# Patient Record
Sex: MALE | Race: WHITE | NOT HISPANIC OR LATINO | Employment: UNEMPLOYED | URBAN - NONMETROPOLITAN AREA
[De-identification: names, ages, dates, MRNs, and addresses within clinical notes are randomized per-mention and may not be internally consistent; named-entity substitution may affect disease eponyms.]

---

## 2017-01-03 VITALS
BODY MASS INDEX: 39.34 KG/M2 | HEIGHT: 68 IN | DIASTOLIC BLOOD PRESSURE: 70 MMHG | OXYGEN SATURATION: 98 % | WEIGHT: 259.6 LBS | TEMPERATURE: 98.1 F | RESPIRATION RATE: 18 BRPM | SYSTOLIC BLOOD PRESSURE: 152 MMHG | HEART RATE: 59 BPM

## 2017-01-03 RX ORDER — POTASSIUM CHLORIDE 20 MEQ/1
20 TABLET, EXTENDED RELEASE ORAL DAILY
COMMUNITY
End: 2017-01-06 | Stop reason: SDUPTHER

## 2017-01-03 RX ORDER — LISINOPRIL AND HYDROCHLOROTHIAZIDE 12.5; 1 MG/1; MG/1
1 TABLET ORAL DAILY
COMMUNITY
End: 2017-01-06 | Stop reason: SDUPTHER

## 2017-01-03 RX ORDER — GABAPENTIN 100 MG/1
100 CAPSULE ORAL 2 TIMES DAILY
COMMUNITY
End: 2017-01-06 | Stop reason: SDUPTHER

## 2017-01-03 RX ORDER — FUROSEMIDE 40 MG/1
40 TABLET ORAL 2 TIMES DAILY
COMMUNITY
End: 2017-01-06 | Stop reason: SDUPTHER

## 2017-01-06 ENCOUNTER — OFFICE VISIT (OUTPATIENT)
Dept: FAMILY MEDICINE CLINIC | Facility: CLINIC | Age: 62
End: 2017-01-06

## 2017-01-06 VITALS
WEIGHT: 247.2 LBS | OXYGEN SATURATION: 96 % | TEMPERATURE: 98.2 F | HEART RATE: 59 BPM | RESPIRATION RATE: 16 BRPM | DIASTOLIC BLOOD PRESSURE: 76 MMHG | HEIGHT: 68 IN | SYSTOLIC BLOOD PRESSURE: 124 MMHG | BODY MASS INDEX: 37.47 KG/M2

## 2017-01-06 DIAGNOSIS — Z11.59 NEED FOR HEPATITIS C SCREENING TEST: ICD-10-CM

## 2017-01-06 DIAGNOSIS — E11.40 TYPE 2 DIABETES MELLITUS WITH DIABETIC NEUROPATHY, WITHOUT LONG-TERM CURRENT USE OF INSULIN (HCC): ICD-10-CM

## 2017-01-06 DIAGNOSIS — E11.9 ENCOUNTER FOR DIABETIC FOOT EXAM (HCC): ICD-10-CM

## 2017-01-06 DIAGNOSIS — Z01.00 DIABETIC EYE EXAM (HCC): ICD-10-CM

## 2017-01-06 DIAGNOSIS — R60.0 LOCALIZED EDEMA: ICD-10-CM

## 2017-01-06 DIAGNOSIS — E11.9 CONTROLLED TYPE 2 DIABETES MELLITUS WITHOUT COMPLICATION, WITHOUT LONG-TERM CURRENT USE OF INSULIN (HCC): ICD-10-CM

## 2017-01-06 DIAGNOSIS — E11.9 DIABETIC EYE EXAM (HCC): ICD-10-CM

## 2017-01-06 DIAGNOSIS — I10 ESSENTIAL HYPERTENSION: Primary | ICD-10-CM

## 2017-01-06 DIAGNOSIS — Z53.20 COLONOSCOPY REFUSED: ICD-10-CM

## 2017-01-06 DIAGNOSIS — E78.2 MIXED HYPERLIPIDEMIA: ICD-10-CM

## 2017-01-06 DIAGNOSIS — Z12.5 SCREENING PSA (PROSTATE SPECIFIC ANTIGEN): ICD-10-CM

## 2017-01-06 DIAGNOSIS — E87.6 HYPOKALEMIA: ICD-10-CM

## 2017-01-06 DIAGNOSIS — Z28.21 REFUSES TETANUS, DIPHTHERIA, AND ACELLULAR PERTUSSIS (TDAP) VACCINATION: ICD-10-CM

## 2017-01-06 DIAGNOSIS — Z23 INFLUENZA VACCINATION GIVEN: ICD-10-CM

## 2017-01-06 LAB — HBA1C MFR BLD: 5.7 %

## 2017-01-06 PROCEDURE — 99214 OFFICE O/P EST MOD 30 MIN: CPT | Performed by: NURSE PRACTITIONER

## 2017-01-06 PROCEDURE — 83036 HEMOGLOBIN GLYCOSYLATED A1C: CPT | Performed by: NURSE PRACTITIONER

## 2017-01-06 RX ORDER — FUROSEMIDE 40 MG/1
40 TABLET ORAL 2 TIMES DAILY
Qty: 180 TABLET | Refills: 1 | Status: SHIPPED | OUTPATIENT
Start: 2017-01-06 | End: 2017-07-21 | Stop reason: SDUPTHER

## 2017-01-06 RX ORDER — POTASSIUM CHLORIDE 20 MEQ/1
20 TABLET, EXTENDED RELEASE ORAL DAILY
Qty: 90 TABLET | Refills: 1 | Status: SHIPPED | OUTPATIENT
Start: 2017-01-06 | End: 2017-07-21 | Stop reason: SDUPTHER

## 2017-01-06 RX ORDER — GABAPENTIN 100 MG/1
100 CAPSULE ORAL 2 TIMES DAILY
Qty: 180 CAPSULE | Refills: 1 | Status: SHIPPED | OUTPATIENT
Start: 2017-01-06 | End: 2017-07-21 | Stop reason: SDUPTHER

## 2017-01-06 RX ORDER — CHLORAL HYDRATE 500 MG
CAPSULE ORAL
COMMUNITY
End: 2017-07-21

## 2017-01-06 RX ORDER — LISINOPRIL AND HYDROCHLOROTHIAZIDE 12.5; 1 MG/1; MG/1
1 TABLET ORAL DAILY
Qty: 90 TABLET | Refills: 1 | Status: SHIPPED | OUTPATIENT
Start: 2017-01-06 | End: 2017-07-21 | Stop reason: SDUPTHER

## 2017-01-06 RX ORDER — CHOLECALCIFEROL (VITAMIN D3) 50 MCG
1 CAPSULE ORAL DAILY
Qty: 90 CAPSULE | Refills: 1 | Status: SHIPPED | OUTPATIENT
Start: 2017-01-06 | End: 2017-01-13

## 2017-01-06 NOTE — PROGRESS NOTES
Sandeep Galvez is a 61 y.o. male presents who today with   Chief Complaint   Patient presents with   • Hypertension     refills and labs        No Known Allergies    HPI:  Sandeep galvez is a 61 yr old male who is here for follow up on chronic problems of HTN stable on lisinopril, diabetes  Type 2 stable with metformin, hyperlipidemia stable on omega 3 fatty acids, diabetic neuropathy stable with gabapentin, and hypokalemia stable on potassium chloride.  No problems today.  Health maintenance discussed and documented     Past Medical History   Diagnosis Date   • Hypertension    • Hypokalemia    • Peripheral edema      History reviewed. No pertinent past surgical history.  Social History     Social History   • Marital status:      Spouse name: N/A   • Number of children: N/A   • Years of education: N/A     Social History Main Topics   • Smoking status: Never Smoker   • Smokeless tobacco: Never Used   • Alcohol use No   • Drug use: No   • Sexual activity: Defer     Other Topics Concern   • None     Social History Narrative     Family History   Problem Relation Age of Onset   • Heart attack Mother    • No Known Problems Sister    • No Known Problems Brother    • No Known Problems Son    • No Known Problems Maternal Grandmother    • No Known Problems Maternal Grandfather    • No Known Problems Paternal Grandmother    • No Known Problems Paternal Grandfather    • No Known Problems Brother    • No Known Problems Brother    • No Known Problems Son    • No Known Problems Son    • No Known Problems Son        Current Outpatient Prescriptions on File Prior to Visit   Medication Sig Dispense Refill   • furosemide (LASIX) 40 MG tablet Take 40 mg by mouth 2 (Two) Times a Day.     • gabapentin (NEURONTIN) 100 MG capsule Take 100 mg by mouth 2 (Two) Times a Day.     • lisinopril-hydrochlorothiazide (ZESTORETIC) 10-12.5 MG per tablet Take 1 tablet by mouth Daily.     • metFORMIN (GLUCOPHAGE) 500 MG tablet Take 500 mg by  "mouth Daily. 1/2 tab daily     • potassium chloride (K-DUR,KLOR-CON) 20 MEQ CR tablet Take 20 mEq by mouth Daily.       No current facility-administered medications on file prior to visit.       REVIEW OF SYMPTOMS: (Positives bolded, all negative)  General:  weight loss, fever, chills, night sweats, fatigue, appetite loss  HEENT:  blurry vision, eye pain, eye discharge, dry eyes, decreased vision  Respiratory: shortness of breath, cough, hemoptysis, wheezing, pleurisy,   Cardiovascular:  chest pain, PND, palpitation, edema, orthopnea, syncope, swelling of extremities  Gastro: Nausea, vomiting, diarrhea, hematemesis, abdominal pain, constipation  Genito: hematuria, dysuria, glycosuria, hesitancy, frequency, incontinence  Musckelo: Arthralgia, myalgia, muscle weakness, joint swelling, NSAID use  Skin: rash, pruritis, sores, nail changes, skin thickening  Neuro:  Migraine, numbness, ataxia, tremor, vertigo, weakness, memory loss,  \"All other systems reviewed and negative, except as listed above.”    OBJECTIVE:  Constitutional:  Appearance-No acute distress, Consistent with stated age. Orientation- Oriented x 3, alert Posture-Not doubled over. Gait-Normal pace, normal arm movement. Posture- Normal Build and Nutrition-Well developed and well nourished.  General- Patient is pleasant and cooperative with the interview and exam.    Integumentary: General-No rashes, ulcers or lesions. No edema.  Palpation- Normal skin moisture/turgor. Skin is warm to touch, no increased warmth. Capillary refill is normal bilateral Upper and lower extremity.     Head/Neck: Head- normocephalic and atraumatic.  Neck- without visible/palpable lumps or pulsations.  Palpation- No bony tenderness about head/neck along frontal, occiptial, temporal, parietal, mastoid, jawline, zygoma, orbit or any other location.  NO temporal artery tenderness. No TMJ tenderness. Normal cervical ROM.   Neck Supple.  Thyroid-No thyromegaly, no nodules    Eye: " Bilaterally PERRLA, EOMI.  No discharge.  Upper and lower eyelids are normal. Sclera/conjunctiva normal without discharge. Cornea is normal and clear. Lens is normal.  Eyeball appears normal. No ciliary flushing, no conjunctival injection.    ENMT:  Pinna- normal without tenderness or erythema.  External auditory canal Left- normal without erythema or discharge, no excessive cerumen. External auditory canal Right-normal without erythema or discharge, no excessive cerumen. TM left- Grey/pearly, normal light reflex and anatomy TM Right- Grey/pearly, normal light reflex and anatomy Hearing Assessment-normal to conversational speech at 2-5 feet.  Nose and sinus-No sinus tenderness along frontal/maxillary region. External appearance normal and midline. Nares- bilateral quiet airflow, no discharge. Nasal mucosa- No bleeding noted and no ulcerations observed. Pink, moist. Turbinates non boggy. Lips- normal color, moist without cracks/lesions Oral Cavity/Palate- hard/soft palate intact without lesions, oral mucosa pink and moist. Dentition assessed and discussed appropriate oral care. Tongue normal midline.  Oropharynx- no pharyngeal erythema, Uvula midline. No post nasal drip. No exudate. Salivary glands- Non tender to palpation    CHEST/LUNG: Inspection- symmetric chest wall no pectus deformity. Normal effort, no distress, no use of accessory muscles. Palpation- nontender sternum, ribline.  No abnormal pulsations. Auscultation- Breath sounds normal throughout all lung fields.  Normal tracheal sounds, Normal bronchial sounds overlying sternum, Bronchovessicular sounds normal between scapulae posteriorly, Normal vessicular breath sounds heard throughout periphery. Lungs are clear today. Adventitious sounds- No wheezes, rales, rhonchi.     CARDIOVASCULAR:  Carotid artery- normal, no bruits or abnormal pulsations. Jugular vein- no pulsations. Palpation/Percussion- Normal PMI, no palpable thrill  Auscultation- Regular rate and  rhythm. No murmur noted in sitting, supine positions. Extremities- no digital clubbing, cyanosis, edema, increased warmth.    ABDOMEN: Inspection- normal and no visible pulsations. Normal contour. Auscultation- Bowel sounds normal, no abdominal bruits. Palpation/Percussion- soft, non-tender, no rebound tenderness, no rigidity (guarding), no jar tenderness, no masses.  Liver-no hepatomegaly, Spleen - no splenomegaly, Hernias- none. Rectal not examined.     Peripheral Vascular: Upper extremity Left- Normal temperature with pink nailbeds and no ulcerations.  Upper extremity Right- Normal temperature with pink nailbeds and no ulcerations.  Lower extremity- Normal temperature with pink nailbeds and no ulcerations. DP pulses 2+ bilaterally.  Pedal hair intact.  Normal capillary refill. Edema- No edema.    Neurological: General- Moves all 4 extremities symmetrically. Symmetrical face and body posture. Cranial nerves- individually evaluated II-XII and intact. PERRLA, Normal EOMI, visual/special senses appear intact, Face is symmetrical and normal sensation/movement, normal tongue, normal strength/posture of neck musculature. Balance- Romberg intact.  Reflexes- ntact with DTR 2+ patellar, Achilles, bicep, brachial,tricep. Ankle clonus normal with 2 beats.  Strength- 5/5 bilateral UE and LE. Soft touch- intact bilateral UE and LE.  Temperature sensation- intact bilateral UE and LE. Cerebellar testing-Rapid alternating movements intact.  Heel shin intact. Able to walk normal gait, normal heel toe walking. Neck- supple.      Diabetic foot exam:   RIGHT: No callus formation, no pre-ulcer areas, no hammer toe, no claw toe, no deformity. Normal sensation, normal pulses.  Toenails are yellow, thick and growing all directions.  No amputations, no cracks, no fissures, no bunions.  Normal monofilament testing.      LEFT: No callus formation, no pre-ulcer areas, no hammer toe, no claw toe, no deformity. Toe nails are thick, yellow and  extremely long and growing different directions.   normal pulses. No amputations, no cracks, no fissures, no bunions, normal monofilament testing       Neuropsych: Oriented- Person, place, time. (AAOx3), Mood/affect- normal and congruent. Able to articulate well. Speech-Normal speech, normal rate, normal tone, normal use of language, volume and coherence.  Thought content- normal with ability to perform basic computations and apply abstract thought/reason. Associations- intact, no SI/HI, no hallucinations, delusions, obsessions.  Judgment/insight- Appropriate. Memory-Recall intact, remote and recent memory intact. Knowledge- Age appropriate fund of knowledge, concentration and attention span normal.    Lymphatic: Head/Neck- normal size and non tender to palpation. Axillary- Head and neck LN are normal size and non tender to palpation. Femoral and Inguinal- normal size and non tender to palpation.      Assessment/Plan:  Sandeep was seen today for hypertension.    Diagnoses and all orders for this visit:    Essential hypertension  -     CBC & AUTO Differential  -     lisinopril-hydrochlorothiazide (ZESTORETIC) 10-12.5 MG per tablet; Take 1 tablet by mouth Daily.    Mixed hyperlipidemia  -     Lipid Panel  -     potassium chloride (K-DUR,KLOR-CON) 20 MEQ CR tablet; Take 1 tablet by mouth Daily.  -     HM OMEGA-3-6-9 FATTY ACIDS capsule; Take 1 capsule by mouth Daily.    Hypokalemia  -     Comprehensive Metabolic Panel    Screening PSA (prostate specific antigen)  -     PSA    Controlled type 2 diabetes mellitus without complication, without long-term current use of insulin  -     POC Glycosylated Hemoglobin (Hb A1C)    Refuses tetanus, diphtheria, and acellular pertussis (TDaP) vaccination  Comments:  1/7/17 refused      Influenza vaccination given  Comments:  At work November 2016    Colonoscopy refused  Comments:  1/7/17 Declines dispite the risks, benefits and alterative options    Diabetic eye exam  Comments:  Done  9/10/13 now due    Need for hepatitis C screening test  Comments:  1/7/17Declines not in high risk    Encounter for diabetic foot exam  Comments:  1/7/17    Localized edema  -     furosemide (LASIX) 40 MG tablet; Take 1 tablet by mouth 2 (Two) Times a Day.    Type 2 diabetes mellitus with diabetic neuropathy, without long-term current use of insulin  -     gabapentin (NEURONTIN) 100 MG capsule; Take 1 capsule by mouth 2 (Two) Times a Day.  -     Ambulatory Referral to Podiatry      1. Diabetes: Insulin (non) dependent. Nephropathy, Retinopathy, Neuropahty status discussed.  Discussed goals of Diabetes today.  Goal Hgb A1C <7.0 for most patient.  Good BP control is encouraged with Goal BP based on JNC 8 guidelines 2014 <140/90.  Discussed role of Ace-I and ARB with DM.  DM imparts risk equivalence for CAD based on ATP III.  Current guidelines support moderate intensity statin with goal of 30-50% reduction in LDL unless 10 yr risk ASCVD >7.5 then high intensity should be used. Close monitoring of Lipid levels encouraged. Recommend once yearly eye evaluation by optometry or ophthalmology.  Good foot health discussed and foot exam completed.  Recommended toe health, wear good shoes, cut nails straight across and tend calluses if present. Take medications as encouraged.  Monitor blood sugars as encouraged and bring log to future meetings. Weight needs to be monitored. Monitor portions and caloric intake.  Pneumovax frequency discussed.   a. Labs: CMP, Microalbumin, A1C  b. Encouraged pt to bring glucose logs to each appointment  c. Encouraged self foot exams  d. Encouraged to lose weight/please see below  e. Recommend regular exercise   f. Medications as listed below  g. Offered handout on DM educational topics.  Provided if patient requested these today.  h. Microalbumin yearly      2. HTN: Suspect Essential HTN.Good BP control is encouraged with Goal BP based on JNC 8 guidelines 2014 <140/90 for patients with known  cardiac disease and diabetes. (ALEXIS. 2014:322 (5):507-520. doi:10.1001/alexis.2013.73661): general population <60 yr old goal BP <140/90 and for those >60 <150/90.  For patients of all ages with Diabetes, CKD, Known CAD <140/90. Recommended to the patient to obtain electronic home BP machine with upper arm blood pressure cuff and to check regularly as instructed.  Keep BP log and bring to subsequent visits. Stable, at goal.  a. LABS: CBC, CMP  b. Recommend if you do not have a home BP machine to obtain an electronic machine with arm blood pressure cuff.      c. Monitor BP over the next week and keep log to bring back to office. Discussed medication therapy however pt wants to try to control with diet exercise. .  Your provider  has recommended self-monitoring of your blood pressure.  If you do not have a blood pressure cuff you may purchase one from the local pharmacy.  You may ask the pharmacist which brand and model they recommend.  Obtain your blood pressure measurement at least 2x per week.  You should also check your blood pressure if you experience any symptoms of blurred visit, dizziness or headache.  Please record all blood pressure measurements and bring them to next office visit.  If you have any questions about the accuracy of your blood pressure machine please bring it in to the office and our staff will be happy to check accuracy.   d. Encouraged to eat a low sodium heart healthy diet  e. Offered handout on HTN educational topics.  These were provided if patient requested these today.  f. MEDS: as listed below  g. Risks/benefits of current and new medications discussed with the patient and or family today.  The patient/family are aware and accept that if there any side effects they should call or return to clinic as soon as possible.  Appropriate F/U discussed for topics addressed today. All questions were answered to the  satisfactory state of patient/family.  Should symptoms fail to improve or worsen  they agree to call or return to clinic or to go to the ER. Education handouts were offered on any new Rx if requested.  Discussed the importance of following up with any needed screening tests/labs/specialist appointments and any requested follow-up recommended by me today.  Importance of maintaining follow-up discussed and patient accepts that missed appointments can delay diagnosis and potentially lead to worsening of conditions.  h. ADA diet encouraged    3. Hyperlipidemia: Current guidelines support moderate intensity statin with goal of 30-50% reduction in LDL unless 10 yr risk ASCVD >7.5 then high intensity should be used.  a. Labs: Lipid panel  b. Offered handout on Elevated cholesterol topics.  Provided if patient requested these today.  c. Moderate potency statins include atorvastatin 20mg, pravastatin 40 mg or Rosuvastatin 10 mg.  d. Laboratory Monitoring:    i. If starting statins can consider repeat lipid panel and CMP in 8 weeks.   ii. Current literature supports limited need to monitor CMP due to low risks of liver enzyme changes.  Consider repeat in 3 months. Risks include abdominal pain, dark urine, change in stools.  Monitor for DM (especially in women) during treatment.  iii. Consider to monitor CPK if family history, severe myalgia or dark urine.  iv. Lipid panel should be checked at baseline and then 8-12 weeks after medication change.  If 2 consecutive LDL <40 consider changing dose.  Minimum monitoring of yearly.   e. Major side effects reported include muscle cramps and pain, kidney and liver changes and diabetes.  We will monitor blood work for kidney/liver. Take Rx at night. If any muscle cramps call clinic.      4. Obesity: Federal guidelines recommend that people under the age of 65 should have a BMI of 18.5-24.9 and people age 65 and older should have a BMI of 23-30. The patient BMI 37.6  is outside this range and we recommended/discussed today to utilize a diet/exercise program to get  back into the appropriate range.  Today we encouraged roughly a 1 lb per week weight loss with initial goal of 5% weight loss. The initial step is to document everything that is consumed into a food diary.  Studies have shown that patients can lose up to 2x the weight by keeping track of foods.     a. Discussed if eating out for a meal, consider cutting food in half and placing into to-go container.  Individually portion any foods coming into the home based on package.    b. Consider using smaller plates.    c. Consider drinking 12 oz of water 30 minutes before meal as way to suppress appetite.   d. Cut back on soft drinks/juices.  Discussed 1 can of regular soft drink has roughly 150-170 Calories per day.    e. Increase exercise as able. It is recommended to try to exercise minimum 10 minutes 5 days per week.  The goal over the next 2-4 weeks is to walk 30 minutes per day 5 days per week at pace difficult to hold conversation.     Schedule a diabetic eye exam    Return in about 6 months (around 7/6/2017).      Lisbet Velez, DNP, APRN-BC  01/06/2017

## 2017-01-06 NOTE — MR AVS SNAPSHOT
Sandeep Alonso   1/6/2017 9:00 AM   Office Visit    Dept Phone:  [phone]   Encounter #:  [id number]    Provider:  Lisbet Velez DNP, VENKAT   Department:  [facility]                Your Full Care Plan              Today's Medication Changes          These changes are accurate as of: 1/6/17 10:11 AM.  If you have any questions, ask your nurse or doctor.               New Medication(s)Ordered:     HM OMEGA-3-6-9 FATTY ACIDS capsule   Take 1 capsule by mouth Daily.   Started by:  Lisbet Velez DNP, VENKAT            Where to Get Your Medications      You can get these medications from any pharmacy     Bring a paper prescription for each of these medications     furosemide 40 MG tablet    gabapentin 100 MG capsule    HM OMEGA-3-6-9 FATTY ACIDS capsule    lisinopril-hydrochlorothiazide 10-12.5 MG per tablet    potassium chloride 20 MEQ CR tablet                  Your Updated Medication List          This list is accurate as of: 1/6/17 10:11 AM.  Always use your most recent med list.                fish oil 1000 MG capsule capsule       furosemide 40 MG tablet   Commonly known as:  LASIX   Take 1 tablet by mouth 2 (Two) Times a Day.       gabapentin 100 MG capsule   Commonly known as:  NEURONTIN   Take 1 capsule by mouth 2 (Two) Times a Day.       HM OMEGA-3-6-9 FATTY ACIDS capsule   Take 1 capsule by mouth Daily.       lisinopril-hydrochlorothiazide 10-12.5 MG per tablet   Commonly known as:  ZESTORETIC   Take 1 tablet by mouth Daily.       metFORMIN 500 MG tablet   Commonly known as:  GLUCOPHAGE       potassium chloride 20 MEQ CR tablet   Commonly known as:  K-DUR,KLOR-CON   Take 1 tablet by mouth Daily.               We Performed the Following     Ambulatory Referral to Podiatry     CBC & AUTO Differential     Comprehensive Metabolic Panel     Lipid Panel     POC Glycosylated Hemoglobin (Hb A1C)     PSA       You Were Diagnosed With        Codes  Comments    Essential hypertension    -  Primary ICD-10-CM: I10  ICD-9-CM: 401.9     Mixed hyperlipidemia     ICD-10-CM: E78.2  ICD-9-CM: 272.2     Hypokalemia     ICD-10-CM: E87.6  ICD-9-CM: 276.8     Screening PSA (prostate specific antigen)     ICD-10-CM: Z12.5  ICD-9-CM: V76.44     Controlled type 2 diabetes mellitus without complication, without long-term current use of insulin     ICD-10-CM: E11.9  ICD-9-CM: 250.00     Refuses tetanus, diphtheria, and acellular pertussis (TDaP) vaccination     ICD-10-CM: Z28.21  ICD-9-CM: V64.06 17 refused      Influenza vaccination given     ICD-10-CM: Z23  ICD-9-CM: V04.81 At work 2016    Colonoscopy refused     ICD-10-CM: Z53.20  ICD-9-CM: V64.2 17 Declines dispite the risks, benefits and alterative options    Diabetic eye exam     ICD-10-CM: E11.9, Z01.00  ICD-9-CM: V72.0, 250.00 Done 9/10/13 now due    Need for hepatitis C screening test     ICD-10-CM: Z11.59  ICD-9-CM: V73.89 17Declines not in high risk    Encounter for diabetic foot exam     ICD-10-CM: E11.9  ICD-9-CM: 250.00 17    Localized edema     ICD-10-CM: R60.0  ICD-9-CM: 782.3     Type 2 diabetes mellitus with diabetic neuropathy, without long-term current use of insulin     ICD-10-CM: E11.40  ICD-9-CM: 250.60, 357.2       Instructions     None    Patient Instructions History      Upcoming Appointments     Visit Type Date Time Department    OFFICE VISIT 2017  9:00 AM SHAMA WELCH [place]      [employer] Signup     [employer] allows you to send messages to your doctor, view your test results, renew your prescriptions, schedule appointments, and more. To sign up, go to [employer] and click on the Sign Up Now link in the New User? box. Enter your [employer] Activation Code exactly as it appears below along with the last four digits of your Social Security Number and your Date of Birth () to complete the sign-up process. If you do not sign up before the expiration  "date, you must request a new code.    [employer] Activation Code: [email or web address]  Expires: 1/20/2017 10:11 AM    If you have questions, you can email [email or web address] or call [phone] to talk to our [employer] staff. Remember, [employer] is NOT to be used for urgent needs. For medical emergencies, dial 911.               Other Info from Your Visit           Allergies     No Known Allergies      Reason for Visit     Hypertension refills and labs      Vital Signs     Blood Pressure Pulse Temperature Respirations Height Weight    124/76 59 98.2 °F (36.8 °C) 16 68\" (172.7 cm) 247 lb 3.2 oz (112 kg)    Oxygen Saturation Body Mass Index Smoking Status             96% 37.59 kg/m2 Never Smoker         Problems and Diagnoses Noted     Colonoscopy refused    Controlled type 2 diabetes mellitus without complication, without long-term current use of insulin    Diabetic eye exam    Encounter for diabetic foot exam    High blood pressure    Hypokalemia    Influenza vaccination given    Mixed hyperlipidemia    Need for hepatitis C screening test    Refuses tetanus, diphtheria, and acellular pertussis (TDaP) vaccination    Screening for prostate cancer    Accumulation of fluid in tissues        Type 2 diabetes mellitus with diabetic neuropathy, without long-term current use of insulin            "

## 2017-01-07 LAB
ALBUMIN SERPL-MCNC: 4.7 G/DL (ref 3.5–5)
ALBUMIN/GLOB SERPL: 1.2 G/DL (ref 1.1–2.5)
ALP SERPL-CCNC: 91 U/L (ref 24–120)
ALT SERPL-CCNC: 47 U/L (ref 0–54)
AST SERPL-CCNC: 26 U/L (ref 7–45)
BASOPHILS # BLD AUTO: 0.02 10*3/MM3 (ref 0–0.2)
BASOPHILS NFR BLD AUTO: 0.2 % (ref 0–2)
BILIRUB SERPL-MCNC: 0.7 MG/DL (ref 0.1–1)
BUN SERPL-MCNC: 17 MG/DL (ref 5–21)
BUN/CREAT SERPL: 18.5 (ref 7–25)
CALCIUM SERPL-MCNC: 9.7 MG/DL (ref 8.4–10.4)
CHLORIDE SERPL-SCNC: 98 MMOL/L (ref 98–110)
CHOLEST SERPL-MCNC: 180 MG/DL (ref 130–200)
CO2 SERPL-SCNC: 33 MMOL/L (ref 24–31)
CREAT SERPL-MCNC: 0.92 MG/DL (ref 0.5–1.4)
EOSINOPHIL # BLD AUTO: 0.15 10*3/MM3 (ref 0–0.7)
EOSINOPHIL NFR BLD AUTO: 1.7 % (ref 0–4)
ERYTHROCYTE [DISTWIDTH] IN BLOOD BY AUTOMATED COUNT: 13.9 % (ref 12–15)
GLOBULIN SER CALC-MCNC: 3.8 GM/DL
GLUCOSE SERPL-MCNC: 89 MG/DL (ref 70–100)
HCT VFR BLD AUTO: 49.2 % (ref 40–52)
HDLC SERPL-MCNC: 32 MG/DL
HGB BLD-MCNC: 15.8 G/DL (ref 14–18)
IMM GRANULOCYTES # BLD: 0.03 10*3/MM3 (ref 0–0.03)
IMM GRANULOCYTES NFR BLD: 0.3 % (ref 0–5)
LDLC SERPL CALC-MCNC: 120 MG/DL (ref 0–99)
LYMPHOCYTES # BLD AUTO: 2.99 10*3/MM3 (ref 0.72–4.86)
LYMPHOCYTES NFR BLD AUTO: 34.6 % (ref 15–45)
MCH RBC QN AUTO: 29.4 PG (ref 28–32)
MCHC RBC AUTO-ENTMCNC: 32.1 G/DL (ref 33–36)
MCV RBC AUTO: 91.6 FL (ref 82–95)
MONOCYTES # BLD AUTO: 0.55 10*3/MM3 (ref 0.19–1.3)
MONOCYTES NFR BLD AUTO: 6.4 % (ref 4–12)
NEUTROPHILS # BLD AUTO: 4.9 10*3/MM3 (ref 1.87–8.4)
NEUTROPHILS NFR BLD AUTO: 56.8 % (ref 39–78)
PLATELET # BLD AUTO: 297 10*3/MM3 (ref 130–400)
POTASSIUM SERPL-SCNC: 4 MMOL/L (ref 3.5–5.3)
PROT SERPL-MCNC: 8.5 G/DL (ref 6.3–8.7)
PSA SERPL-MCNC: 0.98 NG/ML (ref 0–4)
RBC # BLD AUTO: 5.37 10*6/MM3 (ref 4.8–5.9)
SODIUM SERPL-SCNC: 145 MMOL/L (ref 135–145)
TRIGL SERPL-MCNC: 142 MG/DL (ref 0–149)
VLDLC SERPL CALC-MCNC: 28.4 MG/DL
WBC # BLD AUTO: 8.64 10*3/MM3 (ref 4.8–10.8)

## 2017-01-13 ENCOUNTER — OFFICE VISIT (OUTPATIENT)
Dept: PODIATRY | Facility: CLINIC | Age: 62
End: 2017-01-13

## 2017-01-13 VITALS
HEIGHT: 68 IN | SYSTOLIC BLOOD PRESSURE: 152 MMHG | HEART RATE: 82 BPM | BODY MASS INDEX: 36.83 KG/M2 | DIASTOLIC BLOOD PRESSURE: 88 MMHG | WEIGHT: 243 LBS

## 2017-01-13 DIAGNOSIS — B35.1 ONYCHOMYCOSIS: Primary | ICD-10-CM

## 2017-01-13 DIAGNOSIS — E11.49 DIABETES MELLITUS TYPE 2 WITH NEUROLOGICAL MANIFESTATIONS (HCC): ICD-10-CM

## 2017-01-13 PROCEDURE — 99203 OFFICE O/P NEW LOW 30 MIN: CPT | Performed by: PODIATRIST

## 2017-01-13 PROCEDURE — 11721 DEBRIDE NAIL 6 OR MORE: CPT | Performed by: PODIATRIST

## 2017-01-13 NOTE — PROGRESS NOTES
Today's Date: 01/13/2017    Sandeep Alonso  MRN: [id number]  CSN: [id number]  PCP: Lisbet Velez, DNP, APRN      SUBJECTIVE     Chief Complaint   Patient presents with   • Lower Extremity Issue     Diabetic toenail care     HPI: Sandeep Alonso, a 61 y.o.male, presents to clinic as a new patient complaining of painful elongated toenails. Pt is diabetic, controlled with Metformin. Relates his toenails become painful when they are elongated and was told he shouldn't trim them himself due to diabetes. Admits occasional numbness, and tingling in his feet. Denies any constitutional symptoms. No other pedal complaints at this time.    Past Medical History   Diagnosis Date   • Diabetes mellitus    • Hypertension    • Hypokalemia    • Peripheral edema        History reviewed. No pertinent past surgical history.    Family History   Problem Relation Age of Onset   • Heart attack Mother    • Emphysema Father    • No Known Problems Sister    • No Known Problems Brother    • No Known Problems Son    • No Known Problems Maternal Grandmother    • No Known Problems Maternal Grandfather    • No Known Problems Paternal Grandmother    • No Known Problems Paternal Grandfather    • No Known Problems Brother    • No Known Problems Brother    • No Known Problems Son    • No Known Problems Son    • No Known Problems Son        Social History     Social History   • Marital status:      Spouse name: N/A   • Number of children: N/A   • Years of education: N/A     Occupational History   • Not on file.     Social History Main Topics   • Smoking status: Never Smoker   • Smokeless tobacco: Never Used   • Alcohol use Yes      Comment: occasional beer   • Drug use: No   • Sexual activity: Defer     Other Topics Concern   • Not on file     Social History Narrative       No Known Allergies    Prior to Admission medications    Medication Sig Start Date End Date Taking? Authorizing Provider   furosemide (LASIX) 40 MG tablet Take 1 tablet by  mouth 2 (Two) Times a Day. 1/6/17   Lisbet Velez DNP, APRN   gabapentin (NEURONTIN) 100 MG capsule Take 1 capsule by mouth 2 (Two) Times a Day. 1/6/17   Lisbet Velez DNP, APRN   HM OMEGA-3-6-9 FATTY ACIDS capsule Take 1 capsule by mouth Daily. 1/6/17   Lisbet Velez DNP, APRN   lisinopril-hydrochlorothiazide (ZESTORETIC) 10-12.5 MG per tablet Take 1 tablet by mouth Daily. 1/6/17   Lisbet Velez DNP, APRN   metFORMIN (GLUCOPHAGE) 500 MG tablet 1/2 tab daily (actual dose is 250) 1/6/17   Lisbet Velez DNP, APRN   Omega-3 Fatty Acids (FISH OIL) 1000 MG capsule capsule Take  by mouth Daily With Breakfast.    Historical Provider, MD   potassium chloride (K-DUR,KLOR-CON) 20 MEQ CR tablet Take 1 tablet by mouth Daily. 1/6/17   Lisbet Velez DNP, APRN       Review of Systems   Constitutional: Negative for chills and fever.   HENT: Negative for congestion.    Respiratory: Negative for shortness of breath.    Cardiovascular: Negative for chest pain and leg swelling.   Gastrointestinal: Negative for constipation, diarrhea, nausea and vomiting.   Skin:        Elongated toenails   Neurological: Positive for numbness.       OBJECTIVE     Vitals:    01/13/17 1303   BP: 152/88   Pulse: 82       PHYSICAL EXAM  GEN:   A&Ox3, NAD, Pt ambulates to clinic without assistance and wearing casual shoes.    NEURO:   Protective sensation intact to 10/10 sites Right foot, 10/10 site Left foot using Hawley-Lucita monofilament  Light touch sensation diminished.  No Tinel's or Villeux sign.    VASC:  Skin temperature warm to warm proximal to distal alina  DP pulses 2/4 Right, 2/4 Left  PT pulses 1/4 Right, 1/4 Left  CFT <3sec alina  No edema noted alina  No varicosities noted alina    MUSC/SKEL:  Muscle Strength Right foot Dorsiflexors 5/5, Plantarflexors 5/5, Evertors 5/5, Invertors 5/5  Muscle Strength Left foot Dorsiflexors 5/5, Plantarflexors 5/5, Evertors 5/5, Invertors 5/5  POP of Nail plates  No gross pedal  musculoskeletal deformities noted.     DERM:  Pedal nails 1-5 alina are thickened, elongated, discolored, and crumbly with presence of subungual debris.  Webspaces are c/d/i  Skin is mildly atrophic and shiny with no open wounds or sores appreciated.      PATHOLOGY RESULTS:      RADIOLOGY/NUCLEAR:      LABORATORY/CULTURE RESULTS:     Lab Results (last 24 hours)     ** No results found for the last 24 hours. **          ASSESSMENT/PLAN     Patient Active Problem List   Diagnosis   • Essential hypertension   • Mixed hyperlipidemia   • Hypokalemia   • Screening PSA (prostate specific antigen)   • Controlled type 2 diabetes mellitus without complication, without long-term current use of insulin   • Need for hepatitis C screening test   • Diabetic eye exam   • Colonoscopy refused   • Influenza vaccination given   • Refuses tetanus, diphtheria, and acellular pertussis (TDaP) vaccination   • Encounter for diabetic foot exam         ICD-10-CM ICD-9-CM   1. Onychomycosis B35.1 110.1   2. Diabetes mellitus type 2 with neurological manifestations E11.49 250.60     -Pt was examined and evaluated  -Discussed findings and treatment options with patient in detail  -Reviewed any pertinent xrays, labs and studies from pt chart  -After verbal consent obtained, debridement of nails x10 of length and thickness without incidence  -Pt may use emery board at home to maintain nails between visits  -RTC 3 month(s), or sooner if acute issues arise.              Please note that portions of this note may have been completed with a voice recognition program. Efforts were made to edit the dictations, but occasionally words are mistranscribed.

## 2017-01-13 NOTE — PATIENT INSTRUCTIONS
Diabetes and Foot Care  Diabetes may cause you to have problems because of poor blood supply (circulation) to your feet and legs. This may cause the skin on your feet to become thinner, break easier, and heal more slowly. Your skin may become dry, and the skin may peel and crack. You may also have nerve damage in your legs and feet causing decreased feeling in them. You may not notice minor injuries to your feet that could lead to infections or more serious problems. Taking care of your feet is one of the most important things you can do for yourself.   HOME CARE INSTRUCTIONS  · Wear shoes at all times, even in the house. Do not go barefoot. Bare feet are easily injured.  · Check your feet daily for blisters, cuts, and redness. If you cannot see the bottom of your feet, use a mirror or ask someone for help.  · Wash your feet with warm water (do not use hot water) and mild soap. Then pat your feet and the areas between your toes until they are completely dry. Do not soak your feet as this can dry your skin.  · Apply a moisturizing lotion or petroleum jelly (that does not contain alcohol and is unscented) to the skin on your feet and to dry, brittle toenails. Do not apply lotion between your toes.  · Trim your toenails straight across. Do not dig under them or around the cuticle. File the edges of your nails with an emery board or nail file.  · Do not cut corns or calluses or try to remove them with medicine.  · Wear clean socks or stockings every day. Make sure they are not too tight. Do not wear knee-high stockings since they may decrease blood flow to your legs.  · Wear shoes that fit properly and have enough cushioning. To break in new shoes, wear them for just a few hours a day. This prevents you from injuring your feet. Always look in your shoes before you put them on to be sure there are no objects inside.  · Do not cross your legs. This may decrease the blood flow to your feet.  · If you find a minor scrape,  cut, or break in the skin on your feet, keep it and the skin around it clean and dry. These areas may be cleansed with mild soap and water. Do not cleanse the area with peroxide, alcohol, or iodine.  · When you remove an adhesive bandage, be sure not to damage the skin around it.  · If you have a wound, look at it several times a day to make sure it is healing.  · Do not use heating pads or hot water bottles. They may burn your skin. If you have lost feeling in your feet or legs, you may not know it is happening until it is too late.  · Make sure your health care provider performs a complete foot exam at least annually or more often if you have foot problems. Report any cuts, sores, or bruises to your health care provider immediately.  SEEK MEDICAL CARE IF:   · You have an injury that is not healing.  · You have cuts or breaks in the skin.  · You have an ingrown nail.  · You notice redness on your legs or feet.  · You feel burning or tingling in your legs or feet.  · You have pain or cramps in your legs and feet.  · Your legs or feet are numb.  · Your feet always feel cold.  SEEK IMMEDIATE MEDICAL CARE IF:   · There is increasing redness, swelling, or pain in or around a wound.  · There is a red line that goes up your leg.  · Pus is coming from a wound.  · You develop a fever or as directed by your health care provider.  · You notice a bad smell coming from an ulcer or wound.     This information is not intended to replace advice given to you by your health care provider. Make sure you discuss any questions you have with your health care provider.     Document Released: 12/15/2001 Document Revised: 08/20/2014 Document Reviewed: 05/27/2014  [employer] Interactive Patient Education ©2016 [employer]

## 2017-01-13 NOTE — MR AVS SNAPSHOT
Sandeep Alonso   1/13/2017 1:45 PM   Office Visit    Dept Phone:  [phone]   Encounter #:  [id number]    Provider:  Ghassan Sheets DPM   Department:  [facility]                Your Full Care Plan              Your Updated Medication List          This list is accurate as of: 1/13/17  1:28 PM.  Always use your most recent med list.                fish oil 1000 MG capsule capsule       furosemide 40 MG tablet   Commonly known as:  LASIX   Take 1 tablet by mouth 2 (Two) Times a Day.       gabapentin 100 MG capsule   Commonly known as:  NEURONTIN   Take 1 capsule by mouth 2 (Two) Times a Day.       lisinopril-hydrochlorothiazide 10-12.5 MG per tablet   Commonly known as:  ZESTORETIC   Take 1 tablet by mouth Daily.       metFORMIN 500 MG tablet   Commonly known as:  GLUCOPHAGE   1/2 tab daily (actual dose is 250)       potassium chloride 20 MEQ CR tablet   Commonly known as:  K-DUR,KLOR-CON   Take 1 tablet by mouth Daily.               You Were Diagnosed With        Codes Comments    Onychomycosis    -  Primary ICD-10-CM: B35.1  ICD-9-CM: 110.1     Diabetes mellitus type 2 with neurological manifestations     ICD-10-CM: E11.49  ICD-9-CM: 250.60       Instructions    Diabetes and Foot Care  Diabetes may cause you to have problems because of poor blood supply (circulation) to your feet and legs. This may cause the skin on your feet to become thinner, break easier, and heal more slowly. Your skin may become dry, and the skin may peel and crack. You may also have nerve damage in your legs and feet causing decreased feeling in them. You may not notice minor injuries to your feet that could lead to infections or more serious problems. Taking care of your feet is one of the most important things you can do for yourself.   HOME CARE INSTRUCTIONS  · Wear shoes at all times, even in the house. Do not go barefoot. Bare feet are easily injured.  · Check your feet daily for  blisters, cuts, and redness. If you cannot see the bottom of your feet, use a mirror or ask someone for help.  · Wash your feet with warm water (do not use hot water) and mild soap. Then pat your feet and the areas between your toes until they are completely dry. Do not soak your feet as this can dry your skin.  · Apply a moisturizing lotion or petroleum jelly (that does not contain alcohol and is unscented) to the skin on your feet and to dry, brittle toenails. Do not apply lotion between your toes.  · Trim your toenails straight across. Do not dig under them or around the cuticle. File the edges of your nails with an emery board or nail file.  · Do not cut corns or calluses or try to remove them with medicine.  · Wear clean socks or stockings every day. Make sure they are not too tight. Do not wear knee-high stockings since they may decrease blood flow to your legs.  · Wear shoes that fit properly and have enough cushioning. To break in new shoes, wear them for just a few hours a day. This prevents you from injuring your feet. Always look in your shoes before you put them on to be sure there are no objects inside.  · Do not cross your legs. This may decrease the blood flow to your feet.  · If you find a minor scrape, cut, or break in the skin on your feet, keep it and the skin around it clean and dry. These areas may be cleansed with mild soap and water. Do not cleanse the area with peroxide, alcohol, or iodine.  · When you remove an adhesive bandage, be sure not to damage the skin around it.  · If you have a wound, look at it several times a day to make sure it is healing.  · Do not use heating pads or hot water bottles. They may burn your skin. If you have lost feeling in your feet or legs, you may not know it is happening until it is too late.  · Make sure your health care provider performs a complete foot exam at least annually or more often if you have foot problems. Report any cuts, sores, or bruises to your  health care provider immediately.  SEEK MEDICAL CARE IF:   · You have an injury that is not healing.  · You have cuts or breaks in the skin.  · You have an ingrown nail.  · You notice redness on your legs or feet.  · You feel burning or tingling in your legs or feet.  · You have pain or cramps in your legs and feet.  · Your legs or feet are numb.  · Your feet always feel cold.  SEEK IMMEDIATE MEDICAL CARE IF:   · There is increasing redness, swelling, or pain in or around a wound.  · There is a red line that goes up your leg.  · Pus is coming from a wound.  · You develop a fever or as directed by your health care provider.  · You notice a bad smell coming from an ulcer or wound.     This information is not intended to replace advice given to you by your health care provider. Make sure you discuss any questions you have with your health care provider.     Document Released: 12/15/2001 Document Revised: 2014 Document Reviewed: 2014  [employer] - is now [employer] Interactive Patient Education © [employer]       Patient Instructions History      Upcoming Appointments     Visit Type Date Time Department    NEW PATIENT 2017  1:45 PM [facility]    FOLLOW UP 2017  1:00 PM [facility]      MyChart Signup     [facility] [email or web address] allows you to send messages to your doctor, view your test results, renew your prescriptions, schedule appointments, and more. To sign up, go to [employer] and click on the Sign Up Now link in the New User? box. Enter your [email or web address] Activation Code exactly as it appears below along with the last four digits of your Social Security Number and your Date of Birth () to complete the sign-up process. If you do not sign up before the expiration date, you must request a new code.    [email or web address] Activation Code: [email or web address]  Expires: 2017 10:11 AM    If you have questions, you can email [email or web address] [email or web address] or call [phone] to talk to our [email or web address] staff.  "Remember, MyChart is NOT to be used for urgent needs. For medical emergencies, dial 911.               Other Info from Your Visit           Your Appointments     Jan 13, 2017  1:45 PM CST   New Patient with Ghassan Sheets DPM   [facility] PODIAT[facility] (--)    [address] [place] [address] [address]  [place] [id number] [phone]           Bring all previous medical records and films, along with current medications and insurance information.            Apr 14, 2017  1:00 PM CDT   Follow Up with Ghassan Sheets DPM   [facility] PODIAT[facility] (--)    [address] [place] [address] [address]  [place] [address] [phone]           Arrive 15 minutes prior to appointment.              Allergies     No Known Allergies      Reason for Visit     Lower Extremity Issue Diabetic toenail care      Vital Signs     Blood Pressure Pulse Height Weight Body Mass Index Smoking Status    152/88 82 67.5\" (171.5 cm) 243 lb (110 kg) 37.5 kg/m2 Never Smoker      Problems and Diagnoses Noted     Onychomycosis    -  Primary    Diabetes mellitus type 2 with neurological manifestations            "

## 2017-04-14 ENCOUNTER — OFFICE VISIT (OUTPATIENT)
Dept: PODIATRY | Facility: CLINIC | Age: 62
End: 2017-04-14

## 2017-04-14 VITALS
BODY MASS INDEX: 37.2 KG/M2 | HEART RATE: 58 BPM | WEIGHT: 237 LBS | SYSTOLIC BLOOD PRESSURE: 132 MMHG | HEIGHT: 67 IN | DIASTOLIC BLOOD PRESSURE: 74 MMHG

## 2017-04-14 DIAGNOSIS — B35.1 ONYCHOMYCOSIS: Primary | ICD-10-CM

## 2017-04-14 DIAGNOSIS — E11.49 DIABETES MELLITUS TYPE 2 WITH NEUROLOGICAL MANIFESTATIONS (HCC): ICD-10-CM

## 2017-04-14 PROCEDURE — 99213 OFFICE O/P EST LOW 20 MIN: CPT | Performed by: PODIATRIST

## 2017-04-14 PROCEDURE — 11721 DEBRIDE NAIL 6 OR MORE: CPT | Performed by: PODIATRIST

## 2017-04-14 NOTE — PATIENT INSTRUCTIONS
Diabetes and Foot Care  Diabetes may cause you to have problems because of poor blood supply (circulation) to your feet and legs. This may cause the skin on your feet to become thinner, break easier, and heal more slowly. Your skin may become dry, and the skin may peel and crack. You may also have nerve damage in your legs and feet causing decreased feeling in them. You may not notice minor injuries to your feet that could lead to infections or more serious problems. Taking care of your feet is one of the most important things you can do for yourself.  HOME CARE INSTRUCTIONS  · Wear shoes at all times, even in the house. Do not go barefoot. Bare feet are easily injured.  · Check your feet daily for blisters, cuts, and redness. If you cannot see the bottom of your feet, use a mirror or ask someone for help.  · Wash your feet with warm water (do not use hot water) and mild soap. Then pat your feet and the areas between your toes until they are completely dry. Do not soak your feet as this can dry your skin.  · Apply a moisturizing lotion or petroleum jelly (that does not contain alcohol and is unscented) to the skin on your feet and to dry, brittle toenails. Do not apply lotion between your toes.  · Trim your toenails straight across. Do not dig under them or around the cuticle. File the edges of your nails with an emery board or nail file.  · Do not cut corns or calluses or try to remove them with medicine.  · Wear clean socks or stockings every day. Make sure they are not too tight. Do not wear knee-high stockings since they may decrease blood flow to your legs.  · Wear shoes that fit properly and have enough cushioning. To break in new shoes, wear them for just a few hours a day. This prevents you from injuring your feet. Always look in your shoes before you put them on to be sure there are no objects inside.  · Do not cross your legs. This may decrease the blood flow to your feet.  · If you find a minor scrape,  cut, or break in the skin on your feet, keep it and the skin around it clean and dry. These areas may be cleansed with mild soap and water. Do not cleanse the area with peroxide, alcohol, or iodine.  · When you remove an adhesive bandage, be sure not to damage the skin around it.  · If you have a wound, look at it several times a day to make sure it is healing.  · Do not use heating pads or hot water bottles. They may burn your skin. If you have lost feeling in your feet or legs, you may not know it is happening until it is too late.  · Make sure your health care provider performs a complete foot exam at least annually or more often if you have foot problems. Report any cuts, sores, or bruises to your health care provider immediately.  SEEK MEDICAL CARE IF:   · You have an injury that is not healing.  · You have cuts or breaks in the skin.  · You have an ingrown nail.  · You notice redness on your legs or feet.  · You feel burning or tingling in your legs or feet.  · You have pain or cramps in your legs and feet.  · Your legs or feet are numb.  · Your feet always feel cold.  SEEK IMMEDIATE MEDICAL CARE IF:   · There is increasing redness, swelling, or pain in or around a wound.  · There is a red line that goes up your leg.  · Pus is coming from a wound.  · You develop a fever or as directed by your health care provider.  · You notice a bad smell coming from an ulcer or wound.     This information is not intended to replace advice given to you by your health care provider. Make sure you discuss any questions you have with your health care provider.     Document Released: 12/15/2001 Document Revised: 01/13/2017 Document Reviewed: 05/27/2014  [employer] Interactive Patient Education ©2016 [employer]

## 2017-04-14 NOTE — PROGRESS NOTES
[facility] - PODIATRY    Today's Date: 04/14/2017    Patient Name: Sandeep Alonso  MRN: [id number]  CSN: [id number]  PCP: Lisbet Velez, DNP, APRN  Referring Provider: No ref. provider found    SUBJECTIVE     Chief Complaint   Patient presents with   • Follow-up     3 month f/u of diabetic foot care and onychomycosis/Pt states that his feet seem to be doing well     HPI: Sandeep Alonso, a 61 y.o.male, comes to clinic as a(n) established patient presenting for diabetic foot exam and complaining of painful toenails. Patient is NIDDM and unsure of last BG level. Admits pain at 2/10 level and described as aching and tingling. Relates previous treatment(s) including nail trimming by podiatrist. Denies any constitutional symptoms. No other pedal complaints at this time.    Past Medical History:   Diagnosis Date   • Diabetes mellitus    • Hypertension    • Hypokalemia    • Peripheral edema      History reviewed. No pertinent surgical history.  Family History   Problem Relation Age of Onset   • Heart attack Mother    • Emphysema Father    • No Known Problems Sister    • No Known Problems Brother    • No Known Problems Son    • No Known Problems Maternal Grandmother    • No Known Problems Maternal Grandfather    • No Known Problems Paternal Grandmother    • No Known Problems Paternal Grandfather    • No Known Problems Brother    • No Known Problems Brother    • No Known Problems Son    • No Known Problems Son    • No Known Problems Son      Social History     Social History   • Marital status:      Spouse name: N/A   • Number of children: N/A   • Years of education: N/A     Occupational History   • Not on file.     Social History Main Topics   • Smoking status: Never Smoker   • Smokeless tobacco: Never Used   • Alcohol use Yes      Comment: occasional beer   • Drug use: No   • Sexual activity: Defer     Other Topics Concern   • Not on file     Social History Narrative     No Known Allergies  Current  Outpatient Prescriptions   Medication Sig Dispense Refill   • furosemide (LASIX) 40 MG tablet Take 1 tablet by mouth 2 (Two) Times a Day. (Patient taking differently: Take 40 mg by mouth Daily.) 180 tablet 1   • gabapentin (NEURONTIN) 100 MG capsule Take 1 capsule by mouth 2 (Two) Times a Day. 180 capsule 1   • lisinopril-hydrochlorothiazide (ZESTORETIC) 10-12.5 MG per tablet Take 1 tablet by mouth Daily. 90 tablet 1   • metFORMIN (GLUCOPHAGE) 500 MG tablet 1/2 tab daily (actual dose is 250) 90 tablet 0   • Omega-3 Fatty Acids (FISH OIL) 1000 MG capsule capsule Take  by mouth Daily With Breakfast.     • potassium chloride (K-DUR,KLOR-CON) 20 MEQ CR tablet Take 1 tablet by mouth Daily. 90 tablet 1     No current facility-administered medications for this visit.      Review of Systems   Constitutional: Negative for chills and fever.   HENT: Negative for congestion.    Respiratory: Negative for shortness of breath.    Cardiovascular: Negative for chest pain and leg swelling.   Gastrointestinal: Negative for constipation, diarrhea, nausea and vomiting.   Skin:        Elongated toenails   Neurological: Positive for numbness.       OBJECTIVE     Vitals:    04/14/17 1308   BP: 132/74   Pulse: 58       PHYSICAL EXAM  GEN:   A&Ox3, NAD. Pt presents to clinic ambulating without assistance and wearing Casual Shoes.      NEURO:   Protective sensation intact to 10/10 sites Right foot, 10/10 site Left foot using [place] monofilament  Light touch sensation diminished  No Tinel's or [place] sign.    VASC:  Skin temperature Warm to Warm proximal to distal alina  DP pulses 2/4 Right, 2/4 Left  PT pulses 1/4 Right, 1/4 Left  CFT <3 sec alina   no edema noted alina  Varicosities absent alina    MUSC/SKEL:  Muscle Strength Right foot Dorsiflexors 5/5, Plantarflexors 5/5, Evertors 5/5, Invertors 5/5  Muscle Strength Left foot Dorsiflexors 5/5, Plantarflexors 5/5, Evertors 5/5, Invertors 5/5  ROM of the 1st MTP is full without pain or  crepitus  ROM of the MTJ is full without pain or crepitus    ROM of the STJ is full without pain or crepitus    ROM of the ankle joint is full without pain or crepitus    POP of nail plates  Rectus foot type   Gait pattern: Normal  No gross pedal musculoskeletal deformities noted.     DERM:  Pedal nails x10 are thickened, elongated and discolored with presence of subunugual debris  Webspaces are Clean, Dry, and Intact  Skin is mildly atrophic and shiny with no open wounds or sores appreciated.      RADIOLOGY/NUCLEAR:  No results found.    LABORATORY/CULTURE RESULTS:      PATHOLOGY RESULTS:       ASSESSMENT/PLAN     Sandeep was seen today for follow-up.    Diagnoses and all orders for this visit:    Onychomycosis    Diabetes mellitus type 2 with neurological manifestations    Comprehensive lower extremity examination and evaluation was performed.  Discussed findings and treatment plan including risks, benefits, and treatment options with patient in detail. Patient agreed with treatment plan.  After verbal consent obtained, nail(s) x10 debrided of length and thickness with nail nipper without incidence, Patient may maintain nails and calluses at home utilizing [place] board of pumice stone between visits as needed, Reviewed at home diabetic foot care   An After Visit Summary was printed and given to the patient at discharge, including (if requested) any available informative/educational handouts regarding diagnosis, treatment, or medications. All questions were answered to patient/family satisfaction. Should symptoms fail to improve or worsen they agree to call or return to clinic or to go to the Emergency Department. Discussed the importance of following up with any needed screening tests/labs/specialist appointments and any requested follow-up recommended by me today. Importance of maintaining follow-up discussed and patient accepts that missed appointments can delay diagnosis and potentially lead to worsening of  conditions.  No Follow-up on file., or sooner if acute issues arise.        This document has been electronically signed by Ghassan Sheets DPM on April 14, 2017 1:26 PM     Please note that portions of this note may have been completed with a voice recognition program. Efforts were made to edit the dictations, but occasionally words are mistranscribed.

## 2017-07-21 ENCOUNTER — HOSPITAL ENCOUNTER (OUTPATIENT)
Dept: GENERAL RADIOLOGY | Facility: HOSPITAL | Age: 62
Discharge: HOME | End: 2017-07-21
Admitting: NURSE PRACTITIONER

## 2017-07-21 ENCOUNTER — OFFICE VISIT (OUTPATIENT)
Dept: FAMILY MEDICINE CLINIC | Facility: CLINIC | Age: 62
End: 2017-07-21

## 2017-07-21 VITALS
WEIGHT: 228.2 LBS | SYSTOLIC BLOOD PRESSURE: 168 MMHG | HEART RATE: 50 BPM | BODY MASS INDEX: 35.82 KG/M2 | RESPIRATION RATE: 20 BRPM | HEIGHT: 67 IN | DIASTOLIC BLOOD PRESSURE: 82 MMHG | OXYGEN SATURATION: 96 %

## 2017-07-21 DIAGNOSIS — E11.40 TYPE 2 DIABETES MELLITUS WITH DIABETIC NEUROPATHY, WITHOUT LONG-TERM CURRENT USE OF INSULIN (HCC): ICD-10-CM

## 2017-07-21 DIAGNOSIS — I10 ESSENTIAL HYPERTENSION: ICD-10-CM

## 2017-07-21 DIAGNOSIS — M25.472 SWELLING OF ANKLE JOINT, LEFT: ICD-10-CM

## 2017-07-21 DIAGNOSIS — E87.6 HYPOKALEMIA: ICD-10-CM

## 2017-07-21 DIAGNOSIS — Z02.83 ENCOUNTER FOR DRUG SCREENING: ICD-10-CM

## 2017-07-21 DIAGNOSIS — L03.116 CELLULITIS OF LEFT LOWER EXTREMITY: ICD-10-CM

## 2017-07-21 DIAGNOSIS — E11.9 CONTROLLED TYPE 2 DIABETES MELLITUS WITHOUT COMPLICATION, WITHOUT LONG-TERM CURRENT USE OF INSULIN (HCC): Primary | ICD-10-CM

## 2017-07-21 DIAGNOSIS — R60.0 LOCALIZED EDEMA: ICD-10-CM

## 2017-07-21 DIAGNOSIS — Z51.81 ENCOUNTER FOR THERAPEUTIC DRUG MONITORING: ICD-10-CM

## 2017-07-21 DIAGNOSIS — E78.2 MIXED HYPERLIPIDEMIA: ICD-10-CM

## 2017-07-21 PROBLEM — M25.473 SWELLING OF ANKLE JOINT: Status: ACTIVE | Noted: 2017-07-21

## 2017-07-21 LAB
HBA1C MFR BLD: 5.4 %
POC AMPHETAMINES: NEGATIVE
POC BARBITURATES: NEGATIVE
POC BENZODIAZEPHINES: NEGATIVE
POC COCAINE: NEGATIVE
POC METHADONE: NEGATIVE
POC METHAMPHETAMINE SCREEN URINE: NEGATIVE
POC OPIATES: NEGATIVE
POC OXYCODONE: NEGATIVE
POC PHENCYCLIDINE: NEGATIVE
POC PROPOXYPHENE: NEGATIVE
POC THC: NEGATIVE
POC TRICYCLIC ANTIDEPRESSANTS: NEGATIVE

## 2017-07-21 PROCEDURE — 80307 DRUG TEST PRSMV CHEM ANLYZR: CPT | Performed by: NURSE PRACTITIONER

## 2017-07-21 PROCEDURE — 73610 X-RAY EXAM OF ANKLE: CPT

## 2017-07-21 PROCEDURE — 99214 OFFICE O/P EST MOD 30 MIN: CPT | Performed by: NURSE PRACTITIONER

## 2017-07-21 PROCEDURE — 83036 HEMOGLOBIN GLYCOSYLATED A1C: CPT | Performed by: NURSE PRACTITIONER

## 2017-07-21 RX ORDER — CLINDAMYCIN HYDROCHLORIDE 300 MG/1
300 CAPSULE ORAL 3 TIMES DAILY
Qty: 21 CAPSULE | Refills: 0 | Status: SHIPPED | OUTPATIENT
Start: 2017-07-21 | End: 2017-07-28

## 2017-07-21 RX ORDER — FUROSEMIDE 40 MG/1
40 TABLET ORAL 2 TIMES DAILY
Qty: 180 TABLET | Refills: 1 | Status: SHIPPED | OUTPATIENT
Start: 2017-07-21 | End: 2018-02-02 | Stop reason: SDUPTHER

## 2017-07-21 RX ORDER — OMEGA-3 FATTY ACIDS/FISH OIL 300-1000MG
CAPSULE ORAL
COMMUNITY
End: 2018-07-27 | Stop reason: SDUPTHER

## 2017-07-21 RX ORDER — LISINOPRIL AND HYDROCHLOROTHIAZIDE 12.5; 1 MG/1; MG/1
1 TABLET ORAL DAILY
Qty: 90 TABLET | Refills: 1 | Status: SHIPPED | OUTPATIENT
Start: 2017-07-21 | End: 2018-02-02 | Stop reason: SDUPTHER

## 2017-07-21 RX ORDER — GABAPENTIN 100 MG/1
100 CAPSULE ORAL 2 TIMES DAILY
Qty: 180 CAPSULE | Refills: 1 | Status: SHIPPED | OUTPATIENT
Start: 2017-07-21 | End: 2017-12-08 | Stop reason: SDUPTHER

## 2017-07-21 RX ORDER — POTASSIUM CHLORIDE 20 MEQ/1
20 TABLET, EXTENDED RELEASE ORAL DAILY
Qty: 90 TABLET | Refills: 1 | Status: SHIPPED | OUTPATIENT
Start: 2017-07-21 | End: 2018-02-02 | Stop reason: SDUPTHER

## 2017-07-21 NOTE — PROGRESS NOTES
"  Chief Complaint   Patient presents with   • Follow-up     6 month/ patient states he is having swelling and slight pain on left ankle when walking      No Known Allergies    HPI: Sandeep Alonso is a 62 y.o. male presents today for a chronic visit and have his leg looked at because it has been swelling and having pain, rating pain 7/10. .  Denies any injury, says that it just started swelling about 5 days ago and he has been taking ibuprofen for the pain with some relief.  Has multiple chronic problems of hypokalemia stable with potassium chloride, diabetes type 2 which has been controlled in the past with metformin.  Pt states, \"I do not check my sugars).  HTN stable with lisinopril/HCTZ, hyperlipidemia stable with omega 3 fatty acids, lower extremity edema not stable with furosemide with last does being this am.  Diabetic neuropathy stable with gabapentin.   He has not been in since gabapentin went scheduled and I explained to him that he will have to do a drug test, clement and control contract and he is ok with that. R    Past Medical History:   Diagnosis Date   • Diabetes mellitus    • Hypertension    • Hypokalemia    • Peripheral edema      No past surgical history on file.  Social History     Social History   • Marital status:      Spouse name: N/A   • Number of children: N/A   • Years of education: N/A     Social History Main Topics   • Smoking status: Never Smoker   • Smokeless tobacco: Never Used   • Alcohol use Yes      Comment: occasional beer   • Drug use: No   • Sexual activity: Defer     Other Topics Concern   • None     Social History Narrative     Family History   Problem Relation Age of Onset   • Heart attack Mother    • Emphysema Father    • No Known Problems Sister    • No Known Problems Brother    • No Known Problems Son    • No Known Problems Maternal Grandmother    • No Known Problems Maternal Grandfather    • No Known Problems Paternal Grandmother    • No Known Problems Paternal " "Grandfather    • No Known Problems Brother    • No Known Problems Brother    • No Known Problems Son    • No Known Problems Son    • No Known Problems Son        Current Outpatient Prescriptions on File Prior to Visit   Medication Sig Dispense Refill   • furosemide (LASIX) 40 MG tablet Take 1 tablet by mouth 2 (Two) Times a Day. (Patient taking differently: Take 40 mg by mouth Daily.) 180 tablet 1   • gabapentin (NEURONTIN) 100 MG capsule Take 1 capsule by mouth 2 (Two) Times a Day. 180 capsule 1   • lisinopril-hydrochlorothiazide (ZESTORETIC) 10-12.5 MG per tablet Take 1 tablet by mouth Daily. 90 tablet 1   • metFORMIN (GLUCOPHAGE) 500 MG tablet 1/2 tab daily (actual dose is 250) 90 tablet 0   • potassium chloride (K-DUR,KLOR-CON) 20 MEQ CR tablet Take 1 tablet by mouth Daily. 90 tablet 1   • [DISCONTINUED] Omega-3 Fatty Acids (FISH OIL) 1000 MG capsule capsule Take  by mouth Daily With Breakfast.       No current facility-administered medications on file prior to visit.         REVIEW OF SYMPTOMS: (Positives bolded)  General:  weight loss, fever, chills, night sweats, fatigue, appetite loss  HEENT:  blurry vision, eye pain, eye discharge, dry eyes, decreased vision   Respiratory: shortness of breath, cough, hemoptysis, wheezing, pleurisy,   Cardiovascular:  chest pain, PND, palpitation, edema, orthopnea, syncope, swelling of extremities  Gastro: Nausea, vomiting, diarrhea, hematemesis, abdominal pain, constipation  Genito: hematuria, dysuria, glycosuria, hesitancy, frequency, incontinence  Musckelo: Arthralgia, myalgia, muscle weakness, joint swelling, NSAID use  Skin: rash, pruritis, sores, nail changes, skin thickening, change in wart/mole, itching, rash, new lesions, pruritus, nail changes  Neuro:  Migraine, numbness, ataxia, tremor, vertigo, weakness, memory loss,  \"All other systems reviewed and negative, except as listed above.”      OBJECTIVE:  Constitutional:  Appearance-No acute distress, Consistent with " stated age. Orientation- Oriented x 3, alert Posture-Not doubled over. Gait-Normal pace, normal arm movement. Posture- Normal Build and Nutrition-Well developed and well nourished.  General- Patient is pleasant and cooperative with the interview and exam.    Integumentary: General-No rashes, ulcers or lesions. No edema.  Palpation- Normal skin moisture/turgor. Skin is warm to touch, no increased warmth. Capillary refill is normal bilateral Upper and lower extremity.     Head/Neck: Head- normocephalic and atraumatic.  Neck- without visible/palpable lumps or pulsations.  Palpation- No bony tenderness about head/neck along frontal, occiptial, temporal, parietal, mastoid, jawline, zygoma, orbit or any other location.  NO temporal artery tenderness. No TMJ tenderness. Normal cervical ROM.   Neck Supple.  Thyroid-No thyromegaly, no nodules    Eye: Bilaterally PERRLA, EOMI.  No discharge.  Upper and lower eyelids are normal. Sclera/conjunctiva normal without discharge. Cornea is normal and clear. Lens is normal.  Eyeball appears normal. No ciliary flushing, no conjunctival injection.    ENMT:  Pinna- normal without tenderness or erythema.  External auditory canal Left- normal without erythema or discharge, no excessive cerumen. External auditory canal Right-normal without erythema or discharge, no excessive cerumen. TM left- Grey/pearly, normal light reflex and anatomy TM Right- Grey/pearly, normal light reflex and anatomy Hearing Assessment-normal to conversational speech at 2-5 feet.  Nose and sinus-No sinus tenderness along frontal/maxillary region. External appearance normal and midline. Nares- bilateral quiet airflow, no discharge. Nasal mucosa- No bleeding noted and no ulcerations observed. Pink, moist. Turbinates non boggy. Lips- normal color, moist without cracks/lesions Oral Cavity/Palate- hard/soft palate intact without lesions, oral mucosa pink and moist. Dentition assessed and discussed appropriate oral care.  Tongue normal midline.  Oropharynx- no pharyngeal erythema, Uvula midline. No post nasal drip. No exudate. Salivary glands- Non tender to palpation    CHEST/LUNG: Inspection- symmetric chest wall no pectus deformity. Normal effort, no distress, no use of accessory muscles. Palpation- nontender sternum, ribline.  No abnormal pulsations. Auscultation- Breath sounds normal throughout all lung fields.  Normal tracheal sounds, Normal bronchial sounds overlying sternum, Bronchovessicular sounds normal between scapulae posteriorly, Normal vessicular breath sounds heard throughout periphery. Lungs are clear today. Adventitious sounds- No wheezes, rales, rhonchi.     CARDIOVASCULAR:  Carotid artery- normal, no bruits or abnormal pulsations. Jugular vein- no pulsations. Palpation/Percussion- Normal PMI, no palpable thrill  Auscultation- Regular rate and rhythm. No murmur noted in sitting, supine positions. Extremities- no digital clubbing, cyanosis, edema, increased warmth.    ABDOMEN: Inspection- normal and no visible pulsations. Normal contour. Auscultation- Bowel sounds normal, no abdominal bruits. Palpation/Percussion- soft, non-tender, no rebound tenderness, no rigidity (guarding), no jar tenderness, no masses.  Liver-no hepatomegaly, Spleen - no splenomegaly, Hernias- none. Rectal not examined.     Peripheral Vascular: Upper extremity Left- Normal temperature with pink nailbeds and no ulcerations.  Upper extremity Right- Normal temperature with pink nailbeds and no ulcerations.  Lower extremity- Normal temperature with pink nailbeds and no ulcerations. DP pulses 2+ bilaterally.  Pedal hair intact.  Normal capillary refill. Edema- Has bilateral ankle edema with edema worse in the left leg/ankle    Musculoskeletal: Generalized-No generalized swelling or edema of extremities, no digital clubbing or cyanosis, neurovascularly intact all four extremities.  Left lower extremity:  Non tender to touch on leg and calf, has  tenderness on palpation of the the whole ankle worse medially.  Has decreased rom of the ankle, has 1+ swelling of ankle, has good pedal pulses and dorsal pedis pulses, can  all toes without difficulty.  Has erythema in the medial side of the ankle.  Pt has had hx of cellulitis in the past and this resembles it.   Knee ROM normal at 0-120 degrees. No tenderness overlying trochanters, no tenderness about patella, quad tendon, patellar tendon.  No tenderness at tibial tuberosity. Ankle normal ROM not tender to palpation along medial/lateral malleolus. Normal movement of toes, no tenderness bilateral feet/toes.  Normal foot type. Calves symmetrical.  Stretching demonstrated today.  Spine/Ribs- No deformities, masses or tenderness, no known fractures, normal strength, Normal ROM. Normal stability No tenderness along C/T/L spine.  Normal appearing ROM about spine.      Neurological: General- Moves all 4 extremities symmetrically. Symmetrical face and body posture. Cranial nerves- individually evaluated II-XII and intact. PERRLA, Normal EOMI, visual/special senses appear intact, Face is symmetrical and normal sensation/movement, normal tongue, normal strength/posture of neck musculature. Balance- Romberg intact.  Reflexes- ntact with DTR 2+ patellar, Achilles, bicep, brachial,tricep. Ankle clonus normal with 2 beats.  Strength- 5/5 bilateral UE and LE. Soft touch- intact bilateral UE and LE.  Temperature sensation- intact bilateral UE and LE. Cerebellar testing-Rapid alternating movements intact.  Heel shin intact. Able to walk normal gait, normal heel toe walking. Neck- supple.      Diabetic foot exam:   RIGHT: No callus formation, no pre-ulcer areas, no hammer toe, no claw toe, no deformity. Toe nails normal, toe nails cut rounded, very thick, brittle and yellow, across, normal sensation, normal pulses. No amputations, no cracks, no fissures, no bunions.  Normal monofilament testing.   Has absence of hair on toes.        LEFT: No callus formation, no pre-ulcer areas, no hammer toe, no claw toe, no deformity. Toe nails normal, toe nails cut rounded, very thick, brittle and yellow, across, normal sensation, normal pulses. No amputations, no cracks, no fissures, no bunions.  Normal monofilament testing.   Has absence of hair on toes.     Neuropsych: Oriented- Person, place, time. (AAOx3), Mood/affect- normal and congruent. Able to articulate well. Speech-Normal speech, normal rate, normal tone, normal use of language, volume and coherence.  Thought content- normal with ability to perform basic computations and apply abstract thought/reason. Associations- intact, no SI/HI, no hallucinations, delusions, obsessions.  Judgment/insight- Appropriate. Memory-Recall intact, remote and recent memory intact. Knowledge- Age appropriate fund of knowledge, concentration and attention span normal.    Lymphatic: Head/Neck- normal size and non tender to palpation. Axillary- Head and neck LN are normal size and non tender to palpation. Femoral and Inguinal- normal size and non tender to palpation.      Assessment/Plan:  Sandeep was seen today for follow-up.    Diagnoses and all orders for this visit:    Controlled type 2 diabetes mellitus without complication, without long-term current use of insulin  -     POC Glycosylated Hemoglobin (Hb A1C)     POC Glycosylated Hemoglobin (Hb A1C)   Order: [id number]   Status:  Final result   Visible to patient:  No (Not Released) Dx:  Controlled type 2 diabetes mellitus w...         Ref Range & Units 9:05 AM   6mo ago   2yr ago      Hemoglobin A1C % 5.4 5.7 5.7R, CM   Resulting Agency  [facility]      Specimen Collected: 07/21/17  9:05 AM Last Resulted: 07/21/17  9:05 AM              -     Microalbumin / Creatinine Urine Ratio    Essential hypertension  -     CBC & Differential  -     Comprehensive Metabolic Panel  -      lisinopril-hydrochlorothiazide (ZESTORETIC) 10-12.5 MG per tablet; Take 1 tablet by mouth Daily.    Encounter for therapeutic drug monitoring  -     POC Urine Drug Screen, Triage    Hypokalemia    Swelling of ankle joint, left  -     XR Ankle 3+ View Right    Encounter for drug screening  -     POC Urine Drug Screen, Triage     POC Urine Drug Screen, Triage   Order: [id number]   Status:  Final result   Visible to patient:  No (Not Released) Dx:  Encounter for therapeutic drug monito...      Ref Range & Units 9:41 AM     Methamphetaine Screen, Urine Negative Negative   POC Amphetamines Negative Negative   Barbiturates Screen Negative Negative   Benzodiazepine Screen Negative Negative   Cocaine Screen Negative Negative   Methadone Screen Negative Negative   Opiate Screen Negative Negative   Oxycodone, Screen Negative Negative   Phencyclidine (PCP) Screen Negative Negative   Propoxyphene Screen Negative Negative   THC, Screen Negative Negative   Tricyclic Antidepressants Screen Negative Negative   Resulting Agency  [facility]      Specimen Collected: 07/21/17  9:41 AM Last Resulted: 07/21/17  9:41 AM                      Mixed hyperlipidemia  -     Lipid panel  -     potassium chloride (K-DUR,KLOR-CON) 20 MEQ CR tablet; Take 1 tablet by mouth Daily.    Localized edema  -     furosemide (LASIX) 40 MG tablet; Take 1 tablet by mouth 2 (Two) Times a Day.    Type 2 diabetes mellitus with diabetic neuropathy, without long-term current use of insulin  -     gabapentin (NEURONTIN) 100 MG capsule; Take 1 capsule by mouth 2 (Two) Times a Day.  -     metFORMIN (GLUCOPHAGE) 500 MG tablet; 1/2 tab daily (actual dose is 250)    Cellulitis of left lower extremity  -     clindamycin (CLEOCIN) 300 MG capsule; Take 1 capsule by mouth 3 (Three) Times a Day for 7 days.      Hypolkalemia stable with potassium in diet.    HEATHER query complete. Treatment plan to include limited course of prescribed controlled  substance. Risks including addiction, benefits, and alternatives presented to patient.     The patient has read and signed the [facility] Controlled Substance Contract.  I will continue to see patient for regular follow up appointments.  They are well controlled on their medication.  HEATHER is updated every 3 months. The patient is aware of the potential for addiction and dependence.    PSA was done in January 2017    Encouraged to have colonoscopy, Tdap, diabetic eye exam, hepatitis screening.  Pt declines. R/B/A of medications/treatment options d/w  the pt/family today. The patient/family are aware and accept that medications can have side effects.  If there any obvious side effects they should call or return to clinic as soon as possible.  Appropriate f/u discussed for topics addressed today. All questions were answered to the satisfactory state of patient/family.  Should symptoms fail to improve or worsen they agree to call or return to clinic or to go to the ER. Education handouts were offered on any new Rx if requested.  Discussed the importance of f/u with any needed screening tests/labs/specialist appointments and any requested follow-up recommended by me today.  Importance of maintaining f/u discussed and patient accepts that missed appointments can delay diagnosis and potentially lead to worsening of conditions.    1. Diabetes: Insulin (non) dependent. Nephropathy, Retinopathy, Neuropahty status discussed.  Discussed goals of Diabetes today.  Goal Hgb A1C <7.0 for most patient.  Good BP control is encouraged with Goal BP based on JNC 8 guidelines 2014 <140/90.  Discussed role of Ace-I and ARB with DM.  DM imparts risk equivalence for CAD based on ATP III.  Current guidelines support moderate intensity statin with goal of 30-50% reduction in LDL unless 10 yr risk ASCVD >7.5 then high intensity should be used. Close monitoring of Lipid levels encouraged. Recommend once yearly eye evaluation by optometry  or ophthalmology.  Good foot health discussed and foot exam completed.  Recommended toe health, wear good shoes, cut nails straight across and tend calluses if present. Take medications as encouraged.  Monitor blood sugars as encouraged and bring log to future meetings. Weight needs to be monitored. Monitor portions and caloric intake.  Pneumovax frequency discussed.   a. Labs: CMP, Microalbumin, A1C  b. Encouraged pt to bring glucose logs to each appointment  c. Encouraged self foot exams  d. Encouraged to lose weight/please see below  e. Recommend regular exercise   f. Medications as listed below  g. Offered handout on DM educational topics.  Provided if patient requested these today.  h. Microalbumin yearly      2. HTN: Suspect Essential HTN.Good BP control is encouraged with Goal BP based on JNC 8 guidelines 2014 <140/90 for patients with known cardiac disease and diabetes. (ALEXIS. 2014:322 (5):507-520. doi:10.1001/alexis.2013.34261): general population <60 yr old goal BP <140/90 and for those >60 <150/90.  For patients of all ages with Diabetes, CKD, Known CAD <140/90. Recommended to the patient to obtain electronic home BP machine with upper arm blood pressure cuff and to check regularly as instructed.  Keep BP log and bring to subsequent visits. Stable, at goal.  a. LABS: CBC, CMP  b. Recommend if you do not have a home BP machine to obtain an electronic machine with arm blood pressure cuff.      c. Monitor BP over the next week and keep log to bring back to office. Discussed medication therapy however pt wants to try to control with diet exercise. .  Your provider  has recommended self-monitoring of your blood pressure.  If you do not have a blood pressure cuff you may purchase one from the local pharmacy.  You may ask the pharmacist which brand and model they recommend.  Obtain your blood pressure measurement at least 2x per week.  You should also check your blood pressure if you experience any symptoms of  blurred visit, dizziness or headache.  Please record all blood pressure measurements and bring them to next office visit.  If you have any questions about the accuracy of your blood pressure machine please bring it in to the office and our staff will be happy to check accuracy.   d. Encouraged to eat a low sodium heart healthy diet  e. Offered handout on HTN educational topics.  These were provided if patient requested these today.  f. MEDS: as listed below  g. Risks/benefits of current and new medications discussed with the patient and or family today.  The patient/family are aware and accept that if there any side effects they should call or return to clinic as soon as possible.  Appropriate F/U discussed for topics addressed today. All questions were answered to the  satisfactory state of patient/family.  Should symptoms fail to improve or worsen they agree to call or return to clinic or to go to the ER. Education handouts were offered on any new Rx if requested.  Discussed the importance of following up with any needed screening tests/labs/specialist appointments and any requested follow-up recommended by me today.  Importance of maintaining follow-up discussed and patient accepts that missed appointments can delay diagnosis and potentially lead to worsening of conditions.  h. ADA diet encouraged    3. Hyperlipidemia: Current guidelines support moderate intensity statin with goal of 30-50% reduction in LDL unless 10 yr risk ASCVD >7.5 then high intensity should be used.  a. Labs: Lipid panel  b. Offered handout on Elevated cholesterol topics.  Provided if patient requested these today.  c. Moderate potency statins include atorvastatin 20mg, pravastatin 40 mg or Rosuvastatin 10 mg.  d. Laboratory Monitoring:    i. If starting statins can consider repeat lipid panel and CMP in 8 weeks.   ii. Current literature supports limited need to monitor CMP due to low risks of liver enzyme changes.  Consider repeat in 3  months. Risks include abdominal pain, dark urine, change in stools.  Monitor for DM (especially in women) during treatment.  iii. Consider to monitor CPK if family history, severe myalgia or dark urine.  iv. Lipid panel should be checked at baseline and then 8-12 weeks after medication change.  If 2 consecutive LDL <40 consider changing dose.  Minimum monitoring of yearly.   e. Major side effects reported include muscle cramps and pain, kidney and liver changes and diabetes.  We will monitor blood work for kidney/liver. Take Rx at night. If any muscle cramps call clinic.      4. Obesity: Federal guidelines recommend that people under the age of 65 should have a BMI of 18.5-24.9 and people age 65 and older should have a BMI of 23-30. The patient BMI 35.7 is outside this range and we recommended/discussed today to utilize a diet/exercise program to get back into the appropriate range.  Today we encouraged roughly a 1 lb per week weight loss with initial goal of 5% weight loss. The initial step is to document everything that is consumed into a food diary.  Studies have shown that patients can lose up to 2x the weight by keeping track of foods.     a. Discussed if eating out for a meal, consider cutting food in half and placing into to-go container.  Individually portion any foods coming into the home based on package.    b. Consider using smaller plates.    c. Consider drinking 12 oz of water 30 minutes before meal as way to suppress appetite.   d. Cut back on soft drinks/juices.  Discussed 1 can of regular soft drink has roughly 150-170 Calories per day.    e. Increase exercise as able. It is recommended to try to exercise minimum 10 minutes 5 days per week.  The goal over the next 2-4 weeks is to walk 30 minutes per day 5 days per week at pace difficult to hold conversation.   f. Would like to see back in roughly 3 months.      Return in about 6 months (around 1/21/2018), or if symptoms worsen or fail to improve in  ankle.    Lisbet Velez, BECKA, APRN-BC  07/21/2017

## 2017-07-22 LAB
ALBUMIN SERPL-MCNC: 4.3 G/DL (ref 3.5–5)
ALBUMIN/CREAT UR: <13.5 MG/G CREAT (ref 0–30)
ALBUMIN/GLOB SERPL: 1.3 G/DL (ref 1.1–2.5)
ALP SERPL-CCNC: 76 U/L (ref 24–120)
ALT SERPL-CCNC: 48 U/L (ref 0–54)
AST SERPL-CCNC: 27 U/L (ref 7–45)
BASOPHILS # BLD AUTO: 0.02 10*3/MM3 (ref 0–0.2)
BASOPHILS NFR BLD AUTO: 0.3 % (ref 0–2)
BILIRUB SERPL-MCNC: 0.9 MG/DL (ref 0.1–1)
BUN SERPL-MCNC: 16 MG/DL (ref 5–21)
BUN/CREAT SERPL: 17.2 (ref 7–25)
CALCIUM SERPL-MCNC: 9.1 MG/DL (ref 8.4–10.4)
CHLORIDE SERPL-SCNC: 102 MMOL/L (ref 98–110)
CHOLEST SERPL-MCNC: 173 MG/DL (ref 130–200)
CO2 SERPL-SCNC: 31 MMOL/L (ref 24–31)
CREAT SERPL-MCNC: 0.93 MG/DL (ref 0.5–1.4)
CREAT UR-MCNC: 22.3 MG/DL
EOSINOPHIL # BLD AUTO: 0.16 10*3/MM3 (ref 0–0.7)
EOSINOPHIL NFR BLD AUTO: 2.3 % (ref 0–4)
ERYTHROCYTE [DISTWIDTH] IN BLOOD BY AUTOMATED COUNT: 14.3 % (ref 12–15)
GLOBULIN SER CALC-MCNC: 3.2 GM/DL
GLUCOSE SERPL-MCNC: 85 MG/DL (ref 70–100)
HCT VFR BLD AUTO: 47.2 % (ref 40–52)
HDLC SERPL-MCNC: 31 MG/DL
HGB BLD-MCNC: 14.6 G/DL (ref 14–18)
IMM GRANULOCYTES # BLD: 0.02 10*3/MM3 (ref 0–0.03)
IMM GRANULOCYTES NFR BLD: 0.3 % (ref 0–5)
LDLC SERPL CALC-MCNC: 119 MG/DL (ref 0–99)
LYMPHOCYTES # BLD AUTO: 2.63 10*3/MM3 (ref 0.72–4.86)
LYMPHOCYTES NFR BLD AUTO: 37 % (ref 15–45)
MCH RBC QN AUTO: 29.1 PG (ref 28–32)
MCHC RBC AUTO-ENTMCNC: 30.9 G/DL (ref 33–36)
MCV RBC AUTO: 94.2 FL (ref 82–95)
MICROALBUMIN UR-MCNC: <3 UG/ML
MONOCYTES # BLD AUTO: 0.51 10*3/MM3 (ref 0.19–1.3)
MONOCYTES NFR BLD AUTO: 7.2 % (ref 4–12)
NEUTROPHILS # BLD AUTO: 3.77 10*3/MM3 (ref 1.87–8.4)
NEUTROPHILS NFR BLD AUTO: 52.9 % (ref 39–78)
PLATELET # BLD AUTO: 261 10*3/MM3 (ref 130–400)
POTASSIUM SERPL-SCNC: 3.8 MMOL/L (ref 3.5–5.3)
PROT SERPL-MCNC: 7.5 G/DL (ref 6.3–8.7)
RBC # BLD AUTO: 5.01 10*6/MM3 (ref 4.8–5.9)
SODIUM SERPL-SCNC: 144 MMOL/L (ref 135–145)
TRIGL SERPL-MCNC: 113 MG/DL (ref 0–149)
VLDLC SERPL CALC-MCNC: 22.6 MG/DL
WBC # BLD AUTO: 7.11 10*3/MM3 (ref 4.8–10.8)

## 2017-08-11 ENCOUNTER — OFFICE VISIT (OUTPATIENT)
Dept: PODIATRY | Facility: CLINIC | Age: 62
End: 2017-08-11

## 2017-08-11 VITALS
HEIGHT: 68 IN | DIASTOLIC BLOOD PRESSURE: 84 MMHG | OXYGEN SATURATION: 99 % | WEIGHT: 225 LBS | BODY MASS INDEX: 34.1 KG/M2 | SYSTOLIC BLOOD PRESSURE: 132 MMHG | HEART RATE: 68 BPM

## 2017-08-11 DIAGNOSIS — M76.822 POSTERIOR TIBIAL TENDINITIS, LEFT: ICD-10-CM

## 2017-08-11 DIAGNOSIS — E11.49 DIABETES MELLITUS TYPE 2 WITH NEUROLOGICAL MANIFESTATIONS (HCC): ICD-10-CM

## 2017-08-11 DIAGNOSIS — B35.1 ONYCHOMYCOSIS: Primary | ICD-10-CM

## 2017-08-11 PROCEDURE — 99213 OFFICE O/P EST LOW 20 MIN: CPT | Performed by: PODIATRIST

## 2017-08-11 PROCEDURE — 11721 DEBRIDE NAIL 6 OR MORE: CPT | Performed by: PODIATRIST

## 2017-08-11 NOTE — PROGRESS NOTES
"    [facility] - PODIATRY    Today's Date: 08/11/17    Patient Name: Sandeep Alonso  MRN: [id number]  CSN: [id number]  PCP: Lisbet Velez, DNP, APRN  Referring Provider: No ref. provider found    SUBJECTIVE     Chief Complaint   Patient presents with   • Left Ankle - Pain     Patient is complaining of left ankle pain x 2 weeks. 3/10 on pain scale. He describes the pain as throbbing.    • Diabetes     Diabetic foot exam and nail care     HPI: Sandeep Alonso, a 62 y.o.male, comes to clinic as a(n) established patient presenting for diabetic foot exam and complaining of painful toenails. Pt has h/o DM2, HTN, Hypokalemia, Edema. Relates that his nails are thick and long, need trimming. Also relates new pain to his left ankle for 2 weeks. Denies trauma but notes walking on hard floors often. Was given \"capsules\" for a few days which improved the pain some. Patient is NIDDM and unsure of last BG level. Admits pain at 3/10 level and described as aching, throbbing and tingling. Relates previous treatment(s) including nail trimming by podiatrist. Denies any constitutional symptoms. No other pedal complaints at this time.    Past Medical History:   Diagnosis Date   • Diabetes mellitus    • Hypertension    • Hypokalemia    • Peripheral edema      No past surgical history on file.  Family History   Problem Relation Age of Onset   • Heart attack Mother    • Emphysema Father    • No Known Problems Sister    • No Known Problems Brother    • No Known Problems Son    • No Known Problems Maternal Grandmother    • No Known Problems Maternal Grandfather    • No Known Problems Paternal Grandmother    • No Known Problems Paternal Grandfather    • No Known Problems Brother    • No Known Problems Brother    • No Known Problems Son    • No Known Problems Son    • No Known Problems Son      Social History     Social History   • Marital status:      Spouse name: N/A   • Number of children: N/A   • Years of education: N/A "     Occupational History   • Not on file.     Social History Main Topics   • Smoking status: Never Smoker   • Smokeless tobacco: Never Used   • Alcohol use Yes      Comment: occasional beer   • Drug use: No   • Sexual activity: Defer     Other Topics Concern   • Not on file     Social History Narrative     No Known Allergies  Current Outpatient Prescriptions   Medication Sig Dispense Refill   • furosemide (LASIX) 40 MG tablet Take 1 tablet by mouth 2 (Two) Times a Day. 180 tablet 1   • gabapentin (NEURONTIN) 100 MG capsule Take 1 capsule by mouth 2 (Two) Times a Day. 180 capsule 1   • lisinopril-hydrochlorothiazide (ZESTORETIC) 10-12.5 MG per tablet Take 1 tablet by mouth Daily. 90 tablet 1   • metFORMIN (GLUCOPHAGE) 500 MG tablet 1/2 tab daily (actual dose is 250) 90 tablet 0   • Omega 3 1000 MG capsule Take  by mouth.     • potassium chloride (K-DUR,KLOR-CON) 20 MEQ CR tablet Take 1 tablet by mouth Daily. 90 tablet 1     No current facility-administered medications for this visit.      Review of Systems   Constitutional: Negative for chills and fever.   HENT: Negative for congestion.    Respiratory: Negative for shortness of breath.    Cardiovascular: Negative for chest pain and leg swelling.   Gastrointestinal: Negative for constipation, diarrhea, nausea and vomiting.   Skin:        Elongated toenails   Neurological: Positive for numbness.       OBJECTIVE     Vitals:    08/11/17 0916   BP: 132/84   Pulse: 68   SpO2: 99%       PHYSICAL EXAM  GEN:   A&Ox3, NAD. Pt presents to clinic ambulating without assistance and wearing Casual Shoes.      NEURO:   Protective sensation intact to 10/10 sites Right foot, 10/10 site Left foot using [place]-Lucita monofilament  Light touch sensation diminished  No Tinel's or Villeux sign.    VASC:  Skin temperature Warm to Warm proximal to distal alina  DP pulses 2/4 Right, 2/4 Left  PT pulses 1/4 Right, 1/4 Left  CFT <3 sec alina   Light focal edema to left medial ankle  Varicosities  absent alina    MUSC/SKEL:  Muscle Strength Right foot Dorsiflexors 5/5, Plantarflexors 5/5, Evertors 5/5, Invertors 5/5  Muscle Strength Left foot Dorsiflexors 5/5, Plantarflexors 5/5, Evertors 5/5, Invertors 5/5  ROM of the 1st MTP is full without pain or crepitus  ROM of the MTJ is full without pain or crepitus    ROM of the STJ is full without pain or crepitus    ROM of the ankle joint is full without pain or crepitus    POP of nail plates  Mild POP of left medial ankle along course of PT tendon  Rectus foot type   Gait pattern: Normal  No gross pedal musculoskeletal deformities noted.     DERM:  Pedal nails x10 are thickened, elongated and discolored with presence of subunugual debris  Webspaces are Clean, Dry, and Intact  Skin is mildly atrophic and shiny with no open wounds or sores appreciated.      RADIOLOGY/NUCLEAR:  Xr Ankle 3+ View Left    Result Date: 7/21/2017  Narrative: HISTORY: Left ankle pain and swelling  Left ankle: 3 views of left ankle are obtained.  There is soft tissue edema. There is no fracture or dislocation. There is prominent calcaneal spurring.      Impression: 1. No acute bony pathology left ankle. Soft tissue edema. 2. Moderate calcaneal spurring. This report was finalized on 07/21/2017 09:27 by Dr. Zully Mace MD.      LABORATORY/CULTURE RESULTS:      PATHOLOGY RESULTS:       ASSESSMENT/PLAN     Sandeep was seen today for diabetes and pain.    Diagnoses and all orders for this visit:    Onychomycosis    Diabetes mellitus type 2 with neurological manifestations    Posterior tibial tendinitis, left    Comprehensive lower extremity examination and evaluation was performed.  Discussed findings and treatment plan including risks, benefits, and treatment options with patient in detail. Patient agreed with treatment plan.  After verbal consent obtained, nail(s) x10 debrided of length and thickness with nail nipper without incidence, Patient may maintain nails and calluses at home utilizing  emery board of pumice stone between visits as needed, Reviewed at home diabetic foot care   Advised to wear compression stocking and ice left inner ankle 3x daily until pain resolves. If pain gets worse, may require more aggressive therapy.  An After Visit Summary was printed and given to the patient at discharge, including (if requested) any available informative/educational handouts regarding diagnosis, treatment, or medications. All questions were answered to patient/family satisfaction. Should symptoms fail to improve or worsen they agree to call or return to clinic or to go to the Emergency Department. Discussed the importance of following up with any needed screening tests/labs/specialist appointments and any requested follow-up recommended by me today. Importance of maintaining follow-up discussed and patient accepts that missed appointments can delay diagnosis and potentially lead to worsening of conditions.  Return in about 3 months (around 11/11/2017)., or sooner if acute issues arise.        This document has been electronically signed by Ghassan Sheets DPM on August 11, 2017 9:30 AM     Please note that portions of this note may have been completed with a voice recognition program. Efforts were made to edit the dictations, but occasionally words are mistranscribed.

## 2017-08-11 NOTE — PATIENT INSTRUCTIONS
Diabetes and Foot Care  Diabetes may cause you to have problems because of poor blood supply (circulation) to your feet and legs. This may cause the skin on your feet to become thinner, break easier, and heal more slowly. Your skin may become dry, and the skin may peel and crack. You may also have nerve damage in your legs and feet causing decreased feeling in them. You may not notice minor injuries to your feet that could lead to infections or more serious problems. Taking care of your feet is one of the most important things you can do for yourself.  HOME CARE INSTRUCTIONS  · Wear shoes at all times, even in the house. Do not go barefoot. Bare feet are easily injured.  · Check your feet daily for blisters, cuts, and redness. If you cannot see the bottom of your feet, use a mirror or ask someone for help.  · Wash your feet with warm water (do not use hot water) and mild soap. Then pat your feet and the areas between your toes until they are completely dry. Do not soak your feet as this can dry your skin.  · Apply a moisturizing lotion or petroleum jelly (that does not contain alcohol and is unscented) to the skin on your feet and to dry, brittle toenails. Do not apply lotion between your toes.  · Trim your toenails straight across. Do not dig under them or around the cuticle. File the edges of your nails with an emery board or nail file.  · Do not cut corns or calluses or try to remove them with medicine.  · Wear clean socks or stockings every day. Make sure they are not too tight. Do not wear knee-high stockings since they may decrease blood flow to your legs.  · Wear shoes that fit properly and have enough cushioning. To break in new shoes, wear them for just a few hours a day. This prevents you from injuring your feet. Always look in your shoes before you put them on to be sure there are no objects inside.  · Do not cross your legs. This may decrease the blood flow to your feet.  · If you find a minor scrape,  cut, or break in the skin on your feet, keep it and the skin around it clean and dry. These areas may be cleansed with mild soap and water. Do not cleanse the area with peroxide, alcohol, or iodine.  · When you remove an adhesive bandage, be sure not to damage the skin around it.  · If you have a wound, look at it several times a day to make sure it is healing.  · Do not use heating pads or hot water bottles. They may burn your skin. If you have lost feeling in your feet or legs, you may not know it is happening until it is too late.  · Make sure your health care provider performs a complete foot exam at least annually or more often if you have foot problems. Report any cuts, sores, or bruises to your health care provider immediately.  SEEK MEDICAL CARE IF:   · You have an injury that is not healing.  · You have cuts or breaks in the skin.  · You have an ingrown nail.  · You notice redness on your legs or feet.  · You feel burning or tingling in your legs or feet.  · You have pain or cramps in your legs and feet.  · Your legs or feet are numb.  · Your feet always feel cold.  SEEK IMMEDIATE MEDICAL CARE IF:   · There is increasing redness, swelling, or pain in or around a wound.  · There is a red line that goes up your leg.  · Pus is coming from a wound.  · You develop a fever or as directed by your health care provider.  · You notice a bad smell coming from an ulcer or wound.     This information is not intended to replace advice given to you by your health care provider. Make sure you discuss any questions you have with your health care provider.     Document Released: 12/15/2001 Document Revised: 04/10/2017 Document Reviewed: 05/27/2014  [employer] Interactive Patient Education ©2017 [employer]    Diabetes and Foot Care  Diabetes may cause you to have problems because of poor blood supply (circulation) to your feet and legs. This may cause the skin on your feet to become thinner, break easier, and heal more  slowly. Your skin may become dry, and the skin may peel and crack. You may also have nerve damage in your legs and feet causing decreased feeling in them. You may not notice minor injuries to your feet that could lead to infections or more serious problems. Taking care of your feet is one of the most important things you can do for yourself.  HOME CARE INSTRUCTIONS  · Wear shoes at all times, even in the house. Do not go barefoot. Bare feet are easily injured.  · Check your feet daily for blisters, cuts, and redness. If you cannot see the bottom of your feet, use a mirror or ask someone for help.  · Wash your feet with warm water (do not use hot water) and mild soap. Then pat your feet and the areas between your toes until they are completely dry. Do not soak your feet as this can dry your skin.  · Apply a moisturizing lotion or petroleum jelly (that does not contain alcohol and is unscented) to the skin on your feet and to dry, brittle toenails. Do not apply lotion between your toes.  · Trim your toenails straight across. Do not dig under them or around the cuticle. File the edges of your nails with an emery board or nail file.  · Do not cut corns or calluses or try to remove them with medicine.  · Wear clean socks or stockings every day. Make sure they are not too tight. Do not wear knee-high stockings since they may decrease blood flow to your legs.  · Wear shoes that fit properly and have enough cushioning. To break in new shoes, wear them for just a few hours a day. This prevents you from injuring your feet. Always look in your shoes before you put them on to be sure there are no objects inside.  · Do not cross your legs. This may decrease the blood flow to your feet.  · If you find a minor scrape, cut, or break in the skin on your feet, keep it and the skin around it clean and dry. These areas may be cleansed with mild soap and water. Do not cleanse the area with peroxide, alcohol, or iodine.  · When you  remove an adhesive bandage, be sure not to damage the skin around it.  · If you have a wound, look at it several times a day to make sure it is healing.  · Do not use heating pads or hot water bottles. They may burn your skin. If you have lost feeling in your feet or legs, you may not know it is happening until it is too late.  · Make sure your health care provider performs a complete foot exam at least annually or more often if you have foot problems. Report any cuts, sores, or bruises to your health care provider immediately.  SEEK MEDICAL CARE IF:   · You have an injury that is not healing.  · You have cuts or breaks in the skin.  · You have an ingrown nail.  · You notice redness on your legs or feet.  · You feel burning or tingling in your legs or feet.  · You have pain or cramps in your legs and feet.  · Your legs or feet are numb.  · Your feet always feel cold.  SEEK IMMEDIATE MEDICAL CARE IF:   · There is increasing redness, swelling, or pain in or around a wound.  · There is a red line that goes up your leg.  · Pus is coming from a wound.  · You develop a fever or as directed by your health care provider.  · You notice a bad smell coming from an ulcer or wound.     This information is not intended to replace advice given to you by your health care provider. Make sure you discuss any questions you have with your health care provider.     Document Released: 12/15/2001 Document Revised: 04/10/2017 Document Reviewed: 05/27/2014  [employer] Interactive Patient Education ©2017 [employer]

## 2017-09-29 DIAGNOSIS — E11.40 TYPE 2 DIABETES MELLITUS WITH DIABETIC NEUROPATHY, WITHOUT LONG-TERM CURRENT USE OF INSULIN (HCC): ICD-10-CM

## 2017-09-29 NOTE — TELEPHONE ENCOUNTER
Patient called requesting refill for Metformin. He was seen 7/21/17 for chronic visit to return 1/21/18.

## 2017-10-20 ENCOUNTER — LAB (OUTPATIENT)
Dept: FAMILY MEDICINE CLINIC | Facility: CLINIC | Age: 62
End: 2017-10-20

## 2017-10-20 DIAGNOSIS — E78.2 MIXED HYPERLIPIDEMIA: Primary | ICD-10-CM

## 2017-10-21 LAB
CHOLEST SERPL-MCNC: 162 MG/DL (ref 130–200)
HDLC SERPL-MCNC: 32 MG/DL
LDLC SERPL CALC-MCNC: 107 MG/DL (ref 0–99)
TRIGL SERPL-MCNC: 116 MG/DL (ref 0–149)
VLDLC SERPL CALC-MCNC: 23.2 MG/DL

## 2017-11-17 ENCOUNTER — OFFICE VISIT (OUTPATIENT)
Dept: PODIATRY | Facility: CLINIC | Age: 62
End: 2017-11-17

## 2017-11-17 VITALS
HEIGHT: 67 IN | BODY MASS INDEX: 35 KG/M2 | SYSTOLIC BLOOD PRESSURE: 128 MMHG | WEIGHT: 223 LBS | HEART RATE: 64 BPM | DIASTOLIC BLOOD PRESSURE: 80 MMHG | OXYGEN SATURATION: 99 %

## 2017-11-17 DIAGNOSIS — B35.1 ONYCHOMYCOSIS: Primary | ICD-10-CM

## 2017-11-17 DIAGNOSIS — E11.49 DIABETES MELLITUS TYPE 2 WITH NEUROLOGICAL MANIFESTATIONS (HCC): ICD-10-CM

## 2017-11-17 PROCEDURE — 11721 DEBRIDE NAIL 6 OR MORE: CPT | Performed by: PODIATRIST

## 2017-11-17 PROCEDURE — 99213 OFFICE O/P EST LOW 20 MIN: CPT | Performed by: PODIATRIST

## 2017-11-17 NOTE — PROGRESS NOTES
[facility] - PODIATRY    Today's Date: 11/17/17    Patient Name: Sandeep Alonso  MRN: [id number]  CSN: [id number]  PCP: Lisbet Velez, DNP, APRN  Referring Provider: No ref. provider found    SUBJECTIVE     Chief Complaint   Patient presents with   • Follow-up     He is here for a follow up on nail care and diabetic foot exam.    • Diabetes     Patient states he does not check BG.      HPI: Sandeep Alonso, a 62 y.o.male, comes to clinic as a(n) established patient presenting for diabetic foot exam and complaining of painful toenails. Pt has h/o DM2, HTN, Hypokalemia, Edema. Relates that his nails are thick and long, need trimming. Admits occasional numbness and tingling in feet. Relates the pain in his left ankle has resolved with conservative treatment.  Patient is NIDDM and unsure of last BG level. Admits pain at 2/10 level and described as aching, throbbing and tingling. Relates previous treatment(s) including nail trimming by podiatrist. Denies any constitutional symptoms. No other pedal complaints at this time.    Past Medical History:   Diagnosis Date   • Diabetes mellitus    • Hypertension    • Hypokalemia    • Peripheral edema      Past Surgical History:   Procedure Laterality Date   • NO PAST SURGERIES       Family History   Problem Relation Age of Onset   • Heart attack Mother    • Emphysema Father    • No Known Problems Sister    • No Known Problems Brother    • No Known Problems Son    • No Known Problems Maternal Grandmother    • No Known Problems Maternal Grandfather    • No Known Problems Paternal Grandmother    • No Known Problems Paternal Grandfather    • No Known Problems Brother    • No Known Problems Brother    • No Known Problems Son    • No Known Problems Son    • No Known Problems Son      Social History     Social History   • Marital status:      Spouse name: N/A   • Number of children: N/A   • Years of education: N/A     Occupational History   • Not on file.      Social History Main Topics   • Smoking status: Never Smoker   • Smokeless tobacco: Never Used   • Alcohol use Yes      Comment: occasional beer   • Drug use: No   • Sexual activity: Defer     Other Topics Concern   • Not on file     Social History Narrative     No Known Allergies  Current Outpatient Prescriptions   Medication Sig Dispense Refill   • furosemide (LASIX) 40 MG tablet Take 1 tablet by mouth 2 (Two) Times a Day. 180 tablet 1   • gabapentin (NEURONTIN) 100 MG capsule Take 1 capsule by mouth 2 (Two) Times a Day. 180 capsule 1   • lisinopril-hydrochlorothiazide (ZESTORETIC) 10-12.5 MG per tablet Take 1 tablet by mouth Daily. 90 tablet 1   • metFORMIN (GLUCOPHAGE) 500 MG tablet 1/2 tab daily (actual dose is 250) 90 tablet 1   • Omega 3 1000 MG capsule Take  by mouth.     • potassium chloride (K-DUR,KLOR-CON) 20 MEQ CR tablet Take 1 tablet by mouth Daily. 90 tablet 1     No current facility-administered medications for this visit.      Review of Systems   Constitutional: Negative for chills and fever.   HENT: Negative for congestion.    Respiratory: Negative for shortness of breath.    Cardiovascular: Negative for chest pain and leg swelling.   Gastrointestinal: Negative for constipation, diarrhea, nausea and vomiting.   Skin:        Elongated toenails   Neurological: Positive for numbness.       OBJECTIVE     Vitals:    11/17/17 0843   BP: 128/80   Pulse: 64   SpO2: 99%       PHYSICAL EXAM  GEN:   A&Ox3, NAD. Pt presents to clinic ambulating without assistance and wearing Casual Shoes.      NEURO:   Protective sensation intact to 10/10 sites Right foot, 10/10 site Left foot using [place]-Lucita monofilament  Light touch sensation diminished  No Tinel's or Villeux sign.    VASC:  Skin temperature Warm to Warm proximal to distal alina  DP pulses 2/4 Right, 2/4 Left  PT pulses 1/4 Right, 1/4 Left  CFT <3 sec alina   Light focal edema to left medial ankle  Varicosities absent alina    MUSC/SKEL:  Muscle Strength  Right foot Dorsiflexors 5/5, Plantarflexors 5/5, Evertors 5/5, Invertors 5/5  Muscle Strength Left foot Dorsiflexors 5/5, Plantarflexors 5/5, Evertors 5/5, Invertors 5/5  ROM of the 1st MTP is full without pain or crepitus  ROM of the MTJ is full without pain or crepitus    ROM of the STJ is full without pain or crepitus    ROM of the ankle joint is full without pain or crepitus    POP of nail plates  Previous pain to left PT tendon resolved  Rectus foot type   Gait pattern: Normal  No gross pedal musculoskeletal deformities noted.     DERM:  Pedal nails x10 are thickened, elongated and discolored with presence of subunugual debris  Webspaces are Clean, Dry, and Intact  Skin is mildly atrophic and shiny with no open wounds or sores appreciated.      RADIOLOGY/NUCLEAR:  No results found.    LABORATORY/CULTURE RESULTS:      PATHOLOGY RESULTS:       ASSESSMENT/PLAN     Sandeep was seen today for follow-up and diabetes.    Diagnoses and all orders for this visit:    Onychomycosis    Diabetes mellitus type 2 with neurological manifestations    Comprehensive lower extremity examination and evaluation was performed.  Discussed findings and treatment plan including risks, benefits, and treatment options with patient in detail. Patient agreed with treatment plan.  After verbal consent obtained, nail(s) x10 debrided of length and thickness with nail nipper without incidence  Patient may maintain nails and calluses at home utilizing emery board or pumice stone between visits as needed  Reviewed at home diabetic foot care including daily foot checks   An After Visit Summary was printed and given to the patient at discharge, including (if requested) any available informative/educational handouts regarding diagnosis, treatment, or medications. All questions were answered to patient/family satisfaction. Should symptoms fail to improve or worsen they agree to call or return to clinic or to go to the Emergency Department. Discussed  the importance of following up with any needed screening tests/labs/specialist appointments and any requested follow-up recommended by me today. Importance of maintaining follow-up discussed and patient accepts that missed appointments can delay diagnosis and potentially lead to worsening of conditions.  Return in about 3 months (around 2/17/2018)., or sooner if acute issues arise.        This document has been electronically signed by Ghassan Sheets DPM on November 17, 2017 9:01 AM     Please note that portions of this note may have been completed with a voice recognition program. Efforts were made to edit the dictations, but occasionally words are mistranscribed.

## 2017-12-08 DIAGNOSIS — E11.40 TYPE 2 DIABETES MELLITUS WITH DIABETIC NEUROPATHY, WITHOUT LONG-TERM CURRENT USE OF INSULIN (HCC): ICD-10-CM

## 2017-12-08 RX ORDER — GABAPENTIN 100 MG/1
100 CAPSULE ORAL 2 TIMES DAILY
Qty: 180 CAPSULE | Refills: 0 | OUTPATIENT
Start: 2017-12-08 | End: 2018-02-02 | Stop reason: SDUPTHER

## 2017-12-08 NOTE — TELEPHONE ENCOUNTER
Patient called requesting refill for Gabapentin. Rx printed for 180 with 1 refill, but refill was not circled. Pharmacy contacted, verified. #[phone] was called to [facility] [place].

## 2018-02-02 ENCOUNTER — OFFICE VISIT (OUTPATIENT)
Dept: FAMILY MEDICINE CLINIC | Facility: CLINIC | Age: 63
End: 2018-02-02

## 2018-02-02 VITALS
SYSTOLIC BLOOD PRESSURE: 134 MMHG | BODY MASS INDEX: 36.38 KG/M2 | TEMPERATURE: 97.7 F | RESPIRATION RATE: 18 BRPM | HEIGHT: 67 IN | HEART RATE: 53 BPM | DIASTOLIC BLOOD PRESSURE: 79 MMHG | WEIGHT: 231.8 LBS | OXYGEN SATURATION: 98 %

## 2018-02-02 DIAGNOSIS — E78.2 MIXED HYPERLIPIDEMIA: ICD-10-CM

## 2018-02-02 DIAGNOSIS — I10 ESSENTIAL HYPERTENSION: ICD-10-CM

## 2018-02-02 DIAGNOSIS — E11.40 TYPE 2 DIABETES MELLITUS WITH DIABETIC NEUROPATHY, WITHOUT LONG-TERM CURRENT USE OF INSULIN (HCC): ICD-10-CM

## 2018-02-02 DIAGNOSIS — E66.01 OBESITY, MORBID (HCC): Primary | ICD-10-CM

## 2018-02-02 DIAGNOSIS — E87.6 HYPOKALEMIA: ICD-10-CM

## 2018-02-02 DIAGNOSIS — R60.0 LOCALIZED EDEMA: ICD-10-CM

## 2018-02-02 PROCEDURE — 99214 OFFICE O/P EST MOD 30 MIN: CPT | Performed by: NURSE PRACTITIONER

## 2018-02-02 RX ORDER — GABAPENTIN 100 MG/1
100 CAPSULE ORAL 2 TIMES DAILY
Qty: 180 CAPSULE | Refills: 1 | Status: SHIPPED | OUTPATIENT
Start: 2018-02-02 | End: 2018-07-27 | Stop reason: SDUPTHER

## 2018-02-02 RX ORDER — POTASSIUM CHLORIDE 20 MEQ/1
20 TABLET, EXTENDED RELEASE ORAL DAILY
Qty: 90 TABLET | Refills: 1 | Status: SHIPPED | OUTPATIENT
Start: 2018-02-02 | End: 2018-07-27 | Stop reason: SDUPTHER

## 2018-02-02 RX ORDER — FUROSEMIDE 40 MG/1
40 TABLET ORAL 2 TIMES DAILY
Qty: 180 TABLET | Refills: 1 | Status: SHIPPED | OUTPATIENT
Start: 2018-02-02 | End: 2018-07-27 | Stop reason: SDUPTHER

## 2018-02-02 RX ORDER — LISINOPRIL AND HYDROCHLOROTHIAZIDE 12.5; 1 MG/1; MG/1
1 TABLET ORAL DAILY
Qty: 90 TABLET | Refills: 1 | Status: SHIPPED | OUTPATIENT
Start: 2018-02-02 | End: 2018-07-27 | Stop reason: SDUPTHER

## 2018-02-02 NOTE — PATIENT INSTRUCTIONS
Obesity, Adult  Obesity is the condition of having too much total body fat. Being overweight or obese means that your weight is greater than what is considered healthy for your body size. Obesity is determined by a measurement called BMI. BMI is an estimate of body fat and is calculated from height and weight. For adults, a BMI of 30 or higher is considered obese.  Obesity can eventually lead to other health concerns and major illnesses, including:  · Stroke.  · Coronary artery disease (CAD).  · Type 2 diabetes.  · Some types of cancer, including cancers of the colon, breast, uterus, and gallbladder.  · Osteoarthritis.  · High blood pressure (hypertension).  · High cholesterol.  · Sleep apnea.  · Gallbladder stones.  · Infertility problems.  What are the causes?  The main cause of obesity is taking in (consuming) more calories than your body uses for energy. Other factors that contribute to this condition may include:  · Being born with genes that make you more likely to become obese.  · Having a medical condition that causes obesity. These conditions include:  ¨ Hypothyroidism.  ¨ Polycystic ovarian syndrome (PCOS).  ¨ Binge-eating disorder.  ¨ Cushing syndrome.  · Taking certain medicines, such as steroids, antidepressants, and seizure medicines.  · Not being physically active (sedentary lifestyle).  · Living where there are limited places to exercise safely or buy healthy foods.  · Not getting enough sleep.  What increases the risk?  The following factors may increase your risk of this condition:  · Having a family history of obesity.  · Being a woman of -American descent.  · Being a man of  descent.  What are the signs or symptoms?  Having excessive body fat is the main symptom of this condition.  How is this diagnosed?  This condition may be diagnosed based on:  · Your symptoms.  · Your medical history.  · A physical exam. Your health care provider may measure:  ¨ Your BMI. If you are an  adult with a BMI between 25 and less than 30, you are considered overweight. If you are an adult with a BMI of 30 or higher, you are considered obese.  ¨ The distances around your hips and your waist (circumferences). These may be compared to each other to help diagnose your condition.  ¨ Your skinfold thickness. Your health care provider may gently pinch a fold of your skin and measure it.  How is this treated?  Treatment for this condition often includes changing your lifestyle. Treatment may include some or all of the following:  · Dietary changes. Work with your health care provider and a dietitian to set a weight-loss goal that is healthy and reasonable for you. Dietary changes may include eating:  ¨ Smaller portions. A portion size is the amount of a particular food that is healthy for you to eat at one time. This varies from person to person.  ¨ Low-calorie or low-fat options.  ¨ More whole grains, fruits, and vegetables.  · Regular physical activity. This may include aerobic activity (cardio) and strength training.  · Medicine to help you lose weight. Your health care provider may prescribe medicine if you are unable to lose 1 pound a week after 6 weeks of eating more healthily and doing more physical activity.  · Surgery. Surgical options may include gastric banding and gastric bypass. Surgery may be done if:  ¨ Other treatments have not helped to improve your condition.  ¨ You have a BMI of 40 or higher.  ¨ You have life-threatening health problems related to obesity.  Follow these instructions at home:     Eating and drinking  · Follow recommendations from your health care provider about what you eat and drink. Your health care provider may advise you to:  ¨ Limit fast foods, sweets, and processed snack foods.  ¨ Choose low-fat options, such as low-fat milk instead of whole milk.  ¨ Eat 5 or more servings of fruits or vegetables every day.  ¨ Eat at home more often. This gives you more control over what  you eat.  ¨ Choose healthy foods when you eat out.  ¨ Learn what a healthy portion size is.  ¨ Keep low-fat snacks on hand.  ¨ Avoid sugary drinks, such as soda, fruit juice, iced tea sweetened with sugar, and flavored milk.  ¨ Eat a healthy breakfast.  · Drink enough water to keep your urine clear or pale yellow.  · Do not go without eating for long periods of time (do not fast) or follow a fad diet. Fasting and fad diets can be unhealthy and even dangerous.  Physical Activity  · Exercise regularly, as told by your health care provider. Ask your health care provider what types of exercise are safe for you and how often you should exercise.  · Warm up and stretch before being active.  · Cool down and stretch after being active.  · Rest between periods of activity.  Lifestyle  · Limit the time that you spend in front of your TV, computer, or video game system.  · Find ways to reward yourself that do not involve food.  · Limit alcohol intake to no more than 1 drink a day for nonpregnant women and 2 drinks a day for men. One drink equals 12 oz of beer, 5 oz of wine, or 1½ oz of hard liquor.  General instructions  · Keep a weight loss journal to keep track of the food you eat and how much you exercise you get.  · Take over-the-counter and prescription medicines only as told by your health care provider.  · Take vitamins and supplements only as told by your health care provider.  · Consider joining a support group. Your health care provider may be able to recommend a support group.  · Keep all follow-up visits as told by your health care provider. This is important.  Contact a health care provider if:  · You are unable to meet your weight loss goal after 6 weeks of dietary and lifestyle changes.  This information is not intended to replace advice given to you by your health care provider. Make sure you discuss any questions you have with your health care provider.  Document Released: 01/25/2006 Document Revised:  05/22/2017 Document Reviewed: 10/05/2016  [employer] Interactive Patient Education © 2017 [employer]      MyPlate from [employer]  The general, healthful diet is based on the 2010 Dietary Guidelines for Americans. The amount of food you need to eat from each food group depends on your age, sex, and level of physical activity and can be individualized by a dietitian. Go to ChooseMyPlate.gov for more information.  What do I need to know about the MyPlate plan?  · Enjoy your food, but eat less.  · Avoid oversized portions.  ¨ ½ of your plate should include fruits and vegetables.  ¨ ¼ of your plate should be grains.  ¨ ¼ of your plate should be protein.  Grains  · Make at least half of your grains whole grains.  · For a 2,000 calorie daily food plan, eat 6 oz every day.  · 1 oz is about 1 slice bread, 1 cup cereal, or ½ cup cooked rice, cereal, or pasta.  Vegetables  · Make half your plate fruits and vegetables.  · For a 2,000 calorie daily food plan, eat 2½ cups every day.  · 1 cup is about 1 cup raw or cooked vegetables or vegetable juice or 2 cups raw leafy greens.  Fruits  · Make half your plate fruits and vegetables.  · For a 2,000 calorie daily food plan, eat 2 cups every day.  · 1 cup is about 1 cup fruit or 100% fruit juice or ½ cup dried fruit.  Protein  · For a 2,000 calorie daily food plan, eat 5½ oz every day.  · 1 oz is about 1 oz meat, poultry, or fish, ¼ cup cooked beans, 1 egg, 1 Tbsp peanut butter, or ½ oz nuts or seeds.  Dairy  · Switch to fat-free or low-fat (1%) milk.  · For a 2,000 calorie daily food plan, eat 3 cups every day.  · 1 cup is about 1 cup milk or yogurt or soy milk (soy beverage), 1½ oz natural cheese, or 2 oz processed cheese.  Fats, Oils, and Empty Calories  · Only small amounts of oils are recommended.  · Empty calories are calories from solid fats or added sugars.  · Compare sodium in foods like soup, bread, and frozen meals. Choose the foods with lower numbers.  · Drink water instead of  sugary drinks.  What foods can I eat?  Grains   Whole grains such as whole wheat, quinoa, millet, and bulgur. Bread, rolls, and pasta made from whole grains. Brown or wild rice. Hot or cold cereals made from whole grains and without added sugar.  Vegetables   All fresh vegetables, especially fresh red, dark green, or orange vegetables. Peas and beans. Low-sodium frozen or canned vegetables prepared without added salt. Low-sodium vegetable juices.  Fruits   All fresh, frozen, and dried fruits. Canned fruit packed in water or fruit juice without added sugar. Fruit juices without added sugar.  Meats and Other Protein Sources   Boiled, baked, or grilled lean meat trimmed of fat. Skinless poultry. Fresh seafood and shellfish. Canned seafood packed in water. Unsalted nuts and unsalted nut butters. Tofu. Dried beans and pea. Eggs.  Dairy   Low-fat or fat-free milk, yogurt, and cheeses.  Sweets and Desserts   Frozen desserts made from low-fat milk.  Fats and Oils   Olive, peanut, and canola oils and margarine. Salad dressing and mayonnaise made from these oils.  Other   Soups and casseroles made from allowed ingredients and without added fat or salt.  The items listed above may not be a complete list of recommended foods or beverages. Contact your dietitian for more options.   What foods are not recommended?  Grains   Sweetened, low-fiber cereals. Packaged baked goods. Snack crackers and chips. Cheese crackers, butter crackers, and biscuits. Frozen waffles, sweet breads, doughnuts, pastries, packaged baking mixes, pancakes, cakes, and cookies.  Vegetables   Regular canned or frozen vegetables or vegetables prepared with salt. Canned tomatoes. Canned tomato sauce. Fried vegetables. Vegetables in cream sauce or cheese sauce.  Fruits   Fruits packed in syrup or made with added sugar.  Meats and Other Protein Sources   Marbled or fatty meats such as ribs. Poultry with skin. Fried meats, poultry, eggs, or fish. Sausages, hot  dogs, and deli meats such as pastrami, bologna, or salami.  Dairy   Whole milk, cream, cheeses made from whole milk, sour cream. Ice cream or yogurt made from whole milk or with added sugar.  Beverages   For adults, no more than one alcoholic drink per day. Regular soft drinks or other sugary beverages. Juice drinks.  Sweets and Desserts   Sugary or fatty desserts, candy, and other sweets.  Fats and Oils   Solid shortening or partially hydrogenated oils. Solid margarine. Margarine that contains trans fats. Butter.  The items listed above may not be a complete list of foods and beverages to avoid. Contact your dietitian for more information.   This information is not intended to replace advice given to you by your health care provider. Make sure you discuss any questions you have with your health care provider.  Document Released: 01/06/2009 Document Revised: 05/25/2017 Document Reviewed: 11/26/2014  [employer] Interactive Patient Education © 2017 [employer]    Calorie Counting for Weight Loss  Calories are energy you get from the things you eat and drink. Your body uses this energy to keep you going throughout the day. The number of calories you eat affects your weight. When you eat more calories than your body needs, your body stores the extra calories as fat. When you eat fewer calories than your body needs, your body burns fat to get the energy it needs.  Calorie counting means keeping track of how many calories you eat and drink each day. If you make sure to eat fewer calories than your body needs, you should lose weight. In order for calorie counting to work, you will need to eat the number of calories that are right for you in a day to lose a healthy amount of weight per week. A healthy amount of weight to lose per week is usually 1-2 lb (0.5-0.9 kg). A dietitian can determine how many calories you need in a day and give you suggestions on how to reach your calorie goal.   WHAT IS MY MY PLAN?  My goal is to  have __________ calories per day.   If I have this many calories per day, I should lose around __________ pounds per week.  WHAT DO I NEED TO KNOW ABOUT CALORIE COUNTING?  In order to meet your daily calorie goal, you will need to:  · Find out how many calories are in each food you would like to eat. Try to do this before you eat.  · Decide how much of the food you can eat.  · Write down what you ate and how many calories it had. Doing this is called keeping a food log.  WHERE DO I FIND CALORIE INFORMATION?  The number of calories in a food can be found on a Nutrition Facts label. Note that all the information on a label is based on a specific serving of the food. If a food does not have a Nutrition Facts label, try to look up the calories online or ask your dietitian for help.  HOW DO I DECIDE HOW MUCH TO EAT?  To decide how much of the food you can eat, you will need to consider both the number of calories in one serving and the size of one serving. This information can be found on the Nutrition Facts label. If a food does not have a Nutrition Facts label, look up the information online or ask your dietitian for help.  Remember that calories are listed per serving. If you choose to have more than one serving of a food, you will have to multiply the calories per serving by the amount of servings you plan to eat. For example, the label on a package of bread might say that a serving size is 1 slice and that there are 90 calories in a serving. If you eat 1 slice, you will have eaten 90 calories. If you eat 2 slices, you will have eaten 180 calories.  HOW DO I KEEP A FOOD LOG?  After each meal, record the following information in your food log:  · What you ate.  · How much of it you ate.  · How many calories it had.  · Then, add up your calories.  Keep your food log near you, such as in a small notebook in your pocket. Another option is to use a mobile silvio or website. Some programs will calculate calories for you and  show you how many calories you have left each time you add an item to the log.  WHAT ARE SOME CALORIE COUNTING TIPS?  · Use your calories on foods and drinks that will fill you up and not leave you hungry. Some examples of this include foods like nuts and nut butters, vegetables, lean proteins, and high-fiber foods (more than 5 g fiber per serving).  · Eat nutritious foods and avoid empty calories. Empty calories are calories you get from foods or beverages that do not have many nutrients, such as candy and soda. It is better to have a nutritious high-calorie food (such as an avocado) than a food with few nutrients (such as a bag of chips).  · Know how many calories are in the foods you eat most often. This way, you do not have to look up how many calories they have each time you eat them.  · Look out for foods that may seem like low-calorie foods but are really high-calorie foods, such as baked goods, soda, and fat-free candy.  · Pay attention to calories in drinks. Drinks such as sodas, specialty coffee drinks, alcohol, and juices have a lot of calories yet do not fill you up. Choose low-calorie drinks like water and diet drinks.  · Focus your calorie counting efforts on higher calorie items. Logging the calories in a garden salad that contains only vegetables is less important than calculating the calories in a milk shake.  · Find a way of tracking calories that works for you. Get creative. Most people who are successful find ways to keep track of how much they eat in a day, even if they do not count every calorie.  WHAT ARE SOME PORTION CONTROL TIPS?  · Know how many calories are in a serving. This will help you know how many servings of a certain food you can have.  · Use a measuring cup to measure serving sizes. This is helpful when you start out. With time, you will be able to estimate serving sizes for some foods.  · Take some time to put servings of different foods on your favorite plates, bowls, and cups  "so you know what a serving looks like.  · Try not to eat straight from a bag or box. Doing this can lead to overeating. Put the amount you would like to eat in a cup or on a plate to make sure you are eating the right portion.  · Use smaller plates, glasses, and bowls to prevent overeating. This is a quick and easy way to practice portion control. If your plate is smaller, less food can fit on it.  · Try not to multitask while eating, such as watching TV or using your computer. If it is time to eat, sit down at a table and enjoy your food. Doing this will help you to start recognizing when you are full. It will also make you more aware of what and how much you are eating.  HOW CAN I CALORIE COUNT WHEN EATING OUT?  · Ask for smaller portion sizes or child-sized portions.  · Consider sharing an entree and sides instead of getting your own entree.  · If you get your own entree, eat only half. Ask for a box at the beginning of your meal and put the rest of your entree in it so you are not tempted to eat it.  · Look for the calories on the menu. If calories are listed, choose the lower calorie options.  · Choose dishes that include vegetables, fruits, whole grains, low-fat dairy products, and lean protein. Focusing on smart food choices from each of the 5 food groups can help you stay on track at restaurants.  · Choose items that are boiled, broiled, grilled, or steamed.  · Choose water, milk, unsweetened iced tea, or other drinks without added sugars. If you want an alcoholic beverage, choose a lower calorie option. For example, a regular herb can have up to 700 calories and a glass of wine has around 150.  · Stay away from items that are buttered, battered, fried, or served with cream sauce. Items labeled \"crispy\" are usually fried, unless stated otherwise.  · Ask for dressings, sauces, and syrups on the side. These are usually very high in calories, so do not eat much of them.  · Watch out for salads. Many people " think salads are a healthy option, but this is often not the case. Many salads come with babb, fried chicken, lots of cheese, fried chips, and dressing. All of these items have a lot of calories. If you want a salad, choose a garden salad and ask for grilled meats or steak. Ask for the dressing on the side, or ask for olive oil and vinegar or lemon to use as dressing.  · Estimate how many servings of a food you are given. For example, a serving of cooked rice is ½ cup or about the size of half a tennis ball or one cupcake wrapper. Knowing serving sizes will help you be aware of how much food you are eating at restaurants. The list below tells you how big or small some common portion sizes are based on everyday objects.  ¨ 1 oz--4 stacked dice.  ¨ 3 oz--1 deck of cards.  ¨ 1 tsp--1 dice.  ¨ 1 Tbsp--½ a Ping-Pong ball.  ¨ 2 Tbsp--1 Ping-Pong ball.  ¨ ½ cup--1 tennis ball or 1 cupcake wrapper.  ¨ 1 cup--1 baseball.  This information is not intended to replace advice given to you by your health care provider. Make sure you discuss any questions you have with your health care provider.  Document Released: 12/18/2006 Document Revised: 01/08/2016 Document Reviewed: 10/23/2014  [employer] Interactive Patient Education © 2017 [employer]

## 2018-02-02 NOTE — PROGRESS NOTES
Chief Complaint   Patient presents with   • Follow-up     Patient is here today for a follow up.   • Med Refill     Patient is here today for a med refill.         No Known Allergies    HPI: Sandeep Alonso is a 62 y.o. male presents today for a chronic visit, refills and labs.  Has HTN stable as listed below, has diabetes stable with metformin, has hyperlipidemia stable with omega 3 fatty acids, and hypokalemia stable with potassium.  Denies any fever, chills, nausea, vomiting or diarrhea.  No complaints today. Says the gabapentin works wonders.     HTN  Currently takes lisinopril.  Takes medications as prescribed without missed doses, reports no episodes of hypotension or any additional adverse effects from medications(s).  Patient reports BP's at home normal. Denies chest pain/chest pressure or discomfort, shortness of breath with exertion, headaches, palpitations, irregular heart beat, tachycardia, lower extremity edema, orthopnea, near-syncope.     Past Medical History:   Diagnosis Date   • Diabetes mellitus    • Hypertension    • Hypokalemia    • Peripheral edema      Past Surgical History:   Procedure Laterality Date   • NO PAST SURGERIES       Social History     Social History   • Marital status:      Spouse name: N/A   • Number of children: N/A   • Years of education: N/A     Social History Main Topics   • Smoking status: Never Smoker   • Smokeless tobacco: Never Used   • Alcohol use Yes      Comment: occasional beer   • Drug use: No   • Sexual activity: Defer     Other Topics Concern   • None     Social History Narrative     Family History   Problem Relation Age of Onset   • Heart attack Mother    • Emphysema Father    • No Known Problems Sister    • No Known Problems Brother    • No Known Problems Son    • No Known Problems Maternal Grandmother    • No Known Problems Maternal Grandfather    • No Known Problems Paternal Grandmother    • No Known Problems Paternal Grandfather    • No Known Problems  "Brother    • No Known Problems Brother    • No Known Problems Son    • No Known Problems Son    • No Known Problems Son        Current Outpatient Prescriptions on File Prior to Visit   Medication Sig Dispense Refill   • furosemide (LASIX) 40 MG tablet Take 1 tablet by mouth 2 (Two) Times a Day. 180 tablet 1   • gabapentin (NEURONTIN) 100 MG capsule Take 1 capsule by mouth 2 (Two) Times a Day. 180 capsule 0   • lisinopril-hydrochlorothiazide (ZESTORETIC) 10-12.5 MG per tablet Take 1 tablet by mouth Daily. 90 tablet 1   • metFORMIN (GLUCOPHAGE) 500 MG tablet 1/2 tab daily (actual dose is 250) 90 tablet 1   • Omega 3 1000 MG capsule Take  by mouth.     • potassium chloride (K-DUR,KLOR-CON) 20 MEQ CR tablet Take 1 tablet by mouth Daily. 90 tablet 1     No current facility-administered medications on file prior to visit.         REVIEW OF SYMPTOMS: (Positives bolded)  General:  weight loss, fever, chills, night sweats, fatigue, appetite loss  HEENT:  blurry vision, eye pain, eye discharge, dry eyes  Respiratory: shortness of breath, cough, hemoptysis, wheezing, pleurisy,   Cardiovascular:  chest pain, PND, palpitation, edema, orthopnea, syncope, swelling of extremities  Gastro: Nausea, vomiting, diarrhea, hematemesis, abdominal pain, constipation  Genito: hematuria, dysuria, glycosuria, hesitancy, frequency, incontinence  Musckelo: Arthralgia, myalgia, muscle weakness, joint swelling, NSAID use  Skin: rash, pruritis, sores, nail changes, skin thickening, change in wart/mole, itching, rash, new lesions, pruritus, nail changes  Neuro:  Migraine, numbness, ataxia, tremor, vertigo, weakness, memory loss  \"All other systems reviewed and negative, except as listed above.”      OBJECTIVE:  Constitutional:  Appearance-No acute distress, Consistent with stated age. Orientation- Oriented x 3, alert Posture-Not doubled over. Gait-Normal pace, normal arm movement. Posture- Normal Build and Nutrition-Well developed and well " nourished.  General- Patient is pleasant and cooperative with the interview and exam.    Integumentary: General-No rashes, ulcers or lesions. No edema.  Palpation- Normal skin moisture/turgor. Skin is warm to touch, no increased warmth. Capillary refill is normal bilateral Upper and lower extremity.     Head/Neck: Head- normocephalic and atraumatic.  Neck- without visible/palpable lumps or pulsations.  Palpation- No bony tenderness about head/neck along frontal, occiptial, temporal, parietal, mastoid, jawline, zygoma, orbit or any other location.  NO temporal artery tenderness. No TMJ tenderness. Normal cervical ROM.   Neck Supple.  Thyroid-No thyromegaly, no nodules    Eye: Bilaterally PERRLA, EOMI.  No discharge.  Upper and lower eyelids are normal. Sclera/conjunctiva normal without discharge. Cornea is normal and clear. Lens is normal.  Eyeball appears normal. No ciliary flushing, no conjunctival injection.    ENMT:  Pinna- normal without tenderness or erythema.  External auditory canal Left- normal without erythema or discharge, no excessive cerumen. External auditory canal Right-normal without erythema or discharge, no excessive cerumen. TM left- Grey/pearly, normal light reflex and anatomy TM Right- Grey/pearly, normal light reflex and anatomy Hearing Assessment-normal to conversational speech at 2-5 feet.  Nose and sinus-No sinus tenderness along frontal/maxillary region. External appearance normal and midline. Nares- bilateral quiet airflow, no discharge. Nasal mucosa- No bleeding noted and no ulcerations observed. Pink, moist. Turbinates non boggy. Lips- normal color, moist without cracks/lesions Oral Cavity/Palate- hard/soft palate intact without lesions, oral mucosa pink and moist. Dentition assessed and discussed appropriate oral care. Tongue normal midline.  Oropharynx- no pharyngeal erythema, Uvula midline. No post nasal drip. No exudate. Salivary glands- Non tender to palpation    CHEST/LUNG:  Inspection- symmetric chest wall no pectus deformity. Normal effort, no distress, no use of accessory muscles. Palpation- nontender sternum, ribline.  No abnormal pulsations. Auscultation- Breath sounds normal throughout all lung fields.  Normal tracheal sounds, Normal bronchial sounds overlying sternum, Bronchovessicular sounds normal between scapulae posteriorly, Normal vessicular breath sounds heard throughout periphery. Lungs are clear today. Adventitious sounds- No wheezes, rales, rhonchi.     CARDIOVASCULAR:  Carotid artery- normal, no bruits or abnormal pulsations. Jugular vein- no pulsations. Palpation/Percussion- Normal PMI, no palpable thrill  Auscultation- Regular rate and rhythm. No murmur noted in sitting, supine positions. Extremities- no digital clubbing, cyanosis, edema, increased warmth.    ABDOMEN: Inspection- normal and no visible pulsations. Normal contour. Auscultation- Bowel sounds normal, no abdominal bruits. Palpation/Percussion- soft, non-tender, no rebound tenderness, no rigidity (guarding), no jar tenderness, no masses.  Liver-no hepatomegaly, Spleen - no splenomegaly, Hernias- none. Rectal not examined.     Peripheral Vascular: Upper extremity Left- Normal temperature with pink nailbeds and no ulcerations.  Upper extremity Right- Normal temperature with pink nailbeds and no ulcerations.  Lower extremity- Normal temperature with pink nailbeds and no ulcerations. DP pulses 2+ bilaterally.  Pedal hair intact.  Normal capillary refill. Edema- No edema.    Musculoskeletal: Generalized-No generalized swelling or edema of extremities, no digital clubbing or cyanosis, neurovascularly intact all four extremities.  Upper extremity- Symmetrical posture.  No visible deformity.  Normal sensation along medial and lateral upper extremity proximally and distally.  NO tenderness overlying shoulder, lateral/medial epicondyle.  5/5 and strength 5/5 bilateral UE.  Elbow palpated, no tenderness overlying  olecranon.  Normal supination, pronation to active/passive ROM and to resisted rotation. Bicep insertion/tricep insertion appear normal without obvious pathology. Rotator cuff evaluated and intact.  Normal wrist ROM bilaterally. Normal hand movement, intrinsic muscles of hands normal. No tenderness to palpation of hands/wrists/elbows.  Lower extremity- Not tender to palpation, no pain, no swelling, edema or erythema of surrounding tissue, normal strength and tone.  Normal appearing hip ROM bilaterally without pain.  Knee ROM normal at 0-120 degrees. No tenderness overlying trochanters, no tenderness about patella, quad tendon, patellar tendon.  No tenderness at tibial tuberosity. Ankle normal ROM not tender to palpation along medial/lateral malleolus. Normal movement of toes, no tenderness bilateral feet/toes.  Normal foot type. Calves symmetrical.  Stretching demonstrated today.  Spine/Ribs- No deformities, masses or tenderness, no known fractures, normal strength, Normal ROM. Normal stability No tenderness along C/T/L spine.  Normal appearing ROM about spine.      Neurological: General- Moves all 4 extremities symmetrically. Symmetrical face and body posture. Cranial nerves- individually evaluated II-XII and intact. PERRLA, Normal EOMI, visual/special senses appear intact, Face is symmetrical and normal sensation/movement, normal tongue, normal strength/posture of neck musculature. Balance- Romberg intact.  Reflexes- ntact with DTR 2+ patellar, Achilles, bicep, brachial,tricep. Ankle clonus normal with 2 beats.  Strength- 5/5 bilateral UE and LE. Soft touch- intact bilateral UE and LE.  Temperature sensation- intact bilateral UE and LE. Cerebellar testing-Rapid alternating movements intact.  Heel shin intact. Able to walk normal gait, normal heel toe walking. Neck- supple.      Diabetic foot exam:   Sees podiatry on Feb 23, 2018  RIGHT: No callus formation, no pre-ulcer areas, no hammer toe, no claw toe, no  deformity. Toe nails normal, toe nails cut  straight across, normal sensation, normal pulses. No amputations, no cracks, no fissures, no bunions.  Normal monofilament testing.        LEFT: No callus formation, no pre-ulcer areas, no hammer toe, no claw toe, no deformity. Toe nails normal, toe nails cut straight across, normal sensation, normal pulses. No amputations, no cracks, no fissures, no bunions, normal monofilament testing       Neuropsych: Oriented- Person, place, time. (AAOx3), Mood/affect- normal and congruent. Able to articulate well. Speech-Normal speech, normal rate, normal tone, normal use of language, volume and coherence.  Thought content- normal with ability to perform basic computations and apply abstract thought/reason. Associations- intact, no SI/HI, no hallucinations, delusions, obsessions.  Judgment/insight- Appropriate. Memory-Recall intact, remote and recent memory intact. Knowledge- Age appropriate fund of knowledge, concentration and attention span normal.    Lymphatic: Head/Neck- normal size and non tender to palpation. Axillary- Head and neck LN are normal size and non tender to palpation. Femoral and Inguinal- normal size and non tender to palpation.    Preventive Health  Tdap:  Declines  Hepatitis C screen:  Declines  Shingles vaccine:  Declines  [place] vaccine:  Declines  Flu Vaccine got at work in October  Colonoscopy:  Declines  HA1C:  Today  Depression screen done:  2  Fall Risk done:  6.  Plan:  Clear house of clutter and obstacles.  Install grab bars in bathrooms and showers.     Assessment/Plan:  Sandeep was seen today for follow-up and med refill.    Diagnoses and all orders for this visit:    Type 2 diabetes mellitus with diabetic neuropathy, without long-term current use of insulin  -     gabapentin (NEURONTIN) 100 MG capsule; Take 1 capsule by mouth 2 (Two) Times a Day.  -     metFORMIN (GLUCOPHAGE) 500 MG tablet; 1/2 tab daily (actual dose is 250)  -     Hemoglobin A1c         -      Insulin (non) dependent. Nephropathy, Retinopathy, Neuropahty status discussed.  Discussed goals of Diabetes today.  Goal Hgb A1C <7.0 for most patient.  Good BP control is encouraged with Goal BP based on JNC 8 guidelines 2014 <140/90.  Discussed role of Ace-I and ARB with DM.  DM imparts risk equivalence for CAD based on ATP III.  Current guidelines support moderate intensity statin with goal of 30-50% reduction in LDL unless 10 yr risk ASCVD >7.5 then high intensity should be used. Close monitoring of Lipid levels encouraged. Recommend once yearly eye evaluation by optometry or ophthalmology.  Good foot health discussed and foot exam completed.  Recommended toe health, wear good shoes, cut nails straight across and tend calluses if present. Take medications as encouraged.  Monitor blood sugars as encouraged and bring log to future meetings. Weight needs to be monitored. Monitor portions and caloric intake.  Pneumovax frequency discussed.   a. Labs: CMP,  A1C  b. Encouraged pt to bring glucose logs to each appointment  c. Encouraged self foot exams  d. Encouraged to lose weight/please see below  e. Recommend regular exercise   f. Medications as listed below  g. Offered handout on DM educational topics.  Provided if patient requested these today.  h. Microalbumin yearly: not due till 7/21/18  I.    Foot exam:  Done today              J.   Diabetic eye exam done [facility] 10/24/174    Localized edema  -     furosemide (LASIX) 40 MG tablet; Take 1 tablet by mouth 2 (Two) Times a Day.    Essential hypertension  -     lisinopril-hydrochlorothiazide (ZESTORETIC) 10-12.5 MG per tablet; Take 1 tablet by mouth Daily.  -     CBC & Differential  -     Comprehensive metabolic panel        -     BP is at goal,  Good BP control is encouraged with Goal BP based on JNC 8 guidelines.  For the general population <60 yr ol goal <140/90 for those  >60 <150/90.  For those of all ages with Diabetes, CKD, known CAD the  goal is <140/90. Reviewed typical first line agents to include thiazide diuretic or ace-1 or ARB or CCB alone or in combo. Recommended to the patient to obtain electronic home BP machine with upper arm blood pressure cuff and to check regularly as instructed.  Keep BP log and bring to subsequent visits.  · Offered handout on HTN educational topics:  These were provided if pt requested these  · Lab work as needed: CBC, CMP, Lipid  · Dash diet encouraged  · Monitor cholesterol regularly  · Monitor weight with goal of BMI < 30  · I encouraged pt to bring electronic home BP machine to offer to be checked for accuracy  · We discussed blood pressure medications in depth as well as concerning associated signs and symptoms taht might need immediate attention; based on blood pressures obtained in the clinic today.    · I have advised the patient to discontinue use of medications that cause any side effects  · Report to the clinic or ER for abnormal pressures > 160/100, chest pain with or without exertion, worsening unresolved dyspnea, blurred vision, unresolved headaches, or any additional concerning symptoms to seek IMMEDIATE CARE at local ER of choice.  · I have advised the patient to avoid medications and substances that can increase BP: NSAIDS, caffeine, decongestants, nicotine.     Hypokalemia  -     potassium chloride (K-DUR,KLOR-CON) 20 MEQ CR tablet; Take 1 tablet by mouth Daily.      Hyperlipidemia: Current guidelines support moderate intensity statin with goal of 30-50% reduction in LDL unless 10 yr risk ASCVD >7.5 then high intensity should be used.  i. Labs: Lipid panel  j. Offered handout on Elevated cholesterol topics.  Provided if patient requested these today.  k. Moderate potency statins include atorvastatin 20mg, pravastatin 40 mg or Rosuvastatin 10 mg.  l. Laboratory Monitoring:    i. If starting statins can consider repeat lipid panel and CMP in 8 weeks.   ii. Current literature supports limited need to  monitor CMP due to low risks of liver enzyme changes.  Consider repeat in 3 months. Risks include abdominal pain, dark urine, change in stools.  Monitor for DM (especially in women) during treatment.  iii. Consider to monitor CPK if family history, severe myalgia or dark urine.  iv. Lipid panel should be checked at baseline and then 8-12 weeks after medication change.  If 2 consecutive LDL <40 consider changing dose.  Minimum monitoring of yearly.   m. Major side effects reported include muscle cramps and pain, kidney and liver changes and diabetes.  We will monitor blood work for kidney/liver. Take Rx at night. If any muscle cramps call clinic.    Obesity: Federal guidelines recommend that people under the age of 65 should have a BMI of 18.5-24.9 and people age 65 and older should have a BMI of 23-30. The patient BMI 36.3 is outside this range and we recommended/discussed today to utilize a diet/exercise program to get back into the appropriate range.  Today we encouraged roughly a 1 lb per week weight loss with initial goal of 5% weight loss. The initial step is to document everything that is consumed into a food diary.  Studies have shown that patients can lose up to 2x the weight by keeping track of foods.     n. Discussed if eating out for a meal, consider cutting food in half and placing into to-go container.  Individually portion any foods coming into the home based on package.    o. Consider using smaller plates.    p. Consider drinking 12 oz of water 30 minutes before meal as way to suppress appetite.   q. Cut back on soft drinks/juices.  Discussed 1 can of regular soft drink has roughly 150-170 Calories per day.    r. Increase exercise as able. It is recommended to try to exercise minimum 10 minutes 5 days per week.  The goal over the next 2-4 weeks is to walk 30 minutes per day 5 days per week at pace difficult to hold conversation.   s. Would like to see back in roughly 3 months.      Management  plan:  Take medications as prescribed; return to the clinic of new or worsening concerns.      Weight management.  BMI is elevated .  Plan is to lose at least 5 pounds by next visit, exercise daily, cut calories, avoid eating out and eat off smaller plate.   See recommendations in education    Risks/benefits of current and new medications discussed with the patient and or family today.  The patient/family are aware and accept that if there any side effects they should call or return to clinic as soon as possible.  Appropriate F/U discussed for topics addressed today. All questions were answered to the satisfactory state of patient/family.  Should symptoms fail to improve or worsen they agree to call or return to clinic or to go to the ER. Education handouts were offered on any new Rx if requested.  Discussed the importance of following up with any needed screening tests/labs/specialist appointments and any requested follow-up recommended by me today.  Importance of maintaining follow-up discussed and patient accepts that missed appointments can delay diagnosis and potentially lead to worsening of conditions.    Return in about 6 months (around 8/2/2018) for HTN, Diabetes, neuropathy and edema.  PRN if symptoms worsen or fail to improve.    Lisbet Velez, DNP, APRN-BC

## 2018-02-03 LAB
ALBUMIN SERPL-MCNC: 4.2 G/DL (ref 3.5–5)
ALBUMIN/GLOB SERPL: 1.2 G/DL (ref 1.1–2.5)
ALP SERPL-CCNC: 72 U/L (ref 24–120)
ALT SERPL-CCNC: 41 U/L (ref 0–54)
AST SERPL-CCNC: 26 U/L (ref 7–45)
BASOPHILS # BLD AUTO: 0.03 10*3/MM3 (ref 0–0.2)
BASOPHILS NFR BLD AUTO: 0.4 % (ref 0–2)
BILIRUB SERPL-MCNC: 0.6 MG/DL (ref 0.1–1)
BUN SERPL-MCNC: 14 MG/DL (ref 5–21)
BUN/CREAT SERPL: 14.6 (ref 7–25)
CALCIUM SERPL-MCNC: 9.2 MG/DL (ref 8.4–10.4)
CHLORIDE SERPL-SCNC: 98 MMOL/L (ref 98–110)
CHOLEST SERPL-MCNC: 158 MG/DL (ref 130–200)
CO2 SERPL-SCNC: 34 MMOL/L (ref 24–31)
CREAT SERPL-MCNC: 0.96 MG/DL (ref 0.5–1.4)
EOSINOPHIL # BLD AUTO: 0.13 10*3/MM3 (ref 0–0.7)
EOSINOPHIL NFR BLD AUTO: 1.6 % (ref 0–4)
ERYTHROCYTE [DISTWIDTH] IN BLOOD BY AUTOMATED COUNT: 12.8 % (ref 12–15)
GFR SERPLBLD CREATININE-BSD FMLA CKD-EPI: 80 ML/MIN/1.73
GFR SERPLBLD CREATININE-BSD FMLA CKD-EPI: 97 ML/MIN/1.73
GLOBULIN SER CALC-MCNC: 3.5 GM/DL
GLUCOSE SERPL-MCNC: 82 MG/DL (ref 70–100)
HBA1C MFR BLD: 5.4 %
HCT VFR BLD AUTO: 48 % (ref 40–52)
HDLC SERPL-MCNC: 32 MG/DL
HGB BLD-MCNC: 14.9 G/DL (ref 14–18)
IMM GRANULOCYTES # BLD: 0.02 10*3/MM3 (ref 0–0.03)
IMM GRANULOCYTES NFR BLD: 0.3 % (ref 0–5)
LDLC SERPL CALC-MCNC: 101 MG/DL (ref 0–99)
LYMPHOCYTES # BLD AUTO: 2.51 10*3/MM3 (ref 0.72–4.86)
LYMPHOCYTES NFR BLD AUTO: 31.5 % (ref 15–45)
MCH RBC QN AUTO: 28.4 PG (ref 28–32)
MCHC RBC AUTO-ENTMCNC: 31 G/DL (ref 33–36)
MCV RBC AUTO: 91.4 FL (ref 82–95)
MONOCYTES # BLD AUTO: 0.53 10*3/MM3 (ref 0.19–1.3)
MONOCYTES NFR BLD AUTO: 6.7 % (ref 4–12)
NEUTROPHILS # BLD AUTO: 4.74 10*3/MM3 (ref 1.87–8.4)
NEUTROPHILS NFR BLD AUTO: 59.5 % (ref 39–78)
NRBC BLD AUTO-RTO: 0 /100 WBC (ref 0–0)
PLATELET # BLD AUTO: 271 10*3/MM3 (ref 130–400)
POTASSIUM SERPL-SCNC: 3.8 MMOL/L (ref 3.5–5.3)
PROT SERPL-MCNC: 7.7 G/DL (ref 6.3–8.7)
RBC # BLD AUTO: 5.25 10*6/MM3 (ref 4.8–5.9)
SODIUM SERPL-SCNC: 143 MMOL/L (ref 135–145)
TRIGL SERPL-MCNC: 127 MG/DL (ref 0–149)
VLDLC SERPL CALC-MCNC: 25.4 MG/DL
WBC # BLD AUTO: 7.96 10*3/MM3 (ref 4.8–10.8)

## 2018-03-02 ENCOUNTER — OFFICE VISIT (OUTPATIENT)
Dept: PODIATRY | Facility: CLINIC | Age: 63
End: 2018-03-02

## 2018-03-02 VITALS
HEART RATE: 74 BPM | OXYGEN SATURATION: 97 % | DIASTOLIC BLOOD PRESSURE: 78 MMHG | HEIGHT: 67 IN | WEIGHT: 226 LBS | SYSTOLIC BLOOD PRESSURE: 130 MMHG | BODY MASS INDEX: 35.47 KG/M2

## 2018-03-02 DIAGNOSIS — B35.1 ONYCHOMYCOSIS: Primary | ICD-10-CM

## 2018-03-02 DIAGNOSIS — E11.49 DIABETES MELLITUS TYPE 2 WITH NEUROLOGICAL MANIFESTATIONS (HCC): ICD-10-CM

## 2018-03-02 PROCEDURE — 99213 OFFICE O/P EST LOW 20 MIN: CPT | Performed by: PODIATRIST

## 2018-03-02 PROCEDURE — 11721 DEBRIDE NAIL 6 OR MORE: CPT | Performed by: PODIATRIST

## 2018-03-02 NOTE — PROGRESS NOTES
[facility] - PODIATRY    Today's Date: 03/02/18    Patient Name: Sandeep Alonso  MRN: [id number]  CSN: [id number]  PCP: Lisbet Velez, DNP, APRN  Referring Provider: No ref. provider found    SUBJECTIVE     Chief Complaint   Patient presents with   • Left Foot - Follow-up     Patient states here for diabetic foot care on bilateral feet. 0/10 on pain scale. Didn't check blood sugar this morning.    • Right Foot - Follow-up     HPI: Sandeep Alonso, a 62 y.o.male, comes to clinic as a(n) established patient presenting for diabetic foot exam and complaining of painful toenails. Pt has h/o DM2, HTN, Hypokalemia, Edema. Relates that his nails are thick and long, need trimming. Admits occasional numbness and tingling in feet. Relates the pain in his left ankle has not returned.  Patient is NIDDM and unsure of last BG level. Does not check BG at home. Denies pain today but admits aching in toes when toenails are thick and long. Relates previous treatment(s) including nail trimming by podiatrist. Denies any constitutional symptoms. No other pedal complaints at this time.    Past Medical History:   Diagnosis Date   • Diabetes mellitus    • Hypertension    • Hypokalemia    • Peripheral edema      Past Surgical History:   Procedure Laterality Date   • NO PAST SURGERIES       Family History   Problem Relation Age of Onset   • Heart attack Mother    • Emphysema Father    • No Known Problems Sister    • No Known Problems Brother    • No Known Problems Son    • No Known Problems Maternal Grandmother    • No Known Problems Maternal Grandfather    • No Known Problems Paternal Grandmother    • No Known Problems Paternal Grandfather    • No Known Problems Brother    • No Known Problems Brother    • No Known Problems Son    • No Known Problems Son    • No Known Problems Son      Social History     Social History   • Marital status:      Spouse name: N/A   • Number of children: N/A   • Years of education: N/A      Occupational History   • Not on file.     Social History Main Topics   • Smoking status: Never Smoker   • Smokeless tobacco: Never Used   • Alcohol use Yes      Comment: occasional beer   • Drug use: No   • Sexual activity: Defer     Other Topics Concern   • Not on file     Social History Narrative     No Known Allergies  Current Outpatient Prescriptions   Medication Sig Dispense Refill   • furosemide (LASIX) 40 MG tablet Take 1 tablet by mouth 2 (Two) Times a Day. 180 tablet 1   • gabapentin (NEURONTIN) 100 MG capsule Take 1 capsule by mouth 2 (Two) Times a Day. 180 capsule 1   • lisinopril-hydrochlorothiazide (ZESTORETIC) 10-12.5 MG per tablet Take 1 tablet by mouth Daily. 90 tablet 1   • metFORMIN (GLUCOPHAGE) 500 MG tablet 1/2 tab daily (actual dose is 250) 90 tablet 1   • Omega 3 1000 MG capsule Take  by mouth.     • potassium chloride (K-DUR,KLOR-CON) 20 MEQ CR tablet Take 1 tablet by mouth Daily. 90 tablet 1     No current facility-administered medications for this visit.      Review of Systems   Constitutional: Negative for chills and fever.   HENT: Negative for congestion.    Respiratory: Negative for shortness of breath.    Cardiovascular: Negative for chest pain and leg swelling.   Gastrointestinal: Negative for constipation, diarrhea, nausea and vomiting.   Skin:        Elongated toenails   Neurological: Positive for numbness.       OBJECTIVE     Vitals:    03/02/18 1008   BP: 130/78   Pulse: 74   SpO2: 97%       PHYSICAL EXAM  GEN:   A&Ox3, NAD. Pt presents to clinic ambulating without assistance and wearing Casual Shoes.      NEURO:   Protective sensation intact to 10/10 sites Right foot, 10/10 site Left foot using [place]-Lucita monofilament  Light touch sensation diminished  No Tinel's or Villeux sign.    VASC:  Skin temperature Warm to Warm proximal to distal alina  DP pulses 2/4 Right, 2/4 Left  PT pulses 1/4 Right, 1/4 Left  CFT <3 sec alina   Light focal edema to left medial ankle  Varicosities  absent alina    MUSC/SKEL:  Muscle Strength Right foot Dorsiflexors 5/5, Plantarflexors 5/5, Evertors 5/5, Invertors 5/5  Muscle Strength Left foot Dorsiflexors 5/5, Plantarflexors 5/5, Evertors 5/5, Invertors 5/5  ROM of the 1st MTP is full without pain or crepitus  ROM of the MTJ is full without pain or crepitus    ROM of the STJ is full without pain or crepitus    ROM of the ankle joint is full without pain or crepitus    POP of nail plates  Previous pain to left PT tendon resolved  Rectus foot type   Gait pattern: Normal  No gross pedal musculoskeletal deformities noted.     DERM:  Pedal nails x10 are thickened, elongated and discolored with presence of subungual debris  Webspaces are Clean, Dry, and Intact  Skin is mildly atrophic and shiny with no open wounds or sores appreciated.      RADIOLOGY/NUCLEAR:  No results found.    LABORATORY/CULTURE RESULTS:      PATHOLOGY RESULTS:       ASSESSMENT/PLAN     Sandeep was seen today for follow-up and follow-up.    Diagnoses and all orders for this visit:    Onychomycosis    Diabetes mellitus type 2 with neurological manifestations      Comprehensive lower extremity examination and evaluation was performed.  Discussed findings and treatment plan including risks, benefits, and treatment options with patient in detail. Patient agreed with treatment plan.  After verbal consent obtained, nail(s) x10 debrided of length and thickness with nail nipper without incidence  Patient may maintain nails and calluses at home utilizing emery board or pumice stone between visits as needed  Reviewed at home diabetic foot care including daily foot checks   An After Visit Summary was printed and given to the patient at discharge, including (if requested) any available informative/educational handouts regarding diagnosis, treatment, or medications. All questions were answered to patient/family satisfaction. Should symptoms fail to improve or worsen they agree to call or return to clinic or to  go to the Emergency Department. Discussed the importance of following up with any needed screening tests/labs/specialist appointments and any requested follow-up recommended by me today. Importance of maintaining follow-up discussed and patient accepts that missed appointments can delay diagnosis and potentially lead to worsening of conditions.  Return in about 3 months (around 6/2/2018)., or sooner if acute issues arise.        This document has been electronically signed by Ghassan Sheets DPM on March 2, 2018 10:36 AM     Please note that portions of this note may have been completed with a voice recognition program. Efforts were made to edit the dictations, but occasionally words are mistranscribed.

## 2018-03-02 NOTE — PATIENT INSTRUCTIONS
Diabetes and Foot Care  Diabetes may cause you to have problems because of poor blood supply (circulation) to your feet and legs. This may cause the skin on your feet to become thinner, break easier, and heal more slowly. Your skin may become dry, and the skin may peel and crack. You may also have nerve damage in your legs and feet causing decreased feeling in them. You may not notice minor injuries to your feet that could lead to infections or more serious problems. Taking care of your feet is one of the most important things you can do for yourself.  Follow these instructions at home:  · Wear shoes at all times, even in the house. Do not go barefoot. Bare feet are easily injured.  · Check your feet daily for blisters, cuts, and redness. If you cannot see the bottom of your feet, use a mirror or ask someone for help.  · Wash your feet with warm water (do not use hot water) and mild soap. Then pat your feet and the areas between your toes until they are completely dry. Do not soak your feet as this can dry your skin.  · Apply a moisturizing lotion or petroleum jelly (that does not contain alcohol and is unscented) to the skin on your feet and to dry, brittle toenails. Do not apply lotion between your toes.  · Trim your toenails straight across. Do not dig under them or around the cuticle. File the edges of your nails with an emery board or nail file.  · Do not cut corns or calluses or try to remove them with medicine.  · Wear clean socks or stockings every day. Make sure they are not too tight. Do not wear knee-high stockings since they may decrease blood flow to your legs.  · Wear shoes that fit properly and have enough cushioning. To break in new shoes, wear them for just a few hours a day. This prevents you from injuring your feet. Always look in your shoes before you put them on to be sure there are no objects inside.  · Do not cross your legs. This may decrease the blood flow to your feet.  · If you find a  minor scrape, cut, or break in the skin on your feet, keep it and the skin around it clean and dry. These areas may be cleansed with mild soap and water. Do not cleanse the area with peroxide, alcohol, or iodine.  · When you remove an adhesive bandage, be sure not to damage the skin around it.  · If you have a wound, look at it several times a day to make sure it is healing.  · Do not use heating pads or hot water bottles. They may burn your skin. If you have lost feeling in your feet or legs, you may not know it is happening until it is too late.  · Make sure your health care provider performs a complete foot exam at least annually or more often if you have foot problems. Report any cuts, sores, or bruises to your health care provider immediately.  Contact a health care provider if:  · You have an injury that is not healing.  · You have cuts or breaks in the skin.  · You have an ingrown nail.  · You notice redness on your legs or feet.  · You feel burning or tingling in your legs or feet.  · You have pain or cramps in your legs and feet.  · Your legs or feet are numb.  · Your feet always feel cold.  Get help right away if:  · There is increasing redness, swelling, or pain in or around a wound.  · There is a red line that goes up your leg.  · Pus is coming from a wound.  · You develop a fever or as directed by your health care provider.  · You notice a bad smell coming from an ulcer or wound.  This information is not intended to replace advice given to you by your health care provider. Make sure you discuss any questions you have with your health care provider.  Document Released: 12/15/2001 Document Revised: 05/25/2017 Document Reviewed: 05/27/2014  [employer] Interactive Patient Education © 2017 [employer]

## 2018-06-08 ENCOUNTER — OFFICE VISIT (OUTPATIENT)
Dept: PODIATRY | Facility: CLINIC | Age: 63
End: 2018-06-08

## 2018-06-08 VITALS
HEIGHT: 67 IN | DIASTOLIC BLOOD PRESSURE: 76 MMHG | SYSTOLIC BLOOD PRESSURE: 130 MMHG | BODY MASS INDEX: 37.67 KG/M2 | OXYGEN SATURATION: 94 % | HEART RATE: 78 BPM | WEIGHT: 240 LBS

## 2018-06-08 DIAGNOSIS — E11.49 DIABETES MELLITUS TYPE 2 WITH NEUROLOGICAL MANIFESTATIONS (HCC): ICD-10-CM

## 2018-06-08 DIAGNOSIS — B35.1 ONYCHOMYCOSIS: Primary | ICD-10-CM

## 2018-06-08 PROCEDURE — 99212 OFFICE O/P EST SF 10 MIN: CPT | Performed by: PODIATRIST

## 2018-06-08 PROCEDURE — 11721 DEBRIDE NAIL 6 OR MORE: CPT | Performed by: PODIATRIST

## 2018-06-08 NOTE — PROGRESS NOTES
[facility] - P[facility]    Today's Date: 06/08/18    Patient Name: Sandeep Alonso  MRN: [id number]  CSN: [id number]  PCP: Lisbet Velez, BECKA, APRN  Referring Provider: No ref. provider found    SUBJECTIVE     Chief Complaint   Patient presents with   • Left Foot - Follow-up, Numbness     PT is here for diabetic foot/nail care. PT c/o occasional bilateral numbness, swelling, and occasional trouble with walking. Pain scale: 1/10.   • Right Foot - Follow-up, Numbness   • Diabetes     Blood sugar has went unchecked per patient. PCP: Lisbet Velez APRN last visit 02/02/2018     HPI: Sandeep Alonso, a 63 y.o.male, comes to clinic as a(n) established patient presenting for diabetic foot exam and complaining of painful toenails. Pt has h/o DM2, HTN, Hypokalemia, Edema. Relates that his nails are thick and long, need cutting. Admits occasional numbness and tingling in feet. Patient is NIDDM and unsure of last BG level. Does not check BG at home. Admits pain at 1/10 level and described as aching and tingling and admits aching in toes when toenails are thick and long. Relates previous treatment(s) including nail debridement by podiatrist. Denies any constitutional symptoms. No other pedal complaints at this time.    Past Medical History:   Diagnosis Date   • Diabetes mellitus    • Hypertension    • Hypokalemia    • Peripheral edema      Past Surgical History:   Procedure Laterality Date   • NO PAST SURGERIES       Family History   Problem Relation Age of Onset   • Heart attack Mother    • Emphysema Father    • No Known Problems Sister    • No Known Problems Brother    • No Known Problems Son    • No Known Problems Maternal Grandmother    • No Known Problems Maternal Grandfather    • No Known Problems Paternal Grandmother    • No Known Problems Paternal Grandfather    • No Known Problems Brother    • No Known Problems Brother    • No Known Problems Son    • No Known Problems Son    • No Known Problems Son       Social History     Social History   • Marital status:      Spouse name: N/A   • Number of children: N/A   • Years of education: N/A     Occupational History   • Not on file.     Social History Main Topics   • Smoking status: Never Smoker   • Smokeless tobacco: Never Used   • Alcohol use Yes      Comment: occasional beer   • Drug use: No   • Sexual activity: Defer     Other Topics Concern   • Not on file     Social History Narrative   • No narrative on file     No Known Allergies  Current Outpatient Prescriptions   Medication Sig Dispense Refill   • furosemide (LASIX) 40 MG tablet Take 1 tablet by mouth 2 (Two) Times a Day. 180 tablet 1   • gabapentin (NEURONTIN) 100 MG capsule Take 1 capsule by mouth 2 (Two) Times a Day. 180 capsule 1   • lisinopril-hydrochlorothiazide (ZESTORETIC) 10-12.5 MG per tablet Take 1 tablet by mouth Daily. 90 tablet 1   • metFORMIN (GLUCOPHAGE) 500 MG tablet 1/2 tab daily (actual dose is 250) 90 tablet 1   • Omega 3 1000 MG capsule Take  by mouth.     • potassium chloride (K-DUR,KLOR-CON) 20 MEQ CR tablet Take 1 tablet by mouth Daily. 90 tablet 1     No current facility-administered medications for this visit.      Review of Systems   Constitutional: Negative for chills and fever.   HENT: Negative for congestion.    Respiratory: Negative for shortness of breath.    Cardiovascular: Negative for chest pain and leg swelling.   Gastrointestinal: Negative for constipation, diarrhea, nausea and vomiting.   Skin:        Elongated toenails   Neurological: Positive for numbness.       OBJECTIVE     Vitals:    06/08/18 1423   BP: 130/76   Pulse: 78   SpO2: 94%       PHYSICAL EXAM  GEN:   A&Ox3, NAD. Pt presents to clinic ambulating without assistance and wearing Casual Shoes.      NEURO:   Protective sensation intact to 10/10 sites Right foot, 10/10 site Left foot using [place]-Lucita monofilament  Light touch sensation diminished  No Tinel's or Villeux sign.    VASC:  Skin temperature  Warm to Warm proximal to distal alina  DP pulses 2/4 Right, 2/4 Left  PT pulses 1/4 Right, 1/4 Left  CFT <3 sec alina   Light focal edema to left medial ankle  Varicosities absent alina    MUSC/SKEL:  Muscle Strength Right foot Dorsiflexors 5/5, Plantarflexors 5/5, Evertors 5/5, Invertors 5/5  Muscle Strength Left foot Dorsiflexors 5/5, Plantarflexors 5/5, Evertors 5/5, Invertors 5/5  ROM of the 1st MTP is full without pain or crepitus  ROM of the MTJ is full without pain or crepitus    ROM of the STJ is full without pain or crepitus    ROM of the ankle joint is full without pain or crepitus    POP of nail plates  Rectus foot type   Gait pattern: Normal  No gross pedal musculoskeletal deformities noted.     DERM:  Pedal nails x10 are thickened, elongated and discolored with presence of subungual debris  Webspaces are Clean, Dry, and Intact  Skin is mildly atrophic and shiny with no open wounds or sores appreciated.      RADIOLOGY/NUCLEAR:  No results found.    LABORATORY/CULTURE RESULTS:      PATHOLOGY RESULTS:       ASSESSMENT/PLAN     Sandeep was seen today for diabetes, follow-up, numbness, follow-up and numbness.    Diagnoses and all orders for this visit:    Onychomycosis    Diabetes mellitus type 2 with neurological manifestations      Comprehensive lower extremity examination and evaluation was performed.  Discussed findings and treatment plan including risks, benefits, and treatment options with patient in detail. Patient agreed with treatment plan.  After verbal consent obtained, nail(s) x10 debrided of length and thickness with nail nipper without incidence  Patient may maintain nails and calluses at home utilizing emery board or pumice stone between visits as needed  Reviewed at home diabetic foot care including daily foot checks   An After Visit Summary was printed and given to the patient at discharge, including (if requested) any available informative/educational handouts regarding diagnosis, treatment, or  medications. All questions were answered to patient/family satisfaction. Should symptoms fail to improve or worsen they agree to call or return to clinic or to go to the Emergency Department. Discussed the importance of following up with any needed screening tests/labs/specialist appointments and any requested follow-up recommended by me today. Importance of maintaining follow-up discussed and patient accepts that missed appointments can delay diagnosis and potentially lead to worsening of conditions.  Return in about 3 months (around 9/8/2018)., or sooner if acute issues arise.        This document has been electronically signed by Ghassan Sheets DPM on June 8, 2018 2:55 PM     Please note that portions of this note may have been completed with a voice recognition program. Efforts were made to edit the dictations, but occasionally words are mistranscribed.

## 2018-07-27 ENCOUNTER — OFFICE VISIT (OUTPATIENT)
Dept: FAMILY MEDICINE CLINIC | Facility: CLINIC | Age: 63
End: 2018-07-27

## 2018-07-27 VITALS
WEIGHT: 238 LBS | HEIGHT: 67 IN | BODY MASS INDEX: 37.35 KG/M2 | TEMPERATURE: 98.4 F | RESPIRATION RATE: 18 BRPM | HEART RATE: 91 BPM | SYSTOLIC BLOOD PRESSURE: 140 MMHG | OXYGEN SATURATION: 98 % | DIASTOLIC BLOOD PRESSURE: 80 MMHG

## 2018-07-27 DIAGNOSIS — R60.0 LOCALIZED EDEMA: ICD-10-CM

## 2018-07-27 DIAGNOSIS — E78.2 MIXED HYPERLIPIDEMIA: ICD-10-CM

## 2018-07-27 DIAGNOSIS — I10 ESSENTIAL HYPERTENSION: ICD-10-CM

## 2018-07-27 DIAGNOSIS — Z51.81 THERAPEUTIC DRUG MONITORING: ICD-10-CM

## 2018-07-27 DIAGNOSIS — E11.40 TYPE 2 DIABETES MELLITUS WITH DIABETIC NEUROPATHY, WITHOUT LONG-TERM CURRENT USE OF INSULIN (HCC): Primary | ICD-10-CM

## 2018-07-27 PROBLEM — B35.1 ONYCHOMYCOSIS: Status: RESOLVED | Noted: 2017-08-11 | Resolved: 2018-07-27

## 2018-07-27 PROCEDURE — 99214 OFFICE O/P EST MOD 30 MIN: CPT | Performed by: NURSE PRACTITIONER

## 2018-07-27 RX ORDER — LISINOPRIL AND HYDROCHLOROTHIAZIDE 12.5; 1 MG/1; MG/1
1 TABLET ORAL DAILY
Qty: 90 TABLET | Refills: 1 | Status: SHIPPED | OUTPATIENT
Start: 2018-07-27 | End: 2019-02-08 | Stop reason: SDUPTHER

## 2018-07-27 RX ORDER — GABAPENTIN 100 MG/1
100 CAPSULE ORAL 2 TIMES DAILY
Qty: 180 CAPSULE | Refills: 1 | Status: SHIPPED | OUTPATIENT
Start: 2018-07-27 | End: 2019-02-08 | Stop reason: SDUPTHER

## 2018-07-27 RX ORDER — OMEGA-3 FATTY ACIDS/FISH OIL 300-1000MG
1 CAPSULE ORAL NIGHTLY
Qty: 90 EACH | Refills: 1 | Status: SHIPPED | OUTPATIENT
Start: 2018-07-27 | End: 2018-07-27 | Stop reason: SDUPTHER

## 2018-07-27 RX ORDER — POTASSIUM CHLORIDE 20 MEQ/1
20 TABLET, EXTENDED RELEASE ORAL DAILY
Qty: 90 TABLET | Refills: 1 | Status: SHIPPED | OUTPATIENT
Start: 2018-07-27 | End: 2019-02-08 | Stop reason: SDUPTHER

## 2018-07-27 RX ORDER — FUROSEMIDE 40 MG/1
40 TABLET ORAL 2 TIMES DAILY
Qty: 180 TABLET | Refills: 1 | Status: SHIPPED | OUTPATIENT
Start: 2018-07-27 | End: 2019-02-08 | Stop reason: SDUPTHER

## 2018-07-27 RX ORDER — OMEGA-3 FATTY ACIDS/FISH OIL 300-1000MG
1 CAPSULE ORAL NIGHTLY
Qty: 90 EACH | Refills: 1 | Status: SHIPPED | OUTPATIENT
Start: 2018-07-27 | End: 2020-02-21 | Stop reason: SDUPTHER

## 2018-07-27 NOTE — PROGRESS NOTES
Chief Complaint   Patient presents with   • Hyperlipidemia     Medication Refill   • Hypertension     Medication Refill           HPI  Sandeep Alonso is a 63 y.o. male presents today with    HTN  Currently takes lisinopril-hctz.  Takes medications as prescribed without missed doses, reports no episodes of hypotension or any additional adverse effects from medications(s);    Patient reports BP's at home normal at home. Denies chest pain/chest pressure or discomfort, shortness of breath with exertion, headaches, palpitations, irregular heart beat, tachycardia, lower extremity edema, orthopnea, near-syncope.     Chronic problems: HTN  Currently takes lisinopril.  Takes medications as prescribed without missed doses, reports no episodes of hypotension or any additional adverse effects from medications(s).  Patient reports BP's at home normal. Denies chest pain/chest pressure or discomfort, shortness of breath with exertion, headaches, palpitations, irregular heart beat, tachycardia, lower extremity edema, orthopnea, near-syncope.     PCP:  Lisbet Velez, DNP, APRN    No Known Allergies    Past Medical History:   Diagnosis Date   • Diabetes mellitus (CMS/[facility])    • Hypertension    • Hypokalemia    • Peripheral edema        Past Surgical History:   Procedure Laterality Date   • NO PAST SURGERIES         Social History     Social History   • Marital status:      Social History Main Topics   • Smoking status: Never Smoker   • Smokeless tobacco: Never Used   • Alcohol use Yes      Comment: occasional beer   • Drug use: No   • Sexual activity: Defer     Other Topics Concern   • Not on file       Family History   Problem Relation Age of Onset   • Heart attack Mother    • Emphysema Father    • No Known Problems Sister    • No Known Problems Brother    • No Known Problems Son    • No Known Problems Maternal Grandmother    • No Known Problems Maternal Grandfather    • No Known Problems Paternal Grandmother    • No  "Known Problems Paternal Grandfather    • No Known Problems Brother    • No Known Problems Brother    • No Known Problems Son    • No Known Problems Son    • No Known Problems Son        Current Outpatient Prescriptions on File Prior to Visit   Medication Sig Dispense Refill   • [DISCONTINUED] furosemide (LASIX) 40 MG tablet Take 1 tablet by mouth 2 (Two) Times a Day. 180 tablet 1   • [DISCONTINUED] gabapentin (NEURONTIN) 100 MG capsule Take 1 capsule by mouth 2 (Two) Times a Day. 180 capsule 1   • [DISCONTINUED] lisinopril-hydrochlorothiazide (ZESTORETIC) 10-12.5 MG per tablet Take 1 tablet by mouth Daily. 90 tablet 1   • [DISCONTINUED] metFORMIN (GLUCOPHAGE) 500 MG tablet 1/2 tab daily (actual dose is 250) 90 tablet 1   • [DISCONTINUED] Omega 3 1000 MG capsule Take  by mouth.     • [DISCONTINUED] potassium chloride (K-DUR,KLOR-CON) 20 MEQ CR tablet Take 1 tablet by mouth Daily. 90 tablet 1     No current facility-administered medications on file prior to visit.         REVIEW OF SYMPTOMS: (Positives bolded)  General:  weight loss, fever, chills, night sweats, fatigue, appetite loss  HEENT:  blurry vision, eye pain, eye discharge, dry eyes, decreased vision, sore throat tinnitus, bloody nose, hearingloss, sinus pain/pressure, ear pain/pressure.   Respiratory: shortness of breath, cough, hemoptysis, wheezing, pleurisy,   Cardiovascular:  chest pain, PND, palpitation, edema, orthopnea, syncope, swelling of extremities  Gastro: Nausea, vomiting, diarrhea, hematemesis, abdominal pain, constipation  Genito: hematuria, dysuria, glycosuria, hesitancy, frequency, incontinence  Musckelo: Arthralgia, myalgia, muscle weakness, joint swelling, NSAID use  Skin: rash, pruritis, sores, nail changes, skin thickening, change in wart/mole, itching, rash, new lesions, pruritus, nail changes  Neuro:  Migraine, numbness, ataxia, tremor, vertigo, weakness, memory loss  \"All other systems reviewed and negative, except as listed " above.”      OBJECTIVE:  Constitutional:  Appearance-No acute distress, Consistent with stated age. Orientation- Oriented x 3, alert Posture-Not doubled over. Gait-Normal pace, normal arm movement. Posture- Normal Build and Nutrition-Well developed and well nourished.  General- Patient is pleasant and cooperative with the interview and exam.    Integumentary: General-No rashes, ulcers or lesions. No edema.  Palpation- Normal skin moisture/turgor. Skin is warm to touch, no increased warmth. Capillary refill is normal bilateral Upper and lower extremity.     Head/Neck: Head- normocephalic and atraumatic.  Neck- without visible/palpable lumps or pulsations.  Palpation- No bony tenderness about head/neck along frontal, occiptial, temporal, parietal, mastoid, jawline, zygoma, orbit or any other location.  NO temporal artery tenderness. No TMJ tenderness. Normal cervical ROM.   Neck Supple.  Thyroid-No thyromegaly, no nodules    Eye: Bilaterally PERRLA, EOMI.  No discharge.  Upper and lower eyelids are normal. Sclera/conjunctiva normal without discharge. Cornea is normal and clear. Lens is normal.  Eyeball appears normal. No ciliary flushing, no conjunctival injection.    ENMT:  Pinna- normal without tenderness or erythema.  External auditory canal Left- normal without erythema or discharge, no excessive cerumen. External auditory canal Right-normal without erythema or discharge, no excessive cerumen. TM left- Grey/pearly, normal light reflex and anatomy TM Right- Grey/pearly, normal light reflex and anatomy Hearing Assessment-normal to conversational speech at 2-5 feet.  Nose and sinus-No sinus tenderness along frontal/maxillary region. External appearance normal and midline. Nares- bilateral quiet airflow, no discharge. Nasal mucosa- No bleeding noted and no ulcerations observed. Pink, moist. Turbinates non boggy. Lips- normal color, moist without cracks/lesions Oral Cavity/Palate- hard/soft palate intact without  lesions, oral mucosa pink and moist. Dentition assessed and discussed appropriate oral care. Tongue normal midline.  Oropharynx- no pharyngeal erythema, Uvula midline. No post nasal drip. No exudate. Salivary glands- Non tender to palpation    CHEST/LUNG: Inspection- symmetric chest wall no pectus deformity. Normal effort, no distress, no use of accessory muscles. Palpation- nontender sternum, ribline.  No abnormal pulsations. Auscultation- Breath sounds normal throughout all lung fields.  Normal tracheal sounds, Normal bronchial sounds overlying sternum, Bronchovessicular sounds normal between scapulae posteriorly, Normal vessicular breath sounds heard throughout periphery. Lungs are clear today. Adventitious sounds- No wheezes, rales, rhonchi.     CARDIOVASCULAR:  Carotid artery- normal, no bruits or abnormal pulsations. Jugular vein- no pulsations. Palpation/Percussion- Normal PMI, no palpable thrill  Auscultation- Regular rate and rhythm. No murmur noted in sitting, supine positions. Extremities- no digital clubbing, cyanosis, edema, increased warmth.    ABDOMEN: Inspection- normal and no visible pulsations. Normal contour. Auscultation- Bowel sounds normal, no abdominal bruits. Palpation/Percussion- soft, non-tender, no rebound tenderness, no rigidity (guarding), no jar tenderness, no masses.  Liver-no hepatomegaly, Spleen - no splenomegaly, Hernias- none. Rectal not examined.     Peripheral Vascular: Upper extremity Left- Normal temperature with pink nailbeds and no ulcerations.  Upper extremity Right- Normal temperature with pink nailbeds and no ulcerations.  Lower extremity- Normal temperature with pink nailbeds and no ulcerations. DP pulses 2+ bilaterally.  Pedal hair intact.  Normal capillary refill. Edema- No edema.    Musculoskeletal: Generalized-No generalized swelling or edema of extremities, no digital clubbing or cyanosis, neurovascularly intact all four extremities.  Upper extremity- Symmetrical  posture.  No visible deformity.  Normal sensation along medial and lateral upper extremity proximally and distally.  NO tenderness overlying shoulder, lateral/medial epicondyle.  5/5 and strength 5/5 bilateral UE.  Elbow palpated, no tenderness overlying olecranon.  Normal supination, pronation to active/passive ROM and to resisted rotation. Bicep insertion/tricep insertion appear normal without obvious pathology. Rotator cuff evaluated and intact.  Normal wrist ROM bilaterally. Normal hand movement, intrinsic muscles of hands normal. No tenderness to palpation of hands/wrists/elbows.  Lower extremity- Not tender to palpation, no pain, no swelling, edema or erythema of surrounding tissue, normal strength and tone.  Normal appearing hip ROM bilaterally without pain.  Knee ROM normal at 0-120 degrees. No tenderness overlying trochanters, no tenderness about patella, quad tendon, patellar tendon.  No tenderness at tibial tuberosity. Ankle normal ROM not tender to palpation along medial/lateral malleolus. Normal movement of toes, no tenderness bilateral feet/toes.  Normal foot type. Calves symmetrical.  Stretching demonstrated today.  Spine/Ribs- No deformities, masses or tenderness, no known fractures, normal strength, Normal ROM. Normal stability No tenderness along C/T/L spine.  Normal appearing ROM about spine.      Neurological: General- Moves all 4 extremities symmetrically. Symmetrical face and body posture. Cranial nerves- individually evaluated II-XII and intact. PERRLA, Normal EOMI, visual/special senses appear intact, Face is symmetrical and normal sensation/movement, normal tongue, normal strength/posture of neck musculature. Balance- Romberg intact.  Reflexes- ntact with DTR 2+ patellar, Achilles, bicep, brachial,tricep. Ankle clonus normal with 2 beats.  Strength- 5/5 bilateral UE and LE. Soft touch- intact bilateral UE and LE.  Temperature sensation- intact bilateral UE and LE. Cerebellar  testing-Rapid alternating movements intact.  Heel shin intact. Able to walk normal gait, normal heel toe walking. Neck- supple.        Neuropsych: Oriented- Person, place, time. (AAOx3), Mood/affect- normal and congruent. Able to articulate well. Speech-Normal speech, normal rate, normal tone, normal use of language, volume and coherence.  Thought content- normal with ability to perform basic computations and apply abstract thought/reason. Associations- intact, no SI/HI, no hallucinations, delusions, obsessions.  Judgment/insight- Appropriate. Memory-Recall intact, remote and recent memory intact. Knowledge- Age appropriate fund of knowledge, concentration and attention span normal.    Lymphatic: Head/Neck- normal size and non tender to palpation. Axillary- Head and neck LN are normal size and non tender to palpation. Femoral and Inguinal- normal size and non tender to palpation.      Assessment/Plan:  Sandeep was seen today for hyperlipidemia and hypertension.    Diagnoses and all orders for this visit:    Type 2 diabetes mellitus with diabetic neuropathy, without long-term current use of insulin (CMS/[facility])  -     gabapentin (NEURONTIN) 100 MG capsule; Take 1 capsule by mouth 2 (Two) Times a Day.  -     metFORMIN (GLUCOPHAGE) 500 MG tablet; 1/2 tab daily (actual dose is 250)  -     Hemoglobin A1c  -       HEATHER Report:     As part of this patient's treatment plan, I am prescribing controlled substances. The patient has been made aware of appropriate use of such medications, including potential risk of opiod use, somnolence, limited ability to drive and /or work safely, and potential for dependence or overdose. It has also been made clear that these medications are for use by this patient only, without concomitant use of alcohol or other substances unless prescribed.     Patient has completed prescribing agreement detailing terms of continued prescribing of controlled substances, including monitoring HEATHER reports,  urine drug screening, and pill counts if necessary. The patient is aware that inappropriate use will result in cessation of prescribing such medications.     HEATHER report has been reviewed by: Lisbet Velez, BECKA, APRN on 7/27/18, #[id number]  The report was scanned into the patient's chart.      -     Pt is aware of the potential for addiction and dependence.    Addiction was discussed.  The  Risks, benefits and alternative options of drug therapy discussed.  It was reinforced about the risks and benefits of opioids.  It was reinforced that patient may not call early for medication, may not ask for increase in the number of pills given monthly or increase in dosage of medication, may not jump around to different pharmacies or providers and may not receive any narcotics from other providers including ER, or the contract will be broken and narcotics will no longer be prescribed from this facility if any of these criteria has been broken.         Essential hypertension  -     lisinopril-hydrochlorothiazide (ZESTORETIC) 10-12.5 MG per tablet; Take 1 tablet by mouth Daily.  -     CBC & Differential  -     Comprehensive metabolic panel    Localized edema  -     furosemide (LASIX) 40 MG tablet; Take 1 tablet by mouth 2 (Two) Times a Day.    Mixed hyperlipidemia  -     potassium chloride (K-DUR,KLOR-CON) 20 MEQ CR tablet; Take 1 tablet by mouth Daily.  -     Omega 3 1000 MG capsule; Take 1 capsule by mouth Every Night.  -     Lipid panel    Therapeutic drug monitoring  -     ToxASSURE Select 13 Discrete -  -     Gabapentin, Urine -    Other orders  -     Discontinue: Omega 3 1000 MG capsule; Take 1 capsule by mouth Every Night.      Morbid obesity:  BMI:    37.3   Weight:    238    Follow up plan:  Lose at least 3 pounds before next chronic visit, exercise at least 3 times a week for 30 minute increments, eat baked instead of fried foods, and eat healthier     -  Documentation of education   The patient BMI is outside  this range and we recommended/discussed today to utilize a diet/exercise program to get back into the appropriate range.  Federal guidelines recommend that people under the age of 65 should have a BMI of 18.5-24.9  Food diary:  Bring to next visit  tial step is to document everything that is consumed. Pt's that have a food diary  Lose 2x the weight  by keeping track of foods.   Choose one bad food weekly and eliminate it from your diet.  Replace with one healthy food  Exercise diary: Goal over next 2-4 weeks is walk 30 minutes per day 5 days per week at pace difficult to hold conversation.   Drink more water, less soda.   Cut back on portion sizes.   Today we encouraged roughly a 1 lb per week weight loss with initial goal of 5% weight loss.  Avoid fast foods, eat more salads  If eating out for a meal, consider cutting food in half and placing into a to go container.  Individually portion any foods coming into the home   Use smaller plates  Drink 12 ozof water 30 minutes before meal as way to suppress appetite.  Discussed Contrave, metformin, topamax, Belviq and the cost of medications  Encouraged internet programs or self help books  Set  Goals that are realistic   Educated on ways to measure food  Baseball: 1 cup good for green salad, frozen yogurt, medium piece of fruit  Handful:  ½ cup good cut fruit, cooked vegetables, pasta, rice  Egg:  ¼ cup good for dried fruit  Deck of cards: 3 ounces good for meat and poultry  Check book:  3 ounces grilled fish  Plate Method:  reduce plate size to 9 inch dinner plate.  Half of plate should be filled with non-starchy vegetables (broccoli, lettuce, cauliflower, tomatoes), ¼ plate with lean source of protein (lean chicken, turkey, fish), remaining ½ with whole grains (brown rice, potato, whole grain breads  Avoid liquid calories (regular soda, juice, coffee with cream)  Focus  on water, seltzer water and other non-calorie drinks  Replace regular sugar with non-caloric  sweeteners  Avoid skipping meals: plan small regular meals throughout the day in order to keep your hunger controlled  Consider using meal replacements if unable to plan a healthy meal (protein shake, high protein bar)  Replace all white bread with whole wheat/whole grain alternative  Swap regular salad dressings (mayonnaise, butter, or low fat or fat free alternative)  Avoid high fat, high calorie, high carbohydrate snakes (cookies, pastries, cakes)  Snack on fruits, low fat dairy (yogurt, cottage cheese)            Management plan:  Take medications as prescribed; return to the clinic of new or worsening concerns.       Risks/benefits of current and new medications discussed with the patient and or family today.  The patient/family are aware and accept that if there any side effects they should call or return to clinic as soon as possible.  Appropriate F/U discussed for topics addressed today. All questions were answered to the satisfactory state of patient/family.  Should symptoms fail to improve or worsen they agree to call or return to clinic or to go to the ER. Education handouts were offered on any new Rx if requested.  Discussed the importance of following up with any needed screening tests/labs/specialist appointments and any requested follow-up recommended by me today.  Importance of maintaining follow-up discussed and patient accepts that missed appointments can delay diagnosis and potentially lead to worsening of conditions.    Return for Recheck 6 months for chronic problems and  schedule well visit.    Lisbet Velez, DNP, APRN  7/27/2018

## 2018-08-03 DIAGNOSIS — R74.8 ABNORMAL LIVER ENZYMES: Primary | ICD-10-CM

## 2018-08-03 DIAGNOSIS — E78.49 OTHER HYPERLIPIDEMIA: Primary | ICD-10-CM

## 2018-08-03 LAB
7AMINOCLONAZEPAM/CREAT UR: NOT DETECTED NG/MG CREAT
A-OH ALPRAZ/CREAT UR: NOT DETECTED NG/MG CREAT
ALBUMIN SERPL-MCNC: 4.5 G/DL (ref 3.5–5)
ALBUMIN/GLOB SERPL: 1.4 G/DL (ref 1.1–2.5)
ALFENTANIL UR QL: NOT DETECTED NG/MG CREAT
ALP SERPL-CCNC: 74 U/L (ref 24–120)
ALPHA-HYDROXYTRIAZOLAM, URINE: NOT DETECTED NG/MG CREAT
ALT SERPL-CCNC: 47 U/L (ref 0–54)
AMOBARBITAL UR QL CFM: NOT DETECTED
AMPHET/CREAT UR: NOT DETECTED NG/MG CREAT
AMPHETAMINES UR QL CFM: NEGATIVE
AST SERPL-CCNC: 57 U/L (ref 7–45)
BARBITAL UR QL CFM: NOT DETECTED
BARBITURATES UR QL CFM: NEGATIVE
BASOPHILS # BLD AUTO: 0.03 10*3/MM3 (ref 0–0.2)
BASOPHILS NFR BLD AUTO: 0.4 % (ref 0–2)
BENZODIAZ UR QL CFM: NEGATIVE
BILIRUB SERPL-MCNC: 0.6 MG/DL (ref 0.1–1)
BUN SERPL-MCNC: 17 MG/DL (ref 5–21)
BUN/CREAT SERPL: 19.1 (ref 7–25)
BUPRENORPHINE UR QL CFM: NEGATIVE
BUPRENORPHINE/CREAT UR: NOT DETECTED NG/MG CREAT
BUTABARBITAL UR QL CFM: NOT DETECTED
BUTALBITAL UR QL CFM: NOT DETECTED
BZE/CREAT UR: NOT DETECTED NG/MG CREAT
CALCIUM SERPL-MCNC: 9.1 MG/DL (ref 8.4–10.4)
CANNABINOIDS UR QL CFM: NEGATIVE
CARBOXYTHC/CREAT UR: NOT DETECTED NG/MG CREAT
CHLORIDE SERPL-SCNC: 100 MMOL/L (ref 98–110)
CHOLEST SERPL-MCNC: 179 MG/DL (ref 130–200)
CLONAZEPAM UR QL: NOT DETECTED NG/MG CREAT
CO2 SERPL-SCNC: 31 MMOL/L (ref 24–31)
COCAETHYLENE UR QL CFM: NOT DETECTED NG/MG CREAT
COCAINE UR QL CFM: NEGATIVE
CODEINE/CREAT UR: NOT DETECTED NG/MG CREAT
CONV COCAINE, UR: NOT DETECTED NG/MG CREAT
CREAT SERPL-MCNC: 0.89 MG/DL (ref 0.5–1.4)
CREAT UR-MCNC: 20 MG/DL
DESALKYLFLURAZ/CREAT UR: NOT DETECTED NG/MG CREAT
DHC/CREAT UR: NOT DETECTED NG/MG CREAT
DIAZEPAM/CREAT UR: NOT DETECTED NG/MG CREAT
DRUGS UR: NORMAL
EDDP/CREAT UR: NOT DETECTED NG/MG CREAT
EOSINOPHIL # BLD AUTO: 0.11 10*3/MM3 (ref 0–0.7)
EOSINOPHIL NFR BLD AUTO: 1.4 % (ref 0–4)
ERYTHROCYTE [DISTWIDTH] IN BLOOD BY AUTOMATED COUNT: 14.1 % (ref 12–15)
ETHANOL UR CFM-MCNC: NOT DETECTED G/DL
ETHANOL UR QL CFM: NEGATIVE
FENTANYL UR QL CFM: NEGATIVE
FENTANYL/CREAT UR: NOT DETECTED NG/MG CREAT
GABAPENTIN UR-MCNC: 50.2 UG/ML
GLOBULIN SER CALC-MCNC: 3.3 GM/DL
GLUCOSE SERPL-MCNC: 103 MG/DL (ref 70–100)
HBA1C MFR BLD: 5.5 %
HCT VFR BLD AUTO: 48.6 % (ref 40–52)
HDLC SERPL-MCNC: 37 MG/DL
HGB BLD-MCNC: 15 G/DL (ref 14–18)
HX OF MEDICATION USE: NORMAL
HYDROCODONE/CREAT UR: NOT DETECTED NG/MG CREAT
HYDROMORPHONE/CREAT UR: NOT DETECTED NG/MG CREAT
IMM GRANULOCYTES # BLD: 0.03 10*3/MM3 (ref 0–0.03)
IMM GRANULOCYTES NFR BLD: 0.4 % (ref 0–5)
LDLC SERPL CALC-MCNC: 117 MG/DL (ref 0–99)
LEVEL OF DETECTION:: NORMAL
LORAZEPAM/CREAT UR: NOT DETECTED NG/MG CREAT
LORAZEPAM/CREAT UR: NOT DETECTED NG/MG CREAT
LYMPHOCYTES # BLD AUTO: 2.72 10*3/MM3 (ref 0.72–4.86)
LYMPHOCYTES NFR BLD AUTO: 34.5 % (ref 15–45)
MCH RBC QN AUTO: 28.4 PG (ref 28–32)
MCHC RBC AUTO-ENTMCNC: 30.9 G/DL (ref 33–36)
MCV RBC AUTO: 91.9 FL (ref 82–95)
MDA/CREAT UR: NOT DETECTED NG/MG CREAT
MDMA/CREAT UR: NOT DETECTED NG/MG CREAT
MEPHOBARBITAL UR QL CFM: NOT DETECTED
METHADONE UR QL CFM: NEGATIVE
METHADONE/CREAT UR: NOT DETECTED NG/MG CREAT
METHAMPHET/CREAT UR: NOT DETECTED NG/MG CREAT
MIDAZOLAM UR-MCNC: NOT DETECTED NG/MG CREAT
MONOCYTES # BLD AUTO: 0.46 10*3/MM3 (ref 0.19–1.3)
MONOCYTES NFR BLD AUTO: 5.8 % (ref 4–12)
MORPHINE/CREAT UR: NOT DETECTED NG/MG CREAT
N-DESMETHYLTRAMADOL, U: NOT DETECTED
NARCOTICS UR: NEGATIVE
NEUTROPHILS # BLD AUTO: 4.54 10*3/MM3 (ref 1.87–8.4)
NEUTROPHILS NFR BLD AUTO: 57.5 % (ref 39–78)
NORBUPRENORPHINE/CREAT UR: NOT DETECTED NG/MG CREAT
NORCODEINE UR-MCNC: NOT DETECTED NG/MG CREAT
NORDIAZEPAM/CREAT UR: NOT DETECTED NG/MG CREAT
NORFENTANYL/CREAT UR: NOT DETECTED NG/MG CREAT
NORHYDROCODONE/CREAT UR: NOT DETECTED NG/MG CREAT
NOROXYCODONE/CREAT UR: NOT DETECTED NG/MG CREAT
NOROXYMORPHONE: NOT DETECTED NG/MG CREAT
NRBC BLD AUTO-RTO: 0 /100 WBC (ref 0–0)
O-DESMETHYLTRAMADOL, UR: NOT DETECTED
OPIATES UR QL CFM: NEGATIVE
OXAZEPAM/CREAT UR: NOT DETECTED NG/MG CREAT
OXYCODONE UR QL CFM: NEGATIVE
OXYCODONE/CREAT UR: NOT DETECTED NG/MG CREAT
OXYMORPHONE/CREAT UR: NOT DETECTED NG/MG CREAT
PENTOBARB UR QL CFM: NOT DETECTED
PHENOBARB UR QL CFM: NOT DETECTED
PLATELET # BLD AUTO: 257 10*3/MM3 (ref 130–400)
POTASSIUM SERPL-SCNC: 4.1 MMOL/L (ref 3.5–5.3)
PROT SERPL-MCNC: 7.8 G/DL (ref 6.3–8.7)
RBC # BLD AUTO: 5.29 10*6/MM3 (ref 4.8–5.9)
SECOBARBITAL UR QL CFM: NOT DETECTED
SODIUM SERPL-SCNC: 143 MMOL/L (ref 135–145)
SUFENTANIL UR QL: NOT DETECTED NG/MG CREAT
TAPENTADOL UR QL CFM: NEGATIVE
TAPENTADOL/CREAT UR: NOT DETECTED NG/MG CREAT
TEMAZEPAM/CREAT UR: NOT DETECTED NG/MG CREAT
THIOPENTAL UR QL CFM: NOT DETECTED
TRAMADOL UR QL CFM: NOT DETECTED
TRIGL SERPL-MCNC: 125 MG/DL (ref 0–149)
VLDLC SERPL CALC-MCNC: 25 MG/DL
WBC # BLD AUTO: 7.89 10*3/MM3 (ref 4.8–10.8)

## 2018-08-03 RX ORDER — PRAVASTATIN SODIUM 10 MG
10 TABLET ORAL NIGHTLY
Qty: 90 TABLET | Refills: 0 | Status: SHIPPED | OUTPATIENT
Start: 2018-08-03 | End: 2019-02-08 | Stop reason: SDUPTHER

## 2018-08-13 ENCOUNTER — TELEPHONE (OUTPATIENT)
Dept: FAMILY MEDICINE CLINIC | Facility: CLINIC | Age: 63
End: 2018-08-13

## 2018-08-13 NOTE — TELEPHONE ENCOUNTER
Patient called and was given his recent lab results he has been taking his omega 3 fatty acids he is aware that pravastatin has been added.

## 2018-09-07 DIAGNOSIS — R74.8 ABNORMAL LIVER ENZYMES: ICD-10-CM

## 2018-09-08 LAB — AST SERPL-CCNC: 31 U/L (ref 7–45)

## 2018-09-10 ENCOUNTER — TELEPHONE (OUTPATIENT)
Dept: FAMILY MEDICINE CLINIC | Facility: CLINIC | Age: 63
End: 2018-09-10

## 2018-09-10 ENCOUNTER — TELEPHONE (OUTPATIENT)
Dept: PODIATRY | Facility: CLINIC | Age: 63
End: 2018-09-10

## 2018-10-02 ENCOUNTER — TELEPHONE (OUTPATIENT)
Dept: PODIATRY | Facility: CLINIC | Age: 63
End: 2018-10-02

## 2018-10-02 NOTE — TELEPHONE ENCOUNTER
Left message and callback number reminding patient of appointment with Dr. Balwinder Sheets on October 3, 2018 at 2:30 pm.

## 2018-10-02 NOTE — PROGRESS NOTES
[facility] - PODIATRY    Today's Date: 10/03/18    Patient Name: Sandeep Alonso  MRN: [id number]  CSN: [id number]  PCP: Lisbet Velez, DNP, APRN  Referring Provider: No ref. provider found    SUBJECTIVE     Chief Complaint   Patient presents with   • Left Foot - Follow-up     PT c/o slight in right foot, swelling, and numbness. Pain scale: 5/10.   • Right Foot - Follow-up   • Diabetes     PT does not check blood sugar regularly and is currently not taking insulin. PCP: Lisbet Velez APRN last visit 07/27/2018     HPI: Sandeep Alonso, a 63 y.o.male, comes to clinic as a(n) established patient presenting for diabetic foot exam and complaining of painful toenails. Pt has h/o DM2, HTN, Hypokalemia, Edema. Relates that his nails are thick and long, need cutting. Admits occasional numbness and tingling in feet. Patient is NIDDM and unsure of last BG level. Does not check BG at home. Admits pain at 5/10 level and described as aching, numbness and tingling and admits aching in toes when toenails are thick and long. Relates mild itching to feet. Denies open wounds or sores. Relates previous treatment(s) including nail debridement by podiatrist. Denies any constitutional symptoms. No other pedal complaints at this time.    Past Medical History:   Diagnosis Date   • Hypertension    • Hypokalemia    • Peripheral edema      Past Surgical History:   Procedure Laterality Date   • NO PAST SURGERIES       Family History   Problem Relation Age of Onset   • Heart attack Mother    • Emphysema Father    • No Known Problems Sister    • No Known Problems Brother    • No Known Problems Son    • No Known Problems Maternal Grandmother    • No Known Problems Maternal Grandfather    • No Known Problems Paternal Grandmother    • No Known Problems Paternal Grandfather    • No Known Problems Brother    • No Known Problems Brother    • No Known Problems Son    • No Known Problems Son    • No Known Problems Son      Social  History     Social History   • Marital status:      Spouse name: N/A   • Number of children: N/A   • Years of education: N/A     Occupational History   • Not on file.     Social History Main Topics   • Smoking status: Never Smoker   • Smokeless tobacco: Never Used   • Alcohol use Yes      Comment: occasional beer   • Drug use: No   • Sexual activity: Defer     Other Topics Concern   • Not on file     Social History Narrative   • No narrative on file     No Known Allergies  Current Outpatient Prescriptions   Medication Sig Dispense Refill   • furosemide (LASIX) 40 MG tablet Take 1 tablet by mouth 2 (Two) Times a Day. 180 tablet 1   • gabapentin (NEURONTIN) 100 MG capsule Take 1 capsule by mouth 2 (Two) Times a Day. 180 capsule 1   • lisinopril-hydrochlorothiazide (ZESTORETIC) 10-12.5 MG per tablet Take 1 tablet by mouth Daily. 90 tablet 1   • metFORMIN (GLUCOPHAGE) 500 MG tablet 1/2 tab daily (actual dose is 250) 90 tablet 1   • Omega 3 1000 MG capsule Take 1 capsule by mouth Every Night. 90 each 1   • potassium chloride (K-DUR,KLOR-CON) 20 MEQ CR tablet Take 1 tablet by mouth Daily. 90 tablet 1   • pravastatin (PRAVACHOL) 10 MG tablet Take 1 tablet by mouth Every Night. 90 tablet 0     No current facility-administered medications for this visit.      Review of Systems   Constitutional: Negative for chills and fever.   HENT: Negative for congestion.    Respiratory: Negative for shortness of breath.    Cardiovascular: Negative for chest pain and leg swelling.   Gastrointestinal: Negative for constipation, diarrhea, nausea and vomiting.   Skin:        Elongated toenails   Neurological: Positive for numbness.       OBJECTIVE     Vitals:    10/03/18 1359   BP: 138/80   Pulse: 64   SpO2: 94%       PHYSICAL EXAM  GEN:   A&Ox3, NAD. Pt presents to clinic ambulating without assistance and wearing Casual Shoes.      NEURO:   Protective sensation intact to 9/10 sites Right foot, 9/10 site Left foot using  [place]-Lucita monofilament  Light touch sensation diminished  No Tinel's or Villeux sign.    VASC:  Skin temperature Warm to Warm proximal to distal alina  DP pulses 2/4 Right, 2/4 Left  PT pulses 1/4 Right, 1/4 Left  CFT <3 sec alina   Light focal edema to left medial ankle  Varicosities absent alina    MUSC/SKEL:  Muscle Strength Right foot Dorsiflexors 5/5, Plantarflexors 5/5, Evertors 5/5, Invertors 5/5  Muscle Strength Left foot Dorsiflexors 5/5, Plantarflexors 5/5, Evertors 5/5, Invertors 5/5  ROM of the 1st MTP is full without pain or crepitus  ROM of the MTJ is full without pain or crepitus    ROM of the STJ is full without pain or crepitus    ROM of the ankle joint is full without pain or crepitus    POP of nail plates  Rectus foot type   Gait pattern: Normal  No gross pedal musculoskeletal deformities noted.     DERM:  Pedal nails x10 are thickened, elongated and discolored with presence of subungual debris  Webspaces are Clean, Dry, and Intact; mild flakiness to skin.   Skin is mildly atrophic and shiny with no open wounds or sores appreciated.      RADIOLOGY/NUCLEAR:  No results found.    LABORATORY/CULTURE RESULTS:      PATHOLOGY RESULTS:       ASSESSMENT/PLAN     Sandeep was seen today for diabetes, follow-up and follow-up.    Diagnoses and all orders for this visit:    Onychomycosis    Diabetes mellitus type 2 with neurological manifestations (CMS/HCC)    Foot pain, bilateral    Tinea pedis of both feet      Comprehensive lower extremity examination and evaluation was performed.  Discussed findings and treatment plan including risks, benefits, and treatment options with patient in detail. Patient agreed with treatment plan.  After verbal consent obtained, nail(s) x10 debrided of length and thickness with nail nipper without incidence  Patient may maintain nails and calluses at home utilizing emery board or pumice stone between visits as needed  Reviewed at home diabetic foot care including daily foot  checks   Patient to use OTC antifungal cream or spray PRN  An After Visit Summary was printed and given to the patient at discharge, including (if requested) any available informative/educational handouts regarding diagnosis, treatment, or medications. All questions were answered to patient/family satisfaction. Should symptoms fail to improve or worsen they agree to call or return to clinic or to go to the Emergency Department. Discussed the importance of following up with any needed screening tests/labs/specialist appointments and any requested follow-up recommended by me today. Importance of maintaining follow-up discussed and patient accepts that missed appointments can delay diagnosis and potentially lead to worsening of conditions.  Return in about 3 months (around 1/3/2019)., or sooner if acute issues arise.        This document has been electronically signed by Ghassan Sheets DPM on October 3, 2018 2:18 PM     Please note that portions of this note may have been completed with a voice recognition program. Efforts were made to edit the dictations, but occasionally words are mistranscribed.

## 2018-10-03 ENCOUNTER — OFFICE VISIT (OUTPATIENT)
Dept: PODIATRY | Facility: CLINIC | Age: 63
End: 2018-10-03

## 2018-10-03 VITALS
SYSTOLIC BLOOD PRESSURE: 138 MMHG | HEIGHT: 67 IN | HEART RATE: 64 BPM | WEIGHT: 238 LBS | BODY MASS INDEX: 37.35 KG/M2 | OXYGEN SATURATION: 94 % | DIASTOLIC BLOOD PRESSURE: 80 MMHG

## 2018-10-03 DIAGNOSIS — B35.1 ONYCHOMYCOSIS: Primary | ICD-10-CM

## 2018-10-03 DIAGNOSIS — B35.3 TINEA PEDIS OF BOTH FEET: ICD-10-CM

## 2018-10-03 DIAGNOSIS — M79.671 FOOT PAIN, BILATERAL: ICD-10-CM

## 2018-10-03 DIAGNOSIS — M79.672 FOOT PAIN, BILATERAL: ICD-10-CM

## 2018-10-03 DIAGNOSIS — E11.49 DIABETES MELLITUS TYPE 2 WITH NEUROLOGICAL MANIFESTATIONS (HCC): ICD-10-CM

## 2018-10-03 PROCEDURE — 11721 DEBRIDE NAIL 6 OR MORE: CPT | Performed by: PODIATRIST

## 2018-10-03 PROCEDURE — 99213 OFFICE O/P EST LOW 20 MIN: CPT | Performed by: PODIATRIST

## 2018-10-03 NOTE — PATIENT INSTRUCTIONS
Athlete's Foot  Athlete's foot (tinea pedis) is a fungal infection of the skin on the feet. It often occurs on the skin that is between or underneath the toes. It can also occur on the soles of the feet. The infection can spread from person to person (is contagious).  Follow these instructions at home:  · Apply or take over-the-counter and prescription medicines only as told by your doctor.  · Keep all follow-up visits as told by your doctor. This is important.  · Do not scratch your feet.  · Keep your feet dry:  ? Wear cotton or wool socks. Change your socks every day or if they become wet.  ? Wear shoes that allow air to move around, such as sandals or canvas tennis shoes.  · Wash and dry your feet:  ? Every day or as told by your doctor.  ? After exercising.  ? Including the area between your toes.  · Wear sandals in wet areas, such as locker rooms and shared showers.  · Do not share any of these items:  ? Towels.  ? Nail clippers.  ? Other personal items that touch your feet.  · If you have diabetes, keep your blood sugar under control.  Contact a doctor if:  · You have a fever.  · You have swelling, soreness, warmth, or redness in your foot.  · You are not getting better with treatment.  · Your symptoms get worse.  · You have new symptoms.  This information is not intended to replace advice given to you by your health care provider. Make sure you discuss any questions you have with your health care provider.  Document Released: 06/05/2009 Document Revised: 05/25/2017 Document Reviewed: 06/20/2016  [employer] Interactive Patient Education © 2018 [employer]    Diabetes and Foot Care  Diabetes may cause you to have problems because of poor blood supply (circulation) to your feet and legs. This may cause the skin on your feet to become thinner, break easier, and heal more slowly. Your skin may become dry, and the skin may peel and crack. You may also have nerve damage in your legs and feet causing decreased  feeling in them. You may not notice minor injuries to your feet that could lead to infections or more serious problems. Taking care of your feet is one of the most important things you can do for yourself.  Follow these instructions at home:  · Wear shoes at all times, even in the house. Do not go barefoot. Bare feet are easily injured.  · Check your feet daily for blisters, cuts, and redness. If you cannot see the bottom of your feet, use a mirror or ask someone for help.  · Wash your feet with warm water (do not use hot water) and mild soap. Then pat your feet and the areas between your toes until they are completely dry. Do not soak your feet as this can dry your skin.  · Apply a moisturizing lotion or petroleum jelly (that does not contain alcohol and is unscented) to the skin on your feet and to dry, brittle toenails. Do not apply lotion between your toes.  · Trim your toenails straight across. Do not dig under them or around the cuticle. File the edges of your nails with an emery board or nail file.  · Do not cut corns or calluses or try to remove them with medicine.  · Wear clean socks or stockings every day. Make sure they are not too tight. Do not wear knee-high stockings since they may decrease blood flow to your legs.  · Wear shoes that fit properly and have enough cushioning. To break in new shoes, wear them for just a few hours a day. This prevents you from injuring your feet. Always look in your shoes before you put them on to be sure there are no objects inside.  · Do not cross your legs. This may decrease the blood flow to your feet.  · If you find a minor scrape, cut, or break in the skin on your feet, keep it and the skin around it clean and dry. These areas may be cleansed with mild soap and water. Do not cleanse the area with peroxide, alcohol, or iodine.  · When you remove an adhesive bandage, be sure not to damage the skin around it.  · If you have a wound, look at it several times a day to  make sure it is healing.  · Do not use heating pads or hot water bottles. They may burn your skin. If you have lost feeling in your feet or legs, you may not know it is happening until it is too late.  · Make sure your health care provider performs a complete foot exam at least annually or more often if you have foot problems. Report any cuts, sores, or bruises to your health care provider immediately.  Contact a health care provider if:  · You have an injury that is not healing.  · You have cuts or breaks in the skin.  · You have an ingrown nail.  · You notice redness on your legs or feet.  · You feel burning or tingling in your legs or feet.  · You have pain or cramps in your legs and feet.  · Your legs or feet are numb.  · Your feet always feel cold.  Get help right away if:  · There is increasing redness, swelling, or pain in or around a wound.  · There is a red line that goes up your leg.  · Pus is coming from a wound.  · You develop a fever or as directed by your health care provider.  · You notice a bad smell coming from an ulcer or wound.  This information is not intended to replace advice given to you by your health care provider. Make sure you discuss any questions you have with your health care provider.  Document Released: 12/15/2001 Document Revised: 05/25/2017 Document Reviewed: 05/27/2014  [employer] Interactive Patient Education © 2017 [employer]

## 2019-01-24 ENCOUNTER — TELEPHONE (OUTPATIENT)
Dept: PODIATRY | Facility: CLINIC | Age: 64
End: 2019-01-24

## 2019-01-24 NOTE — TELEPHONE ENCOUNTER
Left message and callback number reminding patient of appointment with Dr. Balwinder Sheets on January 25, 2019 at 3:00 pm.

## 2019-01-25 ENCOUNTER — OFFICE VISIT (OUTPATIENT)
Dept: PODIATRY | Facility: CLINIC | Age: 64
End: 2019-01-25

## 2019-01-25 VITALS
HEIGHT: 67 IN | OXYGEN SATURATION: 93 % | SYSTOLIC BLOOD PRESSURE: 140 MMHG | BODY MASS INDEX: 37.35 KG/M2 | HEART RATE: 67 BPM | DIASTOLIC BLOOD PRESSURE: 72 MMHG | WEIGHT: 238 LBS

## 2019-01-25 DIAGNOSIS — M79.671 FOOT PAIN, BILATERAL: ICD-10-CM

## 2019-01-25 DIAGNOSIS — B35.1 ONYCHOMYCOSIS: Primary | ICD-10-CM

## 2019-01-25 DIAGNOSIS — M79.672 FOOT PAIN, BILATERAL: ICD-10-CM

## 2019-01-25 DIAGNOSIS — E11.49 DIABETES MELLITUS TYPE 2 WITH NEUROLOGICAL MANIFESTATIONS (HCC): ICD-10-CM

## 2019-01-25 PROCEDURE — 11721 DEBRIDE NAIL 6 OR MORE: CPT | Performed by: PODIATRIST

## 2019-01-25 PROCEDURE — 99213 OFFICE O/P EST LOW 20 MIN: CPT | Performed by: PODIATRIST

## 2019-01-25 NOTE — PROGRESS NOTES
[facility] - PODIATRY    Today's Date: 01/25/19    Patient Name: Sandeep Alonso  MRN: [id number]  CSN: [id number]  PCP: Lisbet Velez, DNP, APRN  Referring Provider: No ref. provider found    SUBJECTIVE     Chief Complaint   Patient presents with   • Diabetes     Patient is here for diabetic foot and nail care. Patient c/o painful toenails. Pain scale: 5/10. PCP: Lisbet Velez APRN last visit 07/27/2018. Patient is unsure of last blood sugar level.     HPI: Sandeep Alonso, a 63 y.o.male, comes to clinic as a(n) established patient presenting for diabetic foot exam and complaining of painful toenails. Pt has h/o DM2, HTN, Hypokalemia, Edema. Relates that his nails are thick and long, need cutting. Admits occasional numbness and tingling in feet. Patient is NIDDM and unsure of last BG level. Does not check BG at home. Admits pain at 5/10 level and described as aching, numbness and tingling and admits aching in toes when toenails are thick and long. Relates mild itching to feet. Denies open wounds or sores. Relates previous treatment(s) including nail debridement by podiatrist. Denies any constitutional symptoms. No other pedal complaints at this time.    Past Medical History:   Diagnosis Date   • Hypertension    • Hypokalemia    • Peripheral edema      Past Surgical History:   Procedure Laterality Date   • NO PAST SURGERIES       Family History   Problem Relation Age of Onset   • Heart attack Mother    • Emphysema Father    • No Known Problems Sister    • No Known Problems Brother    • No Known Problems Son    • No Known Problems Maternal Grandmother    • No Known Problems Maternal Grandfather    • No Known Problems Paternal Grandmother    • No Known Problems Paternal Grandfather    • No Known Problems Brother    • No Known Problems Brother    • No Known Problems Son    • No Known Problems Son    • No Known Problems Son      Social History     Socioeconomic History   • Marital status:       Spouse name: Not on file   • Number of children: Not on file   • Years of education: Not on file   • Highest education level: Not on file   Social Needs   • Financial resource strain: Not on file   • Food insecurity - worry: Not on file   • Food insecurity - inability: Not on file   • Transportation needs - medical: Not on file   • Transportation needs - non-medical: Not on file   Occupational History   • Not on file   Tobacco Use   • Smoking status: Never Smoker   • Smokeless tobacco: Never Used   Substance and Sexual Activity   • Alcohol use: Yes     Comment: occasional beer   • Drug use: No   • Sexual activity: Defer   Other Topics Concern   • Not on file   Social History Narrative   • Not on file     No Known Allergies  Current Outpatient Medications   Medication Sig Dispense Refill   • furosemide (LASIX) 40 MG tablet Take 1 tablet by mouth 2 (Two) Times a Day. 180 tablet 1   • gabapentin (NEURONTIN) 100 MG capsule Take 1 capsule by mouth 2 (Two) Times a Day. 180 capsule 1   • lisinopril-hydrochlorothiazide (ZESTORETIC) 10-12.5 MG per tablet Take 1 tablet by mouth Daily. 90 tablet 1   • metFORMIN (GLUCOPHAGE) 500 MG tablet 1/2 tab daily (actual dose is 250) 90 tablet 1   • Omega 3 1000 MG capsule Take 1 capsule by mouth Every Night. 90 each 1   • potassium chloride (K-DUR,KLOR-CON) 20 MEQ CR tablet Take 1 tablet by mouth Daily. 90 tablet 1   • pravastatin (PRAVACHOL) 10 MG tablet Take 1 tablet by mouth Every Night. 90 tablet 0     No current facility-administered medications for this visit.      Review of Systems   Constitutional: Negative for chills and fever.   HENT: Negative for congestion.    Respiratory: Negative for shortness of breath.    Cardiovascular: Negative for chest pain and leg swelling.   Gastrointestinal: Negative for constipation, diarrhea, nausea and vomiting.   Skin:        Elongated toenails   Neurological: Positive for numbness.       OBJECTIVE     Vitals:    01/25/19 1524   BP: 140/72    Pulse: 67   SpO2: 93%       PHYSICAL EXAM  GEN:   A&Ox3, NAD. Pt presents to clinic ambulating without assistance and wearing Casual Shoes.      NEURO:   Protective sensation intact to 9/10 sites Right foot, 9/10 site Left foot using [place]-Lucita monofilament  Light touch sensation diminished  No Tinel's or Villeux sign.    VASC:  Skin temperature Warm to Warm proximal to distal alina  DP pulses 2/4 Right, 2/4 Left  PT pulses 1/4 Right, 1/4 Left  CFT <3 sec alina   No edema  Varicosities absent alina    MUSC/SKEL:  Muscle Strength Right foot Dorsiflexors 5/5, Plantarflexors 5/5, Evertors 5/5, Invertors 5/5  Muscle Strength Left foot Dorsiflexors 5/5, Plantarflexors 5/5, Evertors 5/5, Invertors 5/5  ROM of the 1st MTP is full without pain or crepitus  ROM of the MTJ is full without pain or crepitus    ROM of the STJ is full without pain or crepitus    ROM of the ankle joint is full without pain or crepitus    POP of nail plates  Rectus foot type   Gait pattern: Normal  No gross pedal musculoskeletal deformities noted.     DERM:  Pedal nails x10 are thickened, elongated and discolored with presence of subungual debris  Webspaces are Clean, Dry, and Intact; mild flakiness to skin.   Skin is mildly atrophic and shiny with no open wounds or sores appreciated.      RADIOLOGY/NUCLEAR:  No results found.    LABORATORY/CULTURE RESULTS:      PATHOLOGY RESULTS:       ASSESSMENT/PLAN     Sandeep was seen today for diabetes.    Diagnoses and all orders for this visit:    Onychomycosis    Diabetes mellitus type 2 with neurological manifestations (CMS/HCC)    Foot pain, bilateral      Comprehensive lower extremity examination and evaluation was performed.  Discussed findings and treatment plan including risks, benefits, and treatment options with patient in detail. Patient agreed with treatment plan.  After verbal consent obtained, nail(s) x10 debrided of length and thickness with nail nipper without incidence  Patient may maintain  nails and calluses at home utilizing emery board or pumice stone between visits as needed  Reviewed at home diabetic foot care including daily foot checks   An After Visit Summary was printed and given to the patient at discharge, including (if requested) any available informative/educational handouts regarding diagnosis, treatment, or medications. All questions were answered to patient/family satisfaction. Should symptoms fail to improve or worsen they agree to call or return to clinic or to go to the Emergency Department. Discussed the importance of following up with any needed screening tests/labs/specialist appointments and any requested follow-up recommended by me today. Importance of maintaining follow-up discussed and patient accepts that missed appointments can delay diagnosis and potentially lead to worsening of conditions.  Return in about 3 months (around 4/25/2019)., or sooner if acute issues arise.        This document has been electronically signed by Ghassan Sheets DPM on January 25, 2019 3:44 PM     Please note that portions of this note may have been completed with a voice recognition program. Efforts were made to edit the dictations, but occasionally words are mistranscribed.

## 2019-02-08 ENCOUNTER — OFFICE VISIT (OUTPATIENT)
Dept: FAMILY MEDICINE CLINIC | Facility: CLINIC | Age: 64
End: 2019-02-08

## 2019-02-08 VITALS
OXYGEN SATURATION: 93 % | RESPIRATION RATE: 18 BRPM | HEART RATE: 61 BPM | WEIGHT: 247.4 LBS | BODY MASS INDEX: 38.83 KG/M2 | TEMPERATURE: 98.1 F | DIASTOLIC BLOOD PRESSURE: 77 MMHG | SYSTOLIC BLOOD PRESSURE: 163 MMHG | HEIGHT: 67 IN

## 2019-02-08 VITALS
SYSTOLIC BLOOD PRESSURE: 163 MMHG | DIASTOLIC BLOOD PRESSURE: 77 MMHG | BODY MASS INDEX: 38.83 KG/M2 | HEART RATE: 61 BPM | OXYGEN SATURATION: 93 % | RESPIRATION RATE: 18 BRPM | HEIGHT: 67 IN | TEMPERATURE: 98.1 F | WEIGHT: 247.4 LBS

## 2019-02-08 DIAGNOSIS — I10 ESSENTIAL HYPERTENSION: Primary | ICD-10-CM

## 2019-02-08 DIAGNOSIS — E11.40 TYPE 2 DIABETES MELLITUS WITH DIABETIC NEUROPATHY, WITHOUT LONG-TERM CURRENT USE OF INSULIN (HCC): ICD-10-CM

## 2019-02-08 DIAGNOSIS — Z12.5 SCREENING FOR MALIGNANT NEOPLASM OF PROSTATE: ICD-10-CM

## 2019-02-08 DIAGNOSIS — E11.8 TYPE 2 DIABETES MELLITUS WITH COMPLICATION, UNSPECIFIED WHETHER LONG TERM INSULIN USE: Primary | ICD-10-CM

## 2019-02-08 DIAGNOSIS — Z12.11 ENCOUNTER FOR SCREENING FOR MALIGNANT NEOPLASM OF COLON: ICD-10-CM

## 2019-02-08 DIAGNOSIS — E78.2 MIXED HYPERLIPIDEMIA: ICD-10-CM

## 2019-02-08 DIAGNOSIS — R60.0 LOCALIZED EDEMA: ICD-10-CM

## 2019-02-08 DIAGNOSIS — E11.9 ENCOUNTER FOR COMPREHENSIVE DIABETIC FOOT EXAMINATION, TYPE 2 DIABETES MELLITUS (HCC): ICD-10-CM

## 2019-02-08 DIAGNOSIS — E78.49 OTHER HYPERLIPIDEMIA: ICD-10-CM

## 2019-02-08 PROBLEM — E66.01 CLASS 2 SEVERE OBESITY DUE TO EXCESS CALORIES WITH SERIOUS COMORBIDITY AND BODY MASS INDEX (BMI) OF 38.0 TO 38.9 IN ADULT: Status: ACTIVE | Noted: 2019-02-08

## 2019-02-08 PROBLEM — E66.812 CLASS 2 SEVERE OBESITY DUE TO EXCESS CALORIES WITH SERIOUS COMORBIDITY AND BODY MASS INDEX (BMI) OF 38.0 TO 38.9 IN ADULT: Status: ACTIVE | Noted: 2019-02-08

## 2019-02-08 LAB — HBA1C MFR BLD: 5.9 %

## 2019-02-08 PROCEDURE — 83036 HEMOGLOBIN GLYCOSYLATED A1C: CPT | Performed by: NURSE PRACTITIONER

## 2019-02-08 PROCEDURE — 99396 PREV VISIT EST AGE 40-64: CPT | Performed by: NURSE PRACTITIONER

## 2019-02-08 PROCEDURE — 99214 OFFICE O/P EST MOD 30 MIN: CPT | Performed by: NURSE PRACTITIONER

## 2019-02-08 RX ORDER — PRAVASTATIN SODIUM 10 MG
10 TABLET ORAL NIGHTLY
Qty: 90 TABLET | Refills: 1 | Status: SHIPPED | OUTPATIENT
Start: 2019-02-08 | End: 2019-08-16 | Stop reason: SDUPTHER

## 2019-02-08 RX ORDER — GABAPENTIN 100 MG/1
100 CAPSULE ORAL 2 TIMES DAILY
Qty: 180 CAPSULE | Refills: 1 | Status: SHIPPED | OUTPATIENT
Start: 2019-02-08 | End: 2019-08-16 | Stop reason: SDUPTHER

## 2019-02-08 RX ORDER — POTASSIUM CHLORIDE 20 MEQ/1
20 TABLET, EXTENDED RELEASE ORAL DAILY
Qty: 90 TABLET | Refills: 1 | Status: SHIPPED | OUTPATIENT
Start: 2019-02-08 | End: 2019-08-16 | Stop reason: SDUPTHER

## 2019-02-08 RX ORDER — LISINOPRIL AND HYDROCHLOROTHIAZIDE 12.5; 1 MG/1; MG/1
1 TABLET ORAL DAILY
Qty: 90 TABLET | Refills: 1 | Status: SHIPPED | OUTPATIENT
Start: 2019-02-08 | End: 2019-08-16 | Stop reason: SDUPTHER

## 2019-02-08 RX ORDER — FUROSEMIDE 40 MG/1
40 TABLET ORAL 2 TIMES DAILY
Qty: 180 TABLET | Refills: 1 | Status: SHIPPED | OUTPATIENT
Start: 2019-02-08 | End: 2019-08-16 | Stop reason: SDUPTHER

## 2019-02-08 NOTE — PATIENT INSTRUCTIONS
Diabetes Mellitus and Nutrition  When you have diabetes (diabetes mellitus), it is very important to have healthy eating habits because your blood sugar (glucose) levels are greatly affected by what you eat and drink. Eating healthy foods in the appropriate amounts, at about the same times every day, can help you:  · Control your blood glucose.  · Lower your risk of heart disease.  · Improve your blood pressure.  · Reach or maintain a healthy weight.    Every person with diabetes is different, and each person has different needs for a meal plan. Your health care provider may recommend that you work with a diet and nutrition specialist (dietitian) to make a meal plan that is best for you. Your meal plan may vary depending on factors such as:  · The calories you need.  · The medicines you take.  · Your weight.  · Your blood glucose, blood pressure, and cholesterol levels.  · Your activity level.  · Other health conditions you have, such as heart or kidney disease.    How do carbohydrates affect me?  Carbohydrates affect your blood glucose level more than any other type of food. Eating carbohydrates naturally increases the amount of glucose in your blood. Carbohydrate counting is a method for keeping track of how many carbohydrates you eat. Counting carbohydrates is important to keep your blood glucose at a healthy level, especially if you use insulin or take certain oral diabetes medicines.  It is important to know how many carbohydrates you can safely have in each meal. This is different for every person. Your dietitian can help you calculate how many carbohydrates you should have at each meal and for snack.  Foods that contain carbohydrates include:  · Bread, cereal, rice, pasta, and crackers.  · Potatoes and corn.  · Peas, beans, and lentils.  · Milk and yogurt.  · Fruit and juice.  · Desserts, such as cakes, cookies, ice cream, and candy.    How does alcohol affect me?  Alcohol can cause a sudden decrease in blood  "glucose (hypoglycemia), especially if you use insulin or take certain oral diabetes medicines. Hypoglycemia can be a life-threatening condition. Symptoms of hypoglycemia (sleepiness, dizziness, and confusion) are similar to symptoms of having too much alcohol.  If your health care provider says that alcohol is safe for you, follow these guidelines:  · Limit alcohol intake to no more than 1 drink per day for nonpregnant women and 2 drinks per day for men. One drink equals 12 oz of beer, 5 oz of wine, or 1½ oz of hard liquor.  · Do not drink on an empty stomach.  · Keep yourself hydrated with water, diet soda, or unsweetened iced tea.  · Keep in mind that regular soda, juice, and other mixers may contain a lot of sugar and must be counted as carbohydrates.    What are tips for following this plan?  Reading food labels  · Start by checking the serving size on the label. The amount of calories, carbohydrates, fats, and other nutrients listed on the label are based on one serving of the food. Many foods contain more than one serving per package.  · Check the total grams (g) of carbohydrates in one serving. You can calculate the number of servings of carbohydrates in one serving by dividing the total carbohydrates by 15. For example, if a food has 30 g of total carbohydrates, it would be equal to 2 servings of carbohydrates.  · Check the number of grams (g) of saturated and trans fats in one serving. Choose foods that have low or no amount of these fats.  · Check the number of milligrams (mg) of sodium in one serving. Most people should limit total sodium intake to less than 2,300 mg per day.  · Always check the nutrition information of foods labeled as \"low-fat\" or \"nonfat\". These foods may be higher in added sugar or refined carbohydrates and should be avoided.  · Talk to your dietitian to identify your daily goals for nutrients listed on the label.  Shopping  · Avoid buying canned, premade, or processed foods. These " foods tend to be high in fat, sodium, and added sugar.  · Shop around the outside edge of the grocery store. This includes fresh fruits and vegetables, bulk grains, fresh meats, and fresh dairy.  Cooking  · Use low-heat cooking methods, such as baking, instead of high-heat cooking methods like deep frying.  · Cook using healthy oils, such as olive, canola, or sunflower oil.  · Avoid cooking with butter, cream, or high-fat meats.  Meal planning  · Eat meals and snacks regularly, preferably at the same times every day. Avoid going long periods of time without eating.  · Eat foods high in fiber, such as fresh fruits, vegetables, beans, and whole grains. Talk to your dietitian about how many servings of carbohydrates you can eat at each meal.  · Eat 4-6 ounces of lean protein each day, such as lean meat, chicken, fish, eggs, or tofu. 1 ounce is equal to 1 ounce of meat, chicken, or fish, 1 egg, or 1/4 cup of tofu.  · Eat some foods each day that contain healthy fats, such as avocado, nuts, seeds, and fish.  Lifestyle    · Check your blood glucose regularly.  · Exercise at least 30 minutes 5 or more days each week, or as told by your health care provider.  · Take medicines as told by your health care provider.  · Do not use any products that contain nicotine or tobacco, such as cigarettes and e-cigarettes. If you need help quitting, ask your health care provider.  · Work with a counselor or diabetes educator to identify strategies to manage stress and any emotional and social challenges.  What are some questions to ask my health care provider?  · Do I need to meet with a diabetes educator?  · Do I need to meet with a dietitian?  · What number can I call if I have questions?  · When are the best times to check my blood glucose?  Where to find more information:  · American Diabetes Association: diabetes.org/food-and-fitness/food  · Academy of Nutrition and Dietetics:  www.eatright.org/resources/health/diseases-and-conditions/diabetes  · National [place] of Diabetes and Digestive and Kidney Diseases (NIH): www.niddk.nih.gov/health-information/diabetes/overview/diet-eating-physical-activity  Summary  · A healthy meal plan will help you control your blood glucose and maintain a healthy lifestyle.  · Working with a diet and nutrition specialist (dietitian) can help you make a meal plan that is best for you.  · Keep in mind that carbohydrates and alcohol have immediate effects on your blood glucose levels. It is important to count carbohydrates and to use alcohol carefully.  This information is not intended to replace advice given to you by your health care provider. Make sure you discuss any questions you have with your health care provider.  Document Released: 09/14/2006 Document Revised: 01/22/2018 Document Reviewed: 01/22/2018  [email or web address] Interactive Patient Education © 2018 [employer]

## 2019-02-08 NOTE — PROGRESS NOTES
Chief Complaint   Patient presents with   • Annual Exam     Pt is here for annual physical.          No Known Allergies    HPI: Sandeep Alonso is a 63 y.o. male presents today for well exam.  Has a mole on the left forehead that is changing in color but not size.     Chronic problems: HTN stable with lisinopril/HCTZ, hyperlipidemia stable with omega 3 fatty acids and pravastatin, lower extremity edema  stable with furosemide with last does being this am.  Diabetic neuropathy stable with gabapentin.       Past Medical History:   Diagnosis Date   • Hypertension    • Hypokalemia    • Peripheral edema      Past Surgical History:   Procedure Laterality Date   • NO PAST SURGERIES       Social History     Socioeconomic History   • Marital status:      Spouse name: Not on file   • Number of children: Not on file   • Years of education: Not on file   • Highest education level: Not on file   Tobacco Use   • Smoking status: Never Smoker   • Smokeless tobacco: Never Used   Substance and Sexual Activity   • Alcohol use: Yes     Comment: occasional beer   • Drug use: No   • Sexual activity: Defer     Family History   Problem Relation Age of Onset   • Heart attack Mother    • Emphysema Father    • No Known Problems Sister    • No Known Problems Brother    • No Known Problems Son    • No Known Problems Maternal Grandmother    • No Known Problems Maternal Grandfather    • No Known Problems Paternal Grandmother    • No Known Problems Paternal Grandfather    • No Known Problems Brother    • No Known Problems Brother    • No Known Problems Son    • No Known Problems Son    • No Known Problems Son        Current Outpatient Medications on File Prior to Visit   Medication Sig Dispense Refill   • furosemide (LASIX) 40 MG tablet Take 1 tablet by mouth 2 (Two) Times a Day. 180 tablet 1   • gabapentin (NEURONTIN) 100 MG capsule Take 1 capsule by mouth 2 (Two) Times a Day. 180 capsule 1   • lisinopril-hydrochlorothiazide (ZESTORETIC)  10-12.5 MG per tablet Take 1 tablet by mouth Daily. 90 tablet 1   • metFORMIN (GLUCOPHAGE) 500 MG tablet 1/2 tab daily (actual dose is 250) 90 tablet 1   • Omega 3 1000 MG capsule Take 1 capsule by mouth Every Night. 90 each 1   • potassium chloride (K-DUR,KLOR-CON) 20 MEQ CR tablet Take 1 tablet by mouth Daily. 90 tablet 1   • pravastatin (PRAVACHOL) 10 MG tablet Take 1 tablet by mouth Every Night. 90 tablet 1   • [DISCONTINUED] furosemide (LASIX) 40 MG tablet Take 1 tablet by mouth 2 (Two) Times a Day. 180 tablet 1   • [DISCONTINUED] gabapentin (NEURONTIN) 100 MG capsule Take 1 capsule by mouth 2 (Two) Times a Day. 180 capsule 1   • [DISCONTINUED] lisinopril-hydrochlorothiazide (ZESTORETIC) 10-12.5 MG per tablet Take 1 tablet by mouth Daily. 90 tablet 1   • [DISCONTINUED] metFORMIN (GLUCOPHAGE) 500 MG tablet 1/2 tab daily (actual dose is 250) 90 tablet 1   • [DISCONTINUED] potassium chloride (K-DUR,KLOR-CON) 20 MEQ CR tablet Take 1 tablet by mouth Daily. 90 tablet 1   • [DISCONTINUED] pravastatin (PRAVACHOL) 10 MG tablet Take 1 tablet by mouth Every Night. 90 tablet 0     No current facility-administered medications on file prior to visit.         REVIEW OF SYMPTOMS: (Positives bolded)  General:  weight loss, fever, chills, night sweats, fatigue, appetite loss, sneezing  HEENT:  blurry vision, eye pain, eye discharge, dry eyes, decreased vision, sore throat tinnitus, bloody nose, hearin gloss, sinus pain/pressure, ear pain/pressure.   Respiratory: shortness of breath, cough, hemoptysis, wheezing, pleurisy,   Cardiovascular:  chest pain, PND, palpitation, edema, orthopnea, syncope, swelling of extremities  Gastro: Nausea, vomiting, diarrhea, hematemesis, abdominal pain, constipation  Genito: hematuria, dysuria, glycosuria, hesitancy, frequency, incontinence  Skin: rash, pruritis, sores, nail changes, skin thickening, change in wart/mole, itching, rash, new lesions, pruritus, nail changes  Musco: myalgia,  "arthralgia  \"All other systems reviewed and negative, except as listed above.”    The following portions of the patient's history were reviewed and updated as appropriate: allergies, current medications, past family history, past medical history, past social history, past surgical history and problem list.    OBJECTIVE:  Constitutional:  Appearance-No acute distress, Consistent with stated age. Orientation- Oriented x 3, alert Posture-Not doubled over. Gait-Normal pace, normal arm movement. Posture- Normal Build and Nutrition-Well developed and well nourished.  General- Patient is pleasant and cooperative with the interview and exam.    Integumentary: General-No rashes, ulcers or  edema.  Palpation- Normal skin moisture/turgor. Skin is warm to touch, no increased warmth. Capillary refill is normal bilateral Upper and lower extremity. Has small pinpoint black mole over the left brow that is getting darker in color, no change in size    Head/Neck: Head- normocephalic and atraumatic.  Neck- without visible/palpable lumps or pulsations.  Palpation- No bony tenderness about head/neck along frontal, occiptial, temporal, parietal, mastoid, jawline, zygoma, orbit or any other location.  NO temporal artery tenderness. No TMJ tenderness. Normal cervical ROM.   Neck Supple.  Thyroid-No thyromegaly, no nodules    Eye: Bilaterally PERRLA, EOMI.  No discharge.  Upper and lower eyelids are normal. Sclera/conjunctiva normal without discharge. Cornea is normal and clear. Lens is normal.  Eyeball appears normal. No ciliary flushing, no conjunctival injection.    ENMT:  Pinna- normal without tenderness or erythema.  External auditory canal Left- normal without erythema or discharge, no excessive cerumen. External auditory canal Right-normal without erythema or discharge, no excessive cerumen. TM left- Grey/pearly, normal light reflex and anatomy TM Right- Grey/pearly, normal light reflex and anatomy Hearing Assessment-normal to " conversational speech at 2-5 feet.  Nose and sinus-No sinus tenderness along frontal/maxillary region. External appearance normal and midline. Nares- bilateral quiet airflow, no discharge. Nasal mucosa- No bleeding noted and no ulcerations observed. Pink, moist. Turbinates non boggy. Lips- normal color, moist without cracks/lesions Oral Cavity/Palate- hard/soft palate intact without lesions, oral mucosa pink and moist. Dentition assessed and discussed appropriate oral care. Tongue normal midline.  Oropharynx- no pharyngeal erythema, Uvula midline. No post nasal drip. No exudate. Salivary glands- Non tender to palpation    CHEST/LUNG: Inspection- symmetric chest wall no pectus deformity. Normal effort, no distress, no use of accessory muscles. Palpation- nontender sternum, ribline.  No abnormal pulsations. Auscultation- Breath sounds normal throughout all lung fields.  Normal tracheal sounds, Normal bronchial sounds overlying sternum, Bronchovessicular sounds normal between scapulae posteriorly, Normal vessicular breath sounds heard throughout periphery. Lungs are clear today. Adventitious sounds- No wheezes, rales, rhonchi.     CARDIOVASCULAR:  Carotid artery- normal, no bruits or abnormal pulsations. Jugular vein- no pulsations. Palpation/Percussion- Normal PMI, no palpable thrill  Auscultation- Regular rate and rhythm. No murmur noted in sitting, supine positions. Extremities- no digital clubbing, cyanosis, edema, increased warmth.    ABDOMEN: Inspection- normal and no visible pulsations. Normal contour. Auscultation- Bowel sounds normal, no abdominal bruits. Palpation/Percussion- soft, non-tender, no rebound tenderness, no rigidity (guarding), no jar tenderness, no masses.  Liver-no hepatomegaly, Spleen - no splenomegaly, Hernias- none. Rectal not examined.     Peripheral Vascular: Upper extremity Left- Normal temperature with pink nailbeds and no ulcerations.  Upper extremity Right- Normal temperature with pink  "nailbeds and no ulcerations.  Lower extremity- Normal temperature with pink nailbeds and no ulcerations. DP pulses 2+ bilaterally.  Pedal hair intact.  Normal capillary refill. Edema- No edema.    Neurological: General- Moves all 4 extremities symmetrically. Symmetrical face and body posture. Cranial nerves- individually evaluated II-XII and intact. PERRLA, Normal EOMI, visual/special senses appear intact, Face is symmetrical and normal sensation/movement, normal tongue, normal strength/posture of neck musculature. Balance- Romberg intact.  Reflexes- ntact with DTR 2+ patellar, Achilles, bicep, brachial,tricep. Ankle clonus normal with 2 beats.  Strength- 5/5 bilateral UE and LE. Soft touch- intact bilateral UE and LE.  Temperature sensation- intact bilateral UE and LE. Cerebellar testing-Rapid alternating movements intact.  Heel shin intact. Able to walk normal gait, normal heel toe walking. Neck- supple.      Neuropsych: Oriented- Person, place, time. (AAOx3), Mood/affect- normal and congruent. Able to articulate well. Speech-Normal speech, normal rate, normal tone, normal use of language, volume and coherence.  Thought content- normal with ability to perform basic computations and apply abstract thought/reason. Associations- intact, no SI/HI, no hallucinations, delusions, obsessions.  Judgment/insight- Appropriate. Memory-Recall intact, remote and recent memory intact. Knowledge- Age appropriate fund of knowledge, concentration and attention span normal.    Lymphatic: Head/Neck- normal size and non tender to palpation. Axillary- Head and neck LN are normal size and non tender to palpation. Femoral and Inguinal- normal size and non tender to palpation.    /77 (BP Location: Left arm, Patient Position: Sitting, Cuff Size: Adult)   Pulse 61   Temp 98.1 °F (36.7 °C) (Oral)   Resp 18   Ht 170.2 cm (67.01\")   Wt 112 kg (247 lb 6.4 oz)   SpO2 93%   BMI 38.74 kg/m²          ASSESSMENT/PLAN  Sandeep was seen " today for annual exam.    Diagnoses and all orders for this visit:    Type 2 diabetes mellitus with complication, unspecified whether long term insulin use (CMS/[facility])  -     POC Glycosylated Hemoglobin (Hb A1C); 5.9  -     Continue metformen as prescribed  -         Screening for malignant neoplasm of prostate  -     PSA SCREENING    Encounter for screening for malignant neoplasm of colon  -     Cologuard - Stool, Per Rectum; Future      Health:  Exercise daily at least 30 minutes 3x week  Lose weight- decrease calories in diet, eat healthier      Preventive:  Flu vaccine: received at work  Pneumonia: ?   Zostavax: had in Aug 2018    Urine micro: done 7/2018  Dental check: years ago  Diabetic eye exam: 10/19/18  Diabetic foot exam: today  Colonoscopy:  Does not want one.  Will do cologuard      The patient does not have a history of falls. I did complete a risk assessment for falls. A plan of care for falls was not documented.       Return in about 1 year (around 10/12/2017).    Lisbet Velez, BECKA, APRN-BC  02/08/2019

## 2019-02-08 NOTE — PROGRESS NOTES
Chief Complaint   Patient presents with   • Hypertension     Pt is here for followup and medication refills.             HPI  Sandeep Alonso is a 63 y.o. male presents today with follow up for chronic conditions: diabetes, htn, hyperlipidemia.      HTN  Currently takes lisinopril-HCTZ, furosemide .  Takes medications as prescribed without missed doses, reports no episodes of hypotension or any additional adverse effects from medications..  Patient reports BP's at home are okay but he does not check it often. Complains or occasional headaches.  Denies chest pain/chest pressure or discomfort, shortness of breath with exertion, palpitations, irregular heart beat, tachycardia, lower extremity edema, orthopnea, near-syncope.     Chronic problems: HTN stable with lisinopril/HCTZ, hyperlipidemia stable with omega 3 fatty acids and pravastatin, lower extremity edema not stable with furosemide with last does being this am.  Diabetic neuropathy stable with gabapentin.    PCP:  Lisbet Velez, DNP, APRN    No Known Allergies    Past Medical History:   Diagnosis Date   • Hypertension    • Hypokalemia    • Peripheral edema        Past Surgical History:   Procedure Laterality Date   • NO PAST SURGERIES         Social History     Socioeconomic History   • Marital status:      Spouse name: Not on file   • Number of children: Not on file   • Years of education: Not on file   • Highest education level: Not on file   Tobacco Use   • Smoking status: Never Smoker   • Smokeless tobacco: Never Used   Substance and Sexual Activity   • Alcohol use: Yes     Comment: occasional beer   • Drug use: No   • Sexual activity: Defer       Family History   Problem Relation Age of Onset   • Heart attack Mother    • Emphysema Father    • No Known Problems Sister    • No Known Problems Brother    • No Known Problems Son    • No Known Problems Maternal Grandmother    • No Known Problems Maternal Grandfather    • No Known Problems Paternal  "Grandmother    • No Known Problems Paternal Grandfather    • No Known Problems Brother    • No Known Problems Brother    • No Known Problems Son    • No Known Problems Son    • No Known Problems Son        Current Outpatient Medications on File Prior to Visit   Medication Sig Dispense Refill   • furosemide (LASIX) 40 MG tablet Take 1 tablet by mouth 2 (Two) Times a Day. 180 tablet 1   • gabapentin (NEURONTIN) 100 MG capsule Take 1 capsule by mouth 2 (Two) Times a Day. 180 capsule 1   • lisinopril-hydrochlorothiazide (ZESTORETIC) 10-12.5 MG per tablet Take 1 tablet by mouth Daily. 90 tablet 1   • metFORMIN (GLUCOPHAGE) 500 MG tablet 1/2 tab daily (actual dose is 250) 90 tablet 1   • Omega 3 1000 MG capsule Take 1 capsule by mouth Every Night. 90 each 1   • potassium chloride (K-DUR,KLOR-CON) 20 MEQ CR tablet Take 1 tablet by mouth Daily. 90 tablet 1   • pravastatin (PRAVACHOL) 10 MG tablet Take 1 tablet by mouth Every Night. 90 tablet 0     No current facility-administered medications on file prior to visit.         REVIEW OF SYMPTOMS: (Positives bolded)  General:  weight loss, fever, chills, night sweats, fatigue, appetite loss  HEENT:  blurry vision, eye pain, eye discharge, dry eyes, decreased vision, sore throat tinnitus, bloody nose  Respiratory: shortness of breath, cough, hemoptysis, wheezing, pleurisy,   Cardiovascular:  chest pain, PND, palpitation, edema, orthopnea, syncope, swelling of lower extremities  Gastro: Nausea, vomiting, diarrhea, hematemesis, abdominal pain, constipation  Genito: hematuria, dysuria, glycosuria, hesitancy, frequency, incontinence  Musckelo: Arthralgia, myalgia, muscle weakness, joint swelling, NSAID use  Skin: rash, pruritis, sores, nail changes, skin thickening, change in wart/mole, itching, rash, new lesions, pruritus, nail changes  Neuro:  Migraine, numbness, ataxia, tremor, vertigo, weakness, memory loss  \"All other systems reviewed and negative, except as listed " above.”    OBJECTIVE:  Constitutional:  Appearance-No acute distress, Consistent with stated age. Orientation- Oriented x 3, alert Posture-Not doubled over. Gait-Normal pace, normal arm movement. Posture- Normal Build and Nutrition-Well developed and well nourished.  General- Patient is pleasant and cooperative with the interview and exam.    Integumentary: General-No rashes, ulcers or lesions. No edema.  Palpation- Normal skin moisture/turgor. Skin is warm to touch, no increased warmth. Capillary refill is normal bilateral Upper and lower extremity.     Head/Neck: Head- normocephalic and atraumatic.  Neck- without visible/palpable lumps or pulsations.  Palpation- No bony tenderness about head/neck along frontal, occiptial, temporal, parietal, mastoid, jawline, zygoma, orbit or any other location.  NO temporal artery tenderness. No TMJ tenderness. Normal cervical ROM.   Neck Supple.  Thyroid-No thyromegaly, no nodules    Eye: Bilaterally PERRLA, EOMI.  No discharge.  Upper and lower eyelids are normal. Sclera/conjunctiva normal without discharge. Cornea is normal and clear. Lens is normal.  Eyeball appears normal. No ciliary flushing, no conjunctival injection.    ENMT:  Pinna- normal without tenderness or erythema.  External auditory canal Left- normal without erythema or discharge, no excessive cerumen. External auditory canal Right-normal without erythema or discharge, no excessive cerumen. TM left- Grey/pearly, normal light reflex and anatomy TM Right- Grey/pearly, normal light reflex and anatomy Hearing Assessment-normal to conversational speech at 2-5 feet.  Nose and sinus-No sinus tenderness along frontal/maxillary region. External appearance normal and midline. Nares- bilateral quiet airflow, no discharge. Nasal mucosa- No bleeding noted and no ulcerations observed. Pink, moist. Turbinates non boggy. Lips- normal color, moist without cracks/lesions Oral Cavity/Palate- hard/soft palate intact without lesions,  oral mucosa pink and moist. Dentition assessed and discussed appropriate oral care. Tongue normal midline.  Oropharynx- no pharyngeal erythema, Uvula midline. No post nasal drip. No exudate. Salivary glands- Non tender to palpation    CHEST/LUNG: Inspection- symmetric chest wall no pectus deformity. Normal effort, no distress, no use of accessory muscles. Palpation- nontender sternum, ribline.  No abnormal pulsations. Auscultation- Breath sounds normal throughout all lung fields.  Normal tracheal sounds, Normal bronchial sounds overlying sternum, Bronchovessicular sounds normal between scapulae posteriorly, Normal vessicular breath sounds heard throughout periphery. Lungs are clear today. Adventitious sounds- No wheezes, rales, rhonchi.     CARDIOVASCULAR:  Carotid artery- normal, no bruits or abnormal pulsations. Jugular vein- no pulsations. Palpation/Percussion- Normal PMI, no palpable thrill  Auscultation- Regular rate and rhythm. No murmur noted in sitting, supine positions. Extremities- no digital clubbing, cyanosis, edema, increased warmth.    ABDOMEN: Inspection- normal and no visible pulsations. Normal contour. Auscultation- Bowel sounds normal, no abdominal bruits. Palpation/Percussion- soft, non-tender, no rebound tenderness, no rigidity (guarding), no jar tenderness, no masses.  Liver-no hepatomegaly, Spleen - no splenomegaly, Hernias- none. Rectal not examined.     Peripheral Vascular: Upper extremity Left- Normal temperature with pink nailbeds and no ulcerations.  Upper extremity Right- Normal temperature with pink nailbeds and no ulcerations.  Lower extremity- Normal temperature with pink nailbeds and no ulcerations. DP pulses 2+ bilaterally.  Pedal hair intact.  Normal capillary refill. Edema- 1+ edema bilateral lower extremities.     Musculoskeletal:  digital clubbing or cyanosis, neurovascularly intact all four extremities.  Upper extremity- Symmetrical posture.  No visible deformity.  Normal  sensation along medial and lateral upper extremity proximally and distally.  NO tenderness overlying shoulder, lateral/medial epicondyle.  5/5 and strength 5/5 bilateral UE.  Elbow palpated, no tenderness overlying olecranon.  Normal supination, pronation to active/passive ROM and to resisted rotation. Bicep insertion/tricep insertion appear normal without obvious pathology. Rotator cuff evaluated and intact.  Normal wrist ROM bilaterally. Normal hand movement, intrinsic muscles of hands normal. No tenderness to palpation of hands/wrists/elbows.  Lower extremity- Not tender to palpation, no pain, no swelling, edema or erythema of surrounding tissue, normal strength and tone.  Normal appearing hip ROM bilaterally without pain.  Knee ROM normal at 0-120 degrees. No tenderness overlying trochanters, no tenderness about patella, quad tendon, patellar tendon.  No tenderness at tibial tuberosity. Ankle normal ROM not tender to palpation along medial/lateral malleolus. Normal movement of toes, no tenderness bilateral feet/toes.  Normal foot type. Calves symmetrical.  Stretching demonstrated today.  Spine/Ribs- No deformities, masses or tenderness, no known fractures, normal strength, Normal ROM. Normal stability No tenderness along C/T/L spine.  Normal appearing ROM about spine.      Neurological: General- Moves all 4 extremities symmetrically. Symmetrical face and body posture. Cranial nerves- individually evaluated II-XII and intact. PERRLA, Normal EOMI, visual/special senses appear intact, Face is symmetrical and normal sensation/movement, normal tongue, normal strength/posture of neck musculature. Balance- Romberg intact.  Reflexes- ntact with DTR 2+ patellar, Achilles, bicep, brachial,tricep. Ankle clonus normal with 2 beats.  Strength- 5/5 bilateral UE and LE. Soft touch- intact bilateral UE and LE.  Temperature sensation- intact bilateral UE and LE. Cerebellar testing-Rapid alternating movements intact.  Heel  shin intact. Able to walk normal gait, normal heel toe walking. Neck- supple.      Diabetic foot exam:   RIGHT: No callus formation, no pre-ulcer areas, no hammer toe, no claw toe, no deformity. Toe nails thick, toe nails cut rounded per Dr. Sheets; normal sensation, normal pulses. No amputations, no cracks, no fissures, no bunions.  Normal monofilament testing.  Does have peripheral neuoropathy    LEFT: No callus formation, no pre-ulcer areas, no hammer toe, no claw toe, no deformity. Toe nails thick, toe nails cut rounded per Dr. Sheets, normal sensation, normal pulses. No amputations, no cracks, no fissures, no bunions, normal monofilament testing.  Does have peripheral neuoropathy    Neuropsych: Oriented- Person, place, time. (AAOx3), Mood/affect- normal and congruent. Able to articulate well. Speech-Normal speech, normal rate, normal tone, normal use of language, volume and coherence.  Thought content- normal with ability to perform basic computations and apply abstract thought/reason. Associations- intact, no SI/HI, no hallucinations, delusions, obsessions.  Judgment/insight- Appropriate. Memory-Recall intact, remote and recent memory intact. Knowledge- Age appropriate fund of knowledge, concentration and attention span normal.    Lymphatic: Head/Neck- normal size and non tender to palpation. Axillary- Head and neck LN are normal size and non tender to palpation. Femoral and Inguinal- normal size and non tender to palpation.      Assessment/Plan:  Sandeep was seen today for hypertension.    Diagnoses and all orders for this visit:    Essential hypertension  -     lisinopril-hydrochlorothiazide (ZESTORETIC) 10-12.5 MG per tablet; Take 1 tablet by mouth Daily.  -     CBC Auto Differential  -     Comprehensive Metabolic Panel    Type 2 diabetes mellitus with diabetic neuropathy, without long-term current use of insulin (CMS/[facility])  -     gabapentin (NEURONTIN) 100 MG capsule; Take 1 capsule by mouth 2 (Two) Times a  Day.  -     metFORMIN (GLUCOPHAGE) 500 MG tablet; 1/2 tab daily (actual dose is 250)    POC Glycosylated Hemoglobin (Hb A1C)   Order: [id number]   Status:  Final result   Visible to patient:  No (Not Released)   Dx:  Type 2 diabetes mellitus with complic...    Ref Range & Units 09:01   Hemoglobin A1C % 5.9    Resulting Agency  [facility]         Specimen Collected: 02/08/19 09:01 Last Resulted: 02/08/19 09:01                Mixed hyperlipidemia  -     potassium chloride (K-DUR,KLOR-CON) 20 MEQ CR tablet; Take 1 tablet by mouth Daily.  -     Lipid Panel    Other hyperlipidemia  -     pravastatin (PRAVACHOL) 10 MG tablet; Take 1 tablet by mouth Every Night.    Localized edema  -     furosemide (LASIX) 40 MG tablet; Take 1 tablet by mouth 2 (Two) Times a Day.    Morbid obesity:  BMI:   38.7    Weight:    247    Follow up plan:  Lose at least 3 pounds before next chronic visit, exercise at least 3 times a week for 30 minute increments, eat baked instead of fried foods, and eat healthier     -  Documentation of education   The patient BMI is outside this range and we recommended/discussed today to utilize a diet/exercise program to get back into the appropriate range.  Federal guidelines recommend that people under the age of 65 should have a BMI of 18.5-24.9  Food diary:  Bring to next visit  tial step is to document everything that is consumed. Pt's that have a food diary  Lose 2x the weight  by keeping track of foods.   Choose one bad food weekly and eliminate it from your diet.  Replace with one healthy food  Exercise diary: Goal over next 2-4 weeks is walk 30 minutes per day 5 days per week at pace difficult to hold conversation.   Drink more water, less soda.   Cut back on portion sizes.   Today we encouraged roughly a 1 lb per week weight loss with initial goal of 5% weight loss.  Avoid fast foods, eat more salads  If eating out for a meal, consider cutting food in half and placing into a to  go container.  Individually portion any foods coming into the home   Use smaller plates  Drink 12 ozof water 30 minutes before meal as way to suppress appetite.  Discussed Contrave, metformin, topamax, Belviq and the cost of medications  Encouraged internet programs or self help books  Set  Goals that are realistic   Educated on ways to measure food  Baseball: 1 cup good for green salad, frozen yogurt, medium piece of fruit  Handful:  ½ cup good cut fruit, cooked vegetables, pasta, rice  Egg:  ¼ cup good for dried fruit  Deck of cards: 3 ounces good for meat and poultry  Check book:  3 ounces grilled fish  Plate Method:  reduce plate size to 9 inch dinner plate.  Half of plate should be filled with non-starchy vegetables (broccoli, lettuce, cauliflower, tomatoes), ¼ plate with lean source of protein (lean chicken, turkey, fish), remaining ½ with whole grains (brown rice, potato, whole grain breads  Avoid liquid calories (regular soda, juice, coffee with cream)  Focus  on water, seltzer water and other non-calorie drinks  Replace regular sugar with non-caloric sweeteners  Avoid skipping meals: plan small regular meals throughout the day in order to keep your hunger controlled  Consider using meal replacements if unable to plan a healthy meal (protein shake, high protein bar)  Replace all white bread with whole wheat/whole grain alternative  Swap regular salad dressings (mayonnaise, butter, or low fat or fat free alternative)  Avoid high fat, high calorie, high carbohydrate snakes (cookies, pastries, cakes)  Snack on fruits, low fat dairy (yogurt, cottage cheese)            Management plan:  Take medications as prescribed; return to the clinic of new or worsening concerns.       Risks/benefits of current and new medications discussed with the patient and or family today.  The patient/family are aware and accept that if there any side effects they should call or return to clinic as soon as possible.  Appropriate F/U  discussed for topics addressed today. All questions were answered to the satisfactory state of patient/family.  Should symptoms fail to improve or worsen they agree to call or return to clinic or to go to the ER. Education handouts were offered on any new Rx if requested.  Discussed the importance of following up with any needed screening tests/labs/specialist appointments and any requested follow-up recommended by me today.  Importance of maintaining follow-up discussed and patient accepts that missed appointments can delay diagnosis and potentially lead to worsening of conditions.    Return in about 6 months (around 8/8/2019) for Recheck htn, diabetes, hyperlipidemia.  Lisbet Velez, DNP, APRN-BC    2/8/2019

## 2019-02-09 LAB
ALBUMIN SERPL-MCNC: 4.6 G/DL (ref 3.5–5)
ALBUMIN/GLOB SERPL: 1.2 G/DL (ref 1.1–2.5)
ALP SERPL-CCNC: 80 U/L (ref 24–120)
ALT SERPL-CCNC: 37 U/L (ref 0–54)
AST SERPL-CCNC: 31 U/L (ref 7–45)
BASOPHILS # BLD AUTO: 0.03 10*3/MM3 (ref 0–0.2)
BASOPHILS NFR BLD AUTO: 0.3 % (ref 0–2)
BILIRUB SERPL-MCNC: 0.6 MG/DL (ref 0.1–1)
BUN SERPL-MCNC: 18 MG/DL (ref 5–21)
BUN/CREAT SERPL: 19.8 (ref 7–25)
CALCIUM SERPL-MCNC: 9.4 MG/DL (ref 8.4–10.4)
CHLORIDE SERPL-SCNC: 98 MMOL/L (ref 98–110)
CHOLEST SERPL-MCNC: 180 MG/DL (ref 130–200)
CO2 SERPL-SCNC: 35 MMOL/L (ref 24–31)
CREAT SERPL-MCNC: 0.91 MG/DL (ref 0.5–1.4)
EOSINOPHIL # BLD AUTO: 0.13 10*3/MM3 (ref 0–0.7)
EOSINOPHIL NFR BLD AUTO: 1.3 % (ref 0–4)
ERYTHROCYTE [DISTWIDTH] IN BLOOD BY AUTOMATED COUNT: 14 % (ref 12–15)
GLOBULIN SER CALC-MCNC: 3.8 GM/DL
GLUCOSE SERPL-MCNC: 102 MG/DL (ref 70–100)
HCT VFR BLD AUTO: 49.3 % (ref 40–52)
HDLC SERPL-MCNC: 35 MG/DL
HGB BLD-MCNC: 15.4 G/DL (ref 14–18)
IMM GRANULOCYTES # BLD AUTO: 0.04 10*3/MM3 (ref 0–0.03)
IMM GRANULOCYTES NFR BLD AUTO: 0.4 % (ref 0–5)
LDLC SERPL CALC-MCNC: 121 MG/DL (ref 0–99)
LYMPHOCYTES # BLD AUTO: 2.8 10*3/MM3 (ref 0.72–4.86)
LYMPHOCYTES NFR BLD AUTO: 27.1 % (ref 15–45)
MCH RBC QN AUTO: 28.4 PG (ref 28–32)
MCHC RBC AUTO-ENTMCNC: 31.2 G/DL (ref 33–36)
MCV RBC AUTO: 90.8 FL (ref 82–95)
MONOCYTES # BLD AUTO: 0.6 10*3/MM3 (ref 0.19–1.3)
MONOCYTES NFR BLD AUTO: 5.8 % (ref 4–12)
NEUTROPHILS # BLD AUTO: 6.72 10*3/MM3 (ref 1.87–8.4)
NEUTROPHILS NFR BLD AUTO: 65.1 % (ref 39–78)
NRBC BLD AUTO-RTO: 0 /100 WBC (ref 0–0)
PLATELET # BLD AUTO: 294 10*3/MM3 (ref 130–400)
POTASSIUM SERPL-SCNC: 3.8 MMOL/L (ref 3.5–5.3)
PROT SERPL-MCNC: 8.4 G/DL (ref 6.3–8.7)
PSA SERPL-MCNC: 0.9 NG/ML (ref 0–4)
RBC # BLD AUTO: 5.43 10*6/MM3 (ref 4.8–5.9)
SODIUM SERPL-SCNC: 142 MMOL/L (ref 135–145)
TRIGL SERPL-MCNC: 119 MG/DL (ref 0–149)
VLDLC SERPL CALC-MCNC: 23.8 MG/DL
WBC # BLD AUTO: 10.32 10*3/MM3 (ref 4.8–10.8)

## 2019-02-11 DIAGNOSIS — E78.2 MIXED HYPERLIPIDEMIA: Primary | ICD-10-CM

## 2019-02-13 ENCOUNTER — RESULTS ENCOUNTER (OUTPATIENT)
Dept: FAMILY MEDICINE CLINIC | Facility: CLINIC | Age: 64
End: 2019-02-13

## 2019-02-13 DIAGNOSIS — Z12.11 ENCOUNTER FOR SCREENING FOR MALIGNANT NEOPLASM OF COLON: ICD-10-CM

## 2019-02-14 NOTE — PROGRESS NOTES
PATIENT CALLED MESSAGE LEFT FOR PATIENT TO CALL BACK FOR RESULTS. Patient will be mailed a letter to contact the office for results

## 2019-02-22 ENCOUNTER — TELEPHONE (OUTPATIENT)
Dept: FAMILY MEDICINE CLINIC | Facility: CLINIC | Age: 64
End: 2019-02-22

## 2019-05-02 ENCOUNTER — TELEPHONE (OUTPATIENT)
Dept: PODIATRY | Facility: CLINIC | Age: 64
End: 2019-05-02

## 2019-05-10 ENCOUNTER — RESULTS ENCOUNTER (OUTPATIENT)
Dept: FAMILY MEDICINE CLINIC | Facility: CLINIC | Age: 64
End: 2019-05-10

## 2019-05-10 DIAGNOSIS — E78.2 MIXED HYPERLIPIDEMIA: ICD-10-CM

## 2019-05-25 LAB
CHOLEST SERPL-MCNC: 154 MG/DL (ref 0–200)
HDLC SERPL-MCNC: 38 MG/DL (ref 40–60)
LDLC SERPL CALC-MCNC: 98 MG/DL (ref 0–100)
TRIGL SERPL-MCNC: 92 MG/DL (ref 0–150)
VLDLC SERPL CALC-MCNC: 18.4 MG/DL

## 2019-05-30 NOTE — PROGRESS NOTES
PATIENT CALLED MESSAGE LEFT FOR PATIENT TO CALL BACK FOR RESULTS.    Patient will be mailed a letter

## 2019-08-16 ENCOUNTER — OFFICE VISIT (OUTPATIENT)
Dept: FAMILY MEDICINE CLINIC | Facility: CLINIC | Age: 64
End: 2019-08-16

## 2019-08-16 VITALS
DIASTOLIC BLOOD PRESSURE: 89 MMHG | OXYGEN SATURATION: 97 % | TEMPERATURE: 98.2 F | RESPIRATION RATE: 18 BRPM | HEART RATE: 52 BPM | SYSTOLIC BLOOD PRESSURE: 155 MMHG | HEIGHT: 67 IN | BODY MASS INDEX: 39.08 KG/M2 | WEIGHT: 249 LBS

## 2019-08-16 DIAGNOSIS — E78.49 OTHER HYPERLIPIDEMIA: ICD-10-CM

## 2019-08-16 DIAGNOSIS — E66.01 CLASS 2 SEVERE OBESITY DUE TO EXCESS CALORIES WITH SERIOUS COMORBIDITY AND BODY MASS INDEX (BMI) OF 38.0 TO 38.9 IN ADULT (HCC): ICD-10-CM

## 2019-08-16 DIAGNOSIS — E11.40 TYPE 2 DIABETES MELLITUS WITH DIABETIC NEUROPATHY, WITHOUT LONG-TERM CURRENT USE OF INSULIN (HCC): ICD-10-CM

## 2019-08-16 DIAGNOSIS — E78.2 MIXED HYPERLIPIDEMIA: ICD-10-CM

## 2019-08-16 DIAGNOSIS — E11.49 DIABETES MELLITUS TYPE 2 WITH NEUROLOGICAL MANIFESTATIONS (HCC): Primary | ICD-10-CM

## 2019-08-16 DIAGNOSIS — Z51.81 THERAPEUTIC DRUG MONITORING: ICD-10-CM

## 2019-08-16 DIAGNOSIS — I10 ESSENTIAL HYPERTENSION: ICD-10-CM

## 2019-08-16 DIAGNOSIS — Z12.5 SCREENING FOR MALIGNANT NEOPLASM OF PROSTATE: ICD-10-CM

## 2019-08-16 DIAGNOSIS — R60.0 LOCALIZED EDEMA: ICD-10-CM

## 2019-08-16 LAB — HBA1C MFR BLD: 5.6 %

## 2019-08-16 PROCEDURE — 83036 HEMOGLOBIN GLYCOSYLATED A1C: CPT | Performed by: NURSE PRACTITIONER

## 2019-08-16 PROCEDURE — 99214 OFFICE O/P EST MOD 30 MIN: CPT | Performed by: NURSE PRACTITIONER

## 2019-08-16 RX ORDER — LISINOPRIL AND HYDROCHLOROTHIAZIDE 12.5; 1 MG/1; MG/1
1 TABLET ORAL DAILY
Qty: 90 TABLET | Refills: 1 | Status: SHIPPED | OUTPATIENT
Start: 2019-08-16 | End: 2019-09-20 | Stop reason: DRUGHIGH

## 2019-08-16 RX ORDER — FUROSEMIDE 40 MG/1
40 TABLET ORAL 2 TIMES DAILY
Qty: 180 TABLET | Refills: 1 | Status: SHIPPED | OUTPATIENT
Start: 2019-08-16 | End: 2020-02-21 | Stop reason: SDUPTHER

## 2019-08-16 RX ORDER — POTASSIUM CHLORIDE 20 MEQ/1
20 TABLET, EXTENDED RELEASE ORAL DAILY
Qty: 90 TABLET | Refills: 1 | Status: SHIPPED | OUTPATIENT
Start: 2019-08-16 | End: 2020-02-21 | Stop reason: SDUPTHER

## 2019-08-16 RX ORDER — PRAVASTATIN SODIUM 10 MG
10 TABLET ORAL NIGHTLY
Qty: 90 TABLET | Refills: 1 | Status: SHIPPED | OUTPATIENT
Start: 2019-08-16 | End: 2020-02-21 | Stop reason: SDUPTHER

## 2019-08-16 RX ORDER — GABAPENTIN 100 MG/1
100 CAPSULE ORAL 2 TIMES DAILY
Qty: 180 CAPSULE | Refills: 1 | Status: SHIPPED | OUTPATIENT
Start: 2019-08-16 | End: 2020-02-21 | Stop reason: SDUPTHER

## 2019-08-16 RX ORDER — AMLODIPINE BESYLATE 5 MG/1
5 TABLET ORAL DAILY
Qty: 90 TABLET | Refills: 1 | Status: SHIPPED | OUTPATIENT
Start: 2019-08-16 | End: 2020-02-21 | Stop reason: SDUPTHER

## 2019-08-16 NOTE — PATIENT INSTRUCTIONS

## 2019-08-16 NOTE — PROGRESS NOTES
CC:  Med refills, labs, and discuss HTN       Sandeep Alonso is a 64 yr old male here for follow up for chronic medical problems, labs, and refills.  Denies any chest pain, shortness of breath or palpitations.  His blood pressure has been staying elevated with reading today of 155/89.  It has been elevated at the last several visits however, he has not wanted to increase the medication or add a medication.  I discussed in length about the kidneys and how HTN and diabetes kills the kidneys.  I want to change is HTN medication regimen.         Hypertension   The problem has been gradually worsening since onset. Pertinent negatives include no blurred vision, chest pain, headaches, neck pain, palpitations or shortness of breath. There are no associated agents to hypertension. Risk factors for coronary artery disease include dyslipidemia, diabetes mellitus, family history, male gender, obesity and sedentary lifestyle. Past treatments include ACE inhibitors and diuretics. Current antihypertension treatment includes ACE inhibitors, diuretics and lifestyle changes. There are no compliance problems.    Hyperlipidemia   Pertinent negatives include no chest pain or shortness of breath.   Diabetes   Pertinent negatives for hypoglycemia include no headaches. Pertinent negatives for diabetes include no blurred vision, no chest pain and no fatigue.        Chronic problems:  Hyperlipidemia stable with pravastatin, hypokalemia stable with potassium chloride, HTN uncontrolled with lisinopril-hctz, type 2 diabetes controlled with metformin, edema stable with furosemide, neuropathy stable with gabpentin    Continues to work fulltime for [employer]    Denies alcohol, drug or tobacco use    Does not have an advance directive, not interested in getting one.      The following portions of the patient's history were reviewed and updated as appropriate: allergies, current medications, past family history, past medical history, past  social history, past surgical history and problem list.    Review of Systems   Constitutional: Negative for activity change, chills, fatigue and fever.   Eyes: Negative for blurred vision.   Respiratory: Negative for cough, chest tightness, shortness of breath and wheezing.    Cardiovascular: Negative for chest pain and palpitations.   Genitourinary: Negative.    Musculoskeletal: Negative for neck pain.   Psychiatric/Behavioral: Negative.    All other systems reviewed and are negative.      Objective   Physical Exam   Constitutional: He is oriented to person, place, and time. He appears well-developed and well-nourished. He is cooperative.   HENT:   Head: Normocephalic and atraumatic.   Right Ear: Hearing normal.   Left Ear: Hearing normal.   Nose: Nose normal.   Mouth/Throat: Uvula is midline and oropharynx is clear and moist.   Eyes: Lids are normal.   Fundoscopic exam:       The right eye shows no AV nicking, no exudate, no hemorrhage and no papilledema.        The left eye shows no AV nicking, no exudate, no hemorrhage and no papilledema.   Neck: Normal range of motion. Neck supple.   Cardiovascular: Normal rate, regular rhythm and normal heart sounds.   Pulmonary/Chest: Effort normal and breath sounds normal.   Abdominal: Soft. Normal appearance and bowel sounds are normal.   Musculoskeletal: Normal range of motion.   Lymphadenopathy:        Head (right side): No submental, no submandibular and no tonsillar adenopathy present.        Head (left side): No submental, no submandibular and no tonsillar adenopathy present.   Neurological: He is alert and oriented to person, place, and time. He has normal strength. No cranial nerve deficit or sensory deficit.   Skin: Skin is warm, dry and intact.   Psychiatric: He has a normal mood and affect. His behavior is normal. Judgment and thought content normal. Cognition and memory are normal.   Nursing note and vitals reviewed.        Assessment/Plan   Sandeep was seen today  for diabetes and hypertension.    Diagnoses and all orders for this visit:    Diabetes mellitus type 2 with neurological manifestations (CMS/HCC)  -     POC Glycosylated Hemoglobin (Hb A1C)  -     gabapentin (NEURONTIN) 100 MG capsule; Take 1 capsule by mouth 2 (Two) Times a Day.  -     Gabapentin, Urine -    Type 2 diabetes mellitus with diabetic neuropathy, without long-term current use of insulin (CMS/HCC)  -     gabapentin (NEURONTIN) 100 MG capsule; Take 1 capsule by mouth 2 (Two) Times a Day.  -     metFORMIN (GLUCOPHAGE) 500 MG tablet; 1/2 tab daily (actual dose is 250)  -     Microalbumin / Creatinine Urine Ratio - Urine, Clean Catch    Essential hypertension  -     lisinopril-hydrochlorothiazide (ZESTORETIC) 10-12.5 MG per tablet; Take 1 tablet by mouth Daily.  -     Amlodipine 5 mg tab added today, take daily  This is a new bp med to take with other prescribed meds  -     CBC & Differential  -     Comprehensive metabolic panel  -     Monitor bp and if elevated follow up     Localized edema  -     furosemide (LASIX) 40 MG tablet; Take 1 tablet by mouth 2 (Two) Times a Day.    Mixed hyperlipidemia  -     potassium chloride (K-DUR,KLOR-CON) 20 MEQ CR tablet; Take 1 tablet by mouth Daily.  -     Lipid panel    Other hyperlipidemia  -     pravastatin (PRAVACHOL) 10 MG tablet; Take 1 tablet by mouth Every Night.    Therapeutic drug monitoring  -     ToxASSURE Select 13 Discrete -  -     Gabapentin, Urine -      HEATHER Report:     As part of this patient's treatment plan, I am prescribing controlled substances. The patient has been made aware of appropriate use of such medications, including potential risk of opiod use, somnolence, limited ability to drive and /or work safely, and potential for dependence or overdose, and opiods should be used sparingly and are not recommended for long term use.  It has also been made clear that these medications are for use by this patient only, without concomitant use of  alcohol or other substances unless prescribed.     Patient has completed prescribing agreement detailing terms of continued prescribing of controlled substances, including monitoring HEATHER reports, urine drug screening yearly an random drug screens to monitor patients compliance to treatment plan, and pill counts if necessary. The patient is aware that inappropriate use will result in cessation of prescribing such medications.    Toxassure:  I have ordered a urine drug screen to monitor patients predisposition to and patterns of drug use/misuse in order to establish and maintain the safe and effective use of analgesics in the treatment of chronic pain      HEATHER report has been reviewed by: Lisbet Velez, BECKA, APRN on today, #[id number]  The report was scanned into the patient's chart.      -     Pt is aware of the potential for addiction and dependence.    Addiction was discussed.  The  Risks, benefits and alternative options of drug therapy discussed.  It was reinforced about the risks and benefits of opioids.  It was reinforced that patient may not call early for medication, may not ask for increase in the number of pills given monthly or increase in dosage of medication, may not jump around to different pharmacies or providers and may not receive any narcotics from other providers including ER, or the contract will be broken and narcotics will no longer be prescribed from this facility if any of these criteria has been broken.      Last Dose was: last night    Last Fill was:  july  Date of last HEATHER:  today    Date of last UDS: today        Screening for malignant neoplasm of prostate  -     PSA Screen         POC Glycosylated Hemoglobin (Hb A1C)   Order: [id number]   Status:  Final result   Visible to patient:  No (Not Released) Dx:  Diabetes mellitus type 2 with neurolo...   Component  Ref Range & Units 08:11  (8/16/19) 6mo ago  (2/8/19) 1yr ago  (7/27/18) 1yr ago  (2/2/18)   Hemoglobin A1C  % 5.6  5.9   5.50 CM 5.40 CM   Resulting Agency [facility]         Specimen Collected: 08/16/19 08:11 Last Resulted: 08/16/19 08:11           Health Maintenance Summary    Expand All  Collapse All   Overdue - HEMOGLOBIN A1C   Overdue since 8/8/2019   (Every 6 Months)   02/08/2019  Completion Done - 5.9   02/08/2019  Completion POCT GLYCOSYLATED HEMOGLOBIN (HGB A1C)   07/27/2018  Completion HEMOGLOBIN A1C   02/02/2018  Completion Done   02/02/2018  Completion HEMOGLOBIN A1C   View More History   INFLUENZA VACCINE   Due since 8/1/2019   (Yearly - August to March)   12/01/2018  Completion Done   02/02/2018  Completion Patient-Reported (Performed Externally)   11/16/2016  Completion Done   DIABETIC FOOT EXAM   Next due on 2/8/2020   (Yearly)   02/08/2019  Completion Done   02/08/2019  Completion Visit Dx: Encounter for comprehensive diabetic foot examination, type 2 diabetes mellitus (CMS/[facility])   01/25/2019  Completion Done   02/02/2018  Completion Done   07/21/2017  Completion Done   View More History   DIABETIC EYE EXAM   Next due on 4/17/2020   (Yearly)   04/17/2019  Completion Patient-Reported (Performed Externally)   10/19/2018  Completion SCANNED - EYE EXAM   10/24/2017  Completion SCANNED - EYE EXAM   09/10/2013  Completion Done   LIPID PANEL   Next due on 8/16/2020   (Yearly)   08/16/2019  Completion Done   05/24/2019  Completion LIPID PANEL   02/08/2019  Completion Done   02/08/2019  Completion LIPID PANEL   07/27/2018  Completion LIPID PANEL   View More History   URINE MICROALBUMIN   Next due on 8/16/2020   (Yearly)   08/16/2019  Completion Done   07/27/2018  Completion Done   07/21/2017  Completion MICROALBUMIN / CREATININE URINE RATIO   07/07/2015  Completion MICROALBUMIN / CREATININE URINE RATIO   ANNUAL PHYSICAL   Next due on 8/17/2020   (Yearly)   08/16/2019  Completion Done   02/08/2019  Completion Done   COLOGUARD   Next due on 2/17/2022    (Every 3 Years)   02/17/2019  Completion SCANNED - COLOGUARD   02/17/2019  Completion Registry Metric: Has had FOBT   02/13/2019  Completion SCANNED - COLOGUARD   02/08/2019  Completion SCANNED - COLOGUARD   TDAP/TD VACCINES   Next due on 7/21/2027   (3 - Td)   07/21/2017  Completion Declined   01/06/2017  Completion Declined   PNEUMOCOCCAL VACCINE (19-64 MEDIUM RISK)   Completed   (Series Information)   05/08/2013  Completion Patient-Reported (Performed Externally) - Thinks he had it at Dr. Leon office but we do not have records from there   HEPATITIS C SCREENING   Addressed   07/21/2017  Completion Declined   01/06/2017  Completion Declined   ZOSTER VACCINE   Addressed   (Series Information)   07/21/2017  Completion Declined   01/06/2017  Completion Declined     Class 2 severe obesity due to excess calories with serious comorbidity and body mass index (BMI) of 38.0 to 38.9 in adult (CMS/[facility])    Obesity Plan:  BMI: 39.30   Weight 249  The patient BMI is outside this range and we recommended/discussed today to utilize a diet/exercise program to get back into the appropriate range.  Federal guidelines recommend that people under the age of 65 should have a BMI of 18.5-24.9  The initial step is to document everything that is consumed into a food diary. Studies have shown that patients can lose up to 2x the weight by keeping track of foods.   Choose one bad food weekly and eliminate it from your diet.  Replace with one healthy food  Goal over next 2-4 weeks is walk 30 minutes per day 5 days per week at pace difficult to hold conversation.   Drink more water, less soda.   Cut back on portion sizes.   Today we encouraged roughly a 1 lb per week weight loss with initial goal of 5% weight loss.  Discussed if eating out for a meal, consider cutting food in half and placing into a to go container.  Individually portion any foods coming into the home based on package.  Use smaller plates  Drinking 12 oz of water 30  minutes before meal as way to suppress appetite.  Lifestyle therapy   Simple advice to lose weight   Internet programs or self help books.    Advice from dietitian  Set Goals that are realistic   Encouraged to use visual aides to help in measuring foods  Baseball-1 cup good for green salad, frozen yogurt, medium piece of fruit, baked potato  Handful - ½ cup good cut fruit, cooked vegetables, pasta, rice  Egg- ¼ cup good for dried fruit  Deck of cards-3 ounces good for meat and poultry  Check book-3 ounces good for grilled fish  6 dice side by side- 1 ½ ounces good for natural cheese  Simple tips   Plate method-reduce plate size to 9 inch dinner plate.  Half of plate should be filled with non-starchy vegetables (broccoli, lettuce, cauliflower, tomatoes), ¼ plate with lean source of protein (lean chicken, turkey, fish), remaining ½ with whole grains (brown rice, potato, whole grain breads  Avoid liquid calories (regular soda, juice, coffee with cream)  Focus  on water, seltzer water and other non-calorie drinks  Replace regular sugar with non-caloric sweeteners  Avoid skipping meals: plan small regular meals throughout the day in order to keep your hunger controlled  Consider using meal replacements if unable to plan a healthy meal (protein shake, high protein bar)  Replace all white bread with whole wheat/whole grain alternative  Swap regular salad dressings (mayonnaise, butter, or low fat or fat free alternative)  Avoid high fat, high calorie, high carbohydrate snakes (cookies, pastries, cakes)  Snack on fruits, low fat dairy (yogurt, cottage cheese)    Behavioral Modification  Self-Monitoring of dietary Intake-keep a dietary intake log. Obese individuals have been shown to underestimate their food intake  Behavioral treatment -gives exacts about amount and total calories  Read food labels      Return in about 6 months (around 2/16/2020) for Recheck chronic problems, 1 month recheck bp.    Electronically signed by  Lisbet Velez, BECKA, APRN, 08/16/19, 8:57 AM.

## 2019-08-17 LAB
ALBUMIN/CREAT UR: <8.5 MG/G CREAT (ref 0–30)
CREAT UR-MCNC: 35.1 MG/DL
MICROALBUMIN UR-MCNC: <3 UG/ML

## 2019-08-22 LAB
6MAM UR QL CFM: NEGATIVE
6MAM/CREAT UR: NOT DETECTED NG/MG CREAT
7AMINOCLONAZEPAM/CREAT UR: NOT DETECTED NG/MG CREAT
A-OH ALPRAZ/CREAT UR: NOT DETECTED NG/MG CREAT
ALBUMIN SERPL-MCNC: 4.6 G/DL (ref 3.5–5.2)
ALBUMIN/GLOB SERPL: 1.4 G/DL
ALFENTANIL UR QL: NOT DETECTED NG/MG CREAT
ALP SERPL-CCNC: 82 U/L (ref 39–117)
ALPHA-HYDROXYMIDAZOLAM, URINE: NOT DETECTED NG/MG CREAT
ALPHA-HYDROXYTRIAZOLAM, URINE: NOT DETECTED NG/MG CREAT
ALT SERPL-CCNC: 21 U/L (ref 1–41)
AMOBARBITAL UR QL CFM: NOT DETECTED
AMPHET/CREAT UR: NOT DETECTED NG/MG CREAT
AMPHETAMINES UR QL CFM: NEGATIVE
AST SERPL-CCNC: 20 U/L (ref 1–40)
BARBITAL UR QL CFM: NOT DETECTED
BARBITURATES UR QL CFM: NEGATIVE
BASOPHILS # BLD AUTO: 0.04 10*3/MM3 (ref 0–0.2)
BASOPHILS NFR BLD AUTO: 0.5 % (ref 0–1.5)
BENZODIAZ UR QL CFM: NEGATIVE
BILIRUB SERPL-MCNC: 0.5 MG/DL (ref 0.2–1.2)
BUN SERPL-MCNC: 15 MG/DL (ref 8–23)
BUN/CREAT SERPL: 13.9 (ref 7–25)
BUPRENORPHINE UR QL CFM: NEGATIVE
BUPRENORPHINE/CREAT UR: NOT DETECTED NG/MG CREAT
BUTABARBITAL UR QL CFM: NOT DETECTED
BUTALBITAL UR QL CFM: NOT DETECTED
BZE/CREAT UR: NOT DETECTED NG/MG CREAT
CALCIUM SERPL-MCNC: 9.2 MG/DL (ref 8.6–10.5)
CANNABINOIDS UR QL CFM: NEGATIVE
CARBOXYTHC/CREAT UR: NOT DETECTED NG/MG CREAT
CHLORIDE SERPL-SCNC: 99 MMOL/L (ref 98–107)
CHOLEST SERPL-MCNC: 168 MG/DL (ref 0–200)
CLONAZEPAM UR QL: NOT DETECTED NG/MG CREAT
CO2 SERPL-SCNC: 33.2 MMOL/L (ref 22–29)
COCAETHYLENE UR QL CFM: NOT DETECTED NG/MG CREAT
COCAINE UR QL CFM: NEGATIVE
CODEINE/CREAT UR: NOT DETECTED NG/MG CREAT
CONV COCAINE, UR: NOT DETECTED NG/MG CREAT
CREAT SERPL-MCNC: 1.08 MG/DL (ref 0.76–1.27)
CREAT UR-MCNC: 38 MG/DL
DESALKYLFLURAZ/CREAT UR: NOT DETECTED NG/MG CREAT
DESMETHYLFLUNITRAZEPAM: NOT DETECTED NG/MG CREAT
DHC/CREAT UR: NOT DETECTED NG/MG CREAT
DIAZEPAM/CREAT UR: NOT DETECTED NG/MG CREAT
DRUGS UR: NORMAL
EDDP/CREAT UR: NOT DETECTED NG/MG CREAT
EOSINOPHIL # BLD AUTO: 0.12 10*3/MM3 (ref 0–0.4)
EOSINOPHIL NFR BLD AUTO: 1.4 % (ref 0.3–6.2)
ERYTHROCYTE [DISTWIDTH] IN BLOOD BY AUTOMATED COUNT: 14.4 % (ref 12.3–15.4)
ETHANOL UR CFM-MCNC: NOT DETECTED G/DL
ETHANOL UR QL CFM: NEGATIVE
FENTANYL UR QL CFM: NEGATIVE
FENTANYL/CREAT UR: NOT DETECTED NG/MG CREAT
FLUNITRAZEPAM UR QL CFM: NOT DETECTED NG/MG CREAT
GABAPENTIN UR-MCNC: 49.2 UG/ML
GLOBULIN SER CALC-MCNC: 3.4 GM/DL
GLUCOSE SERPL-MCNC: 92 MG/DL (ref 65–99)
HCT VFR BLD AUTO: 49.5 % (ref 37.5–51)
HDLC SERPL-MCNC: 36 MG/DL (ref 40–60)
HGB BLD-MCNC: 15 G/DL (ref 13–17.7)
HYDROCODONE/CREAT UR: NOT DETECTED NG/MG CREAT
HYDROMORPHONE/CREAT UR: NOT DETECTED NG/MG CREAT
IMM GRANULOCYTES # BLD AUTO: 0.02 10*3/MM3 (ref 0–0.05)
IMM GRANULOCYTES NFR BLD AUTO: 0.2 % (ref 0–0.5)
LDLC SERPL CALC-MCNC: 106 MG/DL (ref 0–100)
LEVEL OF DETECTION:: NORMAL
LORAZEPAM/CREAT UR: NOT DETECTED NG/MG CREAT
LORAZEPAM/CREAT UR: NOT DETECTED NG/MG CREAT
LYMPHOCYTES # BLD AUTO: 2.68 10*3/MM3 (ref 0.7–3.1)
LYMPHOCYTES NFR BLD AUTO: 30.6 % (ref 19.6–45.3)
MCH RBC QN AUTO: 29 PG (ref 26.6–33)
MCHC RBC AUTO-ENTMCNC: 30.3 G/DL (ref 31.5–35.7)
MCV RBC AUTO: 95.7 FL (ref 79–97)
MDA/CREAT UR: NOT DETECTED NG/MG CREAT
MDMA/CREAT UR: NOT DETECTED NG/MG CREAT
MEPHOBARBITAL UR QL CFM: NOT DETECTED
METHADONE UR QL CFM: NEGATIVE
METHADONE/CREAT UR: NOT DETECTED NG/MG CREAT
METHAMPHET/CREAT UR: NOT DETECTED NG/MG CREAT
MIDAZOLAM UR-MCNC: NOT DETECTED NG/MG CREAT
MONOCYTES # BLD AUTO: 0.58 10*3/MM3 (ref 0.1–0.9)
MONOCYTES NFR BLD AUTO: 6.6 % (ref 5–12)
MORPHINE/CREAT UR: NOT DETECTED NG/MG CREAT
N-DESMETHYLTRAMADOL, U: NOT DETECTED NG/MG CREAT
NARCOTICS UR: NEGATIVE
NEUTROPHILS # BLD AUTO: 5.33 10*3/MM3 (ref 1.7–7)
NEUTROPHILS NFR BLD AUTO: 60.7 % (ref 42.7–76)
NORBUPRENORPHINE/CREAT UR: NOT DETECTED NG/MG CREAT
NORCODEINE UR-MCNC: NOT DETECTED NG/MG CREAT
NORDIAZEPAM/CREAT UR: NOT DETECTED NG/MG CREAT
NORFENTANYL/CREAT UR: NOT DETECTED NG/MG CREAT
NORHYDROCODONE/CREAT UR: NOT DETECTED NG/MG CREAT
NORMORPHINE: NOT DETECTED NG/MG CREAT
NOROXYCODONE/CREAT UR: NOT DETECTED NG/MG CREAT
NOROXYMORPHONE: NOT DETECTED NG/MG CREAT
NRBC BLD AUTO-RTO: 0 /100 WBC (ref 0–0.2)
O-DESMETHYLTRAMADOL, UR: NOT DETECTED NG/MG CREAT
OPIATES UR QL CFM: NEGATIVE
OXAZEPAM/CREAT UR: NOT DETECTED NG/MG CREAT
OXYCODONE UR QL CFM: NEGATIVE
OXYCODONE/CREAT UR: NOT DETECTED NG/MG CREAT
OXYMORPHONE/CREAT UR: NOT DETECTED NG/MG CREAT
PENTOBARB UR QL CFM: NOT DETECTED
PHENOBARB UR QL CFM: NOT DETECTED
PLATELET # BLD AUTO: 257 10*3/MM3 (ref 140–450)
POTASSIUM SERPL-SCNC: 4.2 MMOL/L (ref 3.5–5.2)
PROT SERPL-MCNC: 8 G/DL (ref 6–8.5)
PSA SERPL-MCNC: 0.86 NG/ML (ref 0–4)
RBC # BLD AUTO: 5.17 10*6/MM3 (ref 4.14–5.8)
SECOBARBITAL UR QL CFM: NOT DETECTED
SODIUM SERPL-SCNC: 143 MMOL/L (ref 136–145)
SUFENTANIL UR QL: NOT DETECTED NG/MG CREAT
TAPENTADOL UR QL CFM: NEGATIVE
TAPENTADOL/CREAT UR: NOT DETECTED NG/MG CREAT
TEMAZEPAM/CREAT UR: NOT DETECTED NG/MG CREAT
THIOPENTAL UR QL CFM: NOT DETECTED
TRAMADOL UR QL CFM: NOT DETECTED NG/MG CREAT
TRIGL SERPL-MCNC: 129 MG/DL (ref 0–150)
VLDLC SERPL CALC-MCNC: 25.8 MG/DL
WBC # BLD AUTO: 8.77 10*3/MM3 (ref 3.4–10.8)

## 2019-08-29 ENCOUNTER — TELEPHONE (OUTPATIENT)
Dept: PODIATRY | Facility: CLINIC | Age: 64
End: 2019-08-29

## 2019-08-29 NOTE — TELEPHONE ENCOUNTER
Left message and callback number reminding patient of appointment with Dr. Balwinder Sheets on August 30, 2019.

## 2019-08-30 ENCOUNTER — OFFICE VISIT (OUTPATIENT)
Dept: PODIATRY | Facility: CLINIC | Age: 64
End: 2019-08-30

## 2019-08-30 VITALS
BODY MASS INDEX: 39.08 KG/M2 | HEART RATE: 62 BPM | OXYGEN SATURATION: 97 % | SYSTOLIC BLOOD PRESSURE: 142 MMHG | WEIGHT: 249 LBS | DIASTOLIC BLOOD PRESSURE: 78 MMHG | HEIGHT: 67 IN

## 2019-08-30 DIAGNOSIS — E11.49 DIABETES MELLITUS TYPE 2 WITH NEUROLOGICAL MANIFESTATIONS (HCC): ICD-10-CM

## 2019-08-30 DIAGNOSIS — M79.671 FOOT PAIN, BILATERAL: ICD-10-CM

## 2019-08-30 DIAGNOSIS — B35.1 ONYCHOMYCOSIS: Primary | ICD-10-CM

## 2019-08-30 DIAGNOSIS — M79.672 FOOT PAIN, BILATERAL: ICD-10-CM

## 2019-08-30 PROCEDURE — 99212 OFFICE O/P EST SF 10 MIN: CPT | Performed by: PODIATRIST

## 2019-08-30 PROCEDURE — 11721 DEBRIDE NAIL 6 OR MORE: CPT | Performed by: PODIATRIST

## 2019-08-30 NOTE — PROGRESS NOTES
[facility] - PODIATRY    Today's Date: 08/30/19    Patient Name: Sandeep Alonso  MRN: [id number]  CSN: [id number]  PCP: Lisbet Velez, DNP, APRN  Referring Provider: No ref. provider found    SUBJECTIVE     Chief Complaint   Patient presents with   • Diabetes     Patient is here for diabetic foot and nail care. Patient complains of painful toenails. Last A1C result of 5.6%. Denies pain. PCP: Lisbet Velez APRN last visit 08/16/2019     HPI: Sandeep Alonso, a 64 y.o.male, comes to clinic as a(n) established patient presenting for diabetic foot exam and complaining of painful toenails. Pt has h/o DM2, HTN, Hypokalemia, Edema. Relates that his nails are discolored, thick and long. He is unable to cut them himself. Admits occasional numbness and tingling in feet. Patient is NIDDM and unsure of last BG level. Does not check BG at home. Last A1C of 5.6%. Denies pain today but admits aching in toes when toenails are thick and long. Improved itching to feet. Denies open wounds or sores. Relates previous treatment(s) including nail debridement by podiatrist. Denies any constitutional symptoms. No other pedal complaints at this time.    Past Medical History:   Diagnosis Date   • Hypokalemia    • Peripheral edema      Past Surgical History:   Procedure Laterality Date   • NO PAST SURGERIES       Family History   Problem Relation Age of Onset   • Heart attack Mother    • Emphysema Father    • No Known Problems Sister    • No Known Problems Brother    • No Known Problems Son    • No Known Problems Maternal Grandmother    • No Known Problems Maternal Grandfather    • No Known Problems Paternal Grandmother    • No Known Problems Paternal Grandfather    • No Known Problems Brother    • No Known Problems Brother    • No Known Problems Son    • No Known Problems Son    • No Known Problems Son      Social History     Socioeconomic History   • Marital status:      Spouse name: Not on file   • Number of  children: Not on file   • Years of education: Not on file   • Highest education level: Not on file   Tobacco Use   • Smoking status: Never Smoker   • Smokeless tobacco: Never Used   Substance and Sexual Activity   • Alcohol use: Yes     Comment: occasional beer   • Drug use: No   • Sexual activity: Defer     No Known Allergies  Current Outpatient Medications   Medication Sig Dispense Refill   • amLODIPine (NORVASC) 5 MG tablet Take 1 tablet by mouth Daily. 90 tablet 1   • furosemide (LASIX) 40 MG tablet Take 1 tablet by mouth 2 (Two) Times a Day. 180 tablet 1   • gabapentin (NEURONTIN) 100 MG capsule Take 1 capsule by mouth 2 (Two) Times a Day. 180 capsule 1   • lisinopril-hydrochlorothiazide (ZESTORETIC) 10-12.5 MG per tablet Take 1 tablet by mouth Daily. 90 tablet 1   • metFORMIN (GLUCOPHAGE) 500 MG tablet 1/2 tab daily (actual dose is 250) 90 tablet 1   • Omega 3 1000 MG capsule Take 1 capsule by mouth Every Night. 90 each 1   • potassium chloride (K-DUR,KLOR-CON) 20 MEQ CR tablet Take 1 tablet by mouth Daily. 90 tablet 1   • pravastatin (PRAVACHOL) 10 MG tablet Take 1 tablet by mouth Every Night. 90 tablet 1     No current facility-administered medications for this visit.      Review of Systems   Constitutional: Negative for chills and fever.   HENT: Negative for congestion.    Respiratory: Negative for shortness of breath.    Cardiovascular: Negative for chest pain and leg swelling.   Gastrointestinal: Negative for constipation, diarrhea, nausea and vomiting.   Skin:        Elongated toenails   Neurological: Positive for numbness.       OBJECTIVE     Vitals:    08/30/19 1032   BP: 142/78   Pulse: 62   SpO2: 97%       PHYSICAL EXAM  GEN:   A&Ox3, NAD. Pt presents to clinic ambulating without assistance and wearing Casual Shoes.      NEURO:   Protective sensation intact to 9/10 sites Right foot, 9/10 site Left foot using [place] monofilament  Light touch sensation diminished  No Tinel's or [place]  sign.    VASC:  Skin temperature Warm to Warm proximal to distal alina  DP pulses 2/4 Right, 2/4 Left  PT pulses 1/4 Right, 1/4 Left  CFT 3 sec alina   Trace edema to BLE  Varicosities absent alina    MUSC/SKEL:  Muscle Strength Right foot Dorsiflexors 5/5, Plantarflexors 5/5, Evertors 5/5, Invertors 5/5  Muscle Strength Left foot Dorsiflexors 5/5, Plantarflexors 5/5, Evertors 5/5, Invertors 5/5  ROM of the 1st MTP is full without pain or crepitus  ROM of the MTJ is full without pain or crepitus    ROM of the STJ is full without pain or crepitus    ROM of the ankle joint is full without pain or crepitus    POP of nail plates  Rectus foot type   Gait pattern: Normal  No gross pedal musculoskeletal deformities noted.     DERM:  Pedal nails x10 are thickened, elongated and discolored with presence of subungual debris  Webspaces are Clean, Dry, and Intact; mild flakiness to skin.   Skin is mildly atrophic and shiny with no open wounds or sores appreciated.      RADIOLOGY/NUCLEAR:  No results found.    LABORATORY/CULTURE RESULTS:      PATHOLOGY RESULTS:       ASSESSMENT/PLAN     Sandeep was seen today for diabetes.    Diagnoses and all orders for this visit:    Onychomycosis    Diabetes mellitus type 2 with neurological manifestations (CMS/HCC)    Foot pain, bilateral      Comprehensive lower extremity examination and evaluation was performed.  Discussed findings and treatment plan including risks, benefits, and treatment options with patient in detail. Patient agreed with treatment plan.  After verbal consent obtained, nail(s) x10 debrided of length and thickness with nail nipper without incidence  Patient may maintain nails and calluses at home utilizing emery board or pumice stone between visits as needed  Reviewed at home diabetic foot care including daily foot checks   An After Visit Summary was printed and given to the patient at discharge, including (if requested) any available informative/educational handouts regarding  diagnosis, treatment, or medications. All questions were answered to patient/family satisfaction. Should symptoms fail to improve or worsen they agree to call or return to clinic or to go to the Emergency Department. Discussed the importance of following up with any needed screening tests/labs/specialist appointments and any requested follow-up recommended by me today. Importance of maintaining follow-up discussed and patient accepts that missed appointments can delay diagnosis and potentially lead to worsening of conditions.  Return in about 3 months (around 11/30/2019)., or sooner if acute issues arise.        This document has been electronically signed by Ghassan Sheets DPM on August 30, 2019 10:58 AM     Please note that portions of this note may have been completed with a voice recognition program. Efforts were made to edit the dictations, but occasionally words are mistranscribed.

## 2019-09-20 ENCOUNTER — OFFICE VISIT (OUTPATIENT)
Dept: FAMILY MEDICINE CLINIC | Facility: CLINIC | Age: 64
End: 2019-09-20

## 2019-09-20 VITALS
WEIGHT: 252.2 LBS | BODY MASS INDEX: 39.58 KG/M2 | RESPIRATION RATE: 18 BRPM | TEMPERATURE: 97.7 F | HEART RATE: 57 BPM | SYSTOLIC BLOOD PRESSURE: 160 MMHG | DIASTOLIC BLOOD PRESSURE: 84 MMHG | OXYGEN SATURATION: 93 % | HEIGHT: 67 IN

## 2019-09-20 DIAGNOSIS — R05.8 ACE-INHIBITOR COUGH: ICD-10-CM

## 2019-09-20 DIAGNOSIS — T46.4X5A ACE-INHIBITOR COUGH: ICD-10-CM

## 2019-09-20 DIAGNOSIS — E66.01 OBESITY, MORBID (HCC): ICD-10-CM

## 2019-09-20 DIAGNOSIS — I10 ESSENTIAL HYPERTENSION: Primary | ICD-10-CM

## 2019-09-20 PROCEDURE — 99214 OFFICE O/P EST MOD 30 MIN: CPT | Performed by: NURSE PRACTITIONER

## 2019-09-20 RX ORDER — LOSARTAN POTASSIUM AND HYDROCHLOROTHIAZIDE 12.5; 5 MG/1; MG/1
1 TABLET ORAL DAILY
Qty: 90 TABLET | Refills: 0 | Status: SHIPPED | OUTPATIENT
Start: 2019-09-20 | End: 2019-11-14 | Stop reason: SDUPTHER

## 2019-09-20 NOTE — PATIENT INSTRUCTIONS
Obesity, Adult  Obesity is the condition of having too much total body fat. Being overweight or obese means that your weight is greater than what is considered healthy for your body size. Obesity is determined by a measurement called BMI. BMI is an estimate of body fat and is calculated from height and weight. For adults, a BMI of 30 or higher is considered obese.  Obesity can eventually lead to other health concerns and major illnesses, including:  · Stroke.  · Coronary artery disease (CAD).  · Type 2 diabetes.  · Some types of cancer, including cancers of the colon, breast, uterus, and gallbladder.  · Osteoarthritis.  · High blood pressure (hypertension).  · High cholesterol.  · Sleep apnea.  · Gallbladder stones.  · Infertility problems.  What are the causes?  The main cause of obesity is taking in (consuming) more calories than your body uses for energy. Other factors that contribute to this condition may include:  · Being born with genes that make you more likely to become obese.  · Having a medical condition that causes obesity. These conditions include:  ? Hypothyroidism.  ? Polycystic ovarian syndrome (PCOS).  ? Binge-eating disorder.  ? Cushing syndrome.  · Taking certain medicines, such as steroids, antidepressants, and seizure medicines.  · Not being physically active (sedentary lifestyle).  · Living where there are limited places to exercise safely or buy healthy foods.  · Not getting enough sleep.  What increases the risk?  The following factors may increase your risk of this condition:  · Having a family history of obesity.  · Being a woman of -American descent.  · Being a man of  descent.  What are the signs or symptoms?  Having excessive body fat is the main symptom of this condition.  How is this diagnosed?  This condition may be diagnosed based on:  · Your symptoms.  · Your medical history.  · A physical exam. Your health care provider may measure:  ? Your BMI. If you are an  adult with a BMI between 25 and less than 30, you are considered overweight. If you are an adult with a BMI of 30 or higher, you are considered obese.  ? The distances around your hips and your waist (circumferences). These may be compared to each other to help diagnose your condition.  ? Your skinfold thickness. Your health care provider may gently pinch a fold of your skin and measure it.  How is this treated?  Treatment for this condition often includes changing your lifestyle. Treatment may include some or all of the following:  · Dietary changes. Work with your health care provider and a dietitian to set a weight-loss goal that is healthy and reasonable for you. Dietary changes may include eating:  ? Smaller portions. A portion size is the amount of a particular food that is healthy for you to eat at one time. This varies from person to person.  ? Low-calorie or low-fat options.  ? More whole grains, fruits, and vegetables.  · Regular physical activity. This may include aerobic activity (cardio) and strength training.  · Medicine to help you lose weight. Your health care provider may prescribe medicine if you are unable to lose 1 pound a week after 6 weeks of eating more healthily and doing more physical activity.  · Surgery. Surgical options may include gastric banding and gastric bypass. Surgery may be done if:  ? Other treatments have not helped to improve your condition.  ? You have a BMI of 40 or higher.  ? You have life-threatening health problems related to obesity.  Follow these instructions at home:    Eating and drinking    · Follow recommendations from your health care provider about what you eat and drink. Your health care provider may advise you to:  ? Limit fast foods, sweets, and processed snack foods.  ? Choose low-fat options, such as low-fat milk instead of whole milk.  ? Eat 5 or more servings of fruits or vegetables every day.  ? Eat at home more often. This gives you more control over what  you eat.  ? Choose healthy foods when you eat out.  ? Learn what a healthy portion size is.  ? Keep low-fat snacks on hand.  ? Avoid sugary drinks, such as soda, fruit juice, iced tea sweetened with sugar, and flavored milk.  ? Eat a healthy breakfast.  · Drink enough water to keep your urine clear or pale yellow.  · Do not go without eating for long periods of time (do not fast) or follow a fad diet. Fasting and fad diets can be unhealthy and even dangerous.  Physical Activity  · Exercise regularly, as told by your health care provider. Ask your health care provider what types of exercise are safe for you and how often you should exercise.  · Warm up and stretch before being active.  · Cool down and stretch after being active.  · Rest between periods of activity.  Lifestyle  · Limit the time that you spend in front of your TV, computer, or video game system.  · Find ways to reward yourself that do not involve food.  · Limit alcohol intake to no more than 1 drink a day for nonpregnant women and 2 drinks a day for men. One drink equals 12 oz of beer, 5 oz of wine, or 1½ oz of hard liquor.  General instructions  · Keep a weight loss journal to keep track of the food you eat and how much you exercise you get.  · Take over-the-counter and prescription medicines only as told by your health care provider.  · Take vitamins and supplements only as told by your health care provider.  · Consider joining a support group. Your health care provider may be able to recommend a support group.  · Keep all follow-up visits as told by your health care provider. This is important.  Contact a health care provider if:  · You are unable to meet your weight loss goal after 6 weeks of dietary and lifestyle changes.  This information is not intended to replace advice given to you by your health care provider. Make sure you discuss any questions you have with your health care provider.  Document Released: 01/25/2006 Document Revised:  05/22/2017 Document Reviewed: 10/05/2016  [employer] Interactive Patient Education © 2019 [employer]      MyPlate from [employer]    MyPlate is an outline of a general healthy diet based on the 2010 Dietary Guidelines for Americans, from the U[place] Department of Agriculture (USDA). It sets guidelines for how much food you should eat from each food group based on your age, sex, and level of physical activity.  What are tips for following MyPlate?  To follow MyPlate recommendations:  · Eat a wide variety of fruits and vegetables, grains, and protein foods.  · Serve smaller portions and eat less food throughout the day.  · Limit portion sizes to avoid overeating.  · Enjoy your food.  · Get at least 150 minutes of exercise every week. This is about 30 minutes each day, 5 or more days per week.  It can be difficult to have every meal look like MyPlate. Think about MyPlate as eating guidelines for an entire day, rather than each individual meal.  Fruits and vegetables  · Make half of your plate fruits and vegetables.  · Eat many different colors of fruits and vegetables each day.  · For a 2,000 calorie daily food plan, eat:  ? 2½ cups of vegetables every day.  ? 2 cups of fruit every day.  · 1 cup is equal to:  ? 1 cup raw or cooked vegetables.  ? 1 cup raw fruit.  ? 1 medium-sized orange, apple, or banana.  ? 1 cup 100% fruit or vegetable juice.  ? 2 cups raw leafy greens, such as lettuce, spinach, or kale.  ? ½ cup dried fruit.  Grains  · One fourth of your plate should be grains.  · Make at least half of the grains you eat each day whole grains.  · For a 2,000 calorie daily food plan, eat 6 oz of grains every day.  · 1 oz is equal to:  ? 1 slice bread.  ? 1 cup cereal.  ? ½ cup cooked rice, cereal, or pasta.  Protein  · One fourth of your plate should be protein.  · Eat a wide variety of protein foods, including meat, poultry, fish, eggs, beans, nuts, and tofu.  · For a 2,000 calorie daily food plan, eat 5½ oz of protein  every day.  · 1 oz is equal to:  ? 1 oz meat, poultry, or fish.  ? ¼ cup cooked beans.  ? 1 egg.  ? ½ oz nuts or seeds.  ? 1 Tbsp peanut butter.  Dairy  · Drink fat-free or low-fat (1%) milk.  · Eat or drink dairy as a side to meals.  · For a 2,000 calorie daily food plan, eat or drink 3 cups of dairy every day.  · 1 cup is equal to:  ? 1 cup milk, yogurt, cottage cheese, or soy milk (soy beverage).  ? 2 oz processed cheese.  ? 1½ oz natural cheese.  Fats, oils, salt, and sugars  · Only small amounts of oils are recommended.  · Avoid foods that are high in calories and low in nutritional value (empty calories), like foods high in fat or added sugars.  · Choose foods that are low in salt (sodium). Choose foods that have less than 140 milligrams (mg) of sodium per serving.  · Drink water instead of sugary drinks. Drink enough water each day to keep your urine pale yellow.  Where to find support  · Work with your health care provider or a nutrition specialist (dietitian) to develop a customized eating plan that is right for you.  · Download an silvio (mobile application) to help you track your daily food intake.  Where to find more information  · Go to ChooseMyPlate.gov for more information.  · Learn more and log your daily food intake according to MyPlate using USDA's SuperTracker: www.supertracker.usda.gov  Summary  · MyPlate is a general guideline for healthy eating from the USDA. It is based on the 2010 Dietary Guidelines for Americans.  · In general, fruits and vegetables should take up ½ of your plate, grains should take up ¼ of your plate, and protein should take up ¼ of your plate.  This information is not intended to replace advice given to you by your health care provider. Make sure you discuss any questions you have with your health care provider.  Document Released: 01/06/2009 Document Revised: 03/19/2018 Document Reviewed: 03/19/2018  [employer] Interactive Patient Education © 2019 [employer]      Calorie  Counting for Weight Loss  Calories are units of energy. Your body needs a certain amount of calories from food to keep you going throughout the day. When you eat more calories than your body needs, your body stores the extra calories as fat. When you eat fewer calories than your body needs, your body burns fat to get the energy it needs.  Calorie counting means keeping track of how many calories you eat and drink each day. Calorie counting can be helpful if you need to lose weight. If you make sure to eat fewer calories than your body needs, you should lose weight. Ask your health care provider what a healthy weight is for you.  For calorie counting to work, you will need to eat the right number of calories in a day in order to lose a healthy amount of weight per week. A dietitian can help you determine how many calories you need in a day and will give you suggestions on how to reach your calorie goal.  · A healthy amount of weight to lose per week is usually 1-2 lb (0.5-0.9 kg). This usually means that your daily calorie intake should be reduced by 500-750 calories.  · Eating 1,200 - 1,500 calories per day can help most women lose weight.  · Eating 1,500 - 1,800 calories per day can help most men lose weight.  What is my plan?  My goal is to have __________ calories per day.  If I have this many calories per day, I should lose around __________ pounds per week.  What do I need to know about calorie counting?  In order to meet your daily calorie goal, you will need to:  · Find out how many calories are in each food you would like to eat. Try to do this before you eat.  · Decide how much of the food you plan to eat.  · Write down what you ate and how many calories it had. Doing this is called keeping a food log.  To successfully lose weight, it is important to balance calorie counting with a healthy lifestyle that includes regular activity. Aim for 150 minutes of moderate exercise (such as walking) or 75 minutes of  vigorous exercise (such as running) each week.  Where do I find calorie information?    The number of calories in a food can be found on a Nutrition Facts label. If a food does not have a Nutrition Facts label, try to look up the calories online or ask your dietitian for help.  Remember that calories are listed per serving. If you choose to have more than one serving of a food, you will have to multiply the calories per serving by the amount of servings you plan to eat. For example, the label on a package of bread might say that a serving size is 1 slice and that there are 90 calories in a serving. If you eat 1 slice, you will have eaten 90 calories. If you eat 2 slices, you will have eaten 180 calories.  How do I keep a food log?  Immediately after each meal, record the following information in your food log:  · What you ate. Don't forget to include toppings, sauces, and other extras on the food.  · How much you ate. This can be measured in cups, ounces, or number of items.  · How many calories each food and drink had.  · The total number of calories in the meal.  Keep your food log near you, such as in a small notebook in your pocket, or use a mobile silvio or website. Some programs will calculate calories for you and show you how many calories you have left for the day to meet your goal.  What are some calorie counting tips?    · Use your calories on foods and drinks that will fill you up and not leave you hungry:  ? Some examples of foods that fill you up are nuts and nut butters, vegetables, lean proteins, and high-fiber foods like whole grains. High-fiber foods are foods with more than 5 g fiber per serving.  ? Drinks such as sodas, specialty coffee drinks, alcohol, and juices have a lot of calories, yet do not fill you up.  · Eat nutritious foods and avoid empty calories. Empty calories are calories you get from foods or beverages that do not have many vitamins or protein, such as candy, sweets, and soda. It is  "better to have a nutritious high-calorie food (such as an avocado) than a food with few nutrients (such as a bag of chips).  · Know how many calories are in the foods you eat most often. This will help you calculate calorie counts faster.  · Pay attention to calories in drinks. Low-calorie drinks include water and unsweetened drinks.  · Pay attention to nutrition labels for \"low fat\" or \"fat free\" foods. These foods sometimes have the same amount of calories or more calories than the full fat versions. They also often have added sugar, starch, or salt, to make up for flavor that was removed with the fat.  · Find a way of tracking calories that works for you. Get creative. Try different apps or programs if writing down calories does not work for you.  What are some portion control tips?  · Know how many calories are in a serving. This will help you know how many servings of a certain food you can have.  · Use a measuring cup to measure serving sizes. You could also try weighing out portions on a kitchen scale. With time, you will be able to estimate serving sizes for some foods.  · Take some time to put servings of different foods on your favorite plates, bowls, and cups so you know what a serving looks like.  · Try not to eat straight from a bag or box. Doing this can lead to overeating. Put the amount you would like to eat in a cup or on a plate to make sure you are eating the right portion.  · Use smaller plates, glasses, and bowls to prevent overeating.  · Try not to multitask (for example, watch TV or use your computer) while eating. If it is time to eat, sit down at a table and enjoy your food. This will help you to know when you are full. It will also help you to be aware of what you are eating and how much you are eating.  What are tips for following this plan?  Reading food labels  · Check the calorie count compared to the serving size. The serving size may be smaller than what you are used to " eating.  · Check the source of the calories. Make sure the food you are eating is high in vitamins and protein and low in saturated and trans fats.  Shopping  · Read nutrition labels while you shop. This will help you make healthy decisions before you decide to purchase your food.  · Make a grocery list and stick to it.  Cooking  · Try to cook your favorite foods in a healthier way. For example, try baking instead of frying.  · Use low-fat dairy products.  Meal planning  · Use more fruits and vegetables. Half of your plate should be fruits and vegetables.  · Include lean proteins like poultry and fish.  How do I count calories when eating out?  · Ask for smaller portion sizes.  · Consider sharing an entree and sides instead of getting your own entree.  · If you get your own entree, eat only half. Ask for a box at the beginning of your meal and put the rest of your entree in it so you are not tempted to eat it.  · If calories are listed on the menu, choose the lower calorie options.  · Choose dishes that include vegetables, fruits, whole grains, low-fat dairy products, and lean protein.  · Choose items that are boiled, broiled, grilled, or steamed. Stay away from items that are buttered, battered, fried, or served with cream sauce. Items labeled “crispy” are usually fried, unless stated otherwise.  · Choose water, low-fat milk, unsweetened iced tea, or other drinks without added sugar. If you want an alcoholic beverage, choose a lower calorie option such as a glass of wine or light beer.  · Ask for dressings, sauces, and syrups on the side. These are usually high in calories, so you should limit the amount you eat.  · If you want a salad, choose a garden salad and ask for grilled meats. Avoid extra toppings like babb, cheese, or fried items. Ask for the dressing on the side, or ask for olive oil and vinegar or lemon to use as dressing.  · Estimate how many servings of a food you are given. For example, a serving of  cooked rice is ½ cup or about the size of half a baseball. Knowing serving sizes will help you be aware of how much food you are eating at restaurants. The list below tells you how big or small some common portion sizes are based on everyday objects:  ? 1 oz--4 stacked dice.  ? 3 oz--1 deck of cards.  ? 1 tsp--1 die.  ? 1 Tbsp--½ a ping-pong ball.  ? 2 Tbsp--1 ping-pong ball.  ? ½ cup--½ baseball.  ? 1 cup--1 baseball.  Summary  · Calorie counting means keeping track of how many calories you eat and drink each day. If you eat fewer calories than your body needs, you should lose weight.  · A healthy amount of weight to lose per week is usually 1-2 lb (0.5-0.9 kg). This usually means reducing your daily calorie intake by 500-750 calories.  · The number of calories in a food can be found on a Nutrition Facts label. If a food does not have a Nutrition Facts label, try to look up the calories online or ask your dietitian for help.  · Use your calories on foods and drinks that will fill you up, and not on foods and drinks that will leave you hungry.  · Use smaller plates, glasses, and bowls to prevent overeating.  This information is not intended to replace advice given to you by your health care provider. Make sure you discuss any questions you have with your health care provider.  Document Released: 12/18/2006 Document Revised: 11/17/2017 Document Reviewed: 11/17/2017  [employer] Interactive Patient Education © 2019 [employer]    Hypertension  Hypertension, commonly called high blood pressure, is when the force of blood pumping through the arteries is too strong. The arteries are the blood vessels that carry blood from the heart throughout the body. Hypertension forces the heart to work harder to pump blood and may cause arteries to become narrow or stiff. Having untreated or uncontrolled hypertension can cause heart attacks, strokes, kidney disease, and other problems.  A blood pressure reading consists of a higher  "number over a lower number. Ideally, your blood pressure should be below 120/80. The first (\"top\") number is called the systolic pressure. It is a measure of the pressure in your arteries as your heart beats. The second (\"bottom\") number is called the diastolic pressure. It is a measure of the pressure in your arteries as the heart relaxes.  What are the causes?  The cause of this condition is not known.  What increases the risk?  Some risk factors for high blood pressure are under your control. Others are not.  Factors you can change  · Smoking.  · Having type 2 diabetes mellitus, high cholesterol, or both.  · Not getting enough exercise or physical activity.  · Being overweight.  · Having too much fat, sugar, calories, or salt (sodium) in your diet.  · Drinking too much alcohol.  Factors that are difficult or impossible to change  · Having chronic kidney disease.  · Having a family history of high blood pressure.  · Age. Risk increases with age.  · Race. You may be at higher risk if you are -American.  · Gender. Men are at higher risk than women before age 45. After age 65, women are at higher risk than men.  · Having obstructive sleep apnea.  · Stress.  What are the signs or symptoms?  Extremely high blood pressure (hypertensive crisis) may cause:  · Headache.  · Anxiety.  · Shortness of breath.  · Nosebleed.  · Nausea and vomiting.  · Severe chest pain.  · Jerky movements you cannot control (seizures).  How is this diagnosed?  This condition is diagnosed by measuring your blood pressure while you are seated, with your arm resting on a surface. The cuff of the blood pressure monitor will be placed directly against the skin of your upper arm at the level of your heart. It should be measured at least twice using the same arm. Certain conditions can cause a difference in blood pressure between your right and left arms.  Certain factors can cause blood pressure readings to be lower or higher than normal " (elevated) for a short period of time:  · When your blood pressure is higher when you are in a health care provider's office than when you are at home, this is called white coat hypertension. Most people with this condition do not need medicines.  · When your blood pressure is higher at home than when you are in a health care provider's office, this is called masked hypertension. Most people with this condition may need medicines to control blood pressure.  If you have a high blood pressure reading during one visit or you have normal blood pressure with other risk factors:  · You may be asked to return on a different day to have your blood pressure checked again.  · You may be asked to monitor your blood pressure at home for 1 week or longer.  If you are diagnosed with hypertension, you may have other blood or imaging tests to help your health care provider understand your overall risk for other conditions.  How is this treated?  This condition is treated by making healthy lifestyle changes, such as eating healthy foods, exercising more, and reducing your alcohol intake. Your health care provider may prescribe medicine if lifestyle changes are not enough to get your blood pressure under control, and if:  · Your systolic blood pressure is above 130.  · Your diastolic blood pressure is above 80.  Your personal target blood pressure may vary depending on your medical conditions, your age, and other factors.  Follow these instructions at home:  Eating and drinking    · Eat a diet that is high in fiber and potassium, and low in sodium, added sugar, and fat. An example eating plan is called the DASH (Dietary Approaches to Stop Hypertension) diet. To eat this way:  ? Eat plenty of fresh fruits and vegetables. Try to fill half of your plate at each meal with fruits and vegetables.  ? Eat whole grains, such as whole wheat pasta, brown rice, or whole grain bread. Fill about one quarter of your plate with whole grains.  ? Eat  or drink low-fat dairy products, such as skim milk or low-fat yogurt.  ? Avoid fatty cuts of meat, processed or cured meats, and poultry with skin. Fill about one quarter of your plate with lean proteins, such as fish, chicken without skin, beans, eggs, and tofu.  ? Avoid premade and processed foods. These tend to be higher in sodium, added sugar, and fat.  · Reduce your daily sodium intake. Most people with hypertension should eat less than 1,500 mg of sodium a day.  · Limit alcohol intake to no more than 1 drink a day for nonpregnant women and 2 drinks a day for men. One drink equals 12 oz of beer, 5 oz of wine, or 1½ oz of hard liquor.  Lifestyle    · Work with your health care provider to maintain a healthy body weight or to lose weight. Ask what an ideal weight is for you.  · Get at least 30 minutes of exercise that causes your heart to beat faster (aerobic exercise) most days of the week. Activities may include walking, swimming, or biking.  · Include exercise to strengthen your muscles (resistance exercise), such as pilates or lifting weights, as part of your weekly exercise routine. Try to do these types of exercises for 30 minutes at least 3 days a week.  · Do not use any products that contain nicotine or tobacco, such as cigarettes and e-cigarettes. If you need help quitting, ask your health care provider.  · Monitor your blood pressure at home as told by your health care provider.  · Keep all follow-up visits as told by your health care provider. This is important.  Medicines  · Take over-the-counter and prescription medicines only as told by your health care provider. Follow directions carefully. Blood pressure medicines must be taken as prescribed.  · Do not skip doses of blood pressure medicine. Doing this puts you at risk for problems and can make the medicine less effective.  · Ask your health care provider about side effects or reactions to medicines that you should watch for.  Contact a health care  provider if:  · You think you are having a reaction to a medicine you are taking.  · You have headaches that keep coming back (recurring).  · You feel dizzy.  · You have swelling in your ankles.  · You have trouble with your vision.  Get help right away if:  · You develop a severe headache or confusion.  · You have unusual weakness or numbness.  · You feel faint.  · You have severe pain in your chest or abdomen.  · You vomit repeatedly.  · You have trouble breathing.  Summary  · Hypertension is when the force of blood pumping through your arteries is too strong. If this condition is not controlled, it may put you at risk for serious complications.  · Your personal target blood pressure may vary depending on your medical conditions, your age, and other factors. For most people, a normal blood pressure is less than 120/80.  · Hypertension is treated with lifestyle changes, medicines, or a combination of both. Lifestyle changes include weight loss, eating a healthy, low-sodium diet, exercising more, and limiting alcohol.  This information is not intended to replace advice given to you by your health care provider. Make sure you discuss any questions you have with your health care provider.  Document Released: 12/18/2006 Document Revised: 11/15/2017 Document Reviewed: 11/15/2017  [employer] Interactive Patient Education © 2019 [employer]

## 2019-09-20 NOTE — PROGRESS NOTES
CC: ace cough, HTN    Sandeep Alonso is a 64 yr old male here for complaints of bp staying elevated.  He says it has been in the 160's for the last week.  He says that he is taking all medication as prescribed.   Denies any chest pain, shortness of breath or headache.  Says that he has had a irritative cough for the last month and he is not sick.  Possibly from ace inhibitor      Hypertension   This is a chronic problem. The current episode started more than 1 year ago. The problem has been gradually worsening since onset. The problem is uncontrolled. Pertinent negatives include no blurred vision, chest pain, headaches, neck pain, orthopnea, peripheral edema or shortness of breath. There are no associated agents to hypertension. Risk factors for coronary artery disease include dyslipidemia, family history, obesity and male gender. Past treatments include ACE inhibitors and diuretics. Current antihypertension treatment includes angiotensin blockers. There are no compliance problems.  There is no history of coarctation of the aorta, hyperaldosteronism, hypercortisolism, pheochromocytoma, renovascular disease or sleep apnea.      Chronic problems: HTN stable with lisinopril/HCTZ, hyperlipidemia stable with omega 3 fatty acids and pravastatin, lower extremity edema  stable with furosemide with last does being this am.  Diabetic neuropathy stable with gabapentin.    L:ast labs august 2019:  A1C 5.6,     The following portions of the patient's history were reviewed and updated as appropriate: allergies, current medications, past family history, past medical history, past social history, past surgical history and problem list.    Review of Systems   Constitutional: Negative for activity change and appetite change.   Eyes: Negative for blurred vision, double vision and pain.   Respiratory: Positive for cough. Negative for choking, chest tightness, shortness of breath, wheezing and stridor.    Cardiovascular: Negative for  chest pain and orthopnea.   Musculoskeletal: Negative.  Negative for neck pain.   Skin: Negative.    Hematological: Negative.    All other systems reviewed and are negative.      Objective   Physical Exam   Constitutional: He is oriented to person, place, and time. He appears well-developed. He is cooperative.   HENT:   Head: Normocephalic and atraumatic.   Right Ear: Hearing, tympanic membrane, external ear and ear canal normal.   Left Ear: Hearing, tympanic membrane, external ear and ear canal normal.   Nose: Nose normal.   Mouth/Throat: Uvula is midline and oropharynx is clear and moist.   Eyes: Conjunctivae are normal.   Fundoscopic exam:       The right eye shows no AV nicking, no exudate and no hemorrhage.        The left eye shows no AV nicking, no exudate and no hemorrhage.   Cardiovascular: Normal rate, regular rhythm and normal heart sounds.   Pulmonary/Chest: Effort normal and breath sounds normal.   Abdominal: Soft. Normal appearance and bowel sounds are normal.   Neurological: He is alert and oriented to person, place, and time. He has normal strength. No cranial nerve deficit or sensory deficit.   Skin: Skin is warm, dry and intact.   Psychiatric: He has a normal mood and affect. His speech is normal and behavior is normal. Judgment and thought content normal. Cognition and memory are normal.   Nursing note and vitals reviewed.    Assessment/Plan   Sandeep was seen today for hypertension.    Diagnoses and all orders for this visit:    Essential hypertension  -     losartan-hydrochlorothiazide (HYZAAR) 50-12.5 MG per tablet; Take 1 tablet by mouth Daily.        -     Stop by office and have bp checked over the next couple weeks        -     Continue all meds as prescribed    ACE-inhibitor cough        -     Stop the lisinoprill/hctz due to cough    Obesity, morbid (CMS/[facility])    Obesity Plan:    The patient BMI is outside this range and we recommended/discussed today to utilize a diet/exercise program to  get back into the appropriate range.  Federal guidelines recommend that people under the age of 65 should have a BMI of 18.5-24.9  The initial step is to document everything that is consumed into a food diary. Studies have shown that patients can lose up to 2x the weight by keeping track of foods.   Choose one bad food weekly and eliminate it from your diet.  Replace with one healthy food  Goal over next 2-4 weeks is walk 30 minutes per day 5 days per week at pace difficult to hold conversation.   Drink more water, less soda.   Cut back on portion sizes.   Today we encouraged roughly a 1 lb per week weight loss with initial goal of 5% weight loss.  Discussed if eating out for a meal, consider cutting food in half and placing into a to go container.  Individually portion any foods coming into the home based on package.  Use smaller plates  Drinking 12 oz of water 30 minutes before meal as way to suppress appetite.  Medications discussed today include metformin, topamax, phentermine, Qsymia, Belviq (lorcaserin), Contrave (Buproprion/naltrexone).    Simple advice to lose weight   Internet programs or self help books.    Advice from dietitian  Set Goals that are realistic   Encouraged to use visual aides to help in measuring foods  Baseball-1 cup good for green salad, frozen yogurt, medium piece of fruit, baked potato  Handful - ½ cup good cut fruit, cooked vegetables, pasta, rice  Egg- ¼ cup good for dried fruit  Deck of cards-3 ounces good for meat and poultry  Check book-3 ounces good for grilled fish  6 dice side by side- 1 ½ ounces good for natural cheese  Simple tips   Plate method-reduce plate size to 9 inch dinner plate.  Half of plate should be filled with non-starchy vegetables (broccoli, lettuce, cauliflower, tomatoes), ¼ plate with lean source of protein (lean chicken, turkey, fish), remaining ½ with whole grains (brown rice, potato, whole grain breads  Avoid liquid calories (regular soda, juice, coffee with  cream)  Focus  on water, seltzer water and other non-calorie drinks  Replace regular sugar with non-caloric sweeteners  Avoid skipping meals: plan small regular meals throughout the day in order to keep your hunger controlled  Consider using meal replacements if unable to plan a healthy meal (protein shake, high protein bar)  Replace all white bread with whole wheat/whole grain alternative  Swap regular salad dressings (mayonnaise, butter, or low fat or fat free alternative)  Avoid high fat, high calorie, high carbohydrate snakes (cookies, pastries, cakes)  Snack on fruits, low fat dairy (yogurt, cottage cheese)    Behavioral Modification  Self-Monitoring of dietary Intake-keep a dietary intake log. Obese individuals have been shown to underestimate their food intake  Behavioral treatment -gives exacts about amount and total calories  Read food labels    Return in about 1 month (around 10/20/2019) for Recheck htn.  Electronically signed by Lisbet Velez, BECKA, APRN, 09/20/19, 8:45 AM.

## 2019-09-24 ENCOUNTER — TELEPHONE (OUTPATIENT)
Dept: VASCULAR SURGERY | Facility: CLINIC | Age: 64
End: 2019-09-24

## 2019-10-18 ENCOUNTER — OFFICE VISIT (OUTPATIENT)
Dept: FAMILY MEDICINE CLINIC | Facility: CLINIC | Age: 64
End: 2019-10-18

## 2019-10-18 VITALS
OXYGEN SATURATION: 92 % | TEMPERATURE: 98 F | RESPIRATION RATE: 18 BRPM | HEART RATE: 63 BPM | WEIGHT: 253.6 LBS | DIASTOLIC BLOOD PRESSURE: 88 MMHG | HEIGHT: 67 IN | SYSTOLIC BLOOD PRESSURE: 140 MMHG | BODY MASS INDEX: 39.8 KG/M2

## 2019-10-18 DIAGNOSIS — Z23 NEEDS FLU SHOT: ICD-10-CM

## 2019-10-18 DIAGNOSIS — E78.2 MIXED HYPERLIPIDEMIA: ICD-10-CM

## 2019-10-18 DIAGNOSIS — E66.01 CLASS 2 SEVERE OBESITY DUE TO EXCESS CALORIES WITH SERIOUS COMORBIDITY AND BODY MASS INDEX (BMI) OF 38.0 TO 38.9 IN ADULT (HCC): ICD-10-CM

## 2019-10-18 DIAGNOSIS — R05.9 COUGH: ICD-10-CM

## 2019-10-18 DIAGNOSIS — E11.49 DIABETES MELLITUS TYPE 2 WITH NEUROLOGICAL MANIFESTATIONS (HCC): ICD-10-CM

## 2019-10-18 DIAGNOSIS — I10 ESSENTIAL HYPERTENSION: Primary | ICD-10-CM

## 2019-10-18 PROCEDURE — 99214 OFFICE O/P EST MOD 30 MIN: CPT | Performed by: NURSE PRACTITIONER

## 2019-10-18 PROCEDURE — 90471 IMMUNIZATION ADMIN: CPT | Performed by: NURSE PRACTITIONER

## 2019-10-18 PROCEDURE — 90674 CCIIV4 VAC NO PRSV 0.5 ML IM: CPT | Performed by: NURSE PRACTITIONER

## 2019-10-18 RX ORDER — DEXTROMETHORPHAN HYDROBROMIDE AND PROMETHAZINE HYDROCHLORIDE 15; 6.25 MG/5ML; MG/5ML
5 SYRUP ORAL 4 TIMES DAILY PRN
Qty: 120 ML | Refills: 0 | Status: SHIPPED | OUTPATIENT
Start: 2019-10-18 | End: 2020-02-21

## 2019-10-18 NOTE — PROGRESS NOTES
CC: HTN, wants flu shot, neuropathy, diabetes, and hyperlipidemia    Sandeep Alonso is a 64 yr old man here for follow up for HTN, neuropathy, hyperlipidemia, edema, and hypokalemia.  Denies chest pain, shortness of breath or headaches.  Says that his allergies are bothering him and when he lays down at night all he does is cough.  No fever, chills, shortness of breath or congestion.  Would like something for the cough. Denies any problems with his potassium says that he eats a banana every day.  Pt says that his bp is elevated 174/81and nurse took it with machine.  I rechecked bp manually  It was 140/88      Hypertension   This is a chronic problem. The current episode started more than 1 year ago. The problem is unchanged. The problem is controlled. Associated symptoms include headaches. Pertinent negatives include no blurred vision, chest pain, neck pain, palpitations, peripheral edema or shortness of breath. There are no associated agents to hypertension. Risk factors for coronary artery disease include diabetes mellitus, dyslipidemia, family history, male gender and obesity. Past treatments include ACE inhibitors, alpha 1 blockers, angiotensin blockers and beta blockers. Current antihypertension treatment includes angiotensin blockers and lifestyle changes. The current treatment provides mild improvement. There are no compliance problems.  There is no history of angina, kidney disease, CAD/MI, heart failure, left ventricular hypertrophy or retinopathy. There is no history of chronic renal disease, coarctation of the aorta, hyperaldosteronism, hypercortisolism, pheochromocytoma, renovascular disease or sleep apnea.   Hyperlipidemia   This is a chronic problem. The current episode started more than 1 year ago. The problem is controlled. Recent lipid tests were reviewed and are normal. He has no history of chronic renal disease, diabetes, hypothyroidism or liver disease. There are no known factors aggravating his  hyperlipidemia. Pertinent negatives include no chest pain, myalgias or shortness of breath. Current antihyperlipidemic treatment includes exercise. The current treatment provides mild improvement of lipids. There are no compliance problems.  Risk factors for coronary artery disease include diabetes mellitus, dyslipidemia, family history, hypertension, male sex and obesity.   Lower Extremity Issue   This is a chronic problem. The current episode started more than 1 year ago. The problem occurs intermittently. The problem has been rapidly improving. Associated symptoms include coughing and headaches. Pertinent negatives include no arthralgias, change in bowel habit, chest pain, fatigue, joint swelling, myalgias, neck pain, sore throat or swollen glands. Nothing aggravates the symptoms. He has tried rest and sleep for the symptoms. The treatment provided mild relief.        The following portions of the patient's history were reviewed and updated as appropriate: allergies, current medications, past family history, past medical history, past social history, past surgical history and problem list.    Chronic problems:  htn controlled with losartan-hctz and amlodipine, neuropathy controlled with gabapentin, type 2 diabetes controlled with metformin, edema lower extremities, hyperlipidemia stable with pravastatin and omega 3 fatty acids and hypokalemia stable with potassium     Review of Systems   Constitutional: Negative for activity change, appetite change and fatigue.   HENT: Negative for ear discharge, ear pain, postnasal drip, sinus pressure, sore throat and swollen glands.    Eyes: Negative for blurred vision.   Respiratory: Positive for cough. Negative for shortness of breath, wheezing and stridor.    Cardiovascular: Negative for chest pain, palpitations and leg swelling.   Gastrointestinal: Negative for change in bowel habit.   Musculoskeletal: Negative for arthralgias, joint swelling, myalgias and neck pain.    Allergic/Immunologic: Positive for environmental allergies. Negative for food allergies and immunocompromised state.   Psychiatric/Behavioral: Negative for decreased concentration, dysphoric mood and depressed mood.   All other systems reviewed and are negative.      Objective   Physical Exam   Constitutional: He is oriented to person, place, and time. Vital signs are normal. He appears well-developed and well-nourished. He is cooperative. He is obese.  HENT:   Head: Normocephalic and atraumatic.   Right Ear: Hearing normal.   Left Ear: Hearing normal.   Nose: Nose normal.   Mouth/Throat: Oropharynx is clear and moist.   Eyes: Conjunctivae are normal. Pupils are equal, round, and reactive to light.   Fundoscopic exam:       The right eye shows no AV nicking, no exudate, no hemorrhage and no papilledema.        The left eye shows no AV nicking, no exudate, no hemorrhage and no papilledema.   Cardiovascular: Normal rate, regular rhythm and normal heart sounds.   Pulmonary/Chest: Effort normal and breath sounds normal.   Abdominal: Soft. Normal appearance and bowel sounds are normal.   Musculoskeletal:        Right shoulder: Normal.        Left shoulder: Normal.        Thoracic back: Normal.        Lumbar back: Normal.   Lymphadenopathy:        Head (right side): No submental, no submandibular and no tonsillar adenopathy present.        Head (left side): No submental, no submandibular and no tonsillar adenopathy present.   Neurological: He is alert and oriented to person, place, and time. He has normal strength. No cranial nerve deficit or sensory deficit.   Skin: Skin is warm, dry and intact.   Psychiatric: He has a normal mood and affect. His speech is normal and behavior is normal. Judgment and thought content normal. Cognition and memory are normal.   Nursing note and vitals reviewed.    Assessment/Plan   Sandeep was seen today for hypertension, hyperlipidemia and diabetes.    Diagnoses and all orders for this  visit:    Essential hypertension       -   Continue amlodipine and losartan-hctz as prescribed       -   Monitor bp and if have elevations follow up       -   Low sodium diet    Lab Results   Component Value Date    WBC 8.77 08/16/2019    HGB 15.0 08/16/2019    HCT 49.5 08/16/2019    MCV 95.7 08/16/2019     08/16/2019     Lab Results   Component Value Date    GLUCOSE 87 01/08/2016    BUN 15 08/16/2019    CREATININE 1.08 08/16/2019    EGFRIFNONA 69 08/16/2019    EGFRIFAFRI 84 08/16/2019    BCR 13.9 08/16/2019    K 4.2 08/16/2019    CO2 33.2 (H) 08/16/2019    CALCIUM 9.2 08/16/2019    PROTENTOTREF 8.0 08/16/2019    ALBUMIN 4.60 08/16/2019    LABIL2 1.4 08/16/2019    AST 20 08/16/2019    ALT 21 08/16/2019     Diabetes mellitus type 2 with neurological manifestations (CMS/[facility])       -   Pt does not check blood sugars at home.  Encouraged to check fasting blood sugar daily       -   Continue metformin as prescribed    Lab Results   Component Value Date    HGBA1C 5.6 08/16/2019     Mixed hyperlipidemia         -   Continue pravastatin as prescribed         -   Continue omega 3's as prescribed         -   Eat healthy diet         -   Exercise and lose weight    Lab Results   Component Value Date    CHLPL 168 08/16/2019    TRIG 129 08/16/2019    HDL 36 (L) 08/16/2019     (H) 08/16/2019       Needs flu shot  -     Flucelvax Quad=>4Years ([id number])    Class 2 severe obesity due to excess calories with serious comorbidity and body mass index (BMI) of 38.0 to 38.9 in adult (CMS/[facility]) BMI 39.7 weight 253  Obesity Plan:  Lose at least 3 pounds before next visit  The patient BMI is outside this range and we recommended/discussed today to utilize a diet/exercise program to get back into the appropriate range.  Federal guidelines recommend that people under the age of 65 should have a BMI of 18.5-24.9  The initial step is to document everything that is consumed into a food diary. Studies have shown that patients can  lose up to 2x the weight by keeping track of foods.   Choose one bad food weekly and eliminate it from your diet.  Replace with one healthy food  Goal over next 2-4 weeks is walk 30 minutes per day 5 days per week at pace difficult to hold conversation.   Drink more water, less soda.   Cut back on portion sizes.   Today we encouraged roughly a 1 lb per week weight loss with initial goal of 5% weight loss.  Discussed if eating out for a meal, consider cutting food in half and placing into a to go container.  Individually portion any foods coming into the home based on package.  Use smaller plates  Drinking 12 oz of water 30 minutes before meal as way to suppress appetite.  Medications discussed today include metformin, topamax, phentermine, Qsymia, Belviq (lorcaserin), Contrave (Buproprion/naltrexone).    Lifestyle therapy   Simple advice to lose weight   Internet programs or self help books.    Advice from dietitian  Set Goals that are realistic   Encouraged to use visual aides to help in measuring foods  Baseball-1 cup good for green salad, frozen yogurt, medium piece of fruit, baked potato  Handful - ½ cup good cut fruit, cooked vegetables, pasta, rice  Egg- ¼ cup good for dried fruit  Deck of cards-3 ounces good for meat and poultry  Check book-3 ounces good for grilled fish  6 dice side by side- 1 ½ ounces good for natural cheese  Simple tips   Plate method-reduce plate size to 9 inch dinner plate.  Half of plate should be filled with non-starchy vegetables (broccoli, lettuce, cauliflower, tomatoes), ¼ plate with lean source of protein (lean chicken, turkey, fish), remaining ½ with whole grains (brown rice, potato, whole grain breads  Avoid liquid calories (regular soda, juice, coffee with cream)  Focus  on water, seltzer water and other non-calorie drinks  Replace regular sugar with non-caloric sweeteners  Avoid skipping meals: plan small regular meals throughout the day in order to keep your hunger  controlled  Consider using meal replacements if unable to plan a healthy meal (protein shake, high protein bar)  Replace all white bread with whole wheat/whole grain alternative  Swap regular salad dressings (mayonnaise, butter, or low fat or fat free alternative)  Avoid high fat, high calorie, high carbohydrate snakes (cookies, pastries, cakes)  Snack on fruits, low fat dairy (yogurt, cottage cheese)    Behavioral Modification  Self-Monitoring of dietary Intake-keep a dietary intake log. Obese individuals have been shown to underestimate their food intake  Behavioral treatment -gives exacts about amount and total calories  Read food labels     Return in about 4 months (around 2/12/2020) for Recheck chronic problems.    Electronically signed by Lisbet Velez, BECKA, APRN, 10/18/19, 9:00 AM.

## 2019-10-18 NOTE — PATIENT INSTRUCTIONS
Obesity, Adult  Obesity is the condition of having too much total body fat. Being overweight or obese means that your weight is greater than what is considered healthy for your body size. Obesity is determined by a measurement called BMI. BMI is an estimate of body fat and is calculated from height and weight. For adults, a BMI of 30 or higher is considered obese.  Obesity can eventually lead to other health concerns and major illnesses, including:  · Stroke.  · Coronary artery disease (CAD).  · Type 2 diabetes.  · Some types of cancer, including cancers of the colon, breast, uterus, and gallbladder.  · Osteoarthritis.  · High blood pressure (hypertension).  · High cholesterol.  · Sleep apnea.  · Gallbladder stones.  · Infertility problems.  What are the causes?  The main cause of obesity is taking in (consuming) more calories than your body uses for energy. Other factors that contribute to this condition may include:  · Being born with genes that make you more likely to become obese.  · Having a medical condition that causes obesity. These conditions include:  ? Hypothyroidism.  ? Polycystic ovarian syndrome (PCOS).  ? Binge-eating disorder.  ? Cushing syndrome.  · Taking certain medicines, such as steroids, antidepressants, and seizure medicines.  · Not being physically active (sedentary lifestyle).  · Living where there are limited places to exercise safely or buy healthy foods.  · Not getting enough sleep.  What increases the risk?  The following factors may increase your risk of this condition:  · Having a family history of obesity.  · Being a woman of -American descent.  · Being a man of  descent.  What are the signs or symptoms?  Having excessive body fat is the main symptom of this condition.  How is this diagnosed?  This condition may be diagnosed based on:  · Your symptoms.  · Your medical history.  · A physical exam. Your health care provider may measure:  ? Your BMI. If you are an  adult with a BMI between 25 and less than 30, you are considered overweight. If you are an adult with a BMI of 30 or higher, you are considered obese.  ? The distances around your hips and your waist (circumferences). These may be compared to each other to help diagnose your condition.  ? Your skinfold thickness. Your health care provider may gently pinch a fold of your skin and measure it.  How is this treated?  Treatment for this condition often includes changing your lifestyle. Treatment may include some or all of the following:  · Dietary changes. Work with your health care provider and a dietitian to set a weight-loss goal that is healthy and reasonable for you. Dietary changes may include eating:  ? Smaller portions. A portion size is the amount of a particular food that is healthy for you to eat at one time. This varies from person to person.  ? Low-calorie or low-fat options.  ? More whole grains, fruits, and vegetables.  · Regular physical activity. This may include aerobic activity (cardio) and strength training.  · Medicine to help you lose weight. Your health care provider may prescribe medicine if you are unable to lose 1 pound a week after 6 weeks of eating more healthily and doing more physical activity.  · Surgery. Surgical options may include gastric banding and gastric bypass. Surgery may be done if:  ? Other treatments have not helped to improve your condition.  ? You have a BMI of 40 or higher.  ? You have life-threatening health problems related to obesity.  Follow these instructions at home:    Eating and drinking    · Follow recommendations from your health care provider about what you eat and drink. Your health care provider may advise you to:  ? Limit fast foods, sweets, and processed snack foods.  ? Choose low-fat options, such as low-fat milk instead of whole milk.  ? Eat 5 or more servings of fruits or vegetables every day.  ? Eat at home more often. This gives you more control over what  you eat.  ? Choose healthy foods when you eat out.  ? Learn what a healthy portion size is.  ? Keep low-fat snacks on hand.  ? Avoid sugary drinks, such as soda, fruit juice, iced tea sweetened with sugar, and flavored milk.  ? Eat a healthy breakfast.  · Drink enough water to keep your urine clear or pale yellow.  · Do not go without eating for long periods of time (do not fast) or follow a fad diet. Fasting and fad diets can be unhealthy and even dangerous.  Physical Activity  · Exercise regularly, as told by your health care provider. Ask your health care provider what types of exercise are safe for you and how often you should exercise.  · Warm up and stretch before being active.  · Cool down and stretch after being active.  · Rest between periods of activity.  Lifestyle  · Limit the time that you spend in front of your TV, computer, or video game system.  · Find ways to reward yourself that do not involve food.  · Limit alcohol intake to no more than 1 drink a day for nonpregnant women and 2 drinks a day for men. One drink equals 12 oz of beer, 5 oz of wine, or 1½ oz of hard liquor.  General instructions  · Keep a weight loss journal to keep track of the food you eat and how much you exercise you get.  · Take over-the-counter and prescription medicines only as told by your health care provider.  · Take vitamins and supplements only as told by your health care provider.  · Consider joining a support group. Your health care provider may be able to recommend a support group.  · Keep all follow-up visits as told by your health care provider. This is important.  Contact a health care provider if:  · You are unable to meet your weight loss goal after 6 weeks of dietary and lifestyle changes.  This information is not intended to replace advice given to you by your health care provider. Make sure you discuss any questions you have with your health care provider.  Document Released: 01/25/2006 Document Revised:  05/22/2017 Document Reviewed: 10/05/2016  [employer] Patient Education © 2019 [employer]      MyPlate from [employer]    MyPlate is an outline of a general healthy diet based on the 2010 Dietary Guidelines for Americans, from the U[place] Department of Agriculture (USDA). It sets guidelines for how much food you should eat from each food group based on your age, sex, and level of physical activity.  What are tips for following MyPlate?  To follow MyPlate recommendations:  · Eat a wide variety of fruits and vegetables, grains, and protein foods.  · Serve smaller portions and eat less food throughout the day.  · Limit portion sizes to avoid overeating.  · Enjoy your food.  · Get at least 150 minutes of exercise every week. This is about 30 minutes each day, 5 or more days per week.  It can be difficult to have every meal look like MyPlate. Think about MyPlate as eating guidelines for an entire day, rather than each individual meal.  Fruits and vegetables  · Make half of your plate fruits and vegetables.  · Eat many different colors of fruits and vegetables each day.  · For a 2,000 calorie daily food plan, eat:  ? 2½ cups of vegetables every day.  ? 2 cups of fruit every day.  · 1 cup is equal to:  ? 1 cup raw or cooked vegetables.  ? 1 cup raw fruit.  ? 1 medium-sized orange, apple, or banana.  ? 1 cup 100% fruit or vegetable juice.  ? 2 cups raw leafy greens, such as lettuce, spinach, or kale.  ? ½ cup dried fruit.  Grains  · One fourth of your plate should be grains.  · Make at least half of the grains you eat each day whole grains.  · For a 2,000 calorie daily food plan, eat 6 oz of grains every day.  · 1 oz is equal to:  ? 1 slice bread.  ? 1 cup cereal.  ? ½ cup cooked rice, cereal, or pasta.  Protein  · One fourth of your plate should be protein.  · Eat a wide variety of protein foods, including meat, poultry, fish, eggs, beans, nuts, and tofu.  · For a 2,000 calorie daily food plan, eat 5½ oz of protein  every day.  · 1 oz is equal to:  ? 1 oz meat, poultry, or fish.  ? ¼ cup cooked beans.  ? 1 egg.  ? ½ oz nuts or seeds.  ? 1 Tbsp peanut butter.  Dairy  · Drink fat-free or low-fat (1%) milk.  · Eat or drink dairy as a side to meals.  · For a 2,000 calorie daily food plan, eat or drink 3 cups of dairy every day.  · 1 cup is equal to:  ? 1 cup milk, yogurt, cottage cheese, or soy milk (soy beverage).  ? 2 oz processed cheese.  ? 1½ oz natural cheese.  Fats, oils, salt, and sugars  · Only small amounts of oils are recommended.  · Avoid foods that are high in calories and low in nutritional value (empty calories), like foods high in fat or added sugars.  · Choose foods that are low in salt (sodium). Choose foods that have less than 140 milligrams (mg) of sodium per serving.  · Drink water instead of sugary drinks. Drink enough water each day to keep your urine pale yellow.  Where to find support  · Work with your health care provider or a nutrition specialist (dietitian) to develop a customized eating plan that is right for you.  · Download an silvio (mobile application) to help you track your daily food intake.  Where to find more information  · Go to ChooseMyPlate.gov for more information.  · Learn more and log your daily food intake according to MyPlate using USDA's SuperTracker: www.supertracker.usda.gov  Summary  · MyPlate is a general guideline for healthy eating from the USDA. It is based on the 2010 Dietary Guidelines for Americans.  · In general, fruits and vegetables should take up ½ of your plate, grains should take up ¼ of your plate, and protein should take up ¼ of your plate.  This information is not intended to replace advice given to you by your health care provider. Make sure you discuss any questions you have with your health care provider.  Document Released: 01/06/2009 Document Revised: 03/19/2018 Document Reviewed: 03/19/2018  [employer] Interactive Patient Education © 2019 [employer]      Calorie  Counting for Weight Loss  Calories are units of energy. Your body needs a certain amount of calories from food to keep you going throughout the day. When you eat more calories than your body needs, your body stores the extra calories as fat. When you eat fewer calories than your body needs, your body burns fat to get the energy it needs.  Calorie counting means keeping track of how many calories you eat and drink each day. Calorie counting can be helpful if you need to lose weight. If you make sure to eat fewer calories than your body needs, you should lose weight. Ask your health care provider what a healthy weight is for you.  For calorie counting to work, you will need to eat the right number of calories in a day in order to lose a healthy amount of weight per week. A dietitian can help you determine how many calories you need in a day and will give you suggestions on how to reach your calorie goal.  · A healthy amount of weight to lose per week is usually 1-2 lb (0.5-0.9 kg). This usually means that your daily calorie intake should be reduced by 500-750 calories.  · Eating 1,200 - 1,500 calories per day can help most women lose weight.  · Eating 1,500 - 1,800 calories per day can help most men lose weight.  What is my plan?  My goal is to have __________ calories per day.  If I have this many calories per day, I should lose around __________ pounds per week.  What do I need to know about calorie counting?  In order to meet your daily calorie goal, you will need to:  · Find out how many calories are in each food you would like to eat. Try to do this before you eat.  · Decide how much of the food you plan to eat.  · Write down what you ate and how many calories it had. Doing this is called keeping a food log.  To successfully lose weight, it is important to balance calorie counting with a healthy lifestyle that includes regular activity. Aim for 150 minutes of moderate exercise (such as walking) or 75 minutes of  vigorous exercise (such as running) each week.  Where do I find calorie information?    The number of calories in a food can be found on a Nutrition Facts label. If a food does not have a Nutrition Facts label, try to look up the calories online or ask your dietitian for help.  Remember that calories are listed per serving. If you choose to have more than one serving of a food, you will have to multiply the calories per serving by the amount of servings you plan to eat. For example, the label on a package of bread might say that a serving size is 1 slice and that there are 90 calories in a serving. If you eat 1 slice, you will have eaten 90 calories. If you eat 2 slices, you will have eaten 180 calories.  How do I keep a food log?  Immediately after each meal, record the following information in your food log:  · What you ate. Don't forget to include toppings, sauces, and other extras on the food.  · How much you ate. This can be measured in cups, ounces, or number of items.  · How many calories each food and drink had.  · The total number of calories in the meal.  Keep your food log near you, such as in a small notebook in your pocket, or use a mobile silvio or website. Some programs will calculate calories for you and show you how many calories you have left for the day to meet your goal.  What are some calorie counting tips?    · Use your calories on foods and drinks that will fill you up and not leave you hungry:  ? Some examples of foods that fill you up are nuts and nut butters, vegetables, lean proteins, and high-fiber foods like whole grains. High-fiber foods are foods with more than 5 g fiber per serving.  ? Drinks such as sodas, specialty coffee drinks, alcohol, and juices have a lot of calories, yet do not fill you up.  · Eat nutritious foods and avoid empty calories. Empty calories are calories you get from foods or beverages that do not have many vitamins or protein, such as candy, sweets, and soda. It is  "better to have a nutritious high-calorie food (such as an avocado) than a food with few nutrients (such as a bag of chips).  · Know how many calories are in the foods you eat most often. This will help you calculate calorie counts faster.  · Pay attention to calories in drinks. Low-calorie drinks include water and unsweetened drinks.  · Pay attention to nutrition labels for \"low fat\" or \"fat free\" foods. These foods sometimes have the same amount of calories or more calories than the full fat versions. They also often have added sugar, starch, or salt, to make up for flavor that was removed with the fat.  · Find a way of tracking calories that works for you. Get creative. Try different apps or programs if writing down calories does not work for you.  What are some portion control tips?  · Know how many calories are in a serving. This will help you know how many servings of a certain food you can have.  · Use a measuring cup to measure serving sizes. You could also try weighing out portions on a kitchen scale. With time, you will be able to estimate serving sizes for some foods.  · Take some time to put servings of different foods on your favorite plates, bowls, and cups so you know what a serving looks like.  · Try not to eat straight from a bag or box. Doing this can lead to overeating. Put the amount you would like to eat in a cup or on a plate to make sure you are eating the right portion.  · Use smaller plates, glasses, and bowls to prevent overeating.  · Try not to multitask (for example, watch TV or use your computer) while eating. If it is time to eat, sit down at a table and enjoy your food. This will help you to know when you are full. It will also help you to be aware of what you are eating and how much you are eating.  What are tips for following this plan?  Reading food labels  · Check the calorie count compared to the serving size. The serving size may be smaller than what you are used to " eating.  · Check the source of the calories. Make sure the food you are eating is high in vitamins and protein and low in saturated and trans fats.  Shopping  · Read nutrition labels while you shop. This will help you make healthy decisions before you decide to purchase your food.  · Make a grocery list and stick to it.  Cooking  · Try to cook your favorite foods in a healthier way. For example, try baking instead of frying.  · Use low-fat dairy products.  Meal planning  · Use more fruits and vegetables. Half of your plate should be fruits and vegetables.  · Include lean proteins like poultry and fish.  How do I count calories when eating out?  · Ask for smaller portion sizes.  · Consider sharing an entree and sides instead of getting your own entree.  · If you get your own entree, eat only half. Ask for a box at the beginning of your meal and put the rest of your entree in it so you are not tempted to eat it.  · If calories are listed on the menu, choose the lower calorie options.  · Choose dishes that include vegetables, fruits, whole grains, low-fat dairy products, and lean protein.  · Choose items that are boiled, broiled, grilled, or steamed. Stay away from items that are buttered, battered, fried, or served with cream sauce. Items labeled “crispy” are usually fried, unless stated otherwise.  · Choose water, low-fat milk, unsweetened iced tea, or other drinks without added sugar. If you want an alcoholic beverage, choose a lower calorie option such as a glass of wine or light beer.  · Ask for dressings, sauces, and syrups on the side. These are usually high in calories, so you should limit the amount you eat.  · If you want a salad, choose a garden salad and ask for grilled meats. Avoid extra toppings like babb, cheese, or fried items. Ask for the dressing on the side, or ask for olive oil and vinegar or lemon to use as dressing.  · Estimate how many servings of a food you are given. For example, a serving of  cooked rice is ½ cup or about the size of half a baseball. Knowing serving sizes will help you be aware of how much food you are eating at restaurants. The list below tells you how big or small some common portion sizes are based on everyday objects:  ? 1 oz--4 stacked dice.  ? 3 oz--1 deck of cards.  ? 1 tsp--1 die.  ? 1 Tbsp--½ a ping-pong ball.  ? 2 Tbsp--1 ping-pong ball.  ? ½ cup--½ baseball.  ? 1 cup--1 baseball.  Summary  · Calorie counting means keeping track of how many calories you eat and drink each day. If you eat fewer calories than your body needs, you should lose weight.  · A healthy amount of weight to lose per week is usually 1-2 lb (0.5-0.9 kg). This usually means reducing your daily calorie intake by 500-750 calories.  · The number of calories in a food can be found on a Nutrition Facts label. If a food does not have a Nutrition Facts label, try to look up the calories online or ask your dietitian for help.  · Use your calories on foods and drinks that will fill you up, and not on foods and drinks that will leave you hungry.  · Use smaller plates, glasses, and bowls to prevent overeating.  This information is not intended to replace advice given to you by your health care provider. Make sure you discuss any questions you have with your health care provider.  Document Released: 12/18/2006 Document Revised: 11/17/2017 Document Reviewed: 11/17/2017  [employer] Patient Education © 2019 [employer]

## 2019-11-06 ENCOUNTER — TELEPHONE (OUTPATIENT)
Dept: FAMILY MEDICINE CLINIC | Facility: CLINIC | Age: 64
End: 2019-11-06

## 2019-11-14 DIAGNOSIS — I10 ESSENTIAL HYPERTENSION: ICD-10-CM

## 2019-11-14 RX ORDER — LOSARTAN POTASSIUM AND HYDROCHLOROTHIAZIDE 12.5; 5 MG/1; MG/1
1 TABLET ORAL DAILY
Qty: 90 TABLET | Refills: 0 | Status: SHIPPED | OUTPATIENT
Start: 2019-11-14 | End: 2020-02-21 | Stop reason: SDUPTHER

## 2019-11-29 DIAGNOSIS — I10 ESSENTIAL HYPERTENSION: ICD-10-CM

## 2019-12-02 RX ORDER — LOSARTAN POTASSIUM AND HYDROCHLOROTHIAZIDE 12.5; 5 MG/1; MG/1
TABLET ORAL
Qty: 55 TABLET | OUTPATIENT
Start: 2019-12-02

## 2020-01-13 NOTE — PROGRESS NOTES
[facility] - PODIATRY    Today's Date: 01/14/20    Patient Name: Sandeep Alonso  MRN: [id number]  CSN: [id number]  PCP: Lisbet Velez, DNP, APRN  Referring Provider: No ref. provider found    SUBJECTIVE     Chief Complaint   Patient presents with   • Follow-up     3 Month Follow Up for Diabetic Foot and Nail Care. Patient complains of painful toenails,  Patient denies any pain    • Diabetes     last stated blood sugar reading 89mg/dl -PCP Lisbet Velez APRN last visit 10/18/19     HPI: Sandeep Alonso, a 64 y.o.male, comes to clinic as a(n) established patient presenting for diabetic foot exam and complaining of painful toenails. Pt has h/o DM2, HTN, Hypokalemia, Edema. Relates that his nails are discolored, thick and long. He is unable to cut them himself. Admits occasional numbness and tingling in feet. Denies open wounds or sores. Patient is NIDDM with last stated BG level of 89mg/dl.  Denies pain today but admits aching in toes when toenails are thick and long. Improved itching to feet.  Relates previous treatment(s) including nail debridement by podiatrist. Denies any constitutional symptoms. No other pedal complaints at this time.    Past Medical History:   Diagnosis Date   • Hypokalemia    • Peripheral edema      Past Surgical History:   Procedure Laterality Date   • NO PAST SURGERIES       Family History   Problem Relation Age of Onset   • Heart attack Mother    • Emphysema Father    • No Known Problems Sister    • No Known Problems Brother    • No Known Problems Son    • No Known Problems Maternal Grandmother    • No Known Problems Maternal Grandfather    • No Known Problems Paternal Grandmother    • No Known Problems Paternal Grandfather    • No Known Problems Brother    • No Known Problems Brother    • No Known Problems Son    • No Known Problems Son    • No Known Problems Son      Social History     Socioeconomic History   • Marital status:      Spouse name: Not on file   •  Number of children: Not on file   • Years of education: Not on file   • Highest education level: Not on file   Tobacco Use   • Smoking status: Never Smoker   • Smokeless tobacco: Never Used   Substance and Sexual Activity   • Alcohol use: Yes     Comment: occasional beer   • Drug use: No   • Sexual activity: Defer     Allergies   Allergen Reactions   • Ace Inhibitors Cough     Current Outpatient Medications   Medication Sig Dispense Refill   • amLODIPine (NORVASC) 5 MG tablet Take 1 tablet by mouth Daily. 90 tablet 1   • furosemide (LASIX) 40 MG tablet Take 1 tablet by mouth 2 (Two) Times a Day. 180 tablet 1   • gabapentin (NEURONTIN) 100 MG capsule Take 1 capsule by mouth 2 (Two) Times a Day. 180 capsule 1   • losartan-hydrochlorothiazide (HYZAAR) 50-12.5 MG per tablet Take 1 tablet by mouth Daily. 90 tablet 0   • metFORMIN (GLUCOPHAGE) 500 MG tablet 1/2 tab daily (actual dose is 250) 90 tablet 1   • Omega 3 1000 MG capsule Take 1 capsule by mouth Every Night. 90 each 1   • potassium chloride (K-DUR,KLOR-CON) 20 MEQ CR tablet Take 1 tablet by mouth Daily. 90 tablet 1   • pravastatin (PRAVACHOL) 10 MG tablet Take 1 tablet by mouth Every Night. 90 tablet 1   • promethazine-dextromethorphan (PROMETHAZINE-DM) 6.25-15 MG/5ML syrup Take 5 mL by mouth 4 (Four) Times a Day As Needed for Cough. 120 mL 0     No current facility-administered medications for this visit.      Review of Systems   Constitutional: Negative for chills and fever.   HENT: Negative for congestion.    Respiratory: Negative for shortness of breath.    Cardiovascular: Negative for chest pain and leg swelling.   Gastrointestinal: Negative for constipation, diarrhea, nausea and vomiting.   Skin:        Elongated toenails   Neurological: Positive for numbness.       OBJECTIVE     Vitals:    01/14/20 0923   BP: 124/80   Pulse: 69   SpO2: 98%       PHYSICAL EXAM  GEN:   A&Ox3, NAD. Pt presents to clinic ambulating without assistance and wearing Casual Shoes.       NEURO:   Protective sensation intact to 7/10 sites Right foot, 7/10 site Left foot using [place] monofilament  Light touch sensation diminished  No Tinel's or Villeux sign.    VASC:  Skin temperature Warm to Warm proximal to distal alina  DP pulses 2/4 Right, 2/4 Left  PT pulses 1/4 Right, 1/4 Left  CFT 3 sec alina   Trace edema to BLE  Varicosities absent alina    MUSC/SKEL:  Muscle Strength Right foot Dorsiflexors 5/5, Plantarflexors 5/5, Evertors 5/5, Invertors 5/5  Muscle Strength Left foot Dorsiflexors 5/5, Plantarflexors 5/5, Evertors 5/5, Invertors 5/5  ROM of the 1st MTP is full without pain or crepitus  ROM of the MTJ is full without pain or crepitus    ROM of the STJ is full without pain or crepitus    ROM of the ankle joint is full without pain or crepitus    POP of nail plates  Rectus foot type   Gait pattern: Normal  No gross pedal musculoskeletal deformities noted.     DERM:  Pedal nails x10 are thickened, elongated and discolored with presence of subungual debris  Webspaces are Clean, Dry, and Intact; mild flakiness to skin - improved from previous  Skin is mildly atrophic and shiny with no open wounds or sores appreciated.      RADIOLOGY/NUCLEAR:  No results found.    LABORATORY/CULTURE RESULTS:      PATHOLOGY RESULTS:       ASSESSMENT/PLAN     Sandeep was seen today for follow-up and diabetes.    Diagnoses and all orders for this visit:    Onychomycosis    Diabetes mellitus type 2 with neurological manifestations (CMS/HCC)    Foot pain, bilateral    Tinea pedis of both feet      Comprehensive lower extremity examination and evaluation was performed.  Discussed findings and treatment plan including risks, benefits, and treatment options with patient in detail. Patient agreed with treatment plan.  After verbal consent obtained, nail(s) x10 debrided of length and thickness with nail nipper without incidence  Patient may maintain nails and calluses at home utilizing emery board or pumice stone  between visits as needed  Reviewed at home diabetic foot care including daily foot checks   Continue antifungal PRN  An After Visit Summary was printed and given to the patient at discharge, including (if requested) any available informative/educational handouts regarding diagnosis, treatment, or medications. All questions were answered to patient/family satisfaction. Should symptoms fail to improve or worsen they agree to call or return to clinic or to go to the Emergency Department. Discussed the importance of following up with any needed screening tests/labs/specialist appointments and any requested follow-up recommended by me today. Importance of maintaining follow-up discussed and patient accepts that missed appointments can delay diagnosis and potentially lead to worsening of conditions.  Return in about 3 months (around 4/14/2020)., or sooner if acute issues arise.        This document has been electronically signed by Ghassan Sheets DPM on January 14, 2020 9:50 AM     Please note that portions of this note may have been completed with a voice recognition program. Efforts were made to edit the dictations, but occasionally words are mistranscribed.

## 2020-01-14 ENCOUNTER — OFFICE VISIT (OUTPATIENT)
Dept: PODIATRY | Facility: CLINIC | Age: 65
End: 2020-01-14

## 2020-01-14 VITALS
HEART RATE: 69 BPM | SYSTOLIC BLOOD PRESSURE: 124 MMHG | OXYGEN SATURATION: 98 % | WEIGHT: 261 LBS | DIASTOLIC BLOOD PRESSURE: 80 MMHG | HEIGHT: 67 IN | BODY MASS INDEX: 40.97 KG/M2

## 2020-01-14 DIAGNOSIS — M79.672 FOOT PAIN, BILATERAL: ICD-10-CM

## 2020-01-14 DIAGNOSIS — B35.3 TINEA PEDIS OF BOTH FEET: ICD-10-CM

## 2020-01-14 DIAGNOSIS — M79.671 FOOT PAIN, BILATERAL: ICD-10-CM

## 2020-01-14 DIAGNOSIS — B35.1 ONYCHOMYCOSIS: Primary | ICD-10-CM

## 2020-01-14 DIAGNOSIS — E11.49 DIABETES MELLITUS TYPE 2 WITH NEUROLOGICAL MANIFESTATIONS (HCC): ICD-10-CM

## 2020-01-14 PROCEDURE — 11721 DEBRIDE NAIL 6 OR MORE: CPT | Performed by: PODIATRIST

## 2020-01-14 PROCEDURE — 99213 OFFICE O/P EST LOW 20 MIN: CPT | Performed by: PODIATRIST

## 2020-02-21 ENCOUNTER — OFFICE VISIT (OUTPATIENT)
Dept: FAMILY MEDICINE CLINIC | Facility: CLINIC | Age: 65
End: 2020-02-21

## 2020-02-21 VITALS
HEART RATE: 65 BPM | DIASTOLIC BLOOD PRESSURE: 85 MMHG | OXYGEN SATURATION: 94 % | RESPIRATION RATE: 18 BRPM | BODY MASS INDEX: 40.3 KG/M2 | WEIGHT: 256.8 LBS | TEMPERATURE: 98.3 F | HEIGHT: 67 IN | SYSTOLIC BLOOD PRESSURE: 132 MMHG

## 2020-02-21 DIAGNOSIS — E78.49 OTHER HYPERLIPIDEMIA: ICD-10-CM

## 2020-02-21 DIAGNOSIS — E11.49 DIABETES MELLITUS TYPE 2 WITH NEUROLOGICAL MANIFESTATIONS (HCC): ICD-10-CM

## 2020-02-21 DIAGNOSIS — E66.01 CLASS 3 SEVERE OBESITY DUE TO EXCESS CALORIES WITH SERIOUS COMORBIDITY AND BODY MASS INDEX (BMI) OF 40.0 TO 44.9 IN ADULT (HCC): ICD-10-CM

## 2020-02-21 DIAGNOSIS — I10 ESSENTIAL HYPERTENSION: Primary | ICD-10-CM

## 2020-02-21 DIAGNOSIS — E11.40 TYPE 2 DIABETES MELLITUS WITH DIABETIC NEUROPATHY, WITHOUT LONG-TERM CURRENT USE OF INSULIN (HCC): ICD-10-CM

## 2020-02-21 DIAGNOSIS — Z51.81 THERAPEUTIC DRUG MONITORING: ICD-10-CM

## 2020-02-21 DIAGNOSIS — R60.0 LOCALIZED EDEMA: ICD-10-CM

## 2020-02-21 DIAGNOSIS — E11.9 ENCOUNTER FOR DIABETIC FOOT EXAM (HCC): ICD-10-CM

## 2020-02-21 DIAGNOSIS — E78.2 MIXED HYPERLIPIDEMIA: ICD-10-CM

## 2020-02-21 PROCEDURE — 99214 OFFICE O/P EST MOD 30 MIN: CPT | Performed by: NURSE PRACTITIONER

## 2020-02-21 RX ORDER — LOSARTAN POTASSIUM AND HYDROCHLOROTHIAZIDE 12.5; 5 MG/1; MG/1
1 TABLET ORAL DAILY
Qty: 90 TABLET | Refills: 0 | Status: SHIPPED | OUTPATIENT
Start: 2020-02-21 | End: 2020-05-28

## 2020-02-21 RX ORDER — AMLODIPINE BESYLATE 5 MG/1
5 TABLET ORAL DAILY
Qty: 90 TABLET | Refills: 1 | Status: SHIPPED | OUTPATIENT
Start: 2020-02-21 | End: 2020-08-21 | Stop reason: SDUPTHER

## 2020-02-21 RX ORDER — PRAVASTATIN SODIUM 10 MG
10 TABLET ORAL NIGHTLY
Qty: 90 TABLET | Refills: 1 | Status: SHIPPED | OUTPATIENT
Start: 2020-02-21 | End: 2020-08-21 | Stop reason: SDUPTHER

## 2020-02-21 RX ORDER — FUROSEMIDE 40 MG/1
40 TABLET ORAL 2 TIMES DAILY
Qty: 180 TABLET | Refills: 1 | Status: SHIPPED | OUTPATIENT
Start: 2020-02-21 | End: 2020-08-21 | Stop reason: SDUPTHER

## 2020-02-21 RX ORDER — OMEGA-3 FATTY ACIDS/FISH OIL 300-1000MG
1 CAPSULE ORAL NIGHTLY
Qty: 90 EACH | Refills: 1 | Status: SHIPPED | OUTPATIENT
Start: 2020-02-21 | End: 2021-09-07 | Stop reason: SDUPTHER

## 2020-02-21 RX ORDER — GABAPENTIN 100 MG/1
100 CAPSULE ORAL 2 TIMES DAILY
Qty: 180 CAPSULE | Refills: 1 | Status: SHIPPED | OUTPATIENT
Start: 2020-02-21 | End: 2020-08-21 | Stop reason: SDUPTHER

## 2020-02-21 RX ORDER — POTASSIUM CHLORIDE 20 MEQ/1
20 TABLET, EXTENDED RELEASE ORAL DAILY
Qty: 90 TABLET | Refills: 1 | Status: SHIPPED | OUTPATIENT
Start: 2020-02-21 | End: 2020-08-21 | Stop reason: SDUPTHER

## 2020-02-21 NOTE — PROGRESS NOTES
Chief Complaint   Patient presents with   • Hypertension     Pt is here for followup and medication refills.          Allergies   Allergen Reactions   • Ace Inhibitors Cough       History provided by: self     HPI:  Jenelle Alonso is a 64 y.o. male presents today for follow up for chronic problems and med follow up. He says he has been doing very well, sugars are good, and bp has been normal  At home. Denies chest pain, shortness of breath and headache.   Says he tried to lose weight but has had no success.  He works at [employer] in [place].        Chronic problems: HTN stable with losartan-hctz and amlodipine, hyperlipidemia stable with omega 3 fatty acids and pravastatin, hypokalemia stable with potassium, type 2 diabetes stable with metformin, neuropathy stable with gabapentin, lower extremity edema stable with furosemide.    PCP currently listed as Lisbet Velez, DNP, APRN.      Advance Care Planning   ACP discussion was held with the patient during this visit. pt does not want information right now.     The following portions of the patient's history were reviewed and updated as appropriate: allergies, current medications, past family history, past medical history, past social history, past surgical history and problem list      Current Outpatient Medications:   •  amLODIPine (NORVASC) 5 MG tablet, Take 1 tablet by mouth Daily., Disp: 90 tablet, Rfl: 1  •  furosemide (LASIX) 40 MG tablet, Take 1 tablet by mouth 2 (Two) Times a Day., Disp: 180 tablet, Rfl: 1  •  gabapentin (NEURONTIN) 100 MG capsule, Take 1 capsule by mouth 2 (Two) Times a Day., Disp: 180 capsule, Rfl: 1  •  losartan-hydrochlorothiazide (HYZAAR) 50-12.5 MG per tablet, Take 1 tablet by mouth Daily., Disp: 90 tablet, Rfl: 0  •  metFORMIN (GLUCOPHAGE) 500 MG tablet, 1/2 tab daily (actual dose is 250), Disp: 90 tablet, Rfl: 1  •  Omega 3 1000 MG capsule, Take 1 capsule by mouth Every Night., Disp: 90 each, Rfl: 1  •  potassium  chloride (K-DUR,KLOR-CON) 20 MEQ CR tablet, Take 1 tablet by mouth Daily., Disp: 90 tablet, Rfl: 1  •  pravastatin (PRAVACHOL) 10 MG tablet, Take 1 tablet by mouth Every Night., Disp: 90 tablet, Rfl: 1  •  promethazine-dextromethorphan (PROMETHAZINE-DM) 6.25-15 MG/5ML syrup, Take 5 mL by mouth 4 (Four) Times a Day As Needed for Cough., Disp: 120 mL, Rfl: 0  Past Medical History:   Diagnosis Date   • Hypokalemia    • Peripheral edema      Past Surgical History:   Procedure Laterality Date   • NO PAST SURGERIES       Social History     Socioeconomic History   • Marital status:      Spouse name: Not on file   • Number of children: Not on file   • Years of education: Not on file   • Highest education level: Not on file   Tobacco Use   • Smoking status: Never Smoker   • Smokeless tobacco: Never Used   Substance and Sexual Activity   • Alcohol use: Yes     Comment: occasional beer   • Drug use: No   • Sexual activity: Defer     Family History   Problem Relation Age of Onset   • Heart attack Mother    • Emphysema Father    • No Known Problems Sister    • No Known Problems Brother    • No Known Problems Son    • No Known Problems Maternal Grandmother    • No Known Problems Maternal Grandfather    • No Known Problems Paternal Grandmother    • No Known Problems Paternal Grandfather    • No Known Problems Brother    • No Known Problems Brother    • No Known Problems Son    • No Known Problems Son    • No Known Problems Son        REVIEW OF SYMPTOMS: (Positives bolded)  General:  weight loss, fever, chills, night sweats, fatigue, appetite loss  HEENT:  blurry vision, eye pain, eye discharge, dry eyes, decreased vision  Respiratory: shortness of breath, cough, hemoptysis, wheezing, pleurisy,   Cardiovascular:  chest pain, PND, palpitation, edema, orthopnea, syncope  Gastro: Nausea, vomiting, diarrhea, hematemesis, abdominal pain, constipation  Genito: hematuria, dysuria, glycosuria, hesitancy, frequency,  "incontinence  Musckelo: Arthralgia, myalgia, muscle weakness, joint swelling, NSAID use  Skin: rash, pruritis, sores, nail changes, skin thickening, change in wart/mole, itching   Neuro:  Migraine, numbness, ataxia, tremor, vertigo, weakness, memory loss  \"All other systems reviewed and negative, except as listed above.”    OBJECTIVE:  Constitutional:  No acute distress, Consistent with stated age. Oriented x 3,  Gait normal. Patient is pleasant and cooperative with the interview and exam.    Integumentary: No rashes, ulcers or lesions. No edema.  Normal skin moisture/turgor. Skin is warm to touch, no increased warmth.      Eye: Bilaterally PERRLA, EOMI.   Upper and lower eyelids are normal. Sclera/conjunctiva normal without discharge. Cornea is normal and clear. Lens is normal.  Eyeball appears normal.     ENMT:  Canals  normal without erythema or discharge, no excessive cerumen. Tympanic membranes Grey/pearly, normal light reflex and anatomy. Hearing normal to conversational speech at 2-5 feet. Nares airflow, no discharge. Dentition assessed and discussed appropriate oral care. Tongue normal midline.  no pharyngeal erythema, Uvula midline. No post nasal drip.     CHEST/LUNG: Lungs clear throughout lung fields without rale, rhonchi or wheezes. no distress, no use of accessory muscles.       CARDIOVASCULAR:  Regular rate and rhythm. No murmur noted in sitting, supine positions.      ABDOMEN:  Bowel sounds normal, no abdominal bruits. Abd soft, non-tender, no rebound tenderness, no rigidity (guarding), no jar tenderness, no masses.  no hepatomegaly, no splenomegaly     Neuropsych: Normal speech, Thought- normal. No hallucinations, delusions, obsessions.   Appropriate judgement and memory.    Musculoskeletal:  digital clubbing or cyanosis, neurovascularly intact all four extremities.  Upper extremity- Symmetrical posture.  No visible deformity.  Normal sensation along medial and lateral upper extremity proximally and " "distally.  NO tenderness overlying shoulder, lateral/medial epicondyle.  5/5 and strength 5/5 bilateral UE.  Elbow palpated, no tenderness overlying olecranon.  Normal supination, pronation to active/passive ROM and to resisted rotation. Bicep insertion/tricep insertion appear normal without obvious pathology. Rotator cuff evaluated and intact.  Normal wrist ROM bilaterally. Normal hand movement, intrinsic muscles of hands normal. No tenderness to palpation of hands/wrists/elbows.  Lower extremity  Knee ROM normal at 0-120 degrees. No tenderness overlying trochanters, no tenderness about patella, quad tendon, patellar tendon. No tenderness at tibial tuberosity. Ankle normal ROM not tender to palpation along medial/lateral malleolus. Normal movement of toes, no tenderness bilateral feet/toes. Normal foot type. Calves symmetrical. Stretching demonstrated today  Cervical: Normal ROM with turning head right to left and touching chin to chest. Has no tenderness on palpation of the cervical spine  Lumbar Spine:  Normal ROM with bending over, twisting right and left.  No tenderness on palpation of the lumbar spine or sciatic notch.  Straight leg raises normal bilaterally. Can heel/toe walk.  Inversion/eversion normal     /85 (BP Location: Left arm, Patient Position: Sitting, Cuff Size: Large Adult)   Pulse 65   Temp 98.3 °F (36.8 °C) (Oral)   Resp 18   Ht 170.2 cm (67.01\")   Wt 116 kg (256 lb 12.8 oz)   SpO2 94%   BMI 40.21 kg/m²     ASSESSMENT  Sandeep was seen today for hypertension.    Diagnoses and all orders for this visit:    Essential hypertension  -     amLODIPine (NORVASC) 5 MG tablet; Take 1 tablet by mouth Daily.  -     losartan-hydrochlorothiazide (HYZAAR) 50-12.5 MG per tablet; Take 1 tablet by mouth Daily.  -     CBC & Differential  -     Comprehensive metabolic panel    Type 2 diabetes mellitus with diabetic neuropathy, without long-term current use of insulin (CMS/[facility])  -     gabapentin " (NEURONTIN) 100 MG capsule; Take 1 capsule by mouth 2 (Two) Times a Day.  -     metFORMIN (GLUCOPHAGE) 500 MG tablet; 1/2 tab daily (actual dose is 250)    Localized edema  -     furosemide (LASIX) 40 MG tablet; Take 1 tablet by mouth 2 (Two) Times a Day.    Diabetes mellitus type 2 with neurological manifestations (CMS/[facility])  -     gabapentin (NEURONTIN) 100 MG capsule; Take 1 capsule by mouth 2 (Two) Times a Day.  -     Hemoglobin A1c    Mixed hyperlipidemia  -     Omega 3 1000 MG capsule; Take 1 capsule by mouth Every Night.  -     potassium chloride (K-DUR,KLOR-CON) 20 MEQ CR tablet; Take 1 tablet by mouth Daily.  -     Lipid panel    Other hyperlipidemia  -     pravastatin (PRAVACHOL) 10 MG tablet; Take 1 tablet by mouth Every Night.    Therapeutic drug monitoring  -     ToxASSURE Select 13 Discrete -  -     Gabapentin, Urine -    Diabetic foot exam:   RIGHT: No callus formation, no pre-ulcer areas, no hammer toe, no claw toe, no deformity. Toe nails are thick and discolored, toe nails cut straight across, normal sensation, normal pulses. No amputations, no cracks, does have dry skin.  Normal monofilament testing.      LEFT: Has callus formation on the left great toe, no pre-ulcer areas, no hammer toe, no claw toe, no deformity. Toe nails are thick and discolored, toe nails cut straight across, normal sensation, normal pulses. No amputations, no cracks, no fissures, no bunions, normal monofilament testing       Class 3 severe obesity due to excess calories with serious comorbidity and body mass index (BMI) of 40.0 to 44.9 in adult (CMS/[facility])  Obesity Plan:  BMI 40.2 Weight 256 Plan:  Lose 3 pounds before next visit  • The patient BMI is outside this range and we recommended/discussed today to utilize a diet/exercise program to get back into the appropriate range.  • Federal guidelines recommend that people under the age of 65 should have a BMI of 18.5-24.9  • The initial step is to document everything that is  consumed into a food diary. Studies have shown that patients can lose up to 2x the weight by keeping track of foods.   • Choose one bad food weekly and eliminate it from your diet.  Replace with one healthy food  • Goal over next 2-4 weeks is walk 30 minutes per day 5 days per week at pace difficult to hold conversation.   • Drink more water, less soda.   • Cut back on portion sizes.   • Today we encouraged roughly a 1 lb per week weight loss with initial goal of 5% weight loss.  • Discussed if eating out for a meal, consider cutting food in half and placing into a to go container.  Individually portion any foods coming into the home based on package.  • Use smaller plates  • Drinking 12 oz of water 30 minutes before meal as way to suppress appetite.  • Medications discussed today include metformin, topamax, phentermine, Qsymia, Belviq (lorcaserin), Contrave (Buproprion/naltrexone).    • Lifestyle therapy   • Simple advice to lose weight   • Internet programs or self help books.    • Advice from dietitian  • Set Goals that are realistic   • Encouraged to use visual aides to help in measuring foods  • Baseball-1 cup good for green salad, frozen yogurt, medium piece of fruit, baked potato  • Handful - ½ cup good cut fruit, cooked vegetables, pasta, rice  • Egg- ¼ cup good for dried fruit  • Deck of cards-3 ounces good for meat and poultry  • Check book-3 ounces good for grilled fish  • 6 dice side by side- 1 ½ ounces good for natural cheese  • Simple tips   • Plate method-reduce plate size to 9 inch dinner plate.  Half of plate should be filled with non-starchy vegetables (broccoli, lettuce, cauliflower, tomatoes), ¼ plate with lean source of protein (lean chicken, turkey, fish), remaining ½ with whole grains (brown rice, potato, whole grain breads  • Avoid liquid calories (regular soda, juice, coffee with cream)  • Focus  on water, seltzer water and other non-calorie drinks  • Replace regular sugar with non-caloric  sweeteners  • Avoid skipping meals: plan small regular meals throughout the day in order to keep your hunger controlled  • Consider using meal replacements if unable to plan a healthy meal (protein shake, high protein bar)  • Replace all white bread with whole wheat/whole grain alternative  • Swap regular salad dressings (mayonnaise, butter, or low fat or fat free alternative)  • Avoid high fat, high calorie, high carbohydrate snakes (cookies, pastries, cakes)  • Snack on fruits, low fat dairy (yogurt, cottage cheese)    Behavioral Modification  • Self-Monitoring of dietary Intake-keep a dietary intake log. Obese individuals have been shown to underestimate their food intake  • Behavioral treatment -gives exacts about amount and total calories  • Read food labels    R/B/A of medications/treatment options d/w  the pt/family today. The patient/family are aware and accept that medications can have side effects.  If there any obvious side effects they should call or return to clinic as soon as possible.  Appropriate f/u discussed for topics addressed today. All questions were answered to the satisfactory state of patient/family.  Should symptoms fail to improve or worsen they agree to call or return to clinic or to go to the ER. Education handouts were offered on any new Rx if requested.  Discussed the importance of f/u with any needed screening tests/labs/specialist appointments and any requested follow-up recommended by me today.  Importance of maintaining f/u discussed and patient accepts that missed appointments can delay diagnosis and potentially lead to worsening of conditions.    Using medications as prescribed.  Discussed need to take regularly as prescribed, to avoid missing doses as able.  Medications reviewed with patient today. We discussed cessation/continuation of medications for current problem.  Needed Rx refills will be provided.  No side effects from medications.       Return in about 6 months (around  8/21/2020) for Recheck chronic.    Electronically signed by Lisbet Velez DNP, APRBELÉN, 02/21/20, 10:02 AM.    Take medications as prescribed; return to the clinic of new or worsening concerns.       Risks/benefits of current and new medications discussed with the patient and or family today.  The patient/family are aware and accept that if there any side effects they should call or return to clinic as soon as possible.  Appropriate F/U discussed for topics addressed today. All questions were answered to the satisfactory state of patient/family.  Should symptoms fail to improve or worsen they agree to call or return to clinic or to go to the ER. Education handouts were offered on any new Rx if requested.  Discussed the importance of following up with any needed screening tests/labs/specialist appointments and any requested follow-up recommended by me today.  Importance of maintaining follow-up discussed and patient accepts that missed appointments can delay diagnosis and potentially lead to worsening of conditions.    Return in about 6 months (around 8/21/2020) for Recheck chronic.    Electronically signed by Lisbet Velez DNP, VENKAT, 02/25/20, 7:09 AM.

## 2020-02-27 LAB
6MAM UR QL CFM: NEGATIVE
6MAM/CREAT UR: NOT DETECTED NG/MG CREAT
7AMINOCLONAZEPAM/CREAT UR: NOT DETECTED NG/MG CREAT
A-OH ALPRAZ/CREAT UR: NOT DETECTED NG/MG CREAT
A-OH-TRIAZOLAM/CREAT UR CFM: NOT DETECTED NG/MG CREAT
ALBUMIN SERPL-MCNC: 4.1 G/DL (ref 3.5–5.2)
ALBUMIN/GLOB SERPL: 1.2 G/DL
ALFENTANIL/CREAT UR CFM: NOT DETECTED NG/MG CREAT
ALP SERPL-CCNC: 75 U/L (ref 39–117)
ALPHA-HYDROXYMIDAZOLAM, URINE: NOT DETECTED NG/MG CREAT
ALPRAZ/CREAT UR CFM: NOT DETECTED NG/MG CREAT
ALT SERPL-CCNC: 33 U/L (ref 1–41)
AMOBARBITAL UR QL CFM: NOT DETECTED
AMPHET/CREAT UR: NOT DETECTED NG/MG CREAT
AMPHETAMINES UR QL CFM: NEGATIVE
AST SERPL-CCNC: 28 U/L (ref 1–40)
BARBITAL UR QL CFM: NOT DETECTED
BARBITURATES UR QL CFM: NEGATIVE
BASOPHILS # BLD AUTO: 0.03 10*3/MM3 (ref 0–0.2)
BASOPHILS NFR BLD AUTO: 0.3 % (ref 0–1.5)
BENZODIAZ UR QL CFM: NEGATIVE
BILIRUB SERPL-MCNC: 0.6 MG/DL (ref 0.2–1.2)
BUN SERPL-MCNC: 10 MG/DL (ref 8–23)
BUN/CREAT SERPL: 11.9 (ref 7–25)
BUPRENORPHINE UR QL CFM: NEGATIVE
BUPRENORPHINE/CREAT UR: NOT DETECTED NG/MG CREAT
BUTABARBITAL UR QL CFM: NOT DETECTED
BUTALBITAL UR QL CFM: NOT DETECTED
BZE/CREAT UR: NOT DETECTED NG/MG CREAT
CALCIUM SERPL-MCNC: 8.7 MG/DL (ref 8.6–10.5)
CANNABINOIDS UR QL CFM: NEGATIVE
CARBOXYTHC/CREAT UR: NOT DETECTED NG/MG CREAT
CHLORIDE SERPL-SCNC: 98 MMOL/L (ref 98–107)
CHOLEST SERPL-MCNC: 172 MG/DL (ref 0–200)
CLONAZEPAM/CREAT UR CFM: NOT DETECTED NG/MG CREAT
CO2 SERPL-SCNC: 31 MMOL/L (ref 22–29)
COCAETHYLENE/CREAT UR CFM: NOT DETECTED NG/MG CREAT
COCAINE UR QL CFM: NEGATIVE
COCAINE/CREAT UR CFM: NOT DETECTED NG/MG CREAT
CODEINE/CREAT UR: NOT DETECTED NG/MG CREAT
CREAT SERPL-MCNC: 0.84 MG/DL (ref 0.76–1.27)
CREAT UR-MCNC: 38 MG/DL
DESALKYLFLURAZ/CREAT UR: NOT DETECTED NG/MG CREAT
DESMETHYLFLUNITRAZEPAM: NOT DETECTED NG/MG CREAT
DHC/CREAT UR: NOT DETECTED NG/MG CREAT
DIAZEPAM/CREAT UR: NOT DETECTED NG/MG CREAT
DRUGS UR: NORMAL
EDDP/CREAT UR: NOT DETECTED NG/MG CREAT
EOSINOPHIL # BLD AUTO: 0.17 10*3/MM3 (ref 0–0.4)
EOSINOPHIL NFR BLD AUTO: 2 % (ref 0.3–6.2)
ERYTHROCYTE [DISTWIDTH] IN BLOOD BY AUTOMATED COUNT: 13.6 % (ref 12.3–15.4)
ETHANOL UR CFM-MCNC: NOT DETECTED G/DL
ETHANOL UR QL CFM: NEGATIVE
FENTANYL UR QL CFM: NEGATIVE
FENTANYL/CREAT UR: NOT DETECTED NG/MG CREAT
FLUNITRAZEPAM UR QL CFM: NOT DETECTED NG/MG CREAT
GABAPENTIN UR-MCNC: 60.6 UG/ML
GLOBULIN SER CALC-MCNC: 3.3 GM/DL
GLUCOSE SERPL-MCNC: 95 MG/DL (ref 65–99)
HBA1C MFR BLD: 6.4 % (ref 4.8–5.6)
HCT VFR BLD AUTO: 41.6 % (ref 37.5–51)
HDLC SERPL-MCNC: 36 MG/DL (ref 40–60)
HGB BLD-MCNC: 13.5 G/DL (ref 13–17.7)
HYDROCODONE/CREAT UR: NOT DETECTED NG/MG CREAT
HYDROMORPHONE/CREAT UR: NOT DETECTED NG/MG CREAT
IMM GRANULOCYTES # BLD AUTO: 0.03 10*3/MM3 (ref 0–0.05)
IMM GRANULOCYTES NFR BLD AUTO: 0.3 % (ref 0–0.5)
LDLC SERPL CALC-MCNC: 115 MG/DL (ref 0–100)
LEVEL OF DETECTION:: NORMAL
LORAZEPAM/CREAT UR: NOT DETECTED NG/MG CREAT
LYMPHOCYTES # BLD AUTO: 2.64 10*3/MM3 (ref 0.7–3.1)
LYMPHOCYTES NFR BLD AUTO: 30.3 % (ref 19.6–45.3)
MCH RBC QN AUTO: 28.7 PG (ref 26.6–33)
MCHC RBC AUTO-ENTMCNC: 32.5 G/DL (ref 31.5–35.7)
MCV RBC AUTO: 88.3 FL (ref 79–97)
MDA/CREAT UR: NOT DETECTED NG/MG CREAT
MDMA/CREAT UR: NOT DETECTED NG/MG CREAT
MEPHOBARBITAL UR QL CFM: NOT DETECTED
METHADONE UR QL CFM: NEGATIVE
METHADONE/CREAT UR: NOT DETECTED NG/MG CREAT
METHAMPHET/CREAT UR: NOT DETECTED NG/MG CREAT
MIDAZOLAM/CREAT UR CFM: NOT DETECTED NG/MG CREAT
MONOCYTES # BLD AUTO: 0.45 10*3/MM3 (ref 0.1–0.9)
MONOCYTES NFR BLD AUTO: 5.2 % (ref 5–12)
MORPHINE/CREAT UR: NOT DETECTED NG/MG CREAT
N-NORTRAMADOL/CREAT UR CFM: NOT DETECTED NG/MG CREAT
NARCOTICS UR: NEGATIVE
NEUTROPHILS # BLD AUTO: 5.38 10*3/MM3 (ref 1.7–7)
NEUTROPHILS NFR BLD AUTO: 61.9 % (ref 42.7–76)
NORBUPRENORPHINE/CREAT UR: NOT DETECTED NG/MG CREAT
NORCODEINE/CREAT UR CFM: NOT DETECTED NG/MG CREAT
NORDIAZEPAM/CREAT UR: NOT DETECTED NG/MG CREAT
NORFENTANYL/CREAT UR: NOT DETECTED NG/MG CREAT
NORHYDROCODONE/CREAT UR: NOT DETECTED NG/MG CREAT
NORMORPHINE UR-MCNC: NOT DETECTED NG/MG CREAT
NOROXYCODONE/CREAT UR: NOT DETECTED NG/MG CREAT
NOROXYMORPHONE/CREAT UR CFM: NOT DETECTED NG/MG CREAT
NRBC BLD AUTO-RTO: 0 /100 WBC (ref 0–0.2)
O-NORTRAMADOL UR CFM-MCNC: NOT DETECTED NG/MG CREAT
OPIATES UR QL CFM: NEGATIVE
OXAZEPAM/CREAT UR: NOT DETECTED NG/MG CREAT
OXYCODONE UR QL CFM: NEGATIVE
OXYCODONE/CREAT UR: NOT DETECTED NG/MG CREAT
OXYMORPHONE/CREAT UR: NOT DETECTED NG/MG CREAT
PENTOBARB UR QL CFM: NOT DETECTED
PHENOBARB UR QL CFM: NOT DETECTED
PLATELET # BLD AUTO: 308 10*3/MM3 (ref 140–450)
POTASSIUM SERPL-SCNC: 3.6 MMOL/L (ref 3.5–5.2)
PROT SERPL-MCNC: 7.4 G/DL (ref 6–8.5)
PSA SERPL-MCNC: 0.97 NG/ML (ref 0–4)
RBC # BLD AUTO: 4.71 10*6/MM3 (ref 4.14–5.8)
SECOBARBITAL UR QL CFM: NOT DETECTED
SODIUM SERPL-SCNC: 138 MMOL/L (ref 136–145)
SUFENTANIL/CREAT UR CFM: NOT DETECTED NG/MG CREAT
TAPENTADOL UR QL CFM: NEGATIVE
TAPENTADOL/CREAT UR: NOT DETECTED NG/MG CREAT
TEMAZEPAM/CREAT UR: NOT DETECTED NG/MG CREAT
THIOPENTAL UR QL CFM: NOT DETECTED
TRAMADOL UR QL CFM: NOT DETECTED NG/MG CREAT
TRIGL SERPL-MCNC: 107 MG/DL (ref 0–150)
VLDLC SERPL CALC-MCNC: 21.4 MG/DL
WBC # BLD AUTO: 8.7 10*3/MM3 (ref 3.4–10.8)

## 2020-04-02 ENCOUNTER — TELEPHONE (OUTPATIENT)
Dept: PODIATRY | Facility: CLINIC | Age: 65
End: 2020-04-02

## 2020-04-02 NOTE — TELEPHONE ENCOUNTER
Cancel upcoming appointment with Dr. Sheets- due to covid 19- We will reschedule this at a later time.  Pt voiced understanding

## 2020-05-06 ENCOUNTER — TELEPHONE (OUTPATIENT)
Dept: PODIATRY | Facility: CLINIC | Age: 65
End: 2020-05-06

## 2020-05-06 NOTE — TELEPHONE ENCOUNTER
Spoke with Patient concerning his cancelled  appointment with Dr. Sheets. Pt confirmed it was fine to get this scheduled with Darin ROBLEDO. I will mail out a reminder as well.

## 2020-05-28 DIAGNOSIS — I10 ESSENTIAL HYPERTENSION: ICD-10-CM

## 2020-05-28 RX ORDER — LOSARTAN POTASSIUM AND HYDROCHLOROTHIAZIDE 12.5; 5 MG/1; MG/1
TABLET ORAL
Qty: 90 TABLET | Refills: 0 | Status: SHIPPED | OUTPATIENT
Start: 2020-05-28 | End: 2020-08-21 | Stop reason: SDUPTHER

## 2020-06-26 ENCOUNTER — TELEPHONE (OUTPATIENT)
Dept: VASCULAR SURGERY | Facility: CLINIC | Age: 65
End: 2020-06-26

## 2020-06-26 NOTE — TELEPHONE ENCOUNTER
Pt confirmed appointment with Darin Keane and expressed understanding about not showing up to early to his appointment due to limited seating.

## 2020-06-29 ENCOUNTER — OFFICE VISIT (OUTPATIENT)
Dept: PODIATRY | Facility: CLINIC | Age: 65
End: 2020-06-29

## 2020-06-29 VITALS
WEIGHT: 241 LBS | DIASTOLIC BLOOD PRESSURE: 68 MMHG | SYSTOLIC BLOOD PRESSURE: 146 MMHG | BODY MASS INDEX: 37.83 KG/M2 | OXYGEN SATURATION: 96 % | HEIGHT: 67 IN | HEART RATE: 71 BPM

## 2020-06-29 DIAGNOSIS — M79.671 FOOT PAIN, BILATERAL: ICD-10-CM

## 2020-06-29 DIAGNOSIS — B35.3 TINEA PEDIS OF BOTH FEET: ICD-10-CM

## 2020-06-29 DIAGNOSIS — M79.672 FOOT PAIN, BILATERAL: ICD-10-CM

## 2020-06-29 DIAGNOSIS — E11.42 TYPE 2 DIABETES MELLITUS WITH DIABETIC POLYNEUROPATHY, WITHOUT LONG-TERM CURRENT USE OF INSULIN (HCC): ICD-10-CM

## 2020-06-29 DIAGNOSIS — B35.1 ONYCHOMYCOSIS: Primary | ICD-10-CM

## 2020-06-29 PROCEDURE — 11721 DEBRIDE NAIL 6 OR MORE: CPT | Performed by: NURSE PRACTITIONER

## 2020-06-29 NOTE — PROGRESS NOTES
[facility] - PODIATRY    Today's Date: 06/29/20    Patient Name: Sandeep Alonso  MRN: [id number]  CSN: [id number]  PCP: iLsbet Velez, DNP, APRN  Referring Provider: No ref. provider found    SUBJECTIVE     Chief Complaint   Patient presents with   • Follow-up     Pt is here today for a f/u on Diabetic Foot and Nail Care. - Patient complains of painful toenails - Patient denies any pain at the moment. - PCP 02/12/2020   • Diabetes     Pt last blood sugar reading is 125mg/dl.      HPI: Sandeep Alonso, a 65 y.o.male, comes to clinic as a(n) established patient presenting for diabetic foot exam and complaining of painful toenails. Pt has h/o DM2, HTN, Hypokalemia, Edema. Relates that his nails are discolored, thick and long. He is unable to cut them himself. Admits occasional numbness and tingling in feet. Denies open wounds or sores. Patient is NIDDM with last stated BG level of 125mg/dl.  Denies pain today but admits aching in toes when toenails are thick and long. Notes that he has been continuing to use antifungal cream PRN. Improved itching to feet.  Relates previous treatment(s) including nail debridement by podiatrist. Denies any constitutional symptoms. No other pedal complaints at this time.    Past Medical History:   Diagnosis Date   • Hypokalemia    • Peripheral edema      Past Surgical History:   Procedure Laterality Date   • NO PAST SURGERIES       Family History   Problem Relation Age of Onset   • Heart attack Mother    • Emphysema Father    • No Known Problems Sister    • No Known Problems Brother    • No Known Problems Son    • No Known Problems Maternal Grandmother    • No Known Problems Maternal Grandfather    • No Known Problems Paternal Grandmother    • No Known Problems Paternal Grandfather    • No Known Problems Brother    • No Known Problems Brother    • No Known Problems Son    • No Known Problems Son    • No Known Problems Son      Social History     Socioeconomic History    • Marital status:      Spouse name: Not on file   • Number of children: Not on file   • Years of education: Not on file   • Highest education level: Not on file   Tobacco Use   • Smoking status: Never Smoker   • Smokeless tobacco: Never Used   Substance and Sexual Activity   • Alcohol use: Yes     Comment: occasional beer   • Drug use: No   • Sexual activity: Defer     Allergies   Allergen Reactions   • Ace Inhibitors Cough     Current Outpatient Medications   Medication Sig Dispense Refill   • amLODIPine (NORVASC) 5 MG tablet Take 1 tablet by mouth Daily. 90 tablet 1   • furosemide (LASIX) 40 MG tablet Take 1 tablet by mouth 2 (Two) Times a Day. 180 tablet 1   • gabapentin (NEURONTIN) 100 MG capsule Take 1 capsule by mouth 2 (Two) Times a Day. 180 capsule 1   • losartan-hydrochlorothiazide (HYZAAR) 50-12.5 MG per tablet Take 1 tablet by mouth once daily 90 tablet 0   • metFORMIN (GLUCOPHAGE) 500 MG tablet 1/2 tab daily (actual dose is 250) 90 tablet 1   • Omega 3 1000 MG capsule Take 1 capsule by mouth Every Night. 90 each 1   • potassium chloride (K-DUR,KLOR-CON) 20 MEQ CR tablet Take 1 tablet by mouth Daily. 90 tablet 1   • pravastatin (PRAVACHOL) 10 MG tablet Take 1 tablet by mouth Every Night. 90 tablet 1     No current facility-administered medications for this visit.      Review of Systems   Constitutional: Negative for chills and fever.   HENT: Negative for congestion.    Respiratory: Negative for shortness of breath.    Cardiovascular: Negative for chest pain and leg swelling.   Gastrointestinal: Negative for constipation, diarrhea, nausea and vomiting.   Musculoskeletal: Negative for gait problem and joint swelling.   Skin: Negative for color change.        Elongated toenails   Neurological: Positive for numbness.       OBJECTIVE     Vitals:    06/29/20 1406   BP: 146/68   Pulse: 71   SpO2: 96%       Foot/Ankle Exam:       General:   Appearance: appears stated age and healthy    Orientation: AAOx3     Affect: appropriate    Gait: unimpaired    Assistance: independent    Shoe Gear:  Casual shoes    VASCULAR      Right Foot Vascularity   Dorsalis pedis:  2+  Posterior tibial:  1+  Skin Temperature: warm    Edema Grading:  Trace and non-pitting  CFT:  3  Pedal Hair Growth:  Present  Varicosities: none       Left Foot Vascularity   Dorsalis pedis:  2+  Posterior tibial:  1+  Skin Temperature: warm    Edema Grading:  Non-pitting and trace  CFT:  3  Pedal Hair Growth:  Present  Varicosities: none        NEUROLOGIC     Right Foot Neurologic   Light touch sensation:  Diminished  Vibratory sensation:  Diminished  Hot/Cold sensation: diminished    Protective Sensation using [place] Monofilament:  Diminished     Left Foot Neurologic   Light touch sensation:  Diminished  Vibratory sensation:  Diminished  Hot/cold sensation: diminished    Protective Sensation using [place] Monofilament:  Diminished     MUSCULOSKELETAL      Right Foot Musculoskeletal   Ecchymosis:  None  Tenderness: none    Arch:  Normal     Left Foot Musculoskeletal   Ecchymosis:  None  Tenderness: none    Arch:  Normal     MUSCLE STRENGTH     Right Foot Muscle Strength   Foot dorsiflexion:  5  Foot plantar flexion:  5  Foot inversion:  5  Foot eversion:  5     Left Foot Muscle Strength   Foot dorsiflexion:  5  Foot plantar flexion:  5  Foot inversion:  5  Foot eversion:  5     RANGE OF MOTION      Right Foot Range of Motion   Foot and ankle ROM within normal limits       Left Foot Range of Motion   Foot and ankle ROM within normal limits       DERMATOLOGIC     Right Foot Dermatologic   Skin: skin intact    Skin: no right foot tinea (improved)    Nails: onychomycosis, abnormally thick and subungual debris       Left Foot Dermatologic   Skin: skin intact    Skin: no left foot tinea (improved)    Nails: onychomycosis, abnormally thick and subungual debris        RADIOLOGY/NUCLEAR:  No results found.    LABORATORY/CULTURE  RESULTS:      PATHOLOGY RESULTS:       ASSESSMENT/PLAN     Sandeep was seen today for follow-up and diabetes.    Diagnoses and all orders for this visit:    Onychomycosis    Type 2 diabetes mellitus with diabetic polyneuropathy, without long-term current use of insulin (CMS/[facility])    Foot pain, bilateral    Tinea pedis of both feet      Comprehensive lower extremity examination and evaluation was performed.  Discussed findings and treatment plan including risks, benefits, and treatment options with patient in detail. Patient agreed with treatment plan.  After verbal consent obtained, nail(s) x10 debrided of length and thickness with nail nipper without incidence  Patient may maintain nails and calluses at home utilizing emery board or pumice stone between visits as needed  Reviewed at home diabetic foot care including daily foot checks   Continue antifungal PRN  An After Visit Summary was printed and given to the patient at discharge, including (if requested) any available informative/educational handouts regarding diagnosis, treatment, or medications. All questions were answered to patient/family satisfaction. Should symptoms fail to improve or worsen they agree to call or return to clinic or to go to the Emergency Department. Discussed the importance of following up with any needed screening tests/labs/specialist appointments and any requested follow-up recommended by me today. Importance of maintaining follow-up discussed and patient accepts that missed appointments can delay diagnosis and potentially lead to worsening of conditions.  Return in about 3 months (around 9/29/2020)., or sooner if acute issues arise.        This document has been electronically signed by VENKAT Lyons on June 29, 2020 15:38     Please note that portions of this note may have been completed with a voice recognition program. Efforts were made to edit the dictations, but occasionally words are mistranscribed.

## 2020-08-21 ENCOUNTER — OFFICE VISIT (OUTPATIENT)
Dept: FAMILY MEDICINE CLINIC | Facility: CLINIC | Age: 65
End: 2020-08-21

## 2020-08-21 VITALS
HEIGHT: 67 IN | RESPIRATION RATE: 18 BRPM | OXYGEN SATURATION: 98 % | HEART RATE: 59 BPM | SYSTOLIC BLOOD PRESSURE: 160 MMHG | BODY MASS INDEX: 41.12 KG/M2 | TEMPERATURE: 98.4 F | DIASTOLIC BLOOD PRESSURE: 79 MMHG | WEIGHT: 262 LBS

## 2020-08-21 DIAGNOSIS — I10 ESSENTIAL HYPERTENSION: ICD-10-CM

## 2020-08-21 DIAGNOSIS — E87.6 HYPOKALEMIA: Primary | ICD-10-CM

## 2020-08-21 DIAGNOSIS — E66.01 CLASS 3 SEVERE OBESITY DUE TO EXCESS CALORIES WITH SERIOUS COMORBIDITY AND BODY MASS INDEX (BMI) OF 40.0 TO 44.9 IN ADULT (HCC): ICD-10-CM

## 2020-08-21 DIAGNOSIS — E78.2 MIXED HYPERLIPIDEMIA: ICD-10-CM

## 2020-08-21 DIAGNOSIS — E11.40 TYPE 2 DIABETES MELLITUS WITH DIABETIC NEUROPATHY, WITHOUT LONG-TERM CURRENT USE OF INSULIN (HCC): ICD-10-CM

## 2020-08-21 DIAGNOSIS — R60.0 LOCALIZED EDEMA: ICD-10-CM

## 2020-08-21 PROBLEM — E66.813 CLASS 3 SEVERE OBESITY DUE TO EXCESS CALORIES WITH SERIOUS COMORBIDITY AND BODY MASS INDEX (BMI) OF 40.0 TO 44.9 IN ADULT: Status: ACTIVE | Noted: 2020-08-21

## 2020-08-21 PROBLEM — E11.49 DIABETES MELLITUS TYPE 2 WITH NEUROLOGICAL MANIFESTATIONS (HCC): Status: RESOLVED | Noted: 2017-08-11 | Resolved: 2020-08-21

## 2020-08-21 PROBLEM — E66.812 CLASS 2 SEVERE OBESITY DUE TO EXCESS CALORIES WITH SERIOUS COMORBIDITY AND BODY MASS INDEX (BMI) OF 38.0 TO 38.9 IN ADULT: Status: RESOLVED | Noted: 2019-02-08 | Resolved: 2020-08-21

## 2020-08-21 PROCEDURE — 99214 OFFICE O/P EST MOD 30 MIN: CPT | Performed by: NURSE PRACTITIONER

## 2020-08-21 RX ORDER — FUROSEMIDE 40 MG/1
40 TABLET ORAL 2 TIMES DAILY
Qty: 180 TABLET | Refills: 1 | Status: SHIPPED | OUTPATIENT
Start: 2020-08-21 | End: 2021-03-05 | Stop reason: SDUPTHER

## 2020-08-21 RX ORDER — GABAPENTIN 100 MG/1
100 CAPSULE ORAL 2 TIMES DAILY
Qty: 180 CAPSULE | Refills: 1 | Status: SHIPPED | OUTPATIENT
Start: 2020-08-21 | End: 2021-02-25

## 2020-08-21 RX ORDER — PRAVASTATIN SODIUM 10 MG
10 TABLET ORAL NIGHTLY
Qty: 90 TABLET | Refills: 1 | Status: SHIPPED | OUTPATIENT
Start: 2020-08-21 | End: 2021-02-17 | Stop reason: SDUPTHER

## 2020-08-21 RX ORDER — AMLODIPINE BESYLATE 5 MG/1
5 TABLET ORAL DAILY
Qty: 90 TABLET | Refills: 1 | Status: SHIPPED | OUTPATIENT
Start: 2020-08-21 | End: 2021-02-17 | Stop reason: SDUPTHER

## 2020-08-21 RX ORDER — POTASSIUM CHLORIDE 20 MEQ/1
20 TABLET, EXTENDED RELEASE ORAL DAILY
Qty: 90 TABLET | Refills: 1 | Status: SHIPPED | OUTPATIENT
Start: 2020-08-21 | End: 2021-03-05 | Stop reason: SDUPTHER

## 2020-08-21 RX ORDER — LOSARTAN POTASSIUM AND HYDROCHLOROTHIAZIDE 12.5; 5 MG/1; MG/1
1 TABLET ORAL DAILY
Qty: 90 TABLET | Refills: 1 | Status: SHIPPED | OUTPATIENT
Start: 2020-08-21 | End: 2021-02-25

## 2020-08-21 NOTE — PROGRESS NOTES
Subjective   Sandeep Alonso is a 65 y.o. male presents in office for follow up on chronic issues including hypertension, type 2 dm, hyperlipidemia, edema, and hypokalemia.     History of Present Illness   Hypertension  Chronic. High today. Reports it has been running ok. Currently on novasc and hyzaar.     Type 2 dm  Chronic. Controlled metformin. No new issues. Reports glucoses controlled. No new issues.     Hyperlipidemia  Chronic. Controlled with pravachol. Needs labs.     Edema  Chronic. Controlled with lasix. No new problems.     Hypokalemia  Chronic. Likely secondary to lasix. Controlled with kdur. No new issues.       The following portions of the patient's history were reviewed and updated as appropriate: allergies, current medications, past family history, past medical history, past social history, past surgical history and problem list.    Review of Systems   Constitutional: Negative for activity change, appetite change, chills, diaphoresis, fatigue, fever and unexpected weight change.   HENT: Negative for congestion, rhinorrhea and trouble swallowing.    Respiratory: Negative for cough and shortness of breath.    Cardiovascular: Negative for chest pain, palpitations and leg swelling.   Gastrointestinal: Negative for abdominal pain, constipation, diarrhea, nausea and vomiting.   Genitourinary: Negative for difficulty urinating and dysuria.   Musculoskeletal: Negative for arthralgias, back pain and myalgias.   Skin: Negative for rash.   Neurological: Negative for headaches.   Psychiatric/Behavioral: Negative for dysphoric mood and sleep disturbance. The patient is not nervous/anxious.        Objective     Vitals:    08/21/20 0904   BP: 160/79   Pulse: 59   Resp: 18   Temp: 98.4 °F (36.9 °C)   SpO2: 98%       Physical Exam   Constitutional: He appears well-developed and well-nourished. No distress.   HENT:   Right Ear: External ear normal.   Left Ear: External ear normal.   Nose: Nose normal.   Mouth/Throat:  Oropharynx is clear and moist.   Eyes: Conjunctivae are normal.   Neck: Neck supple. No thyromegaly present.   Cardiovascular: Normal rate, regular rhythm and normal heart sounds.   Pulmonary/Chest: Effort normal and breath sounds normal.   Abdominal: Soft. Bowel sounds are normal. He exhibits no abdominal bruit and no mass. There is no hepatosplenomegaly. There is no tenderness.   Lymphadenopathy:     He has no cervical adenopathy.   Neurological: He is alert.   Skin: Skin is warm and dry.   Psychiatric: He has a normal mood and affect.   Nursing note and vitals reviewed.         Patient's Body mass index is 41.04 kg/m². BMI is above normal parameters. Recommendations include: exercise counseling and nutrition counseling.       Assessment/Plan   Sandeep was seen today for hypertension.    Diagnoses and all orders for this visit:    Hypokalemia  -     amLODIPine (NORVASC) 5 MG tablet; Take 1 tablet by mouth Daily.    Type 2 diabetes mellitus with diabetic neuropathy, without long-term current use of insulin (CMS/[facility])  -     gabapentin (NEURONTIN) 100 MG capsule; Take 1 capsule by mouth 2 (Two) Times a Day.  -     metFORMIN (GLUCOPHAGE) 500 MG tablet; 1/2 tab daily (actual dose is 250)  -     Hemoglobin A1c  -     MicroAlbumin, Urine, Random - Urine, Clean Catch    Essential hypertension  -     amLODIPine (NORVASC) 5 MG tablet; Take 1 tablet by mouth Daily.  -     losartan-hydrochlorothiazide (HYZAAR) 50-12.5 MG per tablet; Take 1 tablet by mouth Daily.  -     Comprehensive metabolic panel  -     CBC & Differential    Localized edema  -     furosemide (LASIX) 40 MG tablet; Take 1 tablet by mouth 2 (Two) Times a Day.    Mixed hyperlipidemia  -     pravastatin (PRAVACHOL) 10 MG tablet; Take 1 tablet by mouth Every Night.  -     Lipid panel    Class 3 severe obesity due to excess calories with serious comorbidity and body mass index (BMI) of 40.0 to 44.9 in adult (CMS/[facility])    Other orders  -     potassium chloride  (K-DUR,KLOR-CON) 20 MEQ CR tablet; Take 1 tablet by mouth Daily.      Plan:  Fasting labs today  Refill chronic medications  Monitor blood pressure and notify clinic if remaining above 140/80.     Return in about 6 months (around 2/21/2021) for Recheck chronic.             Electronically signed by VENKAT Beltrán, 08/21/20, 9:28 AM.

## 2020-08-22 LAB
ALBUMIN SERPL-MCNC: 4.7 G/DL (ref 3.5–5.2)
ALBUMIN/GLOB SERPL: 1.7 G/DL
ALP SERPL-CCNC: 79 U/L (ref 39–117)
ALT SERPL-CCNC: 27 U/L (ref 1–41)
AST SERPL-CCNC: 22 U/L (ref 1–40)
BASOPHILS # BLD AUTO: 0.04 10*3/MM3 (ref 0–0.2)
BASOPHILS NFR BLD AUTO: 0.5 % (ref 0–1.5)
BILIRUB SERPL-MCNC: 0.6 MG/DL (ref 0–1.2)
BUN SERPL-MCNC: 13 MG/DL (ref 8–23)
BUN/CREAT SERPL: 13.5 (ref 7–25)
CALCIUM SERPL-MCNC: 9 MG/DL (ref 8.6–10.5)
CHLORIDE SERPL-SCNC: 96 MMOL/L (ref 98–107)
CHOLEST SERPL-MCNC: 160 MG/DL (ref 0–200)
CO2 SERPL-SCNC: 34.3 MMOL/L (ref 22–29)
CREAT SERPL-MCNC: 0.96 MG/DL (ref 0.76–1.27)
EOSINOPHIL # BLD AUTO: 0.11 10*3/MM3 (ref 0–0.4)
EOSINOPHIL NFR BLD AUTO: 1.3 % (ref 0.3–6.2)
ERYTHROCYTE [DISTWIDTH] IN BLOOD BY AUTOMATED COUNT: 13.7 % (ref 12.3–15.4)
GLOBULIN SER CALC-MCNC: 2.7 GM/DL
GLUCOSE SERPL-MCNC: 97 MG/DL (ref 65–99)
HBA1C MFR BLD: 6 % (ref 4.8–5.6)
HCT VFR BLD AUTO: 44.5 % (ref 37.5–51)
HDLC SERPL-MCNC: 38 MG/DL (ref 40–60)
HGB BLD-MCNC: 14.6 G/DL (ref 13–17.7)
IMM GRANULOCYTES # BLD AUTO: 0.03 10*3/MM3 (ref 0–0.05)
IMM GRANULOCYTES NFR BLD AUTO: 0.4 % (ref 0–0.5)
LDLC SERPL CALC-MCNC: 90 MG/DL (ref 0–100)
LYMPHOCYTES # BLD AUTO: 2.62 10*3/MM3 (ref 0.7–3.1)
LYMPHOCYTES NFR BLD AUTO: 31.3 % (ref 19.6–45.3)
MCH RBC QN AUTO: 28.9 PG (ref 26.6–33)
MCHC RBC AUTO-ENTMCNC: 32.8 G/DL (ref 31.5–35.7)
MCV RBC AUTO: 88.1 FL (ref 79–97)
MICROALBUMIN UR-MCNC: <3 UG/ML
MONOCYTES # BLD AUTO: 0.54 10*3/MM3 (ref 0.1–0.9)
MONOCYTES NFR BLD AUTO: 6.5 % (ref 5–12)
NEUTROPHILS # BLD AUTO: 5.02 10*3/MM3 (ref 1.7–7)
NEUTROPHILS NFR BLD AUTO: 60 % (ref 42.7–76)
NRBC BLD AUTO-RTO: 0 /100 WBC (ref 0–0.2)
PLATELET # BLD AUTO: 280 10*3/MM3 (ref 140–450)
POTASSIUM SERPL-SCNC: 3.3 MMOL/L (ref 3.5–5.2)
PROT SERPL-MCNC: 7.4 G/DL (ref 6–8.5)
RBC # BLD AUTO: 5.05 10*6/MM3 (ref 4.14–5.8)
SODIUM SERPL-SCNC: 141 MMOL/L (ref 136–145)
TRIGL SERPL-MCNC: 159 MG/DL (ref 0–150)
VLDLC SERPL CALC-MCNC: 31.8 MG/DL
WBC # BLD AUTO: 8.36 10*3/MM3 (ref 3.4–10.8)

## 2020-08-24 DIAGNOSIS — E87.6 HYPOKALEMIA: Primary | ICD-10-CM

## 2020-08-29 LAB — POTASSIUM SERPL-SCNC: 4.2 MMOL/L (ref 3.5–5.2)

## 2020-08-31 ENCOUNTER — RESULTS ENCOUNTER (OUTPATIENT)
Dept: FAMILY MEDICINE CLINIC | Facility: CLINIC | Age: 65
End: 2020-08-31

## 2020-08-31 DIAGNOSIS — E87.6 HYPOKALEMIA: ICD-10-CM

## 2020-09-23 ENCOUNTER — CLINICAL SUPPORT (OUTPATIENT)
Dept: FAMILY MEDICINE CLINIC | Facility: CLINIC | Age: 65
End: 2020-09-23

## 2020-09-23 DIAGNOSIS — Z23 NEED FOR INFLUENZA VACCINATION: Primary | ICD-10-CM

## 2020-09-23 PROCEDURE — 90686 IIV4 VACC NO PRSV 0.5 ML IM: CPT | Performed by: NURSE PRACTITIONER

## 2020-09-23 PROCEDURE — 90471 IMMUNIZATION ADMIN: CPT | Performed by: NURSE PRACTITIONER

## 2020-09-23 NOTE — PROGRESS NOTES
PT presents for flu immunization   Allergies reviewed   Consent obtained   LD- pt asked to wait 15 min after

## 2020-10-01 ENCOUNTER — TELEPHONE (OUTPATIENT)
Dept: PODIATRY | Facility: CLINIC | Age: 65
End: 2020-10-01

## 2020-10-02 ENCOUNTER — OFFICE VISIT (OUTPATIENT)
Dept: PODIATRY | Facility: CLINIC | Age: 65
End: 2020-10-02

## 2020-10-02 VITALS
DIASTOLIC BLOOD PRESSURE: 80 MMHG | OXYGEN SATURATION: 98 % | SYSTOLIC BLOOD PRESSURE: 148 MMHG | HEIGHT: 67 IN | WEIGHT: 266 LBS | HEART RATE: 67 BPM | BODY MASS INDEX: 41.75 KG/M2

## 2020-10-02 DIAGNOSIS — E11.42 TYPE 2 DIABETES MELLITUS WITH DIABETIC POLYNEUROPATHY, WITHOUT LONG-TERM CURRENT USE OF INSULIN (HCC): ICD-10-CM

## 2020-10-02 DIAGNOSIS — M79.672 FOOT PAIN, BILATERAL: ICD-10-CM

## 2020-10-02 DIAGNOSIS — B35.3 TINEA PEDIS OF BOTH FEET: ICD-10-CM

## 2020-10-02 DIAGNOSIS — M79.671 FOOT PAIN, BILATERAL: ICD-10-CM

## 2020-10-02 DIAGNOSIS — B35.1 ONYCHOMYCOSIS: Primary | ICD-10-CM

## 2020-10-02 PROCEDURE — 11721 DEBRIDE NAIL 6 OR MORE: CPT | Performed by: NURSE PRACTITIONER

## 2020-10-02 PROCEDURE — 99213 OFFICE O/P EST LOW 20 MIN: CPT | Performed by: NURSE PRACTITIONER

## 2020-10-02 NOTE — PATIENT INSTRUCTIONS
Obesity, Adult  Obesity is having too much body fat. Being obese means that your weight is more than what is healthy for you.  BMI is a number that explains how much body fat you have. If you have a BMI of 30 or more, you are obese. Obesity is often caused by eating or drinking more calories than your body uses. Changing your lifestyle can help you lose weight.  Obesity can cause serious health problems, such as:  · Stroke.  · Coronary artery disease (CAD).  · Type 2 diabetes.  · Some types of cancer, including cancers of the colon, breast, uterus, and gallbladder.  · Osteoarthritis.  · High blood pressure (hypertension).  · High cholesterol.  · Sleep apnea.  · Gallbladder stones.  · Infertility problems.  What are the causes?  · Eating meals each day that are high in calories, sugar, and fat.  · Being born with genes that may make you more likely to become obese.  · Having a medical condition that causes obesity.  · Taking certain medicines.  · Sitting a lot (having a sedentary lifestyle).  · Not getting enough sleep.  · Drinking a lot of drinks that have sugar in them.  What increases the risk?  · Having a family history of obesity.  · Being an  woman.  · Being a  man.  · Living in an area with limited access to:  ? [place], recreation centers, or sidewalks.  ? Healthy food choices, such as grocery stores and [employer] markets.  What are the signs or symptoms?  The main sign is having too much body fat.  How is this treated?  · Treatment for this condition often includes changing your lifestyle. Treatment may include:  ? Changing your diet. This may include making a healthy meal plan.  ? Exercise. This may include activity that causes your heart to beat faster (aerobic exercise) and strength training. Work with your doctor to design a program that works for you.  ? Medicine to help you lose weight. This may be used if you are not able to lose 1 pound a week after 6 weeks of healthy eating and  more exercise.  ? Treating conditions that cause the obesity.  ? Surgery. Options may include gastric banding and gastric bypass. This may be done if:  § Other treatments have not helped to improve your condition.  § You have a BMI of 40 or higher.  § You have life-threatening health problems related to obesity.  Follow these instructions at home:  Eating and drinking    · Follow advice from your doctor about what to eat and drink. Your doctor may tell you to:  ? Limit fast food, sweets, and processed snack foods.  ? Choose low-fat options. For example, choose low-fat milk instead of whole milk.  ? Eat 5 or more servings of fruits or vegetables each day.  ? Eat at home more often. This gives you more control over what you eat.  ? Choose healthy foods when you eat out.  ? Learn to read food labels. This will help you learn how much food is in 1 serving.  ? Keep low-fat snacks available.  ? Avoid drinks that have a lot of sugar in them. These include soda, fruit juice, iced tea with sugar, and flavored milk.  · Drink enough water to keep your pee (urine) pale yellow.  · Do not go on fad diets.  Physical activity  · Exercise often, as told by your doctor. Most adults should get up to 150 minutes of moderate-intensity exercise every week.Ask your doctor:  ? What types of exercise are safe for you.  ? How often you should exercise.  · Warm up and stretch before being active.  · Do slow stretching after being active (cool down).  · Rest between times of being active.  Lifestyle  · Work with your doctor and a food expert (dietitian) to set a weight-loss goal that is best for you.  · Limit your screen time.  · Find ways to reward yourself that do not involve food.  · Do not drink alcohol if:  ? Your doctor tells you not to drink.  ? You are pregnant, may be pregnant, or are planning to become pregnant.  · If you drink alcohol:  ? Limit how much you use to:  § 0-1 drink a day for women.  § 0-2 drinks a day for men.  ? Be  aware of how much alcohol is in your drink. In the [place] one drink equals one 12 oz bottle of beer (355 mL), one 5 oz glass of wine (148 mL), or one 1½ oz glass of hard liquor (44 mL).  General instructions  · Keep a weight-loss journal. This can help you keep track of:  ? The food that you eat.  ? How much exercise you get.  · Take over-the-counter and prescription medicines only as told by your doctor.  · Take vitamins and supplements only as told by your doctor.  · Think about joining a support group.  · Keep all follow-up visits as told by your doctor. This is important.  Contact a doctor if:  · You cannot meet your weight loss goal after you have changed your diet and lifestyle for 6 weeks.  Get help right away if you:  · Are having trouble breathing.  · Are having thoughts of harming yourself.  Summary  · Obesity is having too much body fat.  · Being obese means that your weight is more than what is healthy for you.  · Work with your doctor to set a weight-loss goal.  · Get regular exercise as told by your doctor.  This information is not intended to replace advice given to you by your health care provider. Make sure you discuss any questions you have with your health care provider.  Document Released: 03/11/2013 Document Revised: 08/22/2019 Document Reviewed: 08/22/2019  [employer]vier Patient Education © 2020 [employer]

## 2021-01-06 ENCOUNTER — TELEPHONE (OUTPATIENT)
Dept: PODIATRY | Facility: CLINIC | Age: 66
End: 2021-01-06

## 2021-01-08 ENCOUNTER — OFFICE VISIT (OUTPATIENT)
Dept: PODIATRY | Facility: CLINIC | Age: 66
End: 2021-01-08

## 2021-01-08 VITALS
HEIGHT: 67 IN | HEART RATE: 68 BPM | WEIGHT: 274 LBS | SYSTOLIC BLOOD PRESSURE: 145 MMHG | DIASTOLIC BLOOD PRESSURE: 80 MMHG | OXYGEN SATURATION: 98 % | BODY MASS INDEX: 43 KG/M2

## 2021-01-08 DIAGNOSIS — B35.1 ONYCHOMYCOSIS: Primary | ICD-10-CM

## 2021-01-08 DIAGNOSIS — B35.3 TINEA PEDIS OF BOTH FEET: ICD-10-CM

## 2021-01-08 DIAGNOSIS — M79.672 FOOT PAIN, BILATERAL: ICD-10-CM

## 2021-01-08 DIAGNOSIS — E66.01 MORBIDLY OBESE (HCC): ICD-10-CM

## 2021-01-08 DIAGNOSIS — M79.671 FOOT PAIN, BILATERAL: ICD-10-CM

## 2021-01-08 DIAGNOSIS — E11.42 TYPE 2 DIABETES MELLITUS WITH DIABETIC POLYNEUROPATHY, WITHOUT LONG-TERM CURRENT USE OF INSULIN (HCC): ICD-10-CM

## 2021-01-08 PROCEDURE — 99213 OFFICE O/P EST LOW 20 MIN: CPT | Performed by: NURSE PRACTITIONER

## 2021-01-08 PROCEDURE — 11721 DEBRIDE NAIL 6 OR MORE: CPT | Performed by: NURSE PRACTITIONER

## 2021-01-08 NOTE — PATIENT INSTRUCTIONS
"BMI for Adults  What is BMI?  Body mass index (BMI) is a number that is calculated from a person's weight and height. BMI can help estimate how much of a person's weight is composed of fat. BMI does not measure body fat directly. Rather, it is an alternative to procedures that directly measure body fat, which can be difficult and expensive.  BMI can help identify people who may be at higher risk for certain medical problems.  What are BMI measurements used for?  BMI is used as a screening tool to identify possible weight problems. It helps determine whether a person is obese, overweight, a healthy weight, or underweight.  BMI is useful for:  · Identifying a weight problem that may be related to a medical condition or may increase the risk for medical problems.  · Promoting changes, such as changes in diet and exercise, to help reach a healthy weight. BMI screening can be repeated to see if these changes are working.  How is BMI calculated?  BMI involves measuring your weight in relation to your height. Both height and weight are measured, and the BMI is calculated from those numbers. This can be done either in English ([place] or metric measurements. Note that charts and online BMI calculators are available to help you find your BMI quickly and easily without having to do these calculations yourself.  To calculate your BMI in English ([place] measurements:    1. Measure your weight in pounds (lb).  2. Multiply the number of pounds by 703.  ? For example, for a person who weighs 180 lb, multiply that number by 703, which equals 126,540.  3. Measure your height in inches. Then multiply that number by itself to get a measurement called \"inches squared.\"  ? For example, for a person who is 70 inches tall, the \"inches squared\" measurement is 70 inches x 70 inches, which equals 4,900 inches squared.  4. Divide the total from step 2 (number of lb x 703) by the total from step 3 (inches squared): 126,540 ÷ 4,900 = 25.8. This is " "your BMI.  To calculate your BMI in metric measurements:  1. Measure your weight in kilograms (kg).  2. Measure your height in meters (m). Then multiply that number by itself to get a measurement called \"meters squared.\"  ? For example, for a person who is 1.75 m tall, the \"meters squared\" measurement is 1.75 m x 1.75 m, which is equal to 3.1 meters squared.  3. Divide the number of kilograms (your weight) by the meters squared number. In this example: 70 ÷ 3.1 = 22.6. This is your BMI.  What do the results mean?  BMI charts are used to identify whether you are underweight, normal weight, overweight, or obese. The following guidelines will be used:  · Underweight: BMI less than 18.5.  · Normal weight: BMI between 18.5 and 24.9.  · Overweight: BMI between 25 and 29.9.  · Obese: BMI of 30 or above.  Keep these notes in mind:  · Weight includes both fat and muscle, so someone with a muscular build, such as an athlete, may have a BMI that is higher than 24.9. In cases like these, BMI is not an accurate measure of body fat.  · To determine if excess body fat is the cause of a BMI of 25 or higher, further assessments may need to be done by a health care provider.  · BMI is usually interpreted in the same way for men and women.  Where to find more information  For more information about BMI, including tools to quickly calculate your BMI, go to these websites:  · Centers for Disease Control and Prevention: www.cdc.gov  · American Heart Association: www.heart.org  · National Heart, Lung, and Blood [place]: www.nhlbi.nih.gov  Summary  · Body mass index (BMI) is a number that is calculated from a person's weight and height.  · BMI may help estimate how much of a person's weight is composed of fat. BMI can help identify those who may be at higher risk for certain medical problems.  · BMI can be measured using English measurements or metric measurements.  · BMI charts are used to identify whether you are underweight, normal " weight, overweight, or obese.  This information is not intended to replace advice given to you by your health care provider. Make sure you discuss any questions you have with your health care provider.  Document Revised: 09/09/2020 Document Reviewed: 07/17/2020  Elsevier Patient Education © 2020 [employer]    Diabetes Mellitus and Foot Care  Foot care is an important part of your health, especially when you have diabetes. Diabetes may cause you to have problems because of poor blood flow (circulation) to your feet and legs, which can cause your skin to:  · Become thinner and drier.  · Break more easily.  · Heal more slowly.  · Peel and crack.  You may also have nerve damage (neuropathy) in your legs and feet, causing decreased feeling in them. This means that you may not notice minor injuries to your feet that could lead to more serious problems. Noticing and addressing any potential problems early is the best way to prevent future foot problems.  How to care for your feet  Foot hygiene  · Wash your feet daily with warm water and mild soap. Do not use hot water. Then, pat your feet and the areas between your toes until they are completely dry. Do not soak your feet as this can dry your skin.  · Trim your toenails straight across. Do not dig under them or around the cuticle. File the edges of your nails with an emery board or nail file.  · Apply a moisturizing lotion or petroleum jelly to the skin on your feet and to dry, brittle toenails. Use lotion that does not contain alcohol and is unscented. Do not apply lotion between your toes.  Shoes and socks  · Wear clean socks or stockings every day. Make sure they are not too tight. Do not wear knee-high stockings since they may decrease blood flow to your legs.  · Wear shoes that fit properly and have enough cushioning. Always look in your shoes before you put them on to be sure there are no objects inside.  · To break in new shoes, wear them for just a few hours a  day. This prevents injuries on your feet.  Wounds, scrapes, corns, and calluses  · Check your feet daily for blisters, cuts, bruises, sores, and redness. If you cannot see the bottom of your feet, use a mirror or ask someone for help.  · Do not cut corns or calluses or try to remove them with medicine.  · If you find a minor scrape, cut, or break in the skin on your feet, keep it and the skin around it clean and dry. You may clean these areas with mild soap and water. Do not clean the area with peroxide, alcohol, or iodine.  · If you have a wound, scrape, corn, or callus on your foot, look at it several times a day to make sure it is healing and not infected. Check for:  ? Redness, swelling, or pain.  ? Fluid or blood.  ? Warmth.  ? Pus or a bad smell.  General instructions  · Do not cross your legs. This may decrease blood flow to your feet.  · Do not use heating pads or hot water bottles on your feet. They may burn your skin. If you have lost feeling in your feet or legs, you may not know this is happening until it is too late.  · Protect your feet from hot and cold by wearing shoes, such as at the beach or on hot pavement.  · Schedule a complete foot exam at least once a year (annually) or more often if you have foot problems. If you have foot problems, report any cuts, sores, or bruises to your health care provider immediately.  Contact a health care provider if:  · You have a medical condition that increases your risk of infection and you have any cuts, sores, or bruises on your feet.  · You have an injury that is not healing.  · You have redness on your legs or feet.  · You feel burning or tingling in your legs or feet.  · You have pain or cramps in your legs and feet.  · Your legs or feet are numb.  · Your feet always feel cold.  · You have pain around a toenail.  Get help right away if:  · You have a wound, scrape, corn, or callus on your foot and:  ? You have pain, swelling, or redness that gets  worse.  ? You have fluid or blood coming from the wound, scrape, corn, or callus.  ? Your wound, scrape, corn, or callus feels warm to the touch.  ? You have pus or a bad smell coming from the wound, scrape, corn, or callus.  ? You have a fever.  ? You have a red line going up your leg.  Summary  · Check your feet every day for cuts, sores, red spots, swelling, and blisters.  · Moisturize feet and legs daily.  · Wear shoes that fit properly and have enough cushioning.  · If you have foot problems, report any cuts, sores, or bruises to your health care provider immediately.  · Schedule a complete foot exam at least once a year (annually) or more often if you have foot problems.  This information is not intended to replace advice given to you by your health care provider. Make sure you discuss any questions you have with your health care provider.  Document Revised: 09/09/2020 Document Reviewed: 01/19/2018  [employer] Patient Education © 2020 [employer] Inc[employer]

## 2021-02-17 DIAGNOSIS — E78.2 MIXED HYPERLIPIDEMIA: ICD-10-CM

## 2021-02-17 DIAGNOSIS — I10 ESSENTIAL HYPERTENSION: ICD-10-CM

## 2021-02-17 DIAGNOSIS — E87.6 HYPOKALEMIA: ICD-10-CM

## 2021-02-17 RX ORDER — PRAVASTATIN SODIUM 10 MG
10 TABLET ORAL NIGHTLY
Qty: 30 TABLET | Refills: 0 | Status: SHIPPED | OUTPATIENT
Start: 2021-02-17 | End: 2021-03-05 | Stop reason: SDUPTHER

## 2021-02-17 RX ORDER — AMLODIPINE BESYLATE 5 MG/1
5 TABLET ORAL DAILY
Qty: 30 TABLET | Refills: 0 | Status: SHIPPED | OUTPATIENT
Start: 2021-02-17 | End: 2021-03-05 | Stop reason: SDUPTHER

## 2021-02-17 NOTE — TELEPHONE ENCOUNTER
Caller: Sandeep Alonso    Relationship: Self    Best call back number: [phone]    Medication needed:   Requested Prescriptions     Pending Prescriptions Disp Refills   • pravastatin (PRAVACHOL) 10 MG tablet 90 tablet 1     Sig: Take 1 tablet by mouth Every Night.   • amLODIPine (NORVASC) 5 MG tablet 90 tablet 1     Sig: Take 1 tablet by mouth Daily.       What is the patient's preferred pharmacy: [address] [phone] [facility] [phone]

## 2021-02-25 DIAGNOSIS — E11.40 TYPE 2 DIABETES MELLITUS WITH DIABETIC NEUROPATHY, WITHOUT LONG-TERM CURRENT USE OF INSULIN (HCC): ICD-10-CM

## 2021-02-25 DIAGNOSIS — I10 ESSENTIAL HYPERTENSION: ICD-10-CM

## 2021-02-25 RX ORDER — GABAPENTIN 100 MG/1
100 CAPSULE ORAL 2 TIMES DAILY
Qty: 180 CAPSULE | Refills: 1 | OUTPATIENT
Start: 2021-02-25

## 2021-02-25 RX ORDER — GABAPENTIN 100 MG/1
CAPSULE ORAL
Qty: 14 CAPSULE | Refills: 0 | Status: SHIPPED | OUTPATIENT
Start: 2021-02-25 | End: 2021-03-05 | Stop reason: SDUPTHER

## 2021-02-25 RX ORDER — LOSARTAN POTASSIUM AND HYDROCHLOROTHIAZIDE 12.5; 5 MG/1; MG/1
TABLET ORAL
Qty: 90 TABLET | Refills: 0 | Status: SHIPPED | OUTPATIENT
Start: 2021-02-25 | End: 2021-03-05 | Stop reason: SDUPTHER

## 2021-02-25 NOTE — TELEPHONE ENCOUNTER
Last RX: 08.21.2020- Qty 180/ 1 capsule BID/ 1 refill    Last OV: 08.21.2020    Next OV: 03.05.2021

## 2021-02-25 NOTE — TELEPHONE ENCOUNTER
Needs refill on    gabapentin (NEURONTIN) 100 MG capsule    losartan-hydrochlorothiazide (HYZAAR) 50-12.5 MG per tablet    [facility] [address] [place] [address] - [address] [place] [address] [phone] [facility] [phone] FX

## 2021-03-05 ENCOUNTER — OFFICE VISIT (OUTPATIENT)
Dept: FAMILY MEDICINE CLINIC | Facility: CLINIC | Age: 66
End: 2021-03-05

## 2021-03-05 VITALS
HEIGHT: 67 IN | TEMPERATURE: 95.6 F | OXYGEN SATURATION: 97 % | HEART RATE: 56 BPM | BODY MASS INDEX: 44.73 KG/M2 | RESPIRATION RATE: 16 BRPM | SYSTOLIC BLOOD PRESSURE: 146 MMHG | WEIGHT: 285 LBS | DIASTOLIC BLOOD PRESSURE: 76 MMHG

## 2021-03-05 DIAGNOSIS — E11.40 TYPE 2 DIABETES MELLITUS WITH DIABETIC NEUROPATHY, WITHOUT LONG-TERM CURRENT USE OF INSULIN (HCC): Primary | ICD-10-CM

## 2021-03-05 DIAGNOSIS — I10 ESSENTIAL HYPERTENSION: ICD-10-CM

## 2021-03-05 DIAGNOSIS — E78.2 MIXED HYPERLIPIDEMIA: ICD-10-CM

## 2021-03-05 DIAGNOSIS — E87.6 HYPOKALEMIA: ICD-10-CM

## 2021-03-05 DIAGNOSIS — R60.0 LOCALIZED EDEMA: ICD-10-CM

## 2021-03-05 DIAGNOSIS — Z51.81 THERAPEUTIC DRUG MONITORING: ICD-10-CM

## 2021-03-05 DIAGNOSIS — E66.01 CLASS 3 SEVERE OBESITY DUE TO EXCESS CALORIES WITH SERIOUS COMORBIDITY AND BODY MASS INDEX (BMI) OF 40.0 TO 44.9 IN ADULT (HCC): ICD-10-CM

## 2021-03-05 LAB — HBA1C MFR BLD: 6 %

## 2021-03-05 PROCEDURE — 83036 HEMOGLOBIN GLYCOSYLATED A1C: CPT | Performed by: NURSE PRACTITIONER

## 2021-03-05 PROCEDURE — 99214 OFFICE O/P EST MOD 30 MIN: CPT | Performed by: NURSE PRACTITIONER

## 2021-03-05 RX ORDER — GABAPENTIN 100 MG/1
100 CAPSULE ORAL 2 TIMES DAILY
Qty: 180 CAPSULE | Refills: 1 | Status: SHIPPED | OUTPATIENT
Start: 2021-03-05 | End: 2021-09-03

## 2021-03-05 RX ORDER — FUROSEMIDE 40 MG/1
40 TABLET ORAL 2 TIMES DAILY
Qty: 180 TABLET | Refills: 1 | Status: SHIPPED | OUTPATIENT
Start: 2021-03-05 | End: 2021-09-07 | Stop reason: SDUPTHER

## 2021-03-05 RX ORDER — PRAVASTATIN SODIUM 10 MG
10 TABLET ORAL NIGHTLY
Qty: 90 TABLET | Refills: 1 | Status: SHIPPED | OUTPATIENT
Start: 2021-03-05 | End: 2021-09-07 | Stop reason: SDUPTHER

## 2021-03-05 RX ORDER — POTASSIUM CHLORIDE 20 MEQ/1
20 TABLET, EXTENDED RELEASE ORAL DAILY
Qty: 90 TABLET | Refills: 1 | Status: SHIPPED | OUTPATIENT
Start: 2021-03-05 | End: 2021-09-03

## 2021-03-05 RX ORDER — AMLODIPINE BESYLATE 5 MG/1
5 TABLET ORAL DAILY
Qty: 90 TABLET | Refills: 1 | Status: SHIPPED | OUTPATIENT
Start: 2021-03-05 | End: 2021-09-07 | Stop reason: SDUPTHER

## 2021-03-05 RX ORDER — LOSARTAN POTASSIUM AND HYDROCHLOROTHIAZIDE 12.5; 5 MG/1; MG/1
1 TABLET ORAL DAILY
Qty: 90 TABLET | Refills: 0 | Status: SHIPPED | OUTPATIENT
Start: 2021-03-05 | End: 2021-08-24 | Stop reason: SDUPTHER

## 2021-03-05 NOTE — PROGRESS NOTES
Chief Complaint  Hypertension (hypertension follow up), Hyperlipidemia, Other (hypokalemia), Peripheral Neuropathy, and Edema      Subjective        Sandeep Alonso presents to [facility]  HTN, hyperlipidemia, Diabetes, edema, hypokalemia and neuropathy.   Denies any chest pain, shortness of breath, or palpitations, HTN he says is well controlled at home, however today bp is 146/76 and it runs high every time he is here so I will change his lisinopril to losartan-hctz.  diabetes with fasting sugars in the 120's, still struggles with neuropathy but says that the gabpentin works well.         Hypertension  This is a chronic problem. The current episode started more than 1 year ago. The problem has been gradually worsening since onset. The problem is uncontrolled. Pertinent negatives include no blurred vision, chest pain, headaches, neck pain, orthopnea, palpitations, PND, shortness of breath or sweats. There are no associated agents to hypertension. Risk factors for coronary artery disease include dyslipidemia, family history, obesity, male gender, sedentary lifestyle and diabetes mellitus. Past treatments include ACE inhibitors and beta blockers. Current antihypertension treatment includes angiotensin blockers, beta blockers and diuretics. The current treatment provides no improvement. There are no compliance problems.  There is no history of kidney disease, heart failure or retinopathy.   Hyperlipidemia  This is a chronic problem. The current episode started more than 1 year ago. The problem is uncontrolled. Recent lipid tests were reviewed and are high. Exacerbating diseases include diabetes and obesity. He has no history of hypothyroidism or nephrotic syndrome. There are no known factors aggravating his hyperlipidemia. Pertinent negatives include no chest pain or shortness of breath. Current antihyperlipidemic treatment includes statins. The current treatment provides mild  improvement of lipids. There are no compliance problems.  Risk factors for coronary artery disease include dyslipidemia, family history, hypertension, male sex, obesity, a sedentary lifestyle and diabetes mellitus.   Leg Swelling  This is a chronic problem. The current episode started more than 1 year ago. The problem occurs every several days. The problem has been unchanged. Pertinent negatives include no anorexia, arthralgias, chest pain, coughing, headaches, joint swelling, neck pain, swollen glands or urinary symptoms. Nothing aggravates the symptoms. He has tried nothing for the symptoms. The treatment provided no relief.   Diabetes  He presents for his follow-up diabetic visit. He has type 2 diabetes mellitus. No MedicAlert identification noted. His disease course has been stable. Pertinent negatives for hypoglycemia include no headaches, nervousness/anxiousness, sleepiness or sweats. Pertinent negatives for diabetes include no blurred vision and no chest pain. Symptoms are stable. There are no diabetic complications. Pertinent negatives for diabetic complications include no retinopathy. Risk factors for coronary artery disease include dyslipidemia, diabetes mellitus, family history, hypertension, male sex, obesity and sedentary lifestyle. Current diabetic treatment includes oral agent (monotherapy). He is compliant with treatment all of the time. He is following a generally unhealthy diet. When asked about meal planning, he reported none. He has not had a previous visit with a dietitian. He rarely participates in exercise. There is no change in his home blood glucose trend. His breakfast blood glucose is taken between 7-8 am. His breakfast blood glucose range is generally 110-130 mg/dl. An ACE inhibitor/angiotensin II receptor blocker is being taken. He does not see a podiatrist.Eye exam is not current.     Chronic problems: HTN with elevations on lisinopril, will change med today, diabetes stable with  "diabetic neuropathy on metformin and gabapentin, lower extremity neuropathy stable with gabapentin and metformin, lower extremity edema stable with furosemide, hyperlipidemia stable with omega 3 fatty acids and statin, hypokalemia stable with potassium    Review of Systems   Constitutional: Negative.    HENT: Negative for swollen glands.    Eyes: Negative for blurred vision.   Respiratory: Negative for cough, choking, chest tightness, shortness of breath and stridor.    Cardiovascular: Positive for leg swelling. Negative for chest pain, palpitations, orthopnea and PND.   Gastrointestinal: Negative for anorexia.   Genitourinary: Negative.    Musculoskeletal: Negative for arthralgias, joint swelling and neck pain.   Neurological: Negative.    Hematological: Negative for adenopathy. Does not bruise/bleed easily.   Psychiatric/Behavioral: The patient is not nervous/anxious.    All other systems reviewed and are negative.    Objective   Vital Signs:   /76 (BP Location: Right arm, Patient Position: Sitting, Cuff Size: Adult)   Pulse 56   Temp 95.6 °F (35.3 °C)   Resp 16   Ht 170.2 cm (67\") Comment: per patient  Wt 129 kg (285 lb)   SpO2 97%   BMI 44.64 kg/m²       Physical Exam  Vitals and nursing note reviewed.   Constitutional:       General: He is awake.      Appearance: Normal appearance. He is well-developed and well-groomed. He is morbidly obese.   HENT:      Head: Normocephalic and atraumatic.      Right Ear: Hearing, tympanic membrane, ear canal and external ear normal.      Left Ear: Hearing, tympanic membrane, ear canal and external ear normal.      Nose: Nose normal.      Mouth/Throat:      Lips: Pink.      Mouth: Mucous membranes are moist.   Cardiovascular:      Rate and Rhythm: Normal rate and regular rhythm.      Heart sounds: Normal heart sounds.   Pulmonary:      Effort: Pulmonary effort is normal.      Breath sounds: Normal breath sounds and air entry.   Abdominal:      General: Abdomen is " flat. Bowel sounds are normal.      Palpations: Abdomen is soft.   Musculoskeletal:      Right lower le+ Edema present.      Left lower le+ Edema present.   Lymphadenopathy:      Head:      Right side of head: No submental, submandibular or tonsillar adenopathy.      Left side of head: No submental, submandibular or tonsillar adenopathy.   Skin:     General: Skin is warm and dry.      Capillary Refill: Capillary refill takes less than 2 seconds.   Neurological:      Mental Status: He is alert and oriented to person, place, and time.      Cranial Nerves: Cranial nerves are intact.      Sensory: Sensation is intact.      Motor: Motor function is intact.      Coordination: Coordination is intact.      Gait: Gait is intact.   Psychiatric:         Attention and Perception: Attention and perception normal.         Mood and Affect: Mood and affect normal.         Speech: Speech normal.         Behavior: Behavior is cooperative.         Thought Content: Thought content normal.         Cognition and Memory: Cognition normal.         Judgment: Judgment normal.                Assessment and Plan    Diagnoses and all orders for this visit:    1. Type 2 diabetes mellitus with diabetic neuropathy, without long-term current use of insulin (CMS/[facility]) (Primary)  -     POC Glycosylated Hemoglobin (Hb A1C)  -     CBC (No Diff)  -     Comprehensive metabolic panel  -     gabapentin (NEURONTIN) 100 MG capsule; Take 1 capsule by mouth 2 (Two) Times a Day.  Dispense: 180 capsule; Refill: 1  -     metFORMIN (GLUCOPHAGE) 500 MG tablet; 1/2 tab daily (actual dose is 250)  Dispense: 90 tablet; Refill: 1    2. Essential hypertension  -     losartan-hydrochlorothiazide (HYZAAR) 50-12.5 MG per tablet; Take 1 tablet by mouth Daily.  Dispense: 90 tablet; Refill: 0  -     amLODIPine (NORVASC) 5 MG tablet; Take 1 tablet by mouth Daily.  Dispense: 90 tablet; Refill: 1    3. Mixed hyperlipidemia  -     Lipid panel  -     pravastatin (PRAVACHOL)  10 MG tablet; Take 1 tablet by mouth Every Night.  Dispense: 90 tablet; Refill: 1    4. Class 3 severe obesity due to excess calories with serious comorbidity and body mass index (BMI) of 40.0 to 44.9 in adult (CMS/[facility])    5. Therapeutic drug monitoring  -     ToxASSURE Select 13 Discrete -  -     Gabapentin level    6. Localized edema  -     furosemide (LASIX) 40 MG tablet; Take 1 tablet by mouth 2 (Two) Times a Day.  Dispense: 180 tablet; Refill: 1    7. Hypokalemia  -     potassium chloride (K-DUR,KLOR-CON) 20 MEQ CR tablet; Take 1 tablet by mouth Daily.  Dispense: 90 tablet; Refill: 1  -     amLODIPine (NORVASC) 5 MG tablet; Take 1 tablet by mouth Daily.  Dispense: 90 tablet; Refill: 1      I spent 12 minutes caring for Sandeep on this date of service. This time includes time spent by me in the following activities:preparing for the visit, performing a medically appropriate examination and/or evaluation , counseling and educating the patient/family/caregiver, ordering medications, tests, or procedures, referring and communicating with other health care professionals , documenting information in the medical record and care coordination     Follow Up  Return in about 6 months (around 9/5/2021) for Recheck chronic and please schedule well exam.  Patient was given instructions and counseling regarding his condition or for health maintenance advice. Please see specific information pulled into the AVS if appropriate.     Electronically signed by Lisbet Velez, BECKA, APRN, 03/17/21, 6:23 AM CDT.

## 2021-03-11 LAB
6MAM UR QL CFM: NEGATIVE
6MAM/CREAT UR: NOT DETECTED NG/MG CREAT
7AMINOCLONAZEPAM/CREAT UR: NOT DETECTED NG/MG CREAT
A-OH ALPRAZ/CREAT UR: NOT DETECTED NG/MG CREAT
A-OH-TRIAZOLAM/CREAT UR CFM: NOT DETECTED NG/MG CREAT
ALBUMIN SERPL-MCNC: 4.3 G/DL (ref 3.8–4.8)
ALBUMIN/GLOB SERPL: 1.4 {RATIO} (ref 1.2–2.2)
ALFENTANIL/CREAT UR CFM: NOT DETECTED NG/MG CREAT
ALP SERPL-CCNC: 89 IU/L (ref 39–117)
ALPHA-HYDROXYMIDAZOLAM, URINE: NOT DETECTED NG/MG CREAT
ALPRAZ/CREAT UR CFM: NOT DETECTED NG/MG CREAT
ALT SERPL-CCNC: 20 IU/L (ref 0–44)
AMOBARBITAL UR QL CFM: NOT DETECTED
AMPHET/CREAT UR: NOT DETECTED NG/MG CREAT
AMPHETAMINES UR QL CFM: NEGATIVE
AST SERPL-CCNC: 19 IU/L (ref 0–40)
BARBITAL UR QL CFM: NOT DETECTED
BARBITURATES UR QL CFM: NEGATIVE
BENZODIAZ UR QL CFM: NEGATIVE
BILIRUB SERPL-MCNC: 0.5 MG/DL (ref 0–1.2)
BUN SERPL-MCNC: 12 MG/DL (ref 8–27)
BUN/CREAT SERPL: 12 (ref 10–24)
BUPRENORPHINE UR QL CFM: NEGATIVE
BUPRENORPHINE/CREAT UR: NOT DETECTED NG/MG CREAT
BUTABARBITAL UR QL CFM: NOT DETECTED
BUTALBITAL UR QL CFM: NOT DETECTED
BZE/CREAT UR: NOT DETECTED NG/MG CREAT
CALCIUM SERPL-MCNC: 8.9 MG/DL (ref 8.6–10.2)
CANNABINOIDS UR QL CFM: NEGATIVE
CARBOXYTHC/CREAT UR: NOT DETECTED NG/MG CREAT
CHLORIDE SERPL-SCNC: 102 MMOL/L (ref 96–106)
CHOLEST SERPL-MCNC: 144 MG/DL (ref 100–199)
CLONAZEPAM/CREAT UR CFM: NOT DETECTED NG/MG CREAT
CO2 SERPL-SCNC: 28 MMOL/L (ref 20–29)
COCAETHYLENE/CREAT UR CFM: NOT DETECTED NG/MG CREAT
COCAINE UR QL CFM: NEGATIVE
COCAINE/CREAT UR CFM: NOT DETECTED NG/MG CREAT
CODEINE/CREAT UR: NOT DETECTED NG/MG CREAT
CREAT SERPL-MCNC: 0.97 MG/DL (ref 0.76–1.27)
CREAT UR-MCNC: 28 MG/DL
DESALKYLFLURAZ/CREAT UR: NOT DETECTED NG/MG CREAT
DESMETHYLFLUNITRAZEPAM: NOT DETECTED NG/MG CREAT
DHC/CREAT UR: NOT DETECTED NG/MG CREAT
DIAZEPAM/CREAT UR: NOT DETECTED NG/MG CREAT
DRUGS UR: NORMAL
EDDP/CREAT UR: NOT DETECTED NG/MG CREAT
ERYTHROCYTE [DISTWIDTH] IN BLOOD BY AUTOMATED COUNT: 14.3 % (ref 11.6–15.4)
ETHANOL UR CFM-MCNC: NOT DETECTED G/DL
ETHANOL UR QL CFM: NEGATIVE
FENTANYL UR QL CFM: NEGATIVE
FENTANYL/CREAT UR: NOT DETECTED NG/MG CREAT
FLUNITRAZEPAM UR QL CFM: NOT DETECTED NG/MG CREAT
GABAPENTIN SERPLBLD-MCNC: 2.1 UG/ML (ref 4–16)
GLOBULIN SER CALC-MCNC: 3.1 G/DL (ref 1.5–4.5)
GLUCOSE SERPL-MCNC: 91 MG/DL (ref 65–99)
HCT VFR BLD AUTO: 42.7 % (ref 37.5–51)
HDLC SERPL-MCNC: 35 MG/DL
HGB BLD-MCNC: 14 G/DL (ref 13–17.7)
HYDROCODONE/CREAT UR: NOT DETECTED NG/MG CREAT
HYDROMORPHONE/CREAT UR: NOT DETECTED NG/MG CREAT
LDLC SERPL CALC-MCNC: 89 MG/DL (ref 0–99)
LEVEL OF DETECTION:: NORMAL
LORAZEPAM/CREAT UR: NOT DETECTED NG/MG CREAT
MCH RBC QN AUTO: 29.3 PG (ref 26.6–33)
MCHC RBC AUTO-ENTMCNC: 32.8 G/DL (ref 31.5–35.7)
MCV RBC AUTO: 89 FL (ref 79–97)
MDA/CREAT UR: NOT DETECTED NG/MG CREAT
MDMA/CREAT UR: NOT DETECTED NG/MG CREAT
MEPHOBARBITAL UR QL CFM: NOT DETECTED
METHADONE UR QL CFM: NEGATIVE
METHADONE/CREAT UR: NOT DETECTED NG/MG CREAT
METHAMPHET/CREAT UR: NOT DETECTED NG/MG CREAT
MIDAZOLAM/CREAT UR CFM: NOT DETECTED NG/MG CREAT
MORPHINE/CREAT UR: NOT DETECTED NG/MG CREAT
N-NORTRAMADOL/CREAT UR CFM: NOT DETECTED NG/MG CREAT
NARCOTICS UR: NEGATIVE
NORBUPRENORPHINE/CREAT UR: NOT DETECTED NG/MG CREAT
NORCODEINE/CREAT UR CFM: NOT DETECTED NG/MG CREAT
NORDIAZEPAM/CREAT UR: NOT DETECTED NG/MG CREAT
NORFENTANYL/CREAT UR: NOT DETECTED NG/MG CREAT
NORHYDROCODONE/CREAT UR: NOT DETECTED NG/MG CREAT
NORMORPHINE UR-MCNC: NOT DETECTED NG/MG CREAT
NOROXYCODONE/CREAT UR: NOT DETECTED NG/MG CREAT
NOROXYMORPHONE/CREAT UR CFM: NOT DETECTED NG/MG CREAT
O-NORTRAMADOL UR CFM-MCNC: NOT DETECTED NG/MG CREAT
OPIATES UR QL CFM: NEGATIVE
OXAZEPAM/CREAT UR: NOT DETECTED NG/MG CREAT
OXYCODONE UR QL CFM: NEGATIVE
OXYCODONE/CREAT UR: NOT DETECTED NG/MG CREAT
OXYMORPHONE/CREAT UR: NOT DETECTED NG/MG CREAT
PENTOBARB UR QL CFM: NOT DETECTED
PHENOBARB UR QL CFM: NOT DETECTED
PLATELET # BLD AUTO: 283 X10E3/UL (ref 150–450)
POTASSIUM SERPL-SCNC: 4 MMOL/L (ref 3.5–5.2)
PROT SERPL-MCNC: 7.4 G/DL (ref 6–8.5)
RBC # BLD AUTO: 4.78 X10E6/UL (ref 4.14–5.8)
SECOBARBITAL UR QL CFM: NOT DETECTED
SODIUM SERPL-SCNC: 144 MMOL/L (ref 134–144)
SUFENTANIL/CREAT UR CFM: NOT DETECTED NG/MG CREAT
TAPENTADOL UR QL CFM: NEGATIVE
TAPENTADOL/CREAT UR: NOT DETECTED NG/MG CREAT
TEMAZEPAM/CREAT UR: NOT DETECTED NG/MG CREAT
THIOPENTAL UR QL CFM: NOT DETECTED
TRAMADOL UR QL CFM: NOT DETECTED NG/MG CREAT
TRIGL SERPL-MCNC: 110 MG/DL (ref 0–149)
VLDLC SERPL CALC-MCNC: 20 MG/DL (ref 5–40)
WBC # BLD AUTO: 7.9 X10E3/UL (ref 3.4–10.8)

## 2021-04-05 NOTE — PROGRESS NOTES
[facility] - PODIATRY    Today's Date: 04/09/21    Patient Name: Sandeep Alonso  MRN: [id number]  CSN: [id number]  PCP: Lisbet Velez, DNP, APRN  Referring Provider: No ref. provider found    SUBJECTIVE     Chief Complaint   Patient presents with   • Follow-up     Pt is here today for a f/u on Diabetic Foot and Nail Care - pt presents with long toenaisl with minor discoloration  - Pt denies any pain at the moment - PCP 03/05/2021    • Diabetes     last blood sugar reading is 114mg/dl      HPI: Sandeep Alonso, a 65 y.o.male, comes to clinic as a(n) established patient presenting for diabetic foot exam and complaining of long, thickened toenails. Pt has h/o DM2, HTN, Hypokalemia, Edema. Relates that his nails are long, thick, and discolored. He is unable to reach nails to care for them himself. Notes occasional numbness and tingling. Denies open wounds or sores. Patient is NIDDM with last stated BG level of 114mg/dl.  Denies pain at the moment. Relates previous treatment(s) including nail debridement by podiatrist. Denies any constitutional symptoms. No other pedal complaints at this time.    Past Medical History:   Diagnosis Date   • Hypokalemia    • Peripheral edema      Past Surgical History:   Procedure Laterality Date   • NO PAST SURGERIES       Family History   Problem Relation Age of Onset   • Heart attack Mother    • Emphysema Father    • No Known Problems Sister    • No Known Problems Brother    • No Known Problems Son    • No Known Problems Maternal Grandmother    • No Known Problems Maternal Grandfather    • No Known Problems Paternal Grandmother    • No Known Problems Paternal Grandfather    • No Known Problems Brother    • No Known Problems Brother    • No Known Problems Son    • No Known Problems Son    • No Known Problems Son      Social History     Socioeconomic History   • Marital status:      Spouse name: Not on file   • Number of children: Not on file   • Years of  education: Not on file   • Highest education level: Not on file   Tobacco Use   • Smoking status: Never Smoker   • Smokeless tobacco: Never Used   Vaping Use   • Vaping Use: Never used   Substance and Sexual Activity   • Alcohol use: Yes     Comment: occasional beer   • Drug use: No   • Sexual activity: Defer     Allergies   Allergen Reactions   • Ace Inhibitors Cough     Current Outpatient Medications   Medication Sig Dispense Refill   • amLODIPine (NORVASC) 5 MG tablet Take 1 tablet by mouth Daily. 90 tablet 1   • furosemide (LASIX) 40 MG tablet Take 1 tablet by mouth 2 (Two) Times a Day. 180 tablet 1   • gabapentin (NEURONTIN) 100 MG capsule Take 1 capsule by mouth 2 (Two) Times a Day. 180 capsule 1   • losartan-hydrochlorothiazide (HYZAAR) 50-12.5 MG per tablet Take 1 tablet by mouth Daily. 90 tablet 0   • metFORMIN (GLUCOPHAGE) 500 MG tablet 1/2 tab daily (actual dose is 250) 90 tablet 1   • Omega 3 1000 MG capsule Take 1 capsule by mouth Every Night. 90 each 1   • potassium chloride (K-DUR,KLOR-CON) 20 MEQ CR tablet Take 1 tablet by mouth Daily. 90 tablet 1   • pravastatin (PRAVACHOL) 10 MG tablet Take 1 tablet by mouth Every Night. 90 tablet 1     No current facility-administered medications for this visit.     Review of Systems   Constitutional: Negative for chills and fever.   HENT: Negative for congestion.    Respiratory: Negative for shortness of breath.    Cardiovascular: Negative for chest pain and leg swelling.   Gastrointestinal: Negative for constipation, diarrhea, nausea and vomiting.   Musculoskeletal: Negative for gait problem and joint swelling.   Skin: Negative for color change.        Long, thickened toenails   Neurological: Positive for numbness.       OBJECTIVE     Vitals:    04/09/21 1343   BP: 138/78   Pulse: 71   SpO2: 98%       Foot/Ankle Exam:       General:   Appearance: appears stated age and healthy and obesity    Orientation: AAOx3    Affect: appropriate    Gait: unimpaired     Assistance: independent    Shoe Gear:  Casual shoes    VASCULAR      Right Foot Vascularity   Dorsalis pedis:  2+  Posterior tibial:  2+  Skin Temperature: warm    Edema Gradin+ and pitting  CFT:  3  Pedal Hair Growth:  Present  Varicosities: mild varicosities       Left Foot Vascularity   Dorsalis pedis:  2+  Posterior tibial:  2+  Skin Temperature: warm    Edema Gradin+ and pitting  CFT:  3  Pedal Hair Growth:  Present  Varicosities: mild varicosities        NEUROLOGIC     Right Foot Neurologic   Light touch sensation:  Diminished  Vibratory sensation:  Diminished  Hot/Cold sensation: diminished    Protective Sensation using [place]-Lucita Monofilament:  Diminished     Left Foot Neurologic   Light touch sensation:  Diminished  Vibratory sensation:  Diminished  Hot/cold sensation: diminished    Protective Sensation using Ideal-Lucita Monofilament:  Diminished     MUSCULOSKELETAL      Right Foot Musculoskeletal    Amputation   Right toes amputated: No    Ecchymosis:  None  Tenderness: none    Arch:  Normal     Left Foot Musculoskeletal    Amputation   Left toes amputated: No    Ecchymosis:  None  Tenderness: none    Arch:  Normal     MUSCLE STRENGTH     Right Foot Muscle Strength   Foot dorsiflexion:  5  Foot plantar flexion:  5  Foot inversion:  5  Foot eversion:  5     Left Foot Muscle Strength   Foot dorsiflexion:  5  Foot plantar flexion:  5  Foot inversion:  5  Foot eversion:  5     RANGE OF MOTION      Right Foot Range of Motion   Foot and ankle ROM within normal limits       Left Foot Range of Motion   Foot and ankle ROM within normal limits       DERMATOLOGIC     Right Foot Dermatologic   Skin: skin intact    Nails: onychomycosis, abnormally thick and subungual debris       Left Foot Dermatologic   Skin: skin intact    Nails: onychomycosis, abnormally thick and subungual debris        RADIOLOGY/NUCLEAR:  No results found.    LABORATORY/CULTURE RESULTS:      PATHOLOGY RESULTS:        ASSESSMENT/PLAN     Diagnoses and all orders for this visit:    1. Onychomycosis (Primary)    2. Type 2 diabetes mellitus with diabetic polyneuropathy, without long-term current use of insulin (CMS/[facility])    3. Foot pain, bilateral    4. Tinea pedis of both feet    5. Morbidly obese (CMS/[facility])      Comprehensive lower extremity examination and evaluation was performed.  Discussed findings and treatment plan including risks, benefits, and treatment options with patient in detail. Patient agreed with treatment plan.  After verbal consent obtained, nail(s) x10 debrided of length and thickness with nail nipper without incidence  Patient may maintain nails and calluses at home utilizing emery board or pumice stone between visits as needed  Reviewed at home diabetic foot care including daily foot checks   Continue antifungal PRN.   Continue blood glucose monitoring under direction of PCP.   Patient's Body mass index is 44.32 kg/m². BMI is above normal parameters. Recommendations include: educational material.  An After Visit Summary was printed and given to the patient at discharge, including (if requested) any available informative/educational handouts regarding diagnosis, treatment, or medications. All questions were answered to patient/family satisfaction. Should symptoms fail to improve or worsen they agree to call or return to clinic or to go to the Emergency Department. Discussed the importance of following up with any needed screening tests/labs/specialist appointments and any requested follow-up recommended by me today. Importance of maintaining follow-up discussed and patient accepts that missed appointments can delay diagnosis and potentially lead to worsening of conditions.  Return in about 3 months (around 7/9/2021)., or sooner if acute issues arise.        This document has been electronically signed by VENKAT Lyons on April 9, 2021 14:33 CDT     Please note that portions of this note may have been  completed with a voice recognition program. Efforts were made to edit the dictations, but occasionally words are mistranscribed.

## 2021-04-08 ENCOUNTER — TELEPHONE (OUTPATIENT)
Dept: PODIATRY | Facility: CLINIC | Age: 66
End: 2021-04-08

## 2021-04-08 NOTE — TELEPHONE ENCOUNTER
Left message for patient regarding appointment with Darin on 4/9. Also informed patient if they had any questions or needed to reschedule to call the office at [phone].

## 2021-04-09 ENCOUNTER — OFFICE VISIT (OUTPATIENT)
Dept: PODIATRY | Facility: CLINIC | Age: 66
End: 2021-04-09

## 2021-04-09 VITALS
HEIGHT: 67 IN | WEIGHT: 283 LBS | OXYGEN SATURATION: 98 % | DIASTOLIC BLOOD PRESSURE: 78 MMHG | HEART RATE: 71 BPM | SYSTOLIC BLOOD PRESSURE: 138 MMHG | BODY MASS INDEX: 44.42 KG/M2

## 2021-04-09 DIAGNOSIS — M79.672 FOOT PAIN, BILATERAL: ICD-10-CM

## 2021-04-09 DIAGNOSIS — E11.42 TYPE 2 DIABETES MELLITUS WITH DIABETIC POLYNEUROPATHY, WITHOUT LONG-TERM CURRENT USE OF INSULIN (HCC): ICD-10-CM

## 2021-04-09 DIAGNOSIS — M79.671 FOOT PAIN, BILATERAL: ICD-10-CM

## 2021-04-09 DIAGNOSIS — E66.01 MORBIDLY OBESE (HCC): ICD-10-CM

## 2021-04-09 DIAGNOSIS — B35.1 ONYCHOMYCOSIS: Primary | ICD-10-CM

## 2021-04-09 DIAGNOSIS — B35.3 TINEA PEDIS OF BOTH FEET: ICD-10-CM

## 2021-04-09 PROCEDURE — 11721 DEBRIDE NAIL 6 OR MORE: CPT | Performed by: NURSE PRACTITIONER

## 2021-07-06 NOTE — PROGRESS NOTES
[facility] - PODIATRY    Today's Date: 07/12/21    Patient Name: Sandeep Alonso  MRN: [id number]  CSN: [id number]  PCP: Lisbet Velez, DNP, APRN  Referring Provider: No ref. provider found    SUBJECTIVE     Chief Complaint   Patient presents with   • Follow-up     pcp03/05/2021 3 MO FU DIABETIC- pt states need for nail care- pt denies pain- pt presents with long toenails   • Diabetes     168mg/dl last BG this morning     HPI: Sandeep Alonso, a 66 y.o.male, comes to clinic as a(n) established patient presenting for diabetic foot exam and complaining of long, thickened toenails. Pt has h/o DM2, HTN, Hypokalemia, Edema. Relates that his nails are long, thick, and discolored. He is unable to reach feet to care for nails himself. Notes occasional numbness and tingling. Denies open wounds or sores. Patient is NIDDM with last stated BG level of 168mg/dl.  Denies pain today. Relates previous treatment(s) including nail debridement by podiatrist. Denies any constitutional symptoms. No other pedal complaints at this time.    Past Medical History:   Diagnosis Date   • Hypokalemia    • Peripheral edema      Past Surgical History:   Procedure Laterality Date   • NO PAST SURGERIES       Family History   Problem Relation Age of Onset   • Heart attack Mother    • Emphysema Father    • No Known Problems Sister    • No Known Problems Brother    • No Known Problems Son    • No Known Problems Maternal Grandmother    • No Known Problems Maternal Grandfather    • No Known Problems Paternal Grandmother    • No Known Problems Paternal Grandfather    • No Known Problems Brother    • No Known Problems Brother    • No Known Problems Son    • No Known Problems Son    • No Known Problems Son      Social History     Socioeconomic History   • Marital status:      Spouse name: Not on file   • Number of children: Not on file   • Years of education: Not on file   • Highest education level: Not on file   Tobacco Use   •  Smoking status: Never Smoker   • Smokeless tobacco: Never Used   Vaping Use   • Vaping Use: Never used   Substance and Sexual Activity   • Alcohol use: Yes     Alcohol/week: 1.0 standard drinks     Types: 1 Cans of beer per week     Comment: occasional beer   • Drug use: No   • Sexual activity: Defer     Allergies   Allergen Reactions   • Ace Inhibitors Cough     Current Outpatient Medications   Medication Sig Dispense Refill   • amLODIPine (NORVASC) 5 MG tablet Take 1 tablet by mouth Daily. 90 tablet 1   • furosemide (LASIX) 40 MG tablet Take 1 tablet by mouth 2 (Two) Times a Day. 180 tablet 1   • gabapentin (NEURONTIN) 100 MG capsule Take 1 capsule by mouth 2 (Two) Times a Day. 180 capsule 1   • losartan-hydrochlorothiazide (HYZAAR) 50-12.5 MG per tablet Take 1 tablet by mouth Daily. 90 tablet 0   • metFORMIN (GLUCOPHAGE) 500 MG tablet 1/2 tab daily (actual dose is 250) 90 tablet 1   • Omega 3 1000 MG capsule Take 1 capsule by mouth Every Night. 90 each 1   • potassium chloride (K-DUR,KLOR-CON) 20 MEQ CR tablet Take 1 tablet by mouth Daily. 90 tablet 1   • pravastatin (PRAVACHOL) 10 MG tablet Take 1 tablet by mouth Every Night. 90 tablet 1     No current facility-administered medications for this visit.     Review of Systems   Constitutional: Negative for chills and fever.   HENT: Negative for congestion.    Respiratory: Negative for shortness of breath.    Cardiovascular: Negative for chest pain and leg swelling.   Gastrointestinal: Negative for constipation, diarrhea, nausea and vomiting.   Musculoskeletal: Negative for gait problem and joint swelling.   Skin: Negative for color change.        Long, thickened toenails   Neurological: Positive for numbness.       OBJECTIVE     Vitals:    07/09/21 1509   BP: 128/60   Pulse: 68   SpO2: 96%       Foot/Ankle Exam:       General:   Appearance: appears stated age and healthy and obesity    Orientation: AAOx3    Affect: appropriate    Gait: unimpaired    Assistance:  independent    Shoe Gear:  Casual shoes    VASCULAR      Right Foot Vascularity   Dorsalis pedis:  2+  Posterior tibial:  2+  Skin Temperature: warm    Edema Gradin+ and pitting  CFT:  3  Pedal Hair Growth:  Present  Varicosities: mild varicosities       Left Foot Vascularity   Dorsalis pedis:  2+  Posterior tibial:  2+  Skin Temperature: warm    Edema Gradin+ and pitting  CFT:  3  Pedal Hair Growth:  Present  Varicosities: mild varicosities        NEUROLOGIC     Right Foot Neurologic   Light touch sensation:  Diminished  Vibratory sensation:  Diminished  Hot/Cold sensation: diminished    Protective Sensation using [place]-Lucita Monofilament:  8     Left Foot Neurologic   Light touch sensation:  Diminished  Vibratory sensation:  Diminished  Hot/cold sensation: diminished    Protective Sensation using [place]-Lucita Monofilament:  8     MUSCULOSKELETAL      Right Foot Musculoskeletal    Amputation   Right toes amputated: No    Ecchymosis:  None  Tenderness: none    Arch:  Normal     Left Foot Musculoskeletal    Amputation   Left toes amputated: No    Ecchymosis:  None  Tenderness: none    Arch:  Normal     MUSCLE STRENGTH     Right Foot Muscle Strength   Foot dorsiflexion:  5  Foot plantar flexion:  5  Foot inversion:  5  Foot eversion:  5     Left Foot Muscle Strength   Foot dorsiflexion:  5  Foot plantar flexion:  5  Foot inversion:  5  Foot eversion:  5     RANGE OF MOTION      Right Foot Range of Motion   Foot and ankle ROM within normal limits       Left Foot Range of Motion   Foot and ankle ROM within normal limits       DERMATOLOGIC     Right Foot Dermatologic   Skin: skin intact    Nails: onychomycosis, abnormally thick and subungual debris       Left Foot Dermatologic   Skin: skin intact    Nails: onychomycosis, abnormally thick and subungual debris        RADIOLOGY/NUCLEAR:  No results found.    LABORATORY/CULTURE RESULTS:      PATHOLOGY RESULTS:       ASSESSMENT/PLAN     Diagnoses and all  orders for this visit:    1. Onychomycosis (Primary)    2. Type 2 diabetes mellitus with diabetic polyneuropathy, without long-term current use of insulin (CMS/[facility])    3. Foot pain, bilateral    4. Tinea pedis of both feet    5. Morbidly obese (CMS/[facility])      Comprehensive lower extremity examination and evaluation was performed.  Discussed findings and treatment plan including risks, benefits, and treatment options with patient in detail. Patient agreed with treatment plan.  After verbal consent obtained, nail(s) x10 debrided of length and thickness with nail nipper without incidence  Patient may maintain nails and calluses at home utilizing emery board or pumice stone between visits as needed  Reviewed at home diabetic foot care including daily foot checks   Continue antifungal PRN.   Continue diabetic monitoring and control under direction of PCP.   Patient's Body mass index is 40.57 kg/m². BMI is above normal parameters. Recommendations include: educational material.  An After Visit Summary was printed and given to the patient at discharge, including (if requested) any available informative/educational handouts regarding diagnosis, treatment, or medications. All questions were answered to patient/family satisfaction. Should symptoms fail to improve or worsen they agree to call or return to clinic or to go to the Emergency Department. Discussed the importance of following up with any needed screening tests/labs/specialist appointments and any requested follow-up recommended by me today. Importance of maintaining follow-up discussed and patient accepts that missed appointments can delay diagnosis and potentially lead to worsening of conditions.  Return in about 3 months (around 10/9/2021)., or sooner if acute issues arise.        This document has been electronically signed by VENKAT Lyons on July 12, 2021 12:57 CDT     Please note that portions of this note may have been completed with a voice recognition  program. Efforts were made to edit the dictations, but occasionally words are mistranscribed.

## 2021-07-08 ENCOUNTER — TELEPHONE (OUTPATIENT)
Dept: PODIATRY | Facility: CLINIC | Age: 66
End: 2021-07-08

## 2021-07-08 NOTE — TELEPHONE ENCOUNTER
Called patient regarding appt on 07/09/2021. Left message for patient to return call if any questions or concerns arise.

## 2021-07-09 ENCOUNTER — OFFICE VISIT (OUTPATIENT)
Dept: PODIATRY | Facility: CLINIC | Age: 66
End: 2021-07-09

## 2021-07-09 VITALS
WEIGHT: 259 LBS | OXYGEN SATURATION: 96 % | HEIGHT: 67 IN | BODY MASS INDEX: 40.65 KG/M2 | DIASTOLIC BLOOD PRESSURE: 60 MMHG | HEART RATE: 68 BPM | SYSTOLIC BLOOD PRESSURE: 128 MMHG

## 2021-07-09 DIAGNOSIS — E11.42 TYPE 2 DIABETES MELLITUS WITH DIABETIC POLYNEUROPATHY, WITHOUT LONG-TERM CURRENT USE OF INSULIN (HCC): ICD-10-CM

## 2021-07-09 DIAGNOSIS — E66.01 MORBIDLY OBESE (HCC): ICD-10-CM

## 2021-07-09 DIAGNOSIS — M79.671 FOOT PAIN, BILATERAL: ICD-10-CM

## 2021-07-09 DIAGNOSIS — B35.1 ONYCHOMYCOSIS: Primary | ICD-10-CM

## 2021-07-09 DIAGNOSIS — M79.672 FOOT PAIN, BILATERAL: ICD-10-CM

## 2021-07-09 DIAGNOSIS — B35.3 TINEA PEDIS OF BOTH FEET: ICD-10-CM

## 2021-07-09 PROCEDURE — 11721 DEBRIDE NAIL 6 OR MORE: CPT | Performed by: NURSE PRACTITIONER

## 2021-07-09 PROCEDURE — 99213 OFFICE O/P EST LOW 20 MIN: CPT | Performed by: NURSE PRACTITIONER

## 2021-08-10 ENCOUNTER — TELEPHONE (OUTPATIENT)
Dept: PODIATRY | Facility: CLINIC | Age: 66
End: 2021-08-10

## 2021-08-10 NOTE — TELEPHONE ENCOUNTER
Left  A message for a return call.Patients appointment had to be moved from October the 8th to October the 15th due to Provider being out of the office.

## 2021-08-24 DIAGNOSIS — I10 ESSENTIAL HYPERTENSION: ICD-10-CM

## 2021-08-24 RX ORDER — LOSARTAN POTASSIUM AND HYDROCHLOROTHIAZIDE 12.5; 5 MG/1; MG/1
1 TABLET ORAL DAILY
Qty: 30 TABLET | Refills: 0 | Status: SHIPPED | OUTPATIENT
Start: 2021-08-24 | End: 2021-09-07 | Stop reason: SDUPTHER

## 2021-08-24 NOTE — TELEPHONE ENCOUNTER
Caller: Sandeep Alonso    Relationship: Self    Best call back number: [phone]    Medication needed:   Requested Prescriptions     Pending Prescriptions Disp Refills   • losartan-hydrochlorothiazide (HYZAAR) 50-12.5 MG per tablet 90 tablet 0     Sig: Take 1 tablet by mouth Daily.       When do you need the refill by: ASAP    What additional details did the patient provide when requesting the medication: HAS ENOUGH UNTIL Friday     Does the patient have less than a 3 day supply:  [] Yes  [x] No    What is the patient's preferred pharmacy: [facility] [address] [place] [address] [phone] [facility] [phone]

## 2021-09-01 DIAGNOSIS — E11.40 TYPE 2 DIABETES MELLITUS WITH DIABETIC NEUROPATHY, WITHOUT LONG-TERM CURRENT USE OF INSULIN (HCC): ICD-10-CM

## 2021-09-01 DIAGNOSIS — E87.6 HYPOKALEMIA: ICD-10-CM

## 2021-09-01 NOTE — TELEPHONE ENCOUNTER
Caller: Sandeep Alonso    Relationship: Self    Best call back number: [phone]     Medication needed:   Requested Prescriptions     Pending Prescriptions Disp Refills   • potassium chloride (K-DUR,KLOR-CON) 20 MEQ CR tablet 90 tablet 1     Sig: Take 1 tablet by mouth Daily.   • gabapentin (NEURONTIN) 100 MG capsule 180 capsule 1     Sig: Take 1 capsule by mouth 2 (Two) Times a Day.       When do you need the refill by: ASAP    Does the patient have less than a 3 day supply:  [x] Yes  [] No    What is the patient's preferred pharmacy: [facility] [address] [place] [address] [phone] [facility] [phone]

## 2021-09-02 RX ORDER — POTASSIUM CHLORIDE 20 MEQ/1
20 TABLET, EXTENDED RELEASE ORAL DAILY
Qty: 90 TABLET | Refills: 1 | OUTPATIENT
Start: 2021-09-02

## 2021-09-02 RX ORDER — GABAPENTIN 100 MG/1
100 CAPSULE ORAL 2 TIMES DAILY
Qty: 180 CAPSULE | Refills: 1 | OUTPATIENT
Start: 2021-09-02

## 2021-09-02 NOTE — TELEPHONE ENCOUNTER
Last RF:     Gabapentin 3/5/2021 #180 with 1 refill     Potassium 3/5/2021 #90 with 1 refill     Last OV: 3/5/2021     Next OV: 9/7/2021

## 2021-09-03 DIAGNOSIS — E11.40 TYPE 2 DIABETES MELLITUS WITH DIABETIC NEUROPATHY, WITHOUT LONG-TERM CURRENT USE OF INSULIN (HCC): ICD-10-CM

## 2021-09-03 DIAGNOSIS — E87.6 HYPOKALEMIA: ICD-10-CM

## 2021-09-03 RX ORDER — GABAPENTIN 100 MG/1
CAPSULE ORAL
Qty: 60 CAPSULE | Refills: 0 | Status: SHIPPED | OUTPATIENT
Start: 2021-09-03 | End: 2021-09-07 | Stop reason: SDUPTHER

## 2021-09-03 RX ORDER — POTASSIUM CHLORIDE 20 MEQ/1
TABLET, EXTENDED RELEASE ORAL
Qty: 90 TABLET | Refills: 0 | Status: SHIPPED | OUTPATIENT
Start: 2021-09-03 | End: 2021-09-07 | Stop reason: SDUPTHER

## 2021-09-03 NOTE — TELEPHONE ENCOUNTER
Rx Refill Note  Requested Prescriptions     Pending Prescriptions Disp Refills   • potassium chloride (K-DUR,KLOR-CON) 20 MEQ CR tablet [Pharmacy Med Name: Potassium Chloride [place] ER 20 MEQ Oral Tablet Extended Release] 90 tablet 0     Sig: Take 1 tablet by mouth once daily   • gabapentin (NEURONTIN) 100 MG capsule [Pharmacy Med Name: Gabapentin 100 MG Oral Capsule] 180 capsule 0     Sig: Take 1 capsule by mouth twice daily     Med last filled:    potassium: 3/5/21   gabapentin: 3/5/21   Last office visit with prescribing clinician: 3/5/2021      Next office visit with prescribing clinician: 9/7/2021     [employer] Select 13 Discrete - (03/05/2021 09:56)    Looks like pt needs UDS              Anne Parr RN  09/03/21, 13:57 CDT

## 2021-09-07 ENCOUNTER — OFFICE VISIT (OUTPATIENT)
Dept: FAMILY MEDICINE CLINIC | Facility: CLINIC | Age: 66
End: 2021-09-07

## 2021-09-07 VITALS
HEIGHT: 67 IN | RESPIRATION RATE: 16 BRPM | WEIGHT: 244.4 LBS | DIASTOLIC BLOOD PRESSURE: 63 MMHG | OXYGEN SATURATION: 99 % | SYSTOLIC BLOOD PRESSURE: 127 MMHG | HEART RATE: 51 BPM | BODY MASS INDEX: 38.36 KG/M2 | TEMPERATURE: 97.3 F

## 2021-09-07 DIAGNOSIS — R60.0 LOCALIZED EDEMA: ICD-10-CM

## 2021-09-07 DIAGNOSIS — E87.6 HYPOKALEMIA: ICD-10-CM

## 2021-09-07 DIAGNOSIS — Z12.5 SCREENING PSA (PROSTATE SPECIFIC ANTIGEN): ICD-10-CM

## 2021-09-07 DIAGNOSIS — E66.01 CLASS 3 SEVERE OBESITY DUE TO EXCESS CALORIES WITH SERIOUS COMORBIDITY AND BODY MASS INDEX (BMI) OF 40.0 TO 44.9 IN ADULT (HCC): ICD-10-CM

## 2021-09-07 DIAGNOSIS — E78.2 MIXED HYPERLIPIDEMIA: ICD-10-CM

## 2021-09-07 DIAGNOSIS — E11.43 TYPE 2 DIABETES MELLITUS WITH DIABETIC AUTONOMIC NEUROPATHY, WITHOUT LONG-TERM CURRENT USE OF INSULIN (HCC): Primary | ICD-10-CM

## 2021-09-07 DIAGNOSIS — I10 ESSENTIAL HYPERTENSION: ICD-10-CM

## 2021-09-07 PROCEDURE — 99214 OFFICE O/P EST MOD 30 MIN: CPT | Performed by: NURSE PRACTITIONER

## 2021-09-07 RX ORDER — POTASSIUM CHLORIDE 20 MEQ/1
20 TABLET, EXTENDED RELEASE ORAL DAILY
Qty: 90 TABLET | Refills: 1 | Status: SHIPPED | OUTPATIENT
Start: 2021-09-07 | End: 2022-03-07 | Stop reason: SDUPTHER

## 2021-09-07 RX ORDER — OMEGA-3 FATTY ACIDS/FISH OIL 300-1000MG
1 CAPSULE ORAL NIGHTLY
Qty: 90 EACH | Refills: 1 | Status: SHIPPED | OUTPATIENT
Start: 2021-09-07 | End: 2022-03-07 | Stop reason: SDUPTHER

## 2021-09-07 RX ORDER — AMLODIPINE BESYLATE 5 MG/1
5 TABLET ORAL DAILY
Qty: 90 TABLET | Refills: 1 | Status: SHIPPED | OUTPATIENT
Start: 2021-09-07 | End: 2022-03-07 | Stop reason: SDUPTHER

## 2021-09-07 RX ORDER — GABAPENTIN 100 MG/1
100 CAPSULE ORAL 2 TIMES DAILY
Qty: 180 CAPSULE | Refills: 1 | Status: SHIPPED | OUTPATIENT
Start: 2021-09-07 | End: 2022-03-07 | Stop reason: SDUPTHER

## 2021-09-07 RX ORDER — PRAVASTATIN SODIUM 10 MG
10 TABLET ORAL NIGHTLY
Qty: 90 TABLET | Refills: 1 | Status: SHIPPED | OUTPATIENT
Start: 2021-09-07 | End: 2022-03-07 | Stop reason: SDUPTHER

## 2021-09-07 RX ORDER — LOSARTAN POTASSIUM AND HYDROCHLOROTHIAZIDE 12.5; 5 MG/1; MG/1
1 TABLET ORAL DAILY
Qty: 90 TABLET | Refills: 1 | Status: SHIPPED | OUTPATIENT
Start: 2021-09-07 | End: 2022-03-07 | Stop reason: SDUPTHER

## 2021-09-07 RX ORDER — FUROSEMIDE 40 MG/1
40 TABLET ORAL 2 TIMES DAILY
Qty: 180 TABLET | Refills: 1 | Status: SHIPPED | OUTPATIENT
Start: 2021-09-07 | End: 2022-03-07 | Stop reason: SDUPTHER

## 2021-09-07 NOTE — PROGRESS NOTES
Chief Complaint  Hypertension (6 month follow up), Hyperlipidemia (6 month follow up ), and Diabetes (6 month follow up)    Subjective    Sandeep Alonso presents to [facility]  Follow up for multiple chronic problems, HTN stable on losartain-hctz without elevations on amlodipine, chest pain, or shortness of breath, lower extremity edema stable with furosemide, neuropathy stable with gabapentin, type 2 diabetes stable with metformin, hypokalemia stable with potassium chloride, hyperlipidemia stable with pravastatin and omega 3 fatty acids, morbid obesity with improvement of weight loss 40 pounds.     Hypertension  This is a chronic problem. The current episode started more than 1 year ago. The problem has been gradually improving since onset. The problem is controlled. Pertinent negatives include no blurred vision, chest pain, headaches, neck pain, orthopnea, PND, shortness of breath or sweats. There are no associated agents to hypertension. Risk factors for coronary artery disease include diabetes mellitus, dyslipidemia, family history, obesity, sedentary lifestyle and stress. Past treatments include beta blockers and ACE inhibitors. Current antihypertension treatment includes beta blockers and lifestyle changes. There are no compliance problems.  There is no history of kidney disease, heart failure or retinopathy. There is no history of hyperaldosteronism or renovascular disease.   Hyperlipidemia  This is a chronic problem. The current episode started more than 1 year ago. The problem is uncontrolled. Recent lipid tests were reviewed and are high. Exacerbating diseases include diabetes and obesity. He has no history of hypothyroidism. Factors aggravating his hyperlipidemia include beta blockers. Pertinent negatives include no chest pain or shortness of breath. Current antihyperlipidemic treatment includes statins and fibric acid derivatives. The current treatment provides mild  improvement of lipids. There are no compliance problems.  Risk factors for coronary artery disease include diabetes mellitus, dyslipidemia, hypertension, male sex, obesity and a sedentary lifestyle.   Diabetes  He presents for his follow-up diabetic visit. He has type 2 diabetes mellitus. No MedicAlert identification noted. His disease course has been stable. Pertinent negatives for hypoglycemia include no dizziness, headaches, nervousness/anxiousness, pallor, speech difficulty or sweats. Pertinent negatives for diabetes include no blurred vision, no chest pain, no polydipsia, no polyphagia, no weakness and no weight loss. Pertinent negatives for hypoglycemia complications include no hospitalization and no required glucagon injection. Pertinent negatives for diabetic complications include no retinopathy. Risk factors for coronary artery disease include diabetes mellitus, dyslipidemia, hypertension, male sex, obesity and sedentary lifestyle. Current diabetic treatment includes oral agent (monotherapy). He is compliant with treatment all of the time. His weight is stable. He is following a generally healthy diet. When asked about meal planning, he reported none. He has not had a previous visit with a dietitian. He participates in exercise intermittently. His home blood glucose trend is decreasing steadily. His breakfast blood glucose is taken between 7-8 am. His breakfast blood glucose range is generally  mg/dl. An ACE inhibitor/angiotensin II receptor blocker is not being taken. He sees a podiatrist.Eye exam is current.   Obesity  This is a chronic problem. The current episode started more than 1 year ago. The problem occurs constantly. The problem has been gradually improving. Pertinent negatives include no change in bowel habit, chest pain, headaches, joint swelling, neck pain, swollen glands, urinary symptoms or weakness. Nothing aggravates the symptoms. He has tried nothing for the symptoms. The treatment  "provided no relief.     The following portions of the patient's history were reviewed and updated as appropriate: allergies, current medications, past family history, past medical history, past social history, past surgical history and problem list.      Objective   Vital Signs:   /63 (BP Location: Left arm, Patient Position: Sitting, Cuff Size: Large Adult)   Pulse 51   Temp 97.3 °F (36.3 °C) (Temporal)   Resp 16   Ht 170.2 cm (67\") Comment: pr  Wt 111 kg (244 lb 6.4 oz)   SpO2 99%   BMI 38.28 kg/m²     Physical Exam  Vitals and nursing note reviewed.   Constitutional:       General: He is awake.      Appearance: Normal appearance. He is well-developed and well-groomed. He is morbidly obese.   HENT:      Head: Normocephalic and atraumatic.      Right Ear: Hearing, tympanic membrane, ear canal and external ear normal.      Left Ear: Hearing, tympanic membrane, ear canal and external ear normal.      Nose: Nose normal.      Mouth/Throat:      Lips: Pink.      Mouth: Mucous membranes are moist.      Pharynx: Oropharynx is clear.   Cardiovascular:      Rate and Rhythm: Normal rate and regular rhythm.      Heart sounds: Normal heart sounds.   Pulmonary:      Effort: Pulmonary effort is normal.      Breath sounds: Normal breath sounds and air entry.   Abdominal:      General: Abdomen is flat. Bowel sounds are normal.      Palpations: Abdomen is soft.   Musculoskeletal:      Right lower le+ Edema present.      Left lower le+ Edema present.      Right foot: Normal range of motion. No deformity or bunion.      Left foot: Normal range of motion. No deformity or bunion.   Feet:      Right foot:      Protective Sensation: 3 sites tested.      Skin integrity: Skin integrity normal.      Toenail Condition: Right toenails are abnormally thick.      Left foot:      Protective Sensation: 3 sites tested.      Skin integrity: Skin integrity normal.      Toenail Condition: Left toenails are abnormally thick. "   Lymphadenopathy:      Head:      Right side of head: No submental, submandibular or tonsillar adenopathy.      Left side of head: No submental, submandibular or tonsillar adenopathy.   Skin:     General: Skin is dry.   Neurological:      Mental Status: He is alert and oriented to person, place, and time.      Sensory: Sensation is intact.      Motor: Motor function is intact.      Coordination: Coordination is intact.      Gait: Gait is intact.   Psychiatric:         Attention and Perception: Attention and perception normal.         Mood and Affect: Affect normal.         Speech: Speech normal.         Behavior: Behavior normal. Behavior is cooperative.         Thought Content: Thought content normal.         Cognition and Memory: Cognition normal.         Judgment: Judgment normal.          Assessment and Plan   Diagnoses and all orders for this visit:    1. Type 2 diabetes mellitus with diabetic autonomic neuropathy, without long-term current use of insulin (CMS/[facility]) (Primary)  -     CBC (No Diff)  -     Comprehensive metabolic panel  -     Hemoglobin A1c  -     Microalbumin / Creatinine Urine Ratio - Urine, Clean Catch  -     gabapentin (NEURONTIN) 100 MG capsule; Take 1 capsule by mouth 2 (Two) Times a Day.  Dispense: 180 capsule; Refill: 1  -     metFORMIN (GLUCOPHAGE) 500 MG tablet; 1/2 tab daily (actual dose is 250)  Dispense: 90 tablet; Refill: 1    2. Essential hypertension  -     CBC (No Diff)  -     Comprehensive metabolic panel  -     amLODIPine (NORVASC) 5 MG tablet; Take 1 tablet by mouth Daily.  Dispense: 90 tablet; Refill: 1  -     losartan-hydrochlorothiazide (HYZAAR) 50-12.5 MG per tablet; Take 1 tablet by mouth Daily.  Dispense: 90 tablet; Refill: 1    3. Class 3 severe obesity due to excess calories with serious comorbidity and body mass index (BMI) of 40.0 to 44.9 in adult (CMS/[facility])    4. Mixed hyperlipidemia  -     Lipid panel  -     Omega 3 1000 MG capsule; Take 1 capsule by mouth Every  Night.  Dispense: 90 each; Refill: 1  -     pravastatin (PRAVACHOL) 10 MG tablet; Take 1 tablet by mouth Every Night.  Dispense: 90 tablet; Refill: 1    5. Screening PSA (prostate specific antigen)  -     PSA Screen    6. Hypokalemia  -     amLODIPine (NORVASC) 5 MG tablet; Take 1 tablet by mouth Daily.  Dispense: 90 tablet; Refill: 1  -     potassium chloride (K-DUR,KLOR-CON) 20 MEQ CR tablet; Take 1 tablet by mouth Daily.  Dispense: 90 tablet; Refill: 1    7. Localized edema  -     furosemide (LASIX) 40 MG tablet; Take 1 tablet by mouth 2 (Two) Times a Day.  Dispense: 180 tablet; Refill: 1      I spent 14 minutes caring for Sandeep on this date of service. This time includes time spent by me in the following activities:preparing for the visit, performing a medically appropriate examination and/or evaluation , counseling and educating the patient/family/caregiver, ordering medications, tests, or procedures, documenting information in the medical record and care coordination  Follow Up {Instructions Charge Capture  Follow-up Communications :23}  Return in about 6 months (around 3/7/2022) for Recheck.  Patient was given instructions and counseling regarding his condition or for health maintenance advice. Please see specific information pulled into the AVS if appropriate.     Electronically signed by Lisbet Velez DNP, APRN, 09/07/21, 11:29 AM CDT.

## 2021-09-08 LAB
ALBUMIN SERPL-MCNC: 4.6 G/DL (ref 3.5–5.2)
ALBUMIN/CREAT UR: <16 MG/G CREAT (ref 0–29)
ALBUMIN/GLOB SERPL: 1.6 G/DL
ALP SERPL-CCNC: 84 U/L (ref 39–117)
ALT SERPL-CCNC: 24 U/L (ref 1–41)
AST SERPL-CCNC: 20 U/L (ref 1–40)
BILIRUB SERPL-MCNC: 0.4 MG/DL (ref 0–1.2)
BUN SERPL-MCNC: 11 MG/DL (ref 8–23)
BUN/CREAT SERPL: 12.4 (ref 7–25)
CALCIUM SERPL-MCNC: 9.3 MG/DL (ref 8.6–10.5)
CHLORIDE SERPL-SCNC: 101 MMOL/L (ref 98–107)
CHOLEST SERPL-MCNC: 139 MG/DL (ref 0–200)
CO2 SERPL-SCNC: 31.4 MMOL/L (ref 22–29)
CREAT SERPL-MCNC: 0.89 MG/DL (ref 0.76–1.27)
CREAT UR-MCNC: 18.3 MG/DL
ERYTHROCYTE [DISTWIDTH] IN BLOOD BY AUTOMATED COUNT: 14.1 % (ref 12.3–15.4)
GLOBULIN SER CALC-MCNC: 2.8 GM/DL
GLUCOSE SERPL-MCNC: 93 MG/DL (ref 65–99)
HBA1C MFR BLD: 5.7 % (ref 4.8–5.6)
HCT VFR BLD AUTO: 44.9 % (ref 37.5–51)
HDLC SERPL-MCNC: 34 MG/DL (ref 40–60)
HGB BLD-MCNC: 14.5 G/DL (ref 13–17.7)
LDLC SERPL CALC-MCNC: 81 MG/DL (ref 0–100)
MCH RBC QN AUTO: 28.8 PG (ref 26.6–33)
MCHC RBC AUTO-ENTMCNC: 32.3 G/DL (ref 31.5–35.7)
MCV RBC AUTO: 89.1 FL (ref 79–97)
MICROALBUMIN UR-MCNC: <3 UG/ML
PLATELET # BLD AUTO: 271 10*3/MM3 (ref 140–450)
POTASSIUM SERPL-SCNC: 4.2 MMOL/L (ref 3.5–5.2)
PROT SERPL-MCNC: 7.4 G/DL (ref 6–8.5)
PSA SERPL-MCNC: 1.08 NG/ML (ref 0–4)
RBC # BLD AUTO: 5.04 10*6/MM3 (ref 4.14–5.8)
SODIUM SERPL-SCNC: 142 MMOL/L (ref 136–145)
TRIGL SERPL-MCNC: 134 MG/DL (ref 0–150)
VLDLC SERPL CALC-MCNC: 24 MG/DL (ref 5–40)
WBC # BLD AUTO: 9.03 10*3/MM3 (ref 3.4–10.8)

## 2021-09-15 ENCOUNTER — TELEPHONE (OUTPATIENT)
Dept: PODIATRY | Facility: CLINIC | Age: 66
End: 2021-09-15

## 2021-09-28 ENCOUNTER — TELEPHONE (OUTPATIENT)
Dept: PODIATRY | Facility: CLINIC | Age: 66
End: 2021-09-28

## 2021-09-28 NOTE — TELEPHONE ENCOUNTER
Spoke with pt regarding moving appt sooner to be seen in [place]. Pt agreed, scheduled pt for 10am in [place] with Darin ROBLEDO on 09/30/2021. Pt expressed understanding

## 2021-09-29 ENCOUNTER — TELEPHONE (OUTPATIENT)
Dept: PODIATRY | Facility: CLINIC | Age: 66
End: 2021-09-29

## 2021-09-29 NOTE — TELEPHONE ENCOUNTER
Called pt regarding appt on 09/30/2021 with Darin BAZAN LM for pt to return call if any questions or concerns arise.

## 2021-09-30 ENCOUNTER — OFFICE VISIT (OUTPATIENT)
Dept: PODIATRY | Facility: CLINIC | Age: 66
End: 2021-09-30

## 2021-09-30 VITALS
TEMPERATURE: 97.7 F | DIASTOLIC BLOOD PRESSURE: 54 MMHG | OXYGEN SATURATION: 95 % | HEART RATE: 72 BPM | BODY MASS INDEX: 37.98 KG/M2 | WEIGHT: 242 LBS | SYSTOLIC BLOOD PRESSURE: 132 MMHG | HEIGHT: 67 IN

## 2021-09-30 DIAGNOSIS — E11.42 TYPE 2 DIABETES MELLITUS WITH DIABETIC POLYNEUROPATHY, WITHOUT LONG-TERM CURRENT USE OF INSULIN (HCC): ICD-10-CM

## 2021-09-30 DIAGNOSIS — B35.1 ONYCHOMYCOSIS: Primary | ICD-10-CM

## 2021-09-30 PROCEDURE — 11721 DEBRIDE NAIL 6 OR MORE: CPT | Performed by: NURSE PRACTITIONER

## 2021-09-30 NOTE — PROGRESS NOTES
[facility] - PODIATRY    Today's Date: 09/30/21    Patient Name: Sandeep Alonso  MRN: [id number]  CSN: [id number]  PCP: Lisbet Velez, DNP, APRN  Referring Provider: No ref. provider found    SUBJECTIVE     Chief Complaint   Patient presents with   • Follow-up     pcp09/07/2021 3 month fu diabetic nail care- pt states sometimes he needs medicine for his neuropathy, but has been doing well otherwise- pt denies pain- pt presents   • Diabetes     last BG 125mg/dl     HPI: Sandeep Alonso, a 66 y.o.male, comes to clinic as a(n) established patient presenting for diabetic foot exam and complaining of long, thickened toenails. Pt has h/o DM2, HTN, Hypokalemia, Edema. Relates that his nails are long, thick, and discolored. He is unable to reach feet to care for nails himself. Notes occasional numbness and tingling in feet and ankles. Denies open wounds or sores. Patient is NIDDM with last stated BG level of 125mg/dl.  Denies pain at the present time. Relates previous treatment(s) including nail debridement by podiatrist. Denies any constitutional symptoms. No other pedal complaints at this time.    Past Medical History:   Diagnosis Date   • Hypokalemia    • Peripheral edema      Past Surgical History:   Procedure Laterality Date   • NO PAST SURGERIES       Family History   Problem Relation Age of Onset   • Heart attack Mother    • Emphysema Father    • No Known Problems Sister    • No Known Problems Brother    • No Known Problems Son    • No Known Problems Maternal Grandmother    • No Known Problems Maternal Grandfather    • No Known Problems Paternal Grandmother    • No Known Problems Paternal Grandfather    • No Known Problems Brother    • No Known Problems Brother    • No Known Problems Son    • No Known Problems Son    • No Known Problems Son      Social History     Socioeconomic History   • Marital status:      Spouse name: Not on file   • Number of children: Not on file   • Years of  education: Not on file   • Highest education level: Not on file   Tobacco Use   • Smoking status: Never Smoker   • Smokeless tobacco: Never Used   Vaping Use   • Vaping Use: Never used   Substance and Sexual Activity   • Alcohol use: Not Currently     Alcohol/week: 1.0 standard drinks     Types: 1 Cans of beer per week     Comment: occasional beer   • Drug use: No   • Sexual activity: Defer     Allergies   Allergen Reactions   • Ace Inhibitors Cough     Current Outpatient Medications   Medication Sig Dispense Refill   • amLODIPine (NORVASC) 5 MG tablet Take 1 tablet by mouth Daily. 90 tablet 1   • furosemide (LASIX) 40 MG tablet Take 1 tablet by mouth 2 (Two) Times a Day. 180 tablet 1   • gabapentin (NEURONTIN) 100 MG capsule Take 1 capsule by mouth 2 (Two) Times a Day. 180 capsule 1   • losartan-hydrochlorothiazide (HYZAAR) 50-12.5 MG per tablet Take 1 tablet by mouth Daily. 90 tablet 1   • metFORMIN (GLUCOPHAGE) 500 MG tablet 1/2 tab daily (actual dose is 250) 90 tablet 1   • Omega 3 1000 MG capsule Take 1 capsule by mouth Every Night. 90 each 1   • potassium chloride (K-DUR,KLOR-CON) 20 MEQ CR tablet Take 1 tablet by mouth Daily. 90 tablet 1   • pravastatin (PRAVACHOL) 10 MG tablet Take 1 tablet by mouth Every Night. 90 tablet 1     No current facility-administered medications for this visit.     Review of Systems   Constitutional: Negative for chills and fever.   HENT: Negative for congestion.    Respiratory: Negative for shortness of breath.    Cardiovascular: Negative for chest pain and leg swelling.   Gastrointestinal: Negative for constipation, diarrhea, nausea and vomiting.   Musculoskeletal: Negative for gait problem and joint swelling.   Skin: Negative for color change.        Long, thickened toenails   Neurological: Positive for numbness.       OBJECTIVE     Vitals:    09/30/21 0922   BP: 132/54   Pulse: 72   Temp: 97.7 °F (36.5 °C)   SpO2: 95%       Foot/Ankle Exam:       General:   Appearance: appears  stated age and healthy and obesity    Orientation: AAOx3    Affect: appropriate    Gait: unimpaired    Assistance: independent    Shoe Gear:  Casual shoes    VASCULAR      Right Foot Vascularity   Dorsalis pedis:  2+  Posterior tibial:  2+  Skin Temperature: warm    Edema Gradin+ and pitting  CFT:  3  Pedal Hair Growth:  Present  Varicosities: mild varicosities       Left Foot Vascularity   Dorsalis pedis:  2+  Posterior tibial:  2+  Skin Temperature: warm    Edema Gradin+ and pitting  CFT:  3  Pedal Hair Growth:  Present  Varicosities: mild varicosities        NEUROLOGIC     Right Foot Neurologic   Light touch sensation:  Diminished  Vibratory sensation:  Diminished  Hot/Cold sensation: diminished    Protective Sensation using [place] Monofilament:  8     Left Foot Neurologic   Light touch sensation:  Diminished  Vibratory sensation:  Diminished  Hot/cold sensation: diminished    Protective Sensation using [place] Monofilament:  8     MUSCULOSKELETAL      Right Foot Musculoskeletal    Amputation   Right toes amputated: No    Ecchymosis:  None  Tenderness: none    Arch:  Normal     Left Foot Musculoskeletal    Amputation   Left toes amputated: No    Ecchymosis:  None  Tenderness: none    Arch:  Normal     MUSCLE STRENGTH     Right Foot Muscle Strength   Foot dorsiflexion:  5  Foot plantar flexion:  5  Foot inversion:  5  Foot eversion:  5     Left Foot Muscle Strength   Foot dorsiflexion:  5  Foot plantar flexion:  5  Foot inversion:  5  Foot eversion:  5     RANGE OF MOTION      Right Foot Range of Motion   Foot and ankle ROM within normal limits       Left Foot Range of Motion   Foot and ankle ROM within normal limits       DERMATOLOGIC     Right Foot Dermatologic   Skin: skin intact    Nails: onychomycosis, abnormally thick and subungual debris       Left Foot Dermatologic   Skin: skin intact    Nails: onychomycosis, abnormally thick and subungual debris        RADIOLOGY/NUCLEAR:  No  results found.    LABORATORY/CULTURE RESULTS:      PATHOLOGY RESULTS:       ASSESSMENT/PLAN     Diagnoses and all orders for this visit:    1. Onychomycosis (Primary)    2. Type 2 diabetes mellitus with diabetic polyneuropathy, without long-term current use of insulin ([facility])      Comprehensive lower extremity examination and evaluation was performed.  Discussed findings and treatment plan including risks, benefits, and treatment options with patient in detail. Patient agreed with treatment plan.  After verbal consent obtained, nail(s) x10 debrided of length and thickness with nail nipper without incidence  Patient may maintain nails and calluses at home utilizing emery board or pumice stone between visits as needed  Reviewed at home diabetic foot care including daily foot checks   Continue antifungal PRN.   Continue diabetic monitoring and control under direction of PCP.   An After Visit Summary was printed and given to the patient at discharge, including (if requested) any available informative/educational handouts regarding diagnosis, treatment, or medications. All questions were answered to patient/family satisfaction. Should symptoms fail to improve or worsen they agree to call or return to clinic or to go to the Emergency Department. Discussed the importance of following up with any needed screening tests/labs/specialist appointments and any requested follow-up recommended by me today. Importance of maintaining follow-up discussed and patient accepts that missed appointments can delay diagnosis and potentially lead to worsening of conditions.  Return in about 3 months (around 12/30/2021)., or sooner if acute issues arise.        This document has been electronically signed by VENKAT Lyons on September 30, 2021 09:42 CDT     Please note that portions of this note may have been completed with a voice recognition program. Efforts were made to edit the dictations, but occasionally words are  mistranscribed.

## 2021-10-04 ENCOUNTER — OFFICE VISIT (OUTPATIENT)
Dept: FAMILY MEDICINE CLINIC | Facility: CLINIC | Age: 66
End: 2021-10-04

## 2021-10-04 VITALS
DIASTOLIC BLOOD PRESSURE: 78 MMHG | TEMPERATURE: 97.3 F | WEIGHT: 240.5 LBS | RESPIRATION RATE: 16 BRPM | OXYGEN SATURATION: 99 % | BODY MASS INDEX: 37.75 KG/M2 | HEART RATE: 53 BPM | SYSTOLIC BLOOD PRESSURE: 139 MMHG | HEIGHT: 67 IN

## 2021-10-04 DIAGNOSIS — I10 PRIMARY HYPERTENSION: ICD-10-CM

## 2021-10-04 DIAGNOSIS — E78.2 MIXED HYPERLIPIDEMIA: ICD-10-CM

## 2021-10-04 DIAGNOSIS — Z00.00 WELCOME TO MEDICARE PREVENTIVE VISIT: Primary | ICD-10-CM

## 2021-10-04 DIAGNOSIS — E11.65 TYPE 2 DIABETES MELLITUS WITH HYPERGLYCEMIA, WITHOUT LONG-TERM CURRENT USE OF INSULIN (HCC): ICD-10-CM

## 2021-10-04 DIAGNOSIS — E11.9 ENCOUNTER FOR DIABETIC FOOT EXAM (HCC): ICD-10-CM

## 2021-10-04 DIAGNOSIS — Z23 IMMUNIZATION DUE: ICD-10-CM

## 2021-10-04 DIAGNOSIS — Z91.81 AT MODERATE RISK FOR FALL: ICD-10-CM

## 2021-10-04 PROCEDURE — 90662 IIV NO PRSV INCREASED AG IM: CPT | Performed by: NURSE PRACTITIONER

## 2021-10-04 PROCEDURE — G0009 ADMIN PNEUMOCOCCAL VACCINE: HCPCS | Performed by: NURSE PRACTITIONER

## 2021-10-04 PROCEDURE — G0008 ADMIN INFLUENZA VIRUS VAC: HCPCS | Performed by: NURSE PRACTITIONER

## 2021-10-04 PROCEDURE — 1125F AMNT PAIN NOTED PAIN PRSNT: CPT | Performed by: NURSE PRACTITIONER

## 2021-10-04 PROCEDURE — 90732 PPSV23 VACC 2 YRS+ SUBQ/IM: CPT | Performed by: NURSE PRACTITIONER

## 2021-10-04 PROCEDURE — G0402 INITIAL PREVENTIVE EXAM: HCPCS | Performed by: NURSE PRACTITIONER

## 2021-10-04 PROCEDURE — 1160F RVW MEDS BY RX/DR IN RCRD: CPT | Performed by: NURSE PRACTITIONER

## 2021-10-04 PROCEDURE — 1170F FXNL STATUS ASSESSED: CPT | Performed by: NURSE PRACTITIONER

## 2021-10-04 NOTE — PROGRESS NOTES
The ABCs of the Annual Wellness Visit  [facility]    Chief Complaint   Patient presents with   • Medicare Wellness-subsequent     Subjective {   History of Present Illness:  Sandeep Alonso is a 66 y.o. male who presents for a  Welcome to Medicare Visit.    The following portions of the patient's history were reviewed and   updated as appropriate: allergies, current medications, past family history, past medical history, past social history, past surgical history and problem list.     Chronic problems: htn stable with losartan-hctz, and amlodipine, edema lower extremties stable with furosemide, neuropathy stable with gabapentin, type 2 diabetes controlled with metformin, hyperlipidemia stable with omega 3 fatty acids and pravastatin,    Compared to one year ago, the patient feels his physical   health is the same.    Compared to one year ago, the patient feels his mental   health is the same.    Recent Hospitalizations:  He was not admitted to the hospital during the last year.       Current Medical Providers:  Patient Care Team:  Lisbet Velez DNP, VENKAT as PCP - General  Lisbet Velez DNP, APRN as PCP - Family Medicine    Outpatient Medications Prior to Visit   Medication Sig Dispense Refill   • amLODIPine (NORVASC) 5 MG tablet Take 1 tablet by mouth Daily. 90 tablet 1   • furosemide (LASIX) 40 MG tablet Take 1 tablet by mouth 2 (Two) Times a Day. 180 tablet 1   • gabapentin (NEURONTIN) 100 MG capsule Take 1 capsule by mouth 2 (Two) Times a Day. 180 capsule 1   • losartan-hydrochlorothiazide (HYZAAR) 50-12.5 MG per tablet Take 1 tablet by mouth Daily. 90 tablet 1   • metFORMIN (GLUCOPHAGE) 500 MG tablet 1/2 tab daily (actual dose is 250) 90 tablet 1   • Omega 3 1000 MG capsule Take 1 capsule by mouth Every Night. 90 each 1   • potassium chloride (K-DUR,KLOR-CON) 20 MEQ CR tablet Take 1 tablet by mouth Daily. 90 tablet 1   • pravastatin (PRAVACHOL) 10 MG tablet Take 1 tablet by mouth Every  "Night. 90 tablet 1     No facility-administered medications prior to visit.       No opioid medication identified on active medication list. I have reviewed chart for other potential  high risk medication/s and harmful drug interactions in the elderly.          Aspirin is not on active medication list.  Aspirin use is not indicated based on review of current medical condition/s. Risk of harm outweighs potential benefits.  .    Patient Active Problem List   Diagnosis   • Essential hypertension   • Mixed hyperlipidemia   • Hypokalemia   • Screening PSA (prostate specific antigen)   • Diabetic eye exam ([facility])   • Influenza vaccination given   • Encounter for diabetic foot exam ([facility])   • Encounter for therapeutic drug monitoring   • Encounter for drug screening   • Swelling of ankle joint   • Class 3 severe obesity due to excess calories with serious comorbidity and body mass index (BMI) of 40.0 to 44.9 in adult ([facility])     Advance Care Planning  Advance Directive is not on file.  ACP discussion was held with the patient during this visit. Patient does not have an advance directive, information provided.     Objective      Vitals:    10/04/21 0931   BP: 139/78   BP Location: Left arm   Patient Position: Sitting   Cuff Size: Adult   Pulse: 53   Resp: 16   Temp: 97.3 °F (36.3 °C)   TempSrc: Infrared   SpO2: 99%   Weight: 109 kg (240 lb 8 oz)   Height: 170.2 cm (67\")  Comment: per patient   PainSc:   5     BMI Readings from Last 1 Encounters:   10/04/21 37.67 kg/m²   BMI is above normal parameters. Recommendations include: educational material, exercise counseling and nutrition counseling    Does the patient have evidence of cognitive impairment? No    Physical Exam  Vitals and nursing note reviewed.   Constitutional:       General: He is awake.      Appearance: Normal appearance. He is well-developed and well-groomed. He is morbidly obese.   HENT:      Head: Normocephalic and atraumatic.      Right Ear: Hearing, tympanic " membrane, ear canal and external ear normal.      Left Ear: Hearing, tympanic membrane, ear canal and external ear normal.      Nose: Nose normal.      Mouth/Throat:      Lips: Pink.      Dentition: Abnormal dentition. Dental caries present.      Pharynx: Oropharynx is clear.   Cardiovascular:      Rate and Rhythm: Normal rate and regular rhythm.      Pulses:           Dorsalis pedis pulses are 3+ on the right side and 3+ on the left side.      Heart sounds: Normal heart sounds.   Pulmonary:      Effort: Pulmonary effort is normal.      Breath sounds: Normal breath sounds and air entry.   Abdominal:      General: Abdomen is flat. Bowel sounds are normal.      Palpations: Abdomen is soft.   Musculoskeletal:      Cervical back: Normal.      Thoracic back: Normal.      Lumbar back: Normal.      Right lower leg: No edema.      Left lower leg: No edema.      Right foot: Normal range of motion. No deformity, bunion, Charcot foot, foot drop or prominent metatarsal heads.      Left foot: Normal range of motion. No deformity, bunion, Charcot foot, foot drop or prominent metatarsal heads.   Feet:      Right foot:      Skin integrity: Skin integrity normal.      Left foot:      Skin integrity: Skin integrity normal.   Lymphadenopathy:      Head:      Right side of head: No submental, submandibular or tonsillar adenopathy.      Left side of head: No submental, submandibular or tonsillar adenopathy.   Skin:     General: Skin is warm and dry.   Neurological:      Mental Status: He is alert and oriented to person, place, and time.      Cranial Nerves: Cranial nerves are intact.      Sensory: Sensation is intact.      Motor: Motor function is intact.      Coordination: Coordination is intact.      Gait: Gait is intact.   Psychiatric:         Attention and Perception: Attention normal.         Mood and Affect: Mood normal.         Speech: Speech normal.         Behavior: Behavior normal. Behavior is cooperative.         Thought  Content: Thought content normal.         Cognition and Memory: Cognition and memory normal.         Judgment: Judgment normal.         Lab Results   Component Value Date    GLU 93 09/07/2021    CHLPL 139 09/07/2021    TRIG 134 09/07/2021    HDL 34 (L) 09/07/2021    LDL 81 09/07/2021    VLDL 24 09/07/2021    HGBA1C 5.70 (H) 09/07/2021         HEALTH RISK ASSESSMENT    Smoking Status:  Social History     Tobacco Use   Smoking Status Never Smoker   Smokeless Tobacco Never Used     Alcohol Consumption:  Social History     Substance and Sexual Activity   Alcohol Use Not Currently   • Alcohol/week: 1.0 standard drinks   • Types: 1 Cans of beer per week    Comment: occasional beer       Fall Risk Screen:    RICHARDSONADI Fall Risk Assessment was completed, and patient is at MODERATE risk for falls. Assessment completed on:10/4/2021    Depression Screen:   PHQ-2/PHQ-9 Depression Screening 10/4/2021   Little interest or pleasure in doing things 0   Feeling down, depressed, or hopeless 0   Trouble falling or staying asleep, or sleeping too much -   Feeling tired or having little energy -   Poor appetite or overeating -   Feeling bad about yourself - or that you are a failure or have let yourself or your family down -   Trouble concentrating on things, such as reading the newspaper or watching television -   Moving or speaking so slowly that other people could have noticed. Or the opposite - being so fidgety or restless that you have been moving around a lot more than usual -   Thoughts that you would be better off dead, or of hurting yourself in some way -   Total Score 0   If you checked off any problems, how difficult have these problems made it for you to do your work, take care of things at home, or get along with other people? -     Health Habits and Functional and Cognitive Screening:  No flowsheet data found.    Visual Acuity:    No exam data present    Age-appropriate Screening Schedule:  Refer to the list below for future  screening recommendations based on patient's age, sex and/or medical conditions. Orders for these recommended tests are listed in the plan section. The patient has been provided with a written plan.    Health Maintenance   Topic Date Due   • INFLUENZA VACCINE  10/01/2021   • DIABETIC EYE EXAM  11/30/2021 (Originally 10/1/2021)   • HEMOGLOBIN A1C  03/07/2022   • DIABETIC FOOT EXAM  04/09/2022   • LIPID PANEL  09/07/2022   • URINE MICROALBUMIN  09/07/2022   • TDAP/TD VACCINES (3 - Td or Tdap) 07/21/2027   • ZOSTER VACCINE  Addressed          Assessment/Plan    Diagnoses and all orders for this visit:    1. Welcome to Medicare preventive visit (Primary)  -     CBC (No Diff)  -     Comprehensive metabolic panel  -     Lipid panel  -     Hemoglobin A1c    2. At moderate risk for fall      -    Pt says that he fell once  Because he got his feet tangled up      -    Encouraged to remove all obstacles out of the path of walking      -    Install grab bars in the bathroom and shower    3. Immunization due  -     Pneumococcal Polysaccharide Vaccine 23-Valent Greater Than or Equal To 1yo Subcutaneous / IM  -     Fluzone High-Dose 65+yrs (2141-6671)  Discussed risks/benefits to vaccination, reviewed components of the vaccine, discussed VIS, discussed informed consent, informed consent obtained. Patient/Parent was allowed to accept or refuse vaccine. Questions answered to satisfactory state of patient/Parent. We reviewed typical age appropriate and seasonally appropriate vaccinations. Reviewed immunization history and updated state vaccination form as needed. Patient was counseled on will obtain records    4. Primary hypertension  -     CBC (No Diff)  -     Comprehensive metabolic panel  -     Continue losartan/hctz as prescribed    5. Encounter for diabetic foot exam (HCC)      -    Done today    6. Mixed hyperlipidemia  -     Lipid panel  -     Continue pravastatin and omega 3's as prescribed    7. Type 2 diabetes mellitus  with hyperglycemia, without long-term current use of insulin (HCC)  -     Hemoglobin A1c        -     Continue metformin as prescribed                    CMS Preventative Services Quick Reference  Risk Factors Identified During Encounter  Alcohol Misuse: denies  Chronic Pain: has diabetic neuropathy  Dementia/Memory: screened  Depression/Dysphoria: screened  Fall Risk-High or Moderate: screened and discussed  Immunizations Discussed/Encouraged (specific Immunizations; Influenza and Pneumococcal 23  Obesity/Overweight   Polypharmacy     Health Maintenance Summary    Ordered - Pneumococcal Vaccine 65+; (1 of 2 - PPSV23); Ordered on 10/4/2021  No completion, postpone, frequency change, or communication history exists for this topic.     Ordered - INFLUENZA VACCINE; (Yearly - October to March); Ordered on 10/4/2021  09/23/2020  Imm Admin: Flulaval/Fluarix/Fluzone Quad    10/18/2019  Imm Admin: flucelvax quad pfs =>4 YRS    10/18/2019  Imm Admin: Influenza, Unspecified    12/01/2018  Done    02/02/2018  Patient-Reported (Performed Externally)         Postponed - DIABETIC EYE EXAM; (Yearly); Postponed until 11/30/2021 Says it is due then  10/04/2021  Postponed until 11/30/2021 by Akil Morgan MA (Pending event)    10/01/2020  SCANNED - EYE EXAM    08/21/2020  Postponed until 11/1/2020 by Leta Wallace APRN (Pending event)    04/17/2019  Patient-Reported (Performed Externally)    10/19/2018  SCANNED - EYE EXAM         HEMOGLOBIN A1C;  (Every 6 Months); ordered today  Next due on 3/7/2022  09/07/2021  Hemoglobin A1C    03/05/2021  Hemoglobin A1C    08/21/2020  Hemoglobin A1C    02/21/2020  Hemoglobin A1C    08/16/2019  Hemoglobin A1C         DIABETIC FOOT EXAM; (Yearly); Next due on 4/9/2022 04/09/2021  Smart[employer]: WORKFLOW - DIABETES - DIABETIC FOOT EXAM PERFORMED    01/08/2021  Smart[employer]: WORKFLOW - DIABETES - DIABETIC FOOT EXAM PERFORMED    12/30/2020  SmartData: WORKFLOW - DIABETES - DIABETIC FOOT EXAM  PERFORMED    10/02/2020  SmartData: WORKFLOW - DIABETES - DIABETIC FOOT EXAM PERFORMED    10/02/2020  SmartData: WORKFLOW - DIABETES - DIABETIC FOOT EXAM PERFORMED         LIPID PANEL; (Yearly); Next due on 9/7/2022 09/07/2021  Lipid Panel    03/05/2021  Lipid panel    08/21/2020  Lipid panel    02/21/2020  Lipid panel    08/16/2019  Done         URINE MICROALBUMIN; (Yearly); Next due on 9/7/2022 09/07/2021  Microalbumin, Urine, Microalbumin/Creatinine Ratio    08/21/2020  Microalbumin, Urine    08/16/2019  Done    08/16/2019  Microalbumin, Urine, Microalbumin/Creatinine Ratio    07/27/2018  Done         ANNUAL WELLNESS VISIT; (Yearly); Next due on 10/4/2022  10/04/2021  Done      TDAP/TD VACCINES; (3 - Td or Tdap); Next due on 7/21/2027 07/21/2017  Declined    01/06/2017  Declined      COLORECTAL CANCER SCREENING  (COLONOSCOPY - Every 10 Years); Next due on 7/27/2028 02/17/2019  SCANNED - COLOGUARD    02/13/2019  SCANNED - COLOGUARD    02/08/2019  SCANNED - COLOGUARD    07/27/2018  COLONOSCOPY (Declined)    07/21/2017  COLONOSCOPY (Declined)         HEPATITIS C SCREENING; Addressed  07/21/2017  Declined    01/06/2017  Declined      ZOSTER VACCINE; (Series Information); Addressed  07/27/2018  Imm Admin: Zostavax    07/21/2017  Declined    01/06/2017  Declined      COVID-19 Vaccine; (Series Information); Completed  03/11/2021  Imm Admin: COVID-19 (MILANA)        The above risks/problems have been discussed with the patient.  Pertinent information has been shared with the patient in the After Visit Summary.  Follow up plans and orders are seen below in the Assessment/Plan Section.      Follow Up:   Return in about 1 year (around 10/4/2022) for Medicare Wellness.     An After Visit Summary and PPPS were made available to the patient.        I spent 14 minutes caring for Sandeep on this date of service. This time includes time spent by me in the following activities:preparing for the visit, performing a medically  appropriate examination and/or evaluation , counseling and educating the patient/family/caregiver, ordering medications, tests, or procedures, documenting information in the medical record and care coordination     Electronically signed by Lisbet Velez DNP, APRN, 10/04/21, 1:08 PM CDT.

## 2021-10-04 NOTE — PATIENT INSTRUCTIONS
Advance Care Planning and Advance Directives     You make decisions on a daily basis - decisions about where you want to live, your career, your home, your life. Perhaps one of the most important decisions you face is your choice for future medical care. Take time to talk with your family and your healthcare team and start planning today.  Advance Care Planning is a process that can help you:  · Understand possible future healthcare decisions in light of your own experiences  · Reflect on those decision in light of your goals and values  · Discuss your decisions with those closest to you and the healthcare professionals that care for you  · Make a plan by creating a document that reflects your wishes    Surrogate Decision Maker  In the event of a medical emergency, which has left you unable to communicate or to make your own decisions, you would need someone to make decisions for you.  It is important to discuss your preferences for medical treatment with this person while you are in good health.     Qualities of a surrogate decision maker:  • Willing to take on this role and responsibility  • Knows what you want for future medical care  • Willing to follow your wishes even if they don't agree with them  • Able to make difficult medical decisions under stressful circumstances    Advance Directives  These are legal documents you can create that will guide your healthcare team and decision maker(s) when needed. These documents can be stored in the electronic medical record.    · Living Will - a legal document to guide your care if you have a terminal condition or a serious illness and are unable to communicate. States vary by statute in document names/types, but most forms may include one or more of the following:        -  Directions regarding life-prolonging treatments        -  Directions regarding artificially provided nutrition/hydration        -  Choosing a healthcare decision maker        -  Direction  regarding organ/tissue donation    · Durable Power of  for Healthcare - this document names an -in-fact to make medical decisions for you, but it may also allow this person to make personal and financial decisions for you. Please seek the advice of an  if you need this type of document.    **Advance Directives are not required and no one may discriminate against you if you do not sign one.    Medical Orders  Many states allow specific forms/orders signed by your physician to record your wishes for medical treatment in your current state of health. This form, signed in personal communication with your physician, addresses resuscitation and other medical interventions that you may or may not want.      For more information or to schedule a time with a [facility] Advance Care Planning Facilitator contact: [facility] or call [phone] and someone will contact you directly.    Fall Prevention in the Home, Adult  Falls can cause injuries and can affect people from all age groups. There are many simple things that you can do to make your home safe and to help prevent falls. Ask for help when making these changes, if needed.  What actions can I take to prevent falls?  General instructions  · Use good lighting in all rooms. Replace any light bulbs that burn out.  · Turn on lights if it is dark. Use night-lights.  · Place frequently used items in easy-to-reach places. Lower the shelves around your home if necessary.  · Set up furniture so that there are clear paths around it. Avoid moving your furniture around.  · Remove throw rugs and other tripping hazards from the floor.  · Avoid walking on wet floors.  · Fix any uneven floor surfaces.  · Add color or contrast paint or tape to grab bars and handrails in your home. Place contrasting color strips on the first and last steps of stairways.  · When you use a stepladder, make sure that it is completely opened and that the sides are  firmly locked. Have someone hold the ladder while you are using it. Do not climb a closed stepladder.  · Be aware of any and all pets.  What can I do in the bathroom?         · Keep the floor dry. Immediately clean up any water that spills onto the floor.  · Remove soap buildup in the tub or shower on a regular basis.  · Use non-skid mats or decals on the floor of the tub or shower.  · Attach bath mats securely with double-sided, non-slip rug tape.  · If you need to sit down while you are in the shower, use a plastic, non-slip stool.  · Install grab bars by the toilet and in the tub and shower. Do not use towel bars as grab bars.  What can I do in the bedroom?  · Make sure that a bedside light is easy to reach.  · Do not use oversized bedding that drapes onto the floor.  · Have a firm chair that has side arms to use for getting dressed.  What can I do in the kitchen?  · Clean up any spills right away.  · If you need to reach for something above you, use a sturdy step stool that has a grab bar.  · Keep electrical cables out of the way.  · Do not use floor polish or wax that makes floors slippery. If you must use wax, make sure that it is non-skid floor wax.  What can I do in the stairways?  · Do not leave any items on the stairs.  · Make sure that you have a light switch at the top of the stairs and the bottom of the stairs. Have them installed if you do not have them.  · Make sure that there are handrails on both sides of the stairs. Fix handrails that are broken or loose. Make sure that handrails are as long as the stairways.  · Install non-slip stair treads on all stairs in your home.  · Avoid having throw rugs at the top or bottom of stairways, or secure the rugs with carpet tape to prevent them from moving.  · Choose a carpet design that does not hide the edge of steps on the stairway.  · Check any carpeting to make sure that it is firmly attached to the stairs. Fix any carpet that is loose or worn.  What can I  do on the outside of my home?  · Use bright outdoor lighting.  · Regularly repair the edges of walkways and driveways and fix any cracks.  · Remove high doorway thresholds.  · Trim any shrubbery on the main path into your home.  · Regularly check that handrails are securely fastened and in good repair. Both sides of any steps should have handrails.  · Install guardrails along the edges of any raised decks or porches.  · Clear walkways of debris and clutter, including tools and rocks.  · Have leaves, snow, and ice cleared regularly.  · Use sand or salt on walkways during winter months.  · In the garage, clean up any spills right away, including grease or oil spills.  What other actions can I take?  · Wear closed-toe shoes that fit well and support your feet. Wear shoes that have rubber soles or low heels.  · Use mobility aids as needed, such as canes, walkers, scooters, and crutches.  · Review your medicines with your health care provider. Some medicines can cause dizziness or changes in blood pressure, which increase your risk of falling.  Talk with your health care provider about other ways that you can decrease your risk of falls. This may include working with a physical therapist or  to improve your strength, balance, and endurance.  Where to find more information  · Centers for Disease Control and Prevention, [place]: https://www.cdc.gov  · National [place] [employer] A[place]: https://qw3biaa.yi.nih.gov  Contact a health care provider if:  · You are afraid of falling at home.  · You feel weak, drowsy, or dizzy at home.  · You fall at home.  Summary  · There are many simple things that you can do to make your home safe and to help prevent falls.  · Ways to make your home safe include removing tripping hazards and installing grab bars in the bathroom.  · Ask for help when making these changes in your home.  This information is not intended to replace advice given to you by your health care provider. Make sure you  discuss any questions you have with your health care provider.  Document Revised: 11/30/2018 Document Reviewed: 08/02/2018  Elsevier Patient Education © 2021 [employer]      Sit-to-Stand Exercise    The sit-to-stand exercise (also known as the chair stand or chair rise exercise) strengthens your lower body and helps you maintain or improve your mobility and independence. The goal is to do the sit-to-stand exercise without using your hands. This will be easier as you become stronger. You should always talk with your health care provider before starting any exercise program, especially if you have had recent surgery.  Do the exercise exactly as told by your health care provider and adjust it as directed. It is normal to feel mild stretching, pulling, tightness, or discomfort as you do this exercise, but you should stop right away if you feel sudden pain or your pain gets worse. Do not begin doing this exercise until told by your health care provider.  What the sit-to-stand exercise does  The sit-to-stand exercise helps to strengthen the muscles in your thighs and the muscles in the center of your body that give you stability (core muscles). This exercise is especially helpful if:  · You have had knee or hip surgery.  · You have trouble getting up from a chair, out of a car, or off the toilet.  How to do the sit-to-stand exercise  1. Sit toward the front edge of a sturdy chair without armrests. Your knees should be bent and your feet should be flat on the floor and shoulder-width apart.  2. Place your hands lightly on each side of the seat. Keep your back and neck as straight as possible, with your chest slightly forward.  3. Breathe in slowly. Lean forward and slightly shift your weight to the front of your feet.  4. Breathe out as you slowly stand up. Use your hands as little as possible.  5. Stand and pause for a full breath in and out.  6. Breathe in as you sit down slowly. Tighten your core and abdominal  muscles to control your lowering as much as possible.  7. Breathe out slowly.  8. Do this exercise 10-15 times. If needed, do it fewer times until you build up strength.  9. Rest for 1 minute, then do another set of 10-15 repetitions.  To change the difficulty of the sit-to-stand exercise  · If the exercise is too difficult, use a chair with sturdy armrests, and push off the armrests to help you come to the standing position. You can also use the armrests to help slowly lower yourself back to sitting. As this gets easier, try to use your arms less. You can also place a firm cushion or pillow on the chair to make the surface higher.  · If this exercise is too easy, do not use your arms to help raise or lower yourself. You can also wear a weighted vest, use hand weights, increase your repetitions, or try a lower chair.  General tips  · You may feel tired when starting an exercise routine. This is normal.  · You may have muscle soreness that lasts a few days. This is normal. As you get stronger, you may not feel muscle soreness.  · Use smooth, steady movements.  · Do not  hold your breath during strength exercises. This can cause unsafe changes in your blood pressure.  · Breathe in slowly through your nose, and breathe out slowly through your mouth.  Summary  · Strengthening your lower body is an important step to help you move safely and independently.  · The sit-to-stand exercise helps strengthen the muscles in your thighs and core.  · You should always talk with your health care provider before starting any exercise program, especially if you have had recent surgery.  This information is not intended to replace advice given to you by your health care provider. Make sure you discuss any questions you have with your health care provider.  Document Revised: 10/16/2019 Document Reviewed: 02/08/2018  [employer] Patient Education © 2021 [employer]      Medicare Wellness  Personal Prevention Plan of Service     Date of  Office Visit:    Encounter Provider:  Lisbet Velez DNP, APRN  Place of Service:  [facility]  Patient Name: Sandeep Alonso  :  1955    As part of the Medicare Wellness portion of your visit today, we are providing you with this personalized preventive plan of services (PPPS). This plan is based upon recommendations of the [place] Preventive Services Task Force (USPSTF) and the Advisory Committee on Immunization Practices (ACIP).    This lists the preventive care services that should be considered, and provides dates of when you are due. Items listed as completed are up-to-date and do not require any further intervention.    Health Maintenance   Topic Date Due   • Pneumococcal Vaccine 65+ (1 of 2 - PPSV23) Never done   • ANNUAL WELLNESS VISIT  Never done   • INFLUENZA VACCINE  10/01/2021   • DIABETIC EYE EXAM  2021 (Originally 10/1/2021)   • HEMOGLOBIN A1C  2022   • DIABETIC FOOT EXAM  2022   • LIPID PANEL  2022   • URINE MICROALBUMIN  2022   • TDAP/TD VACCINES (3 - Td or Tdap) 2027   • COLORECTAL CANCER SCREENING  2028   • COVID-19 Vaccine  Completed   • HEPATITIS C SCREENING  Addressed   • ZOSTER VACCINE  Addressed       Orders Placed This Encounter   Procedures   • Fluzone High-Dose 65+yrs ([id number])       No follow-ups on file.        Medicare Wellness  Personal Prevention Plan of Service     Date of Office Visit:  10/04/2021  Encounter Provider:  Lisbet Velez DNP, VENKAT  Place of Service:  [facility]  Patient Name: Sandeep Alonso  :  1955    As part of the Medicare Wellness portion of your visit today, we are providing you with this personalized preventive plan of services (PPPS). This plan is based upon recommendations of the [place] Preventive Services Task Force (USPSTF) and the Advisory Committee on Immunization Practices (ACIP).    This lists the preventive care  services that should be considered, and provides dates of when you are due. Items listed as completed are up-to-date and do not require any further intervention.    Health Maintenance   Topic Date Due   • Pneumococcal Vaccine 65+ (1 of 2 - PPSV23) Never done   • INFLUENZA VACCINE  10/01/2021   • DIABETIC EYE EXAM  11/30/2021 (Originally 10/1/2021)   • HEMOGLOBIN A1C  03/07/2022   • DIABETIC FOOT EXAM  04/09/2022   • LIPID PANEL  09/07/2022   • URINE MICROALBUMIN  09/07/2022   • ANNUAL WELLNESS VISIT  10/04/2022   • TDAP/TD VACCINES (3 - Td or Tdap) 07/21/2027   • COLORECTAL CANCER SCREENING  07/27/2028   • COVID-19 Vaccine  Completed   • HEPATITIS C SCREENING  Addressed   • ZOSTER VACCINE  Addressed       Orders Placed This Encounter   Procedures   • Fluzone High-Dose 65+yrs ([id number])   • Pneumococcal Polysaccharide Vaccine 23-Valent Greater Than or Equal To 3yo Subcutaneous / IM       Return in about 1 year (around 10/4/2022) for Medicare Wellness.

## 2021-10-06 ENCOUNTER — CLINICAL SUPPORT (OUTPATIENT)
Dept: FAMILY MEDICINE CLINIC | Facility: CLINIC | Age: 66
End: 2021-10-06

## 2021-10-06 DIAGNOSIS — E78.2 MIXED HYPERLIPIDEMIA: ICD-10-CM

## 2021-10-06 PROCEDURE — 36415 COLL VENOUS BLD VENIPUNCTURE: CPT | Performed by: NURSE PRACTITIONER

## 2021-10-07 LAB
ALBUMIN SERPL-MCNC: 4.6 G/DL (ref 3.5–5.2)
ALBUMIN/GLOB SERPL: 1.9 G/DL
ALP SERPL-CCNC: 88 U/L (ref 39–117)
ALT SERPL-CCNC: 19 U/L (ref 1–41)
AST SERPL-CCNC: 15 U/L (ref 1–40)
BILIRUB SERPL-MCNC: 0.5 MG/DL (ref 0–1.2)
BUN SERPL-MCNC: 10 MG/DL (ref 8–23)
BUN/CREAT SERPL: 10.2 (ref 7–25)
CALCIUM SERPL-MCNC: 9.1 MG/DL (ref 8.6–10.5)
CHLORIDE SERPL-SCNC: 99 MMOL/L (ref 98–107)
CHOLEST SERPL-MCNC: 135 MG/DL (ref 0–200)
CO2 SERPL-SCNC: 31.9 MMOL/L (ref 22–29)
CREAT SERPL-MCNC: 0.98 MG/DL (ref 0.76–1.27)
ERYTHROCYTE [DISTWIDTH] IN BLOOD BY AUTOMATED COUNT: 14.2 % (ref 12.3–15.4)
GLOBULIN SER CALC-MCNC: 2.4 GM/DL
GLUCOSE SERPL-MCNC: 92 MG/DL (ref 65–99)
HBA1C MFR BLD: 5.7 % (ref 4.8–5.6)
HCT VFR BLD AUTO: 43.8 % (ref 37.5–51)
HDLC SERPL-MCNC: 31 MG/DL (ref 40–60)
HGB BLD-MCNC: 14.2 G/DL (ref 13–17.7)
LDLC SERPL CALC-MCNC: 80 MG/DL (ref 0–100)
MCH RBC QN AUTO: 29.4 PG (ref 26.6–33)
MCHC RBC AUTO-ENTMCNC: 32.4 G/DL (ref 31.5–35.7)
MCV RBC AUTO: 90.7 FL (ref 79–97)
PLATELET # BLD AUTO: 253 10*3/MM3 (ref 140–450)
POTASSIUM SERPL-SCNC: 3.9 MMOL/L (ref 3.5–5.2)
PROT SERPL-MCNC: 7 G/DL (ref 6–8.5)
RBC # BLD AUTO: 4.83 10*6/MM3 (ref 4.14–5.8)
SODIUM SERPL-SCNC: 140 MMOL/L (ref 136–145)
TRIGL SERPL-MCNC: 137 MG/DL (ref 0–150)
VLDLC SERPL CALC-MCNC: 24 MG/DL (ref 5–40)
WBC # BLD AUTO: 7.46 10*3/MM3 (ref 3.4–10.8)

## 2022-01-27 ENCOUNTER — TELEPHONE (OUTPATIENT)
Dept: PODIATRY | Facility: CLINIC | Age: 67
End: 2022-01-27

## 2022-03-07 ENCOUNTER — OFFICE VISIT (OUTPATIENT)
Dept: FAMILY MEDICINE CLINIC | Facility: CLINIC | Age: 67
End: 2022-03-07

## 2022-03-07 VITALS
TEMPERATURE: 96.8 F | OXYGEN SATURATION: 99 % | HEIGHT: 67 IN | SYSTOLIC BLOOD PRESSURE: 145 MMHG | BODY MASS INDEX: 36.73 KG/M2 | WEIGHT: 234 LBS | RESPIRATION RATE: 18 BRPM | HEART RATE: 46 BPM | DIASTOLIC BLOOD PRESSURE: 82 MMHG

## 2022-03-07 DIAGNOSIS — E11.65 TYPE 2 DIABETES MELLITUS WITH HYPERGLYCEMIA, WITHOUT LONG-TERM CURRENT USE OF INSULIN: Primary | ICD-10-CM

## 2022-03-07 DIAGNOSIS — E11.43 TYPE 2 DIABETES MELLITUS WITH DIABETIC AUTONOMIC NEUROPATHY, WITHOUT LONG-TERM CURRENT USE OF INSULIN: ICD-10-CM

## 2022-03-07 DIAGNOSIS — E78.2 MIXED HYPERLIPIDEMIA: ICD-10-CM

## 2022-03-07 DIAGNOSIS — R60.0 LOCALIZED EDEMA: ICD-10-CM

## 2022-03-07 DIAGNOSIS — E87.6 HYPOKALEMIA: ICD-10-CM

## 2022-03-07 DIAGNOSIS — I10 ESSENTIAL HYPERTENSION: ICD-10-CM

## 2022-03-07 LAB
EXPIRATION DATE: NORMAL
HBA1C MFR BLD: 5.5 %
Lab: NORMAL

## 2022-03-07 PROCEDURE — 3044F HG A1C LEVEL LT 7.0%: CPT | Performed by: NURSE PRACTITIONER

## 2022-03-07 PROCEDURE — 99214 OFFICE O/P EST MOD 30 MIN: CPT | Performed by: NURSE PRACTITIONER

## 2022-03-07 PROCEDURE — 83036 HEMOGLOBIN GLYCOSYLATED A1C: CPT | Performed by: NURSE PRACTITIONER

## 2022-03-07 RX ORDER — POTASSIUM CHLORIDE 20 MEQ/1
20 TABLET, EXTENDED RELEASE ORAL DAILY
Qty: 90 TABLET | Refills: 1 | Status: SHIPPED | OUTPATIENT
Start: 2022-03-07 | End: 2022-09-07 | Stop reason: SDUPTHER

## 2022-03-07 RX ORDER — AMLODIPINE BESYLATE 5 MG/1
5 TABLET ORAL DAILY
Qty: 90 TABLET | Refills: 1 | Status: SHIPPED | OUTPATIENT
Start: 2022-03-07 | End: 2022-09-07 | Stop reason: SDUPTHER

## 2022-03-07 RX ORDER — OMEGA-3 FATTY ACIDS/FISH OIL 300-1000MG
1 CAPSULE ORAL NIGHTLY
Qty: 90 EACH | Refills: 1 | Status: SHIPPED | OUTPATIENT
Start: 2022-03-07 | End: 2022-09-07 | Stop reason: SDUPTHER

## 2022-03-07 RX ORDER — GABAPENTIN 100 MG/1
100 CAPSULE ORAL 2 TIMES DAILY
Qty: 180 CAPSULE | Refills: 1 | Status: SHIPPED | OUTPATIENT
Start: 2022-03-07 | End: 2022-09-07 | Stop reason: SDUPTHER

## 2022-03-07 RX ORDER — PRAVASTATIN SODIUM 10 MG
10 TABLET ORAL NIGHTLY
Qty: 90 TABLET | Refills: 1 | Status: SHIPPED | OUTPATIENT
Start: 2022-03-07 | End: 2022-09-07 | Stop reason: SDUPTHER

## 2022-03-07 RX ORDER — LOSARTAN POTASSIUM AND HYDROCHLOROTHIAZIDE 12.5; 5 MG/1; MG/1
1 TABLET ORAL DAILY
Qty: 90 TABLET | Refills: 1 | Status: SHIPPED | OUTPATIENT
Start: 2022-03-07 | End: 2022-09-07 | Stop reason: SDUPTHER

## 2022-03-07 RX ORDER — FUROSEMIDE 40 MG/1
40 TABLET ORAL 2 TIMES DAILY
Qty: 180 TABLET | Refills: 1 | Status: SHIPPED | OUTPATIENT
Start: 2022-03-07 | End: 2022-09-07 | Stop reason: SDUPTHER

## 2022-03-07 NOTE — PROGRESS NOTES
Chief Complaint  Diabetes (Diabetic follow up)    Subjective          Sandeep Alonso presents to [facility]  Here for follow up for type 2 diabetes, neuropathy, htn, hyperlipidemia and hypokalemia. Denies chest pain, shortness of breath, palpitations, says he is doing well.  Denies fever, chills, nausea, vomiting or diarrhea.     Diabetes  He presents for his follow-up diabetic visit. He has type 2 diabetes mellitus. No MedicAlert identification noted. His disease course has been improving. There are no hypoglycemic associated symptoms. There are no diabetic associated symptoms. Pertinent negatives for diabetes include no blurred vision and no chest pain. There are no hypoglycemic complications. Symptoms are stable. There are no diabetic complications. Pertinent negatives for diabetic complications include no CVA or PVD. Risk factors for coronary artery disease include diabetes mellitus, dyslipidemia, family history, male sex, obesity, hypertension and sedentary lifestyle. Current diabetic treatment includes oral agent (monotherapy). He is compliant with treatment all of the time. His weight is stable. He is following a generally healthy diet. When asked about meal planning, he reported none. He participates in exercise intermittently. His home blood glucose trend is decreasing steadily. His breakfast blood glucose is taken between 6-7 am. His breakfast blood glucose range is generally 70-90 mg/dl. An ACE inhibitor/angiotensin II receptor blocker is being taken. He does not see a podiatrist.Eye exam is not current.   Hypertension  This is a chronic problem. The current episode started more than 1 year ago. The problem has been gradually improving since onset. The problem is controlled. Pertinent negatives include no blurred vision, chest pain, neck pain, orthopnea, PND or shortness of breath. There are no associated agents to hypertension. Risk factors for coronary artery disease  "include diabetes mellitus, dyslipidemia, family history, male gender, obesity and sedentary lifestyle. Past treatments include angiotensin blockers. Current antihypertension treatment includes angiotensin blockers. The current treatment provides mild improvement. There are no compliance problems.  There is no history of angina, CVA or PVD. There is no history of chronic renal disease.   Hyperlipidemia  This is a chronic problem. The current episode started more than 1 year ago. Recent lipid tests were reviewed and are normal. Exacerbating diseases include obesity. He has no history of chronic renal disease or hypothyroidism. There are no known factors aggravating his hyperlipidemia. Pertinent negatives include no chest pain or shortness of breath. Current antihyperlipidemic treatment includes statins. The current treatment provides moderate improvement of lipids. There are no compliance problems.  Risk factors for coronary artery disease include diabetes mellitus, dyslipidemia, family history, hypertension, male sex, obesity and a sedentary lifestyle.     The following portions of the patient's history were reviewed and updated as appropriate: allergies, current medications, past family history, past medical history, past social history, past surgical history and problem list.      Objective   Vital Signs:   /82 (BP Location: Right arm, Patient Position: Sitting, Cuff Size: Large Adult)   Pulse (!) 46   Temp 96.8 °F (36 °C) (Infrared)   Resp 18   Ht 170.2 cm (67\") Comment: per patient  Wt 106 kg (234 lb)   SpO2 99%   BMI 36.65 kg/m²     Physical Exam  Vitals and nursing note reviewed.   Constitutional:       General: He is awake.      Appearance: Normal appearance. He is well-developed and well-groomed.   HENT:      Head: Normocephalic and atraumatic.      Right Ear: Hearing, tympanic membrane, ear canal and external ear normal.      Left Ear: Hearing, tympanic membrane, ear canal and external ear " normal.      Nose: Nose normal.      Mouth/Throat:      Lips: Pink.      Pharynx: Oropharynx is clear.   Eyes:      General: Lids are normal.      Conjunctiva/sclera: Conjunctivae normal.   Cardiovascular:      Rate and Rhythm: Normal rate and regular rhythm.      Heart sounds: Normal heart sounds.   Pulmonary:      Effort: Pulmonary effort is normal.      Breath sounds: Normal breath sounds and air entry.   Musculoskeletal:      Cervical back: Full passive range of motion without pain.      Right lower leg: No edema.      Left lower leg: No edema.   Lymphadenopathy:      Head:      Right side of head: No submental, submandibular or tonsillar adenopathy.      Left side of head: No submental, submandibular or tonsillar adenopathy.   Skin:     General: Skin is warm and dry.   Neurological:      Mental Status: He is alert.      Sensory: Sensation is intact.      Motor: Motor function is intact.      Coordination: Coordination is intact.      Gait: Gait is intact.   Psychiatric:         Attention and Perception: Attention and perception normal.         Mood and Affect: Mood and affect normal.         Speech: Speech normal.         Behavior: Behavior normal. Behavior is cooperative.         Thought Content: Thought content normal.         Cognition and Memory: Cognition and memory normal.         Judgment: Judgment normal.        Result Review :                 Assessment and Plan    Diagnoses and all orders for this visit:    1. Type 2 diabetes mellitus with hyperglycemia, without long-term current use of insulin ([facility]) (Primary)  2. Type 2 diabetes mellitus with diabetic autonomic neuropathy, without long-term current use of insulin (HCC)  -     POC Glycosylated Hemoglobin (Hb A1C)    POC Glycosylated Hemoglobin (Hb A1C)  Order: [id number]   Status: Edited Result - FINAL     Visible to patient: No (scheduled for 3/8/2022  1:44 AM)     Dx: Type 2 diabetes mellitus with diabeti...    Specimen Information: Blood         0  Result Notes     1  Topic    Component   Ref Range & Units 11:20   (3/7/22) 5 mo ago   (10/6/21) 6 mo ago   (9/7/21) 1 yr ago   (3/5/21) 1 yr ago   (8/21/20) 2 yr ago   (2/21/20) 2 yr ago   (8/16/19)   Hemoglobin A1C   % 5.5  5.70 High  R, CM  5.70 High  R, CM  6.0  6.00 High  R, CM  6.40 High  R, CM  5.6    Lot Number  [id number]           Expiration Date  9,592,691           Resulting Agency [facility]              Specimen Collected: 03/07/22 11:20 Last Resulted: 03/07/22 11:20             -     gabapentin (NEURONTIN) 100 MG capsule; Take 1 capsule by mouth 2 (Two) Times a Day.  Dispense: 180 capsule; Refill: 1  -     metFORMIN (GLUCOPHAGE) 500 MG tablet; 1/2 tab daily (actual dose is 250)  Dispense: 90 tablet; Refill: 1    3. Essential hypertension  -     amLODIPine (NORVASC) 5 MG tablet; Take 1 tablet by mouth Daily.  Dispense: 90 tablet; Refill: 1  -     losartan-hydrochlorothiazide (HYZAAR) 50-12.5 MG per tablet; Take 1 tablet by mouth Daily.  Dispense: 90 tablet; Refill: 1  -     Comprehensive metabolic panel  -     CBC (No Diff)    4. Hypokalemia  -     amLODIPine (NORVASC) 5 MG tablet; Take 1 tablet by mouth Daily.  Dispense: 90 tablet; Refill: 1  -     potassium chloride (K-DUR,KLOR-CON) 20 MEQ CR tablet; Take 1 tablet by mouth Daily.  Dispense: 90 tablet; Refill: 1    5. Localized edema  -     furosemide (LASIX) 40 MG tablet; Take 1 tablet by mouth 2 (Two) Times a Day.  Dispense: 180 tablet; Refill: 1    6. Mixed hyperlipidemia  -     Omega 3 1000 MG capsule; Take 1 capsule by mouth Every Night.  Dispense: 90 each; Refill: 1  -     pravastatin (PRAVACHOL) 10 MG tablet; Take 1 tablet by mouth Every Night.  Dispense: 90 tablet; Refill: 1  -     Lipid panel    -       [facility] Report:      As part of this patient's treatment plan, I am prescribing controlled substances. The  patient has been made aware of appropriate use of such medications, including potential risk of opiod use, somnolence, limited ability to drive and /or work safely, and potential for dependence or overdose, and opiods should be used sparingly and are not recommended for long term use.  It has also been made clear that these medications are for use by this patient only, without concomitant use of alcohol or other substances unless prescribed.    The patient was provided a Rx for norco as needed for pain control after sustaining multiple back and neck injuries as well as surgeries.     This medication is a narcotic pain medication. This medication is monitored by the federal [place] and the [facility]. Narcotic medication is commonly abused and many patients can become addicted to this medication.    There are problems with narcotic pain medication including addiction, dependence and tolerance to the pain relief. We discussed appropriate and expected levels of pain.     Narcotics have side effects, including the common side effect of nausea/vomiting if taken on an empty stomach and the problem of constipation that occur with narcotic use.     Narcotics can impair your judgement and therefore you should never drive or operate heavy machinery while using these medications. Treat this medication similar to alcohol, do not take this and drive or you could injure yourself or others.    As an alternative drug, we encourage the use of tylenol and/or NSAIDs (non-steroidal anti-inflammatory drugs) for additional pain relief and as an anti-inflammatory. NSAIDS are drugs such as aleve (naproxen) and motrin (ibuprofen). Narcotics and NSAIDs work differently and can complement each other well after surgery.    In general, NSAIDs and tylenol are much safer drugs than narcotics. Tylenol (acetaminophen) should not be taken with narcotics as most prescribed narcotics already contain tylenol. The maximum dose of tylenol is 4 grams  per day, so this can be used if your narcotic pain medication is used sparingly. Pay close attention to the dosages and ask your pharmacist about the dosage of tylenol.  Some patients can experience GI irritation from NSAID use and patients with kidney problems, previous bleeding from the GI tract, gastric bypass, or certain other conditions may not be able to take these over the counter medications.      Patient has completed prescribing agreement detailing terms of continued prescribing of controlled substances, including monitoring HEATHER reports, urine drug screening yearly an random drug screens to monitor patients compliance to treatment plan, and pill counts if necessary. The patient is aware that inappropriate use will result in cessation of prescribing such medications.    Toxassure:  I have ordered a urine drug screen to monitor patients predisposition to and patterns of drug use/misuse in order to establish and maintain the safe and effective use of analgesics in the treatment of chronic pain    HEATHER report has been reviewed by: Lisbet Velez, DNP, APRN .      -     Pt is aware of the potential for addiction and dependence.    Addiction was discussed.  The  Risks, benefits and alternative options of drug therapy discussed.  It was reinforced about the risks and benefits of opioids.  It was reinforced that patient may not call early for medication, may not ask for increase in the number of pills given monthly or increase in dosage of medication, may not jump around to different pharmacies or providers and may not receive any narcotics from other providers including ER, or the contract will be broken and narcotics will no longer be prescribed from this facility if any of these criteria has been broken.  Last Dose was: yesterday    Last Fill was:  january  Date of last HEATHER: today    Date of last UDS: on file.     I spent 14 minutes caring for Sandeep on this date of service. This time includes time  spent by me in the following activities:preparing for the visit, reviewing tests, performing a medically appropriate examination and/or evaluation , counseling and educating the patient/family/caregiver, ordering medications, tests, or procedures, documenting information in the medical record and care coordination  Follow Up   Return in about 6 months (around 9/7/2022) for Recheck dm chronic.  Patient was given instructions and counseling regarding his condition or for health maintenance advice. Please see specific information pulled into the AVS if appropriate.     Electronically signed by Lisbet Velez, BECKA, APRN, 03/07/22, 10:58 AM CST.

## 2022-03-07 NOTE — EXTERNAL PATIENT INSTRUCTIONS
"Patient Education   Table of Contents       Hypertension, Adult     To view videos and all your education online visit,   [email or web address]   or scan this QR code with your smartphone.                  Hypertension, Adult     High blood pressure (hypertension) is when the force of blood pumping through the arteries is too strong. The arteries are the blood vessels that carry blood from the heart throughout the body. Hypertension forces the heart to work harder to pump blood and may cause arteries to become narrow or stiff. Untreated or uncontrolled hypertension can cause a heart attack, heart failure, a stroke, kidney disease, and other problems.   A blood pressure reading consists of a higher number over a lower number. Ideally, your blood pressure should be below 120/80. The first (\"top\") number is called the systolic pressure. It is a measure of the pressure in your arteries as your heart beats. The second (\"bottom\") number is called the diastolic pressure. It is a measure of the pressure in your arteries as the heart relaxes.   What are the causes?   The exact cause of this condition is not known. There are some conditions that result in or are related to high blood pressure.   What increases the risk?    Some risk factors for high blood pressure are under your control. The following factors may make you more likely to develop this condition:       Smoking.       Having type 2 diabetes mellitus, high cholesterol, or both.       Not getting enough exercise or physical activity.       Being overweight.       Having too much fat, sugar, calories, or salt (sodium) in your diet.       Drinking too much alcohol.      Some risk factors for high blood pressure may be difficult or impossible to change. Some of these factors include:       Having chronic kidney disease.       Having a family history of high blood pressure.       Age. Risk increases with age.       Race. You may be at higher risk if you are " .       Gender. Men are at higher risk than women before age 45. After age 65, women are at higher risk than men.       Having obstructive sleep apnea.       Stress.     What are the signs or symptoms?    High blood pressure may not cause symptoms. Very high blood pressure (hypertensive crisis) may cause:       Headache.       Anxiety.       Shortness of breath.       Nosebleed.       Nausea and vomiting.       Vision changes.       Severe chest pain.       Seizures.     How is this diagnosed?   This condition is diagnosed by measuring your blood pressure while you are seated, with your arm resting on a flat surface, your legs uncrossed, and your feet flat on the floor. The cuff of the blood pressure monitor will be placed directly against the skin of your upper arm at the level of your heart. It should be measured at least twice using the same arm. Certain conditions can cause a difference in blood pressure between your right and left arms.    Certain factors can cause blood pressure readings to be lower or higher than normal for a short period of time:       When your blood pressure is higher when you are in a health care provider's office than when you are at home, this is called white coat hypertension. Most people with this condition do not need medicines.       When your blood pressure is higher at home than when you are in a health care provider's office, this is called masked hypertension. Most people with this condition may need medicines to control blood pressure.      If you have a high blood pressure reading during one visit or you have normal blood pressure with other risk factors, you may be asked to:       Return on a different day to have your blood pressure checked again.       Monitor your blood pressure at home for 1 week or longer.     If you are diagnosed with hypertension, you may have other blood or imaging tests to help your health care provider understand your overall risk  for other conditions.   How is this treated?    This condition is treated by making healthy lifestyle changes, such as eating healthy foods, exercising more, and reducing your alcohol intake. Your health care provider may prescribe medicine if lifestyle changes are not enough to get your blood pressure under control, and if:       Your systolic blood pressure is above 130.       Your diastolic blood pressure is above 80.     Your personal target blood pressure may vary depending on your medical conditions, your age, and other factors.   Follow these instructions at home:   Eating and drinking           Eat a diet that is high in fiber and potassium, and low in sodium, added sugar, and fat. An example eating plan is called the DASH (Dietary Approaches to Stop Hypertension) diet. To eat this way:       Eat plenty of fresh fruits and vegetables. Try to fill one half of your plate at each meal with fruits and vegetables.       Eat whole grains, such as whole-wheat pasta, brown rice, or whole-grain bread. Fill about one fourth of your plate with whole grains.       Eat or drink low-fat dairy products, such as skim milk or low-fat yogurt.       Avoid fatty cuts of meat, processed or cured meats, and poultry with skin. Fill about one fourth of your plate with lean proteins, such as fish, chicken without skin, beans, eggs, or tofu.       Avoid pre-made and processed foods. These tend to be higher in sodium, added sugar, and fat.       Reduce your daily sodium intake. Most people with hypertension should eat less than 1,500 mg of sodium a day.      Do not  drink alcohol if:       Your health care provider tells you not to drink.       You are pregnant, may be pregnant, or are planning to become pregnant.      If you drink alcohol:      Limit how much you use to:       0?1 drink a day for women.       0?2 drinks a day for men.       Be aware of how much alcohol is in your drink. In the [place] one drink equals one 12 oz bottle  of beer (355 mL), one 5 oz glass of wine (148 mL), or one 1? oz glass of hard liquor (44 mL).     Lifestyle            Work with your health care provider to maintain a healthy body weight or to lose weight. Ask what an ideal weight is for you.       Get at least 30 minutes of exercise most days of the week. Activities may include walking, swimming, or biking.       Include exercise to strengthen your muscles (resistance exercise), such as Pilates or lifting weights, as part of your weekly exercise routine. Try to do these types of exercises for 30 minutes at least 3 days a week.      Do not  use any products that contain nicotine or tobacco, such as cigarettes, e-cigarettes, and chewing tobacco. If you need help quitting, ask your health care provider.       Monitor your blood pressure at home as told by your health care provider.       Keep all follow-up visits as told by your health care provider. This is important.       Medicines         Take over-the-counter and prescription medicines only as told by your health care provider. Follow directions carefully. Blood pressure medicines must be taken as prescribed.      Do not  skip doses of blood pressure medicine. Doing this puts you at risk for problems and can make the medicine less effective.       Ask your health care provider about side effects or reactions to medicines that you should watch for.       Contact a health care provider if you:         Think you are having a reaction to a medicine you are taking.       Have headaches that keep coming back (recurring).       Feel dizzy.       Have swelling in your ankles.       Have trouble with your vision.     Get help right away if you:         Develop a severe headache or confusion.       Have unusual weakness or numbness.       Feel faint.       Have severe pain in your chest or abdomen.       Vomit repeatedly.       Have trouble breathing.     Summary         Hypertension is when the force of blood pumping  through your arteries is too strong. If this condition is not controlled, it may put you at risk for serious complications.       Your personal target blood pressure may vary depending on your medical conditions, your age, and other factors. For most people, a normal blood pressure is less than 120/80.       Hypertension is treated with lifestyle changes, medicines, or a combination of both. Lifestyle changes include losing weight, eating a healthy, low-sodium diet, exercising more, and limiting alcohol.     This information is not intended to replace advice given to you by your health care provider. Make sure you discuss any questions you have with your health care provider.     Document Released: 12/18/2006Document Revised: 08/28/2019Document Reviewed: 08/28/2019     [employer]vier Patient Education ? 2021 [employer]

## 2022-03-08 LAB
ALBUMIN SERPL-MCNC: 4.2 G/DL (ref 3.5–5.2)
ALBUMIN/GLOB SERPL: 1.4 G/DL
ALP SERPL-CCNC: 84 U/L (ref 39–117)
ALT SERPL-CCNC: 16 U/L (ref 1–41)
AST SERPL-CCNC: 16 U/L (ref 1–40)
BILIRUB SERPL-MCNC: 0.6 MG/DL (ref 0–1.2)
BUN SERPL-MCNC: 12 MG/DL (ref 8–23)
BUN/CREAT SERPL: 13 (ref 7–25)
CALCIUM SERPL-MCNC: 9.1 MG/DL (ref 8.6–10.5)
CHLORIDE SERPL-SCNC: 101 MMOL/L (ref 98–107)
CHOLEST SERPL-MCNC: 128 MG/DL (ref 0–200)
CO2 SERPL-SCNC: 30.2 MMOL/L (ref 22–29)
CREAT SERPL-MCNC: 0.92 MG/DL (ref 0.76–1.27)
EGFR GENE MUT ANL BLD/T: 92.3 ML/MIN/1.73
ERYTHROCYTE [DISTWIDTH] IN BLOOD BY AUTOMATED COUNT: 13.3 % (ref 12.3–15.4)
GLOBULIN SER CALC-MCNC: 3 GM/DL
GLUCOSE SERPL-MCNC: 85 MG/DL (ref 65–99)
HCT VFR BLD AUTO: 44.1 % (ref 37.5–51)
HDLC SERPL-MCNC: 31 MG/DL (ref 40–60)
HGB BLD-MCNC: 14 G/DL (ref 13–17.7)
LDLC SERPL CALC-MCNC: 81 MG/DL (ref 0–100)
MCH RBC QN AUTO: 28.8 PG (ref 26.6–33)
MCHC RBC AUTO-ENTMCNC: 31.7 G/DL (ref 31.5–35.7)
MCV RBC AUTO: 90.7 FL (ref 79–97)
PLATELET # BLD AUTO: 250 10*3/MM3 (ref 140–450)
POTASSIUM SERPL-SCNC: 3.9 MMOL/L (ref 3.5–5.2)
PROT SERPL-MCNC: 7.2 G/DL (ref 6–8.5)
RBC # BLD AUTO: 4.86 10*6/MM3 (ref 4.14–5.8)
SODIUM SERPL-SCNC: 140 MMOL/L (ref 136–145)
TRIGL SERPL-MCNC: 83 MG/DL (ref 0–150)
VLDLC SERPL CALC-MCNC: 16 MG/DL (ref 5–40)
WBC # BLD AUTO: 7.21 10*3/MM3 (ref 3.4–10.8)

## 2022-03-16 ENCOUNTER — TELEPHONE (OUTPATIENT)
Dept: PODIATRY | Facility: CLINIC | Age: 67
End: 2022-03-16

## 2022-03-16 NOTE — PROGRESS NOTES
[facility] - PODIATRY    Today's Date: 03/22/22    Patient Name: Sandeep Alonso  MRN: [id number]  CSN: [id number]  PCP: Lisbet Velez DNP, VENKAT  Referring Provider: No ref. provider found    SUBJECTIVE     Chief Complaint   Patient presents with   • Follow-up     98.0 SUB  Lisbet Velez DNP, VENKAT pcp09/07/2021 Return in about 3 months (around 12/30/2021). - pt states doing ok, no complaints - pt denies pain - pt presents routine diabetic nail and foot care, long thick toenails    • Diabetes     100mg/dl this am      HPI: Sandeep Alonso, a 66 y.o.male, comes to clinic as a(n) established patient presenting for diabetic foot exam and complaining of long, thickened toenails. Pt has h/o DM2, HTN, Hypokalemia, Edema.  States his nails are long, thick, and discolored. He is unable to reach feet to care for nails himself. Notes occasional numbness and tingling in feet and ankles. Denies open wounds or sores.  Feels that feet have improved with routine podiatry visits. Patient is NIDDM with last stated BG level of 100mg/dl.  Denies pain  today. Relates previous treatment(s) including nail debridement by podiatrist. Denies any constitutional symptoms. No other pedal complaints at this time.    Past Medical History:   Diagnosis Date   • Hypokalemia    • Peripheral edema      Past Surgical History:   Procedure Laterality Date   • NO PAST SURGERIES       Family History   Problem Relation Age of Onset   • Heart attack Mother    • Emphysema Father    • No Known Problems Sister    • No Known Problems Brother    • No Known Problems Son    • No Known Problems Maternal Grandmother    • No Known Problems Maternal Grandfather    • No Known Problems Paternal Grandmother    • No Known Problems Paternal Grandfather    • No Known Problems Brother    • No Known Problems Brother    • No Known Problems Son    • No Known Problems Son    • No Known Problems Son      Social History     Socioeconomic History   • Marital  status:    Tobacco Use   • Smoking status: Never Smoker   • Smokeless tobacco: Never Used   Vaping Use   • Vaping Use: Never used   Substance and Sexual Activity   • Alcohol use: Not Currently     Alcohol/week: 1.0 standard drink     Types: 1 Cans of beer per week     Comment: occasional beer   • Drug use: No   • Sexual activity: Defer     Allergies   Allergen Reactions   • Ace Inhibitors Cough     Current Outpatient Medications   Medication Sig Dispense Refill   • amLODIPine (NORVASC) 5 MG tablet Take 1 tablet by mouth Daily. 90 tablet 1   • furosemide (LASIX) 40 MG tablet Take 1 tablet by mouth 2 (Two) Times a Day. 180 tablet 1   • gabapentin (NEURONTIN) 100 MG capsule Take 1 capsule by mouth 2 (Two) Times a Day. 180 capsule 1   • losartan-hydrochlorothiazide (HYZAAR) 50-12.5 MG per tablet Take 1 tablet by mouth Daily. 90 tablet 1   • metFORMIN (GLUCOPHAGE) 500 MG tablet 1/2 tab daily (actual dose is 250) 90 tablet 1   • Omega 3 1000 MG capsule Take 1 capsule by mouth Every Night. 90 each 1   • potassium chloride (K-DUR,KLOR-CON) 20 MEQ CR tablet Take 1 tablet by mouth Daily. 90 tablet 1   • pravastatin (PRAVACHOL) 10 MG tablet Take 1 tablet by mouth Every Night. 90 tablet 1     No current facility-administered medications for this visit.     Review of Systems   Constitutional: Negative for chills and fever.   HENT: Negative for congestion.    Respiratory: Negative for shortness of breath.    Cardiovascular: Negative for chest pain and leg swelling.   Gastrointestinal: Negative for constipation, diarrhea, nausea and vomiting.   Musculoskeletal: Negative for arthralgias, gait problem and joint swelling.   Skin: Negative for color change.        Long, thickened toenails   Neurological: Positive for numbness.       OBJECTIVE     Vitals:    03/17/22 1011   Pulse: 55   SpO2: 96%       Foot/Ankle Exam:       General:   Diabetic Foot Exam Performed    Appearance: appears stated age and healthy and obesity     Orientation: AAOx3    Affect: appropriate    Gait: unimpaired    Assistance: independent    Shoe Gear:  Casual shoes    VASCULAR      Right Foot Vascularity   Dorsalis pedis:  2+  Posterior tibial:  2+  Skin Temperature: warm    Edema Gradin+ and pitting  CFT:  3  Pedal Hair Growth:  Present  Varicosities: mild varicosities       Left Foot Vascularity   Dorsalis pedis:  2+  Posterior tibial:  2+  Skin Temperature: warm    Edema Gradin+ and pitting  CFT:  3  Pedal Hair Growth:  Present  Varicosities: mild varicosities        NEUROLOGIC     Right Foot Neurologic   Light touch sensation:  Diminished  Vibratory sensation:  Diminished  Hot/Cold sensation: diminished    Protective Sensation using [place] Monofilament:  8     Left Foot Neurologic   Light touch sensation:  Diminished  Vibratory sensation:  Diminished  Hot/cold sensation: diminished    Protective Sensation using [place] Monofilament:  8     MUSCULOSKELETAL      Right Foot Musculoskeletal    Amputation   Right toes amputated: No    Ecchymosis:  None  Tenderness: none    Arch:  Normal     Left Foot Musculoskeletal    Amputation   Left toes amputated: No    Ecchymosis:  None  Tenderness: none    Arch:  Normal     MUSCLE STRENGTH     Right Foot Muscle Strength   Foot dorsiflexion:  5  Foot plantar flexion:  5  Foot inversion:  5  Foot eversion:  5     Left Foot Muscle Strength   Foot dorsiflexion:  5  Foot plantar flexion:  5  Foot inversion:  5  Foot eversion:  5     RANGE OF MOTION      Right Foot Range of Motion   Foot and ankle ROM within normal limits       Left Foot Range of Motion   Foot and ankle ROM within normal limits       DERMATOLOGIC     Right Foot Dermatologic   Skin: skin intact    Nails: onychomycosis, abnormally thick and subungual debris       Left Foot Dermatologic   Skin: skin intact    Nails: onychomycosis, abnormally thick and subungual debris        RADIOLOGY/NUCLEAR:  No results found.    LABORATORY/CULTURE  RESULTS:      PATHOLOGY RESULTS:       ASSESSMENT/PLAN     Diagnoses and all orders for this visit:    1. Onychomycosis (Primary)    2. Type 2 diabetes mellitus with diabetic polyneuropathy, without long-term current use of insulin (HCC)    3. Foot pain, bilateral    4. Tinea pedis of both feet    5. Encounter for diabetic foot exam ([facility])    6. Class 2 obesity due to excess calories without serious comorbidity with body mass index (BMI) of 36.0 to 36.9 in adult      Comprehensive lower extremity examination and evaluation was performed.  Discussed findings and treatment plan including risks, benefits, and treatment options with patient in detail. Patient agreed with treatment plan.  Diabetic foot exam performed  After verbal consent obtained, nail(s) x10 debrided of length and thickness with nail nipper without incidence  Patient may maintain nails and calluses at home utilizing emery board or pumice stone between visits as needed  Reviewed at home diabetic foot care including daily foot checks   Continue antifungal PRN.   Continue diabetic monitoring and control under direction of PCP.   Patient's Body mass index is 36.65 kg/m². indicating that he is obese (BMI >30). Obesity-related health conditions include the following: diabetes mellitus. Obesity is improving. BMI is is above average; BMI management plan is completed. We discussed portion control and increasing exercise..  An After Visit Summary was printed and given to the patient at discharge, including (if requested) any available informative/educational handouts regarding diagnosis, treatment, or medications. All questions were answered to patient/family satisfaction. Should symptoms fail to improve or worsen they agree to call or return to clinic or to go to the Emergency Department. Discussed the importance of following up with any needed screening tests/labs/specialist appointments and any requested follow-up recommended by me today. Importance of  maintaining follow-up discussed and patient accepts that missed appointments can delay diagnosis and potentially lead to worsening of conditions.  Return in about 3 months (around 6/17/2022)., or sooner if acute issues arise.        This document has been electronically signed by VENKAT Lyons on March 22, 2022 12:06 CDT     Please note that portions of this note may have been completed with a voice recognition program. Efforts were made to edit the dictations, but occasionally words are mistranscribed.

## 2022-03-17 ENCOUNTER — OFFICE VISIT (OUTPATIENT)
Dept: PODIATRY | Facility: CLINIC | Age: 67
End: 2022-03-17

## 2022-03-17 VITALS — BODY MASS INDEX: 36.73 KG/M2 | HEIGHT: 67 IN | OXYGEN SATURATION: 96 % | WEIGHT: 234 LBS | HEART RATE: 55 BPM

## 2022-03-17 DIAGNOSIS — E66.09 CLASS 2 OBESITY DUE TO EXCESS CALORIES WITHOUT SERIOUS COMORBIDITY WITH BODY MASS INDEX (BMI) OF 36.0 TO 36.9 IN ADULT: ICD-10-CM

## 2022-03-17 DIAGNOSIS — E11.42 TYPE 2 DIABETES MELLITUS WITH DIABETIC POLYNEUROPATHY, WITHOUT LONG-TERM CURRENT USE OF INSULIN: ICD-10-CM

## 2022-03-17 DIAGNOSIS — B35.3 TINEA PEDIS OF BOTH FEET: ICD-10-CM

## 2022-03-17 DIAGNOSIS — B35.1 ONYCHOMYCOSIS: Primary | ICD-10-CM

## 2022-03-17 DIAGNOSIS — M79.671 FOOT PAIN, BILATERAL: ICD-10-CM

## 2022-03-17 DIAGNOSIS — M79.672 FOOT PAIN, BILATERAL: ICD-10-CM

## 2022-03-17 DIAGNOSIS — E11.9 ENCOUNTER FOR DIABETIC FOOT EXAM: ICD-10-CM

## 2022-03-17 PROCEDURE — 99213 OFFICE O/P EST LOW 20 MIN: CPT | Performed by: NURSE PRACTITIONER

## 2022-03-17 PROCEDURE — 11721 DEBRIDE NAIL 6 OR MORE: CPT | Performed by: NURSE PRACTITIONER

## 2022-06-22 ENCOUNTER — TELEPHONE (OUTPATIENT)
Dept: PODIATRY | Facility: CLINIC | Age: 67
End: 2022-06-22

## 2022-06-22 NOTE — PROGRESS NOTES
[facility] - PODIATRY    Today's Date: 06/28/22    Patient Name: Sandeep Alonso  MRN: [id number]  CSN: [id number]  PCP: Lisbet Velez DNP, APRN  Referring Provider: No ref. provider found    SUBJECTIVE     Chief Complaint   Patient presents with   • Follow-up     Lisbet Velez DNP, VENKAT pcp09/07/2021 F/U/3 MO DIABETIC EXAM- pt states feet doing good- pt pain occ 5/10 pain at worst- pt presents with long thick nails, no other visible issues    • Diabetes     Hasnt checked     HPI: Sandeep Alonso, a 67 y.o.male, comes to clinic as a(n) established patient presenting for diabetic foot exam and complaining of long, thickened toenails. Pt has h/o DM2, HTN, Hypokalemia, Edema.  States his nails are long, thick, and discolored. He is unable to reach feet to care for nails himself. Reports mild numbness and tingling in feet. Denies open wounds or sores.  Patient is NIDDM and unsure of last BG level. Has not checked glucose recently.  Denies pain today, occasional pain at 5/10 at worst. Relates previous treatment(s) including nail debridement by podiatrist. Denies any constitutional symptoms. No other pedal complaints at this time.    Past Medical History:   Diagnosis Date   • Hypokalemia    • Peripheral edema      Past Surgical History:   Procedure Laterality Date   • NO PAST SURGERIES       Family History   Problem Relation Age of Onset   • Heart attack Mother    • Emphysema Father    • No Known Problems Sister    • No Known Problems Brother    • No Known Problems Son    • No Known Problems Maternal Grandmother    • No Known Problems Maternal Grandfather    • No Known Problems Paternal Grandmother    • No Known Problems Paternal Grandfather    • No Known Problems Brother    • No Known Problems Brother    • No Known Problems Son    • No Known Problems Son    • No Known Problems Son      Social History     Socioeconomic History   • Marital status:    Tobacco Use   • Smoking status: Never  Smoker   • Smokeless tobacco: Never Used   Vaping Use   • Vaping Use: Never used   Substance and Sexual Activity   • Alcohol use: Not Currently     Alcohol/week: 1.0 standard drink     Types: 1 Cans of beer per week     Comment: occasional beer   • Drug use: No   • Sexual activity: Defer     Allergies   Allergen Reactions   • Ace Inhibitors Cough     Current Outpatient Medications   Medication Sig Dispense Refill   • amLODIPine (NORVASC) 5 MG tablet Take 1 tablet by mouth Daily. 90 tablet 1   • furosemide (LASIX) 40 MG tablet Take 1 tablet by mouth 2 (Two) Times a Day. 180 tablet 1   • gabapentin (NEURONTIN) 100 MG capsule Take 1 capsule by mouth 2 (Two) Times a Day. 180 capsule 1   • losartan-hydrochlorothiazide (HYZAAR) 50-12.5 MG per tablet Take 1 tablet by mouth Daily. 90 tablet 1   • metFORMIN (GLUCOPHAGE) 500 MG tablet 1/2 tab daily (actual dose is 250) 90 tablet 1   • Omega 3 1000 MG capsule Take 1 capsule by mouth Every Night. 90 each 1   • potassium chloride (K-DUR,KLOR-CON) 20 MEQ CR tablet Take 1 tablet by mouth Daily. 90 tablet 1   • pravastatin (PRAVACHOL) 10 MG tablet Take 1 tablet by mouth Every Night. 90 tablet 1     No current facility-administered medications for this visit.     Review of Systems   Constitutional: Negative for chills and fever.   HENT: Negative for congestion.    Respiratory: Negative for shortness of breath.    Cardiovascular: Negative for chest pain and leg swelling.   Gastrointestinal: Negative for constipation, diarrhea, nausea and vomiting.   Musculoskeletal: Negative for arthralgias and myalgias.   Skin: Negative for wound.   Neurological: Positive for numbness.       OBJECTIVE     There were no vitals filed for this visit.    Foot/Ankle Exam:       General:   Appearance: appears stated age and healthy and obesity    Orientation: AAOx3    Affect: appropriate    Gait: unimpaired    Assistance: independent    Shoe Gear:  Casual shoes    VASCULAR      Right Foot Vascularity    Dorsalis pedis:  2+  Posterior tibial:  2+  Skin Temperature: warm    Edema Gradin+ and pitting  CFT:  3  Pedal Hair Growth:  Present  Varicosities: mild varicosities       Left Foot Vascularity   Dorsalis pedis:  2+  Posterior tibial:  2+  Skin Temperature: warm    Edema Gradin+ and pitting  CFT:  3  Pedal Hair Growth:  Present  Varicosities: mild varicosities        NEUROLOGIC     Right Foot Neurologic   Light touch sensation:  Diminished  Vibratory sensation:  Diminished  Hot/Cold sensation: diminished    Protective Sensation using [place] Monofilament:  8     Left Foot Neurologic   Light touch sensation:  Diminished  Vibratory sensation:  Diminished  Hot/cold sensation: diminished    Protective Sensation using [place] Monofilament:  8     MUSCULOSKELETAL      Right Foot Musculoskeletal    Amputation   Right toes amputated: No    Ecchymosis:  None  Tenderness: none    Arch:  Normal     Left Foot Musculoskeletal    Amputation   Left toes amputated: No    Ecchymosis:  None  Tenderness: none    Arch:  Normal     MUSCLE STRENGTH     Right Foot Muscle Strength   Foot dorsiflexion:  5  Foot plantar flexion:  5  Foot inversion:  5  Foot eversion:  5     Left Foot Muscle Strength   Foot dorsiflexion:  5  Foot plantar flexion:  5  Foot inversion:  5  Foot eversion:  5     RANGE OF MOTION      Right Foot Range of Motion   Foot and ankle ROM within normal limits       Left Foot Range of Motion   Foot and ankle ROM within normal limits       DERMATOLOGIC     Right Foot Dermatologic   Skin: skin intact    Nails: onychomycosis, abnormally thick and subungual debris       Left Foot Dermatologic   Skin: skin intact    Nails: onychomycosis, abnormally thick and subungual debris        RADIOLOGY/NUCLEAR:  No results found.    LABORATORY/CULTURE RESULTS:      PATHOLOGY RESULTS:       ASSESSMENT/PLAN     Diagnoses and all orders for this visit:    1. Onychomycosis (Primary)    2. Type 2 diabetes  mellitus with diabetic polyneuropathy, without long-term current use of insulin (HCC)    3. Foot pain, bilateral    4. Class 2 obesity due to excess calories without serious comorbidity with body mass index (BMI) of 36.0 to 36.9 in adult      Comprehensive lower extremity examination and evaluation was performed.  Discussed findings and treatment plan including risks, benefits, and treatment options with patient in detail. Patient agreed with treatment plan.  After verbal consent obtained, nail(s) x10 debrided of length and thickness with nail nipper without incidence  Patient may maintain nails and calluses at home utilizing emery board or pumice stone between visits as needed  Reviewed at home diabetic foot care including daily foot checks   Continue antifungal PRN.   Continue diabetic monitoring and control under direction of PCP.   Patient's Body mass index is 36.65 kg/m². indicating that he is obese (BMI >30). Obesity-related health conditions include the following: diabetes mellitus. Obesity is improving. BMI is is above average; BMI management plan is completed. We discussed portion control and increasing exercise..  An After Visit Summary was printed and given to the patient at discharge, including (if requested) any available informative/educational handouts regarding diagnosis, treatment, or medications. All questions were answered to patient/family satisfaction. Should symptoms fail to improve or worsen they agree to call or return to clinic or to go to the Emergency Department. Discussed the importance of following up with any needed screening tests/labs/specialist appointments and any requested follow-up recommended by me today. Importance of maintaining follow-up discussed and patient accepts that missed appointments can delay diagnosis and potentially lead to worsening of conditions.  Return in about 3 months (around 9/23/2022)., or sooner if acute issues arise.      This document has been  electronically signed by VENKAT Lyons on June 28, 2022 07:34 CDT

## 2022-06-23 ENCOUNTER — OFFICE VISIT (OUTPATIENT)
Dept: PODIATRY | Facility: CLINIC | Age: 67
End: 2022-06-23

## 2022-06-23 VITALS — BODY MASS INDEX: 36.73 KG/M2 | WEIGHT: 234 LBS | HEIGHT: 67 IN

## 2022-06-23 DIAGNOSIS — M79.671 FOOT PAIN, BILATERAL: ICD-10-CM

## 2022-06-23 DIAGNOSIS — B35.1 ONYCHOMYCOSIS: Primary | ICD-10-CM

## 2022-06-23 DIAGNOSIS — M79.672 FOOT PAIN, BILATERAL: ICD-10-CM

## 2022-06-23 DIAGNOSIS — E66.09 CLASS 2 OBESITY DUE TO EXCESS CALORIES WITHOUT SERIOUS COMORBIDITY WITH BODY MASS INDEX (BMI) OF 36.0 TO 36.9 IN ADULT: ICD-10-CM

## 2022-06-23 DIAGNOSIS — E11.42 TYPE 2 DIABETES MELLITUS WITH DIABETIC POLYNEUROPATHY, WITHOUT LONG-TERM CURRENT USE OF INSULIN: ICD-10-CM

## 2022-06-23 PROCEDURE — 11721 DEBRIDE NAIL 6 OR MORE: CPT | Performed by: NURSE PRACTITIONER

## 2022-09-07 ENCOUNTER — OFFICE VISIT (OUTPATIENT)
Dept: FAMILY MEDICINE CLINIC | Facility: CLINIC | Age: 67
End: 2022-09-07

## 2022-09-07 VITALS
BODY MASS INDEX: 34.84 KG/M2 | WEIGHT: 222 LBS | TEMPERATURE: 97.8 F | HEART RATE: 74 BPM | RESPIRATION RATE: 18 BRPM | SYSTOLIC BLOOD PRESSURE: 162 MMHG | OXYGEN SATURATION: 99 % | HEIGHT: 67 IN | DIASTOLIC BLOOD PRESSURE: 74 MMHG

## 2022-09-07 DIAGNOSIS — E87.6 HYPOKALEMIA: ICD-10-CM

## 2022-09-07 DIAGNOSIS — Z12.11 SCREENING FOR COLORECTAL CANCER: ICD-10-CM

## 2022-09-07 DIAGNOSIS — E78.2 MIXED HYPERLIPIDEMIA: ICD-10-CM

## 2022-09-07 DIAGNOSIS — R60.0 LOCALIZED EDEMA: ICD-10-CM

## 2022-09-07 DIAGNOSIS — Z12.12 SCREENING FOR COLORECTAL CANCER: ICD-10-CM

## 2022-09-07 DIAGNOSIS — E66.01 CLASS 3 SEVERE OBESITY DUE TO EXCESS CALORIES WITH SERIOUS COMORBIDITY AND BODY MASS INDEX (BMI) OF 40.0 TO 44.9 IN ADULT: ICD-10-CM

## 2022-09-07 DIAGNOSIS — E11.43 TYPE 2 DIABETES MELLITUS WITH DIABETIC AUTONOMIC NEUROPATHY, WITHOUT LONG-TERM CURRENT USE OF INSULIN: Primary | ICD-10-CM

## 2022-09-07 DIAGNOSIS — I10 ESSENTIAL HYPERTENSION: ICD-10-CM

## 2022-09-07 LAB
EXPIRATION DATE: NORMAL
HBA1C MFR BLD: 5.4 %
Lab: NORMAL

## 2022-09-07 PROCEDURE — 83036 HEMOGLOBIN GLYCOSYLATED A1C: CPT | Performed by: NURSE PRACTITIONER

## 2022-09-07 PROCEDURE — 3044F HG A1C LEVEL LT 7.0%: CPT | Performed by: NURSE PRACTITIONER

## 2022-09-07 PROCEDURE — 99214 OFFICE O/P EST MOD 30 MIN: CPT | Performed by: NURSE PRACTITIONER

## 2022-09-07 RX ORDER — PRAVASTATIN SODIUM 10 MG
10 TABLET ORAL NIGHTLY
Qty: 90 TABLET | Refills: 1 | Status: SHIPPED | OUTPATIENT
Start: 2022-09-07 | End: 2023-02-15

## 2022-09-07 RX ORDER — AMLODIPINE BESYLATE 5 MG/1
5 TABLET ORAL DAILY
Qty: 90 TABLET | Refills: 1 | Status: SHIPPED | OUTPATIENT
Start: 2022-09-07 | End: 2023-03-07 | Stop reason: SDUPTHER

## 2022-09-07 RX ORDER — LOSARTAN POTASSIUM AND HYDROCHLOROTHIAZIDE 12.5; 5 MG/1; MG/1
1 TABLET ORAL DAILY
Qty: 90 TABLET | Refills: 1 | Status: SHIPPED | OUTPATIENT
Start: 2022-09-07 | End: 2023-03-07

## 2022-09-07 RX ORDER — GABAPENTIN 100 MG/1
100 CAPSULE ORAL 2 TIMES DAILY
Qty: 180 CAPSULE | Refills: 1 | Status: SHIPPED | OUTPATIENT
Start: 2022-09-07 | End: 2023-03-07 | Stop reason: SDUPTHER

## 2022-09-07 RX ORDER — POTASSIUM CHLORIDE 20 MEQ/1
20 TABLET, EXTENDED RELEASE ORAL DAILY
Qty: 90 TABLET | Refills: 1 | Status: SHIPPED | OUTPATIENT
Start: 2022-09-07

## 2022-09-07 RX ORDER — OMEGA-3 FATTY ACIDS/FISH OIL 300-1000MG
1 CAPSULE ORAL NIGHTLY
Qty: 90 EACH | Refills: 1 | Status: SHIPPED | OUTPATIENT
Start: 2022-09-07 | End: 2023-03-07

## 2022-09-07 RX ORDER — FUROSEMIDE 40 MG/1
40 TABLET ORAL 2 TIMES DAILY
Qty: 180 TABLET | Refills: 1 | Status: SHIPPED | OUTPATIENT
Start: 2022-09-07 | End: 2023-03-07 | Stop reason: SDUPTHER

## 2022-09-07 NOTE — PROGRESS NOTES
Chief Complaint  Diabetes (Follow up)    Subjective        Sandeep Alonso presents to [facility]  Presents for follow up for multiple chronic problems: HTN stable with amlodipine, losartan-hctz hyperlipidemia stable with omega 3 fatty acids and pravastatin, hypokalemia stable with potassium, type 2 diabetes stable with metformin, edema stable with furosemide, neuropathy stable with gabapentin.  Denies chest pain, shortness of breath,  Or palpitations, denies fever, chills, nausea, vomiting or diarrhea    Hypertension  This is a chronic problem. The current episode started more than 1 year ago. The problem has been gradually improving since onset. The problem is controlled. Pertinent negatives include no blurred vision, chest pain, headaches, neck pain (  ), orthopnea, PND or shortness of breath. There are no associated agents to hypertension. Risk factors for coronary artery disease include diabetes mellitus, dyslipidemia, family history, male gender, obesity and sedentary lifestyle. Past treatments include ACE inhibitors, diuretics and beta blockers. Current antihypertension treatment includes ACE inhibitors, beta blockers, diuretics and lifestyle changes. The current treatment provides mild improvement. There are no compliance problems.  There is no history of angina, kidney disease, CVA or PVD. There is no history of chronic renal disease.   Hyperlipidemia  This is a chronic problem. The current episode started more than 1 year ago. The problem is uncontrolled. Recent lipid tests were reviewed and are low. Exacerbating diseases include diabetes and obesity. He has no history of chronic renal disease, liver disease or nephrotic syndrome. Factors aggravating his hyperlipidemia include beta blockers. Pertinent negatives include no chest pain or shortness of breath. Current antihyperlipidemic treatment includes statins and diet change. The current treatment provides mild improvement  of lipids. There are no compliance problems.  Risk factors for coronary artery disease include diabetes mellitus, dyslipidemia, family history, hypertension, male sex, obesity and a sedentary lifestyle.   Diabetes  He presents for his follow-up diabetic visit. He has type 2 diabetes mellitus. His disease course has been improving. There are no hypoglycemic associated symptoms. Pertinent negatives for hypoglycemia include no headaches. Pertinent negatives for diabetes include no blurred vision, no chest pain, no polydipsia, no polyphagia and no polyuria. There are no hypoglycemic complications. Symptoms are stable. There are no diabetic complications. Pertinent negatives for diabetic complications include no CVA or PVD. Risk factors for coronary artery disease include diabetes mellitus, dyslipidemia, hypertension, male sex and sedentary lifestyle. Current diabetic treatment includes oral agent (dual therapy). He is compliant with treatment all of the time. His weight is stable. He is following a generally healthy diet. When asked about meal planning, he reported none. He has not had a previous visit with a dietitian. He participates in exercise intermittently. His breakfast blood glucose is taken between 6-7 am. His breakfast blood glucose range is generally  mg/dl. An ACE inhibitor/angiotensin II receptor blocker is being taken. He does not see a podiatrist.Eye exam is not current.   Leg Swelling  This is a chronic problem. The current episode started more than 1 year ago. The problem occurs intermittently. The problem has been unchanged. Pertinent negatives include no chest pain, fever, headaches, joint swelling or neck pain (  ). Nothing aggravates the symptoms. He has tried nothing for the symptoms. The treatment provided no relief.       The following portions of the patient's history were reviewed and updated as appropriate: allergies, current medications, past family history, past medical history, past  "social history, past surgical history and problem list.      Objective   Vital Signs:  /74 (BP Location: Right arm, Patient Position: Sitting, Cuff Size: Adult)   Pulse 74   Temp 97.8 °F (36.6 °C) (Infrared)   Resp 18   Ht 170.2 cm (67\") Comment: per patient  Wt 101 kg (222 lb)   SpO2 99%   BMI 34.77 kg/m²   Estimated body mass index is 34.77 kg/m² as calculated from the following:    Height as of this encounter: 170.2 cm (67\").    Weight as of this encounter: 101 kg (222 lb).          Physical Exam  Vitals and nursing note reviewed.   Constitutional:       General: He is awake.      Appearance: Normal appearance. He is well-developed and well-groomed.   HENT:      Head: Normocephalic and atraumatic.      Right Ear: Hearing, tympanic membrane, ear canal and external ear normal.      Left Ear: Hearing, tympanic membrane, ear canal and external ear normal.      Nose: Nose normal.      Mouth/Throat:      Lips: Pink.      Pharynx: Oropharynx is clear.   Eyes:      General: Lids are normal.      Conjunctiva/sclera: Conjunctivae normal.   Cardiovascular:      Rate and Rhythm: Normal rate and regular rhythm.      Heart sounds: Normal heart sounds.   Pulmonary:      Effort: Pulmonary effort is normal.      Breath sounds: Normal breath sounds and air entry.   Musculoskeletal:      Cervical back: Full passive range of motion without pain.      Right lower le+ Edema present.      Left lower le+ Edema present.   Lymphadenopathy:      Head:      Right side of head: No submental, submandibular or tonsillar adenopathy.      Left side of head: No submental, submandibular or tonsillar adenopathy.   Skin:     General: Skin is warm and dry.   Neurological:      Mental Status: He is alert and oriented to person, place, and time.      Sensory: Sensation is intact.      Motor: Motor function is intact.      Coordination: Coordination is intact.      Gait: Gait is intact.   Psychiatric:         Attention and " Perception: Attention and perception normal.         Mood and Affect: Mood and affect normal.         Speech: Speech normal.         Behavior: Behavior normal. Behavior is cooperative.         Thought Content: Thought content normal.         Cognition and Memory: Cognition and memory normal.         Judgment: Judgment normal.        Result Review :                Assessment and Plan   Diagnoses and all orders for this visit:    1. Type 2 diabetes mellitus with diabetic autonomic neuropathy, without long-term current use of insulin (HCC) (Primary)  -     POC Glycosylated Hemoglobin (Hb A1C)  -     CBC (No Diff)  -     Comprehensive metabolic panel  -     Hemoglobin A1c  -     Microalbumin / Creatinine Urine Ratio - Urine, Clean Catch  -     gabapentin (NEURONTIN) 100 MG capsule; Take 1 capsule by mouth 2 (Two) Times a Day.  Dispense: 180 capsule; Refill: 1  -     metFORMIN (GLUCOPHAGE) 500 MG tablet; 1/2 tab daily (actual dose is 250)  Dispense: 90 tablet; Refill: 1    2. Screening for colorectal cancer  -     Cologuard - Stool, Per Rectum; Future    3. Essential hypertension  -     CBC (No Diff)  -     Comprehensive metabolic panel  -     amLODIPine (NORVASC) 5 MG tablet; Take 1 tablet by mouth Daily.  Dispense: 90 tablet; Refill: 1  -     losartan-hydrochlorothiazide (HYZAAR) 50-12.5 MG per tablet; Take 1 tablet by mouth Daily.  Dispense: 90 tablet; Refill: 1    4. Mixed hyperlipidemia  -     Lipid panel  -     Omega 3 1000 MG capsule; Take 1 capsule by mouth Every Night.  Dispense: 90 each; Refill: 1  -     pravastatin (PRAVACHOL) 10 MG tablet; Take 1 tablet by mouth Every Night.  Dispense: 90 tablet; Refill: 1    5. Hypokalemia  -     amLODIPine (NORVASC) 5 MG tablet; Take 1 tablet by mouth Daily.  Dispense: 90 tablet; Refill: 1  -     potassium chloride (K-DUR,KLOR-CON) 20 MEQ CR tablet; Take 1 tablet by mouth Daily.  Dispense: 90 tablet; Refill: 1    6. Localized edema  -     furosemide (LASIX) 40 MG tablet;  Take 1 tablet by mouth 2 (Two) Times a Day.  Dispense: 180 tablet; Refill: 1    7. Class 3 severe obesity due to excess calories with serious comorbidity and body mass index (BMI) of 40.0 to 44.9 in adult (HCC)    Patient's (Body mass index is 34.77 kg/m².) indicates that they are obese (BMI >30) with health related conditions that include hypertension, diabetes mellitus and dyslipidemias . Weight is unchanged. BMI is is above average; BMI management plan is completed. We discussed portion control and increasing exercise.              Follow Up   Return in about 6 months (around 3/7/2023) for Recheck.  Patient was given instructions and counseling regarding his condition or for health maintenance advice. Please see specific information pulled into the AVS if appropriate.     Electronically signed by Lisbet Velez DNP, APRN, 09/07/22, 10:25 AM CDT.

## 2022-09-08 LAB
ALBUMIN SERPL-MCNC: 4.7 G/DL (ref 3.5–5.2)
ALBUMIN/CREAT UR: <23 MG/G CREAT (ref 0–29)
ALBUMIN/GLOB SERPL: 1.6 G/DL
ALP SERPL-CCNC: 85 U/L (ref 39–117)
ALT SERPL-CCNC: 19 U/L (ref 1–41)
AST SERPL-CCNC: 22 U/L (ref 1–40)
BILIRUB SERPL-MCNC: 0.6 MG/DL (ref 0–1.2)
BUN SERPL-MCNC: 11 MG/DL (ref 8–23)
BUN/CREAT SERPL: 11.3 (ref 7–25)
CALCIUM SERPL-MCNC: 9.4 MG/DL (ref 8.6–10.5)
CHLORIDE SERPL-SCNC: 100 MMOL/L (ref 98–107)
CHOLEST SERPL-MCNC: 136 MG/DL (ref 0–200)
CO2 SERPL-SCNC: 33.6 MMOL/L (ref 22–29)
CREAT SERPL-MCNC: 0.97 MG/DL (ref 0.76–1.27)
CREAT UR-MCNC: 12.9 MG/DL
EGFRCR-CYS SERPLBLD CKD-EPI 2021: 86.1 ML/MIN/1.73
ERYTHROCYTE [DISTWIDTH] IN BLOOD BY AUTOMATED COUNT: 13.4 % (ref 12.3–15.4)
GLOBULIN SER CALC-MCNC: 3 GM/DL
GLUCOSE SERPL-MCNC: 87 MG/DL (ref 65–99)
HBA1C MFR BLD: 5.5 % (ref 4.8–5.6)
HCT VFR BLD AUTO: 45.8 % (ref 37.5–51)
HDLC SERPL-MCNC: 41 MG/DL (ref 40–60)
HGB BLD-MCNC: 14.7 G/DL (ref 13–17.7)
LDLC SERPL CALC-MCNC: 78 MG/DL (ref 0–100)
MCH RBC QN AUTO: 28.5 PG (ref 26.6–33)
MCHC RBC AUTO-ENTMCNC: 32.1 G/DL (ref 31.5–35.7)
MCV RBC AUTO: 88.9 FL (ref 79–97)
MICROALBUMIN UR-MCNC: <3 UG/ML
PLATELET # BLD AUTO: 247 10*3/MM3 (ref 140–450)
POTASSIUM SERPL-SCNC: 4 MMOL/L (ref 3.5–5.2)
PROT SERPL-MCNC: 7.7 G/DL (ref 6–8.5)
RBC # BLD AUTO: 5.15 10*6/MM3 (ref 4.14–5.8)
SODIUM SERPL-SCNC: 142 MMOL/L (ref 136–145)
TRIGL SERPL-MCNC: 88 MG/DL (ref 0–150)
VLDLC SERPL CALC-MCNC: 17 MG/DL (ref 5–40)
WBC # BLD AUTO: 7.81 10*3/MM3 (ref 3.4–10.8)

## 2022-09-27 ENCOUNTER — TELEPHONE (OUTPATIENT)
Dept: FAMILY MEDICINE CLINIC | Facility: CLINIC | Age: 67
End: 2022-09-27

## 2022-10-05 ENCOUNTER — OFFICE VISIT (OUTPATIENT)
Dept: FAMILY MEDICINE CLINIC | Facility: CLINIC | Age: 67
End: 2022-10-05

## 2022-10-05 VITALS
WEIGHT: 223 LBS | BODY MASS INDEX: 35 KG/M2 | TEMPERATURE: 98 F | DIASTOLIC BLOOD PRESSURE: 75 MMHG | HEIGHT: 67 IN | HEART RATE: 88 BPM | OXYGEN SATURATION: 99 % | RESPIRATION RATE: 18 BRPM | SYSTOLIC BLOOD PRESSURE: 156 MMHG

## 2022-10-05 DIAGNOSIS — Z00.00 INITIAL MEDICARE ANNUAL WELLNESS VISIT: Primary | ICD-10-CM

## 2022-10-05 DIAGNOSIS — Z23 IMMUNIZATION DUE: ICD-10-CM

## 2022-10-05 DIAGNOSIS — Z71.85 IMMUNIZATION COUNSELING: ICD-10-CM

## 2022-10-05 PROCEDURE — 1170F FXNL STATUS ASSESSED: CPT | Performed by: NURSE PRACTITIONER

## 2022-10-05 PROCEDURE — G0438 PPPS, INITIAL VISIT: HCPCS | Performed by: NURSE PRACTITIONER

## 2022-10-05 PROCEDURE — 90662 IIV NO PRSV INCREASED AG IM: CPT | Performed by: NURSE PRACTITIONER

## 2022-10-05 PROCEDURE — G0009 ADMIN PNEUMOCOCCAL VACCINE: HCPCS | Performed by: NURSE PRACTITIONER

## 2022-10-05 PROCEDURE — 1159F MED LIST DOCD IN RCRD: CPT | Performed by: NURSE PRACTITIONER

## 2022-10-05 PROCEDURE — G0008 ADMIN INFLUENZA VIRUS VAC: HCPCS | Performed by: NURSE PRACTITIONER

## 2022-10-05 PROCEDURE — 1125F AMNT PAIN NOTED PAIN PRSNT: CPT | Performed by: NURSE PRACTITIONER

## 2022-10-05 PROCEDURE — 90677 PCV20 VACCINE IM: CPT | Performed by: NURSE PRACTITIONER

## 2022-10-05 NOTE — PATIENT INSTRUCTIONS
Medicare Wellness  Personal Prevention Plan of Service     Date of Office Visit:    Encounter Provider:  Lisbet Velez, BECKA, APRN  Place of Service:  [facility]  Patient Name: Sandeep Alonso  :  1955    As part of the Medicare Wellness portion of your visit today, we are providing you with this personalized preventive plan of services (PPPS). This plan is based upon recommendations of the [place] Preventive Services Task Force (USPSTF) and the Advisory Committee on Immunization Practices (ACIP).    This lists the preventive care services that should be considered, and provides dates of when you are due. Items listed as completed are up-to-date and do not require any further intervention.    Health Maintenance   Topic Date Due    INFLUENZA VACCINE  2022    Pneumococcal Vaccine 65+ (2 - PCV) 10/04/2022    COVID-19 Vaccine (4 - Booster for Corie series) 10/07/2022 (Originally 2022)    HEMOGLOBIN A1C  2023    DIABETIC FOOT EXAM  2023    DIABETIC EYE EXAM  08/10/2023    LIPID PANEL  2023    URINE MICROALBUMIN  2023    ANNUAL WELLNESS VISIT  10/05/2023    COLORECTAL CANCER SCREENING  2025    TDAP/TD VACCINES (3 - Td or Tdap) 2027    HEPATITIS C SCREENING  Addressed    ZOSTER VACCINE  Addressed       Orders Placed This Encounter   Procedures    Fluzone High-Dose 65+yrs (1979-9882)    Pneumococcal Conjugate Vaccine 20-Valent All       Return in about 1 year (around 10/5/2023) for Medicare Wellness.            Advance Care Planning and Advance Directives     You make decisions on a daily basis - decisions about where you want to live, your career, your home, your life. Perhaps one of the most important decisions you face is your choice for future medical care. Take time to talk with your family and your healthcare team and start planning today.  Advance Care Planning is a process that can help you:  Understand possible  future healthcare decisions in light of your own experiences  Reflect on those decision in light of your goals and values  Discuss your decisions with those closest to you and the healthcare professionals that care for you  Make a plan by creating a document that reflects your wishes    Surrogate Decision Maker  In the event of a medical emergency, which has left you unable to communicate or to make your own decisions, you would need someone to make decisions for you.  It is important to discuss your preferences for medical treatment with this person while you are in good health.     Qualities of a surrogate decision maker:  Willing to take on this role and responsibility  Knows what you want for future medical care  Willing to follow your wishes even if they don't agree with them  Able to make difficult medical decisions under stressful circumstances    Advance Directives  These are legal documents you can create that will guide your healthcare team and decision maker(s) when needed. These documents can be stored in the electronic medical record.    Living Will - a legal document to guide your care if you have a terminal condition or a serious illness and are unable to communicate. States vary by statute in document names/types, but most forms may include one or more of the following:        -  Directions regarding life-prolonging treatments        -  Directions regarding artificially provided nutrition/hydration        -  Choosing a healthcare decision maker        -  Direction regarding organ/tissue donation    Durable Power of  for Healthcare - this document names an -in-fact to make medical decisions for you, but it may also allow this person to make personal and financial decisions for you. Please seek the advice of an  if you need this type of document.    **Advance Directives are not required and no one may discriminate against you if you do not sign one.    Medical Orders  Many  states allow specific forms/orders signed by your physician to record your wishes for medical treatment in your current state of health. This form, signed in personal communication with your physician, addresses resuscitation and other medical interventions that you may or may not want.      For more information or to schedule a time with a [facility] Advance Care Planning Facilitator contact: [facility] or call [phone] and someone will contact you directly.    Medicare Wellness  Personal Prevention Plan of Service     Date of Office Visit:    Encounter Provider:  Lisbet Velez, BECKA, APRN  Place of Service:  [facility]  Patient Name: Sandeep Alonso  :  1955    As part of the Medicare Wellness portion of your visit today, we are providing you with this personalized preventive plan of services (PPPS). This plan is based upon recommendations of the [place] Preventive Services Task Force (USPSTF) and the Advisory Committee on Immunization Practices (ACIP).    This lists the preventive care services that should be considered, and provides dates of when you are due. Items listed as completed are up-to-date and do not require any further intervention.    Health Maintenance   Topic Date Due    INFLUENZA VACCINE  2022    ANNUAL WELLNESS VISIT  10/04/2022    Pneumococcal Vaccine 65+ (2 - PCV) 10/04/2022    COVID-19 Vaccine (4 - Booster for Corie series) 10/07/2022 (Originally 2022)    HEMOGLOBIN A1C  2023    DIABETIC FOOT EXAM  2023    DIABETIC EYE EXAM  08/10/2023    LIPID PANEL  2023    URINE MICROALBUMIN  2023    COLORECTAL CANCER SCREENING  2025    TDAP/TD VACCINES (3 - Td or Tdap) 2027    HEPATITIS C SCREENING  Addressed    ZOSTER VACCINE  Addressed       Orders Placed This Encounter   Procedures    Pneumococcal Conjugate Vaccine 20-Valent All       No follow-ups on file.

## 2022-10-05 NOTE — PROGRESS NOTES
The ABCs of the Annual Wellness Visit  Initial Medicare Wellness Visit    Chief Complaint   Patient presents with   • Medicare Wellness-Initial Visit     Subjective   History of Present Illness:  Sandeep Alonso is a 67 y.o. male who presents for an Initial Medicare Wellness Visit.    The following portions of the patient's history were reviewed and   updated as appropriate: allergies, current medications, past family history, past medical history, past social history, past surgical history and problem list.     Compared to one year ago, the patient feels his physical   health is better.    Compared to one year ago, the patient feels his mental   health is the same.    The following portions of the patient's history were reviewed and updated as appropriate: allergies, current medications, past family history, past medical history, past social history, past surgical history and problem list.    Recent Hospitalizations:  He was not admitted to the hospital during the last year.     Current Medical Providers:  Patient Care Team:  Lisbet Velez DNP, VENKAT as PCP - General  Lisbet Velez DNP, APRN as PCP - Family Medicine    Outpatient Medications Prior to Visit   Medication Sig Dispense Refill   • amLODIPine (NORVASC) 5 MG tablet Take 1 tablet by mouth Daily. 90 tablet 1   • furosemide (LASIX) 40 MG tablet Take 1 tablet by mouth 2 (Two) Times a Day. 180 tablet 1   • gabapentin (NEURONTIN) 100 MG capsule Take 1 capsule by mouth 2 (Two) Times a Day. 180 capsule 1   • losartan-hydrochlorothiazide (HYZAAR) 50-12.5 MG per tablet Take 1 tablet by mouth Daily. 90 tablet 1   • metFORMIN (GLUCOPHAGE) 500 MG tablet 1/2 tab daily (actual dose is 250) 90 tablet 1   • Omega 3 1000 MG capsule Take 1 capsule by mouth Every Night. 90 each 1   • potassium chloride (K-DUR,KLOR-CON) 20 MEQ CR tablet Take 1 tablet by mouth Daily. 90 tablet 1   • pravastatin (PRAVACHOL) 10 MG tablet Take 1 tablet by mouth Every Night. 90 tablet 1  "    No facility-administered medications prior to visit.     No opioid medication identified on active medication list. I have reviewed chart for other potential  high risk medication/s and harmful drug interactions in the elderly.    Aspirin is not on active medication list.  Aspirin use is not indicated based on review of current medical condition/s. Risk of harm outweighs potential benefits.  .    Patient Active Problem List   Diagnosis   • Essential hypertension   • Mixed hyperlipidemia   • Hypokalemia   • Screening PSA (prostate specific antigen)   • Diabetic eye exam ([facility])   • Influenza vaccination given   • Encounter for diabetic foot exam ([facility])   • Encounter for therapeutic drug monitoring   • Encounter for drug screening   • Swelling of ankle joint   • Class 3 severe obesity due to excess calories with serious comorbidity and body mass index (BMI) of 40.0 to 44.9 in adult ([facility])     Advance Care Planning  Advance Directive is not on file.  ACP discussion was held with the patient during this visit. Patient does not have an advance directive, information provided.     Objective       Vitals:    10/05/22 1019   BP: 156/75   BP Location: Right arm   Patient Position: Sitting   Cuff Size: Adult   Pulse: 88   Resp: 18   Temp: 98 °F (36.7 °C)   TempSrc: Infrared   SpO2: 99%   Weight: 101 kg (223 lb)   Height: 170.2 cm (67\")   PainSc:   5   PainLoc: Hip     Estimated body mass index is 34.93 kg/m² as calculated from the following:    Height as of this encounter: 170.2 cm (67\").    Weight as of this encounter: 101 kg (223 lb).     Does the patient have evidence of cognitive impairment? No    Physical Exam  Vitals and nursing note reviewed.   Constitutional:       General: He is awake.      Appearance: Normal appearance. He is well-developed and well-groomed.   HENT:      Head: Normocephalic and atraumatic.      Right Ear: Hearing, tympanic membrane, ear canal and external ear normal.      Left Ear: Hearing, tympanic " membrane, ear canal and external ear normal.      Nose: Nose normal.      Mouth/Throat:      Lips: Pink.      Pharynx: Oropharynx is clear.   Eyes:      General: Lids are normal.      Conjunctiva/sclera: Conjunctivae normal.   Cardiovascular:      Rate and Rhythm: Normal rate and regular rhythm.      Heart sounds: Normal heart sounds.   Pulmonary:      Effort: Pulmonary effort is normal.      Breath sounds: Normal breath sounds and air entry.   Musculoskeletal:      Cervical back: Full passive range of motion without pain.      Right lower leg: No edema.      Left lower leg: No edema.   Lymphadenopathy:      Head:      Right side of head: No submental, submandibular or tonsillar adenopathy.      Left side of head: No submental, submandibular or tonsillar adenopathy.   Skin:     General: Skin is warm and dry.   Neurological:      Mental Status: He is alert.      Sensory: Sensation is intact.      Motor: Motor function is intact.      Coordination: Coordination is intact.      Gait: Gait is intact.   Psychiatric:         Attention and Perception: Attention and perception normal.         Mood and Affect: Mood and affect normal.         Speech: Speech normal.         Behavior: Behavior normal. Behavior is cooperative.         Thought Content: Thought content normal.         Cognition and Memory: Cognition and memory normal.         Judgment: Judgment normal.       Lab Results   Component Value Date    CHLPL 136 09/07/2022    TRIG 88 09/07/2022    HDL 41 09/07/2022    LDL 78 09/07/2022    VLDL 17 09/07/2022    HGBA1C 5.4 09/07/2022    HGBA1C 5.50 09/07/2022          HEALTH RISK ASSESSMENT    Smoking Status:  Social History     Tobacco Use   Smoking Status Never Smoker   Smokeless Tobacco Never Used     Alcohol Consumption:  Social History     Substance and Sexual Activity   Alcohol Use Not Currently   • Alcohol/week: 1.0 standard drink   • Types: 1 Cans of beer per week    Comment: occasional beer     Fall Risk  Screen:    RICHARDSONADI Fall Risk Assessment was completed, and patient is at LOW risk for falls.Assessment completed on:10/5/2022    Depression Screen:   PHQ-2/PHQ-9 Depression Screening 10/5/2022   Retired PHQ-9 Total Score -   Retired Total Score -   Little Interest or Pleasure in Doing Things 0-->not at all   Feeling Down, Depressed or Hopeless 0-->not at all   PHQ-9: Brief Depression Severity Measure Score 0     Health Habits and Functional and Cognitive Screening:  Functional & Cognitive Status 10/5/2022   Do you have difficulty preparing food and eating? No   Do you have difficulty bathing yourself, getting dressed or grooming yourself? No   Do you have difficulty using the toilet? No   Do you have difficulty moving around from place to place? No   Do you have trouble with steps or getting out of a bed or a chair? No   Current Diet Diabetic Diet   Dental Exam Up to date   Eye Exam Not up to date   Exercise (times per week) 3 times per week   Current Exercises Include Yard Work;No Regular Exercise   Do you need help using the phone?  No   Are you deaf or do you have serious difficulty hearing?  No   Do you need help with transportation? No   Do you need help shopping? No   Do you need help preparing meals?  No   Do you need help with housework?  No   Do you need help with laundry? No   Do you need help taking your medications? No   Do you need help managing money? No   Do you ever drive or ride in a car without wearing a seat belt? No   Have you felt unusual stress, anger or loneliness in the last month? No   Who do you live with? Alone   If you need help, do you have trouble finding someone available to you? No   Have you been bothered in the last four weeks by sexual problems? No   Do you have difficulty concentrating, remembering or making decisions? No     Age-appropriate Screening Schedule:  Refer to the list below for future screening recommendations based on patient's age, sex and/or medical conditions.  Orders for these recommended tests are listed in the plan section. The patient has been provided with a written plan.    Health Maintenance   Topic Date Due   • INFLUENZA VACCINE  08/01/2022   • HEMOGLOBIN A1C  03/07/2023   • DIABETIC FOOT EXAM  06/23/2023   • DIABETIC EYE EXAM  08/10/2023   • LIPID PANEL  09/07/2023   • URINE MICROALBUMIN  09/07/2023   • TDAP/TD VACCINES (3 - Td or Tdap) 07/21/2027   • ZOSTER VACCINE  Addressed            Assessment & Plan      Diagnoses and all orders for this visit:    1. Initial Medicare annual wellness visit (Primary)    2. Immunization due  -     Fluzone High-Dose 65+yrs (7270-6390)  -     Pneumococcal Conjugate Vaccine 20-Valent All    3. Immunization counseling  -     Fluzone High-Dose 65+yrs (7894-0519)  -     Pneumococcal Conjugate Vaccine 20-Valent All    Health Maintenance Summary    Ordered - INFLUENZA VACCINE; (Yearly - August to March); Ordered on 10/5/2022  10/04/2021  Imm Admin: Fluzone High-Dose 65+yrs    09/23/2020  Imm Admin: FluLaval/Fluzone >6mos    10/18/2019  Imm Admin: flucelvax quad pfs =>4 YRS    10/18/2019  Imm Admin: Influenza, Unspecified    12/01/2018  Done         Ordered - Pneumococcal Vaccine 65+; (2 - PCV); Ordered on 10/5/2022  10/04/2021  Imm Admin: Pneumococcal Polysaccharide (PPSV23)      Postponed - COVID-19 Vaccine; (4 - Booster for Milana series); Postponed until 10/7/2022  10/05/2022  Postponed until 10/7/2022 by Latoya Anand MA (Patient Refused)    05/03/2022  Imm Admin: COVID-19 (MODERNA) BOOSTER    12/22/2021  Imm Admin: COVID-19 (MODERNA) 1st, 2nd, 3rd Dose Only    03/11/2021  Imm Admin: COVID-19 (MILANA)      HEMOGLOBIN A1C; (Every 6 Months); Next due on 3/7/2023  09/07/2022  Hemoglobin A1C component of POC Glycosylated Hemoglobin (Hb A1C)    09/07/2022  Hemoglobin A1C component of Hemoglobin A1c    03/07/2022  Hemoglobin A1C component of POC Glycosylated Hemoglobin (Hb A1C)    10/06/2021  Hemoglobin A1C component of Hemoglobin A1c     09/07/2021  Hemoglobin A1C component of Hemoglobin A1c         DIABETIC FOOT EXAM; (Yearly); Next due on 6/23/2023 06/23/2022  SmartData: WORKFLOW - DIABETES - DIABETIC FOOT EXAM PERFORMED    03/22/2022  SmartData: WORKFLOW - DIABETES - DIABETIC FOOT EXAM PERFORMED    03/17/2022  SmartData: WORKFLOW - DIABETES - DIABETIC FOOT EXAM PERFORMED    03/17/2022  Visit Dx: Encounter for diabetic foot exam (HCC)    10/04/2021  Visit Dx: Encounter for diabetic foot exam (HCC)         DIABETIC EYE EXAM; (Yearly); Next due on 8/10/2023  08/10/2022  SCANNED - EYE EXAM    11/30/2021  SCANNED - EYE EXAM    10/04/2021  Postponed until 11/30/2021 by Akil Morgan MA (Pending event)    10/01/2020  SCANNED - EYE EXAM    08/21/2020  Postponed until 11/1/2020 by Leta Wallace APRN (Pending event)         LIPID PANEL; (Yearly); Next due on 9/7/2023 09/07/2022  Lipid panel    03/07/2022  Lipid panel    10/06/2021  Lipid panel    09/07/2021  Lipid Panel    03/05/2021  Lipid panel         URINE MICROALBUMIN; (Yearly); Next due on 9/7/2023 09/07/2022  Multiple components of Microalbumin / Creatinine Urine Ratio - Urine, Clean Catch    09/07/2021  Multiple components of Microalbumin / Creatinine Urine Ratio -    08/21/2020  Microalbumin, Urine component of MicroAlbumin, Urine, Random - Urine, Clean Catch    08/16/2019  Done    08/16/2019  Multiple components of Microalbumin / Creatinine Urine Ratio - Urine, Clean Catch         ANNUAL WELLNESS VISIT; (Yearly); Next due on 10/5/2023  10/05/2022  Done    10/04/2021  Done    10/04/2021  Level of Service: INITIAL PREVENTIVE EXAM    10/04/2021  Registry Metric: Medicare AWV      COLORECTAL CANCER SCREENING; (COLONOSCOPY - Every 3 Years); Next due on 9/23/2025 09/23/2022  Follow-up changed to COLONOSCOPY - Every 3 Years and due date changed to 9/23/2025 by Akil Morgan MA    09/23/2022  Follow-up changed to COLONOSCOPY - Every 3 Years by Akil Morgan MA    09/23/2022  VELVET  (Done)    09/19/2022  Cologuard component of Cologuard - Stool, Per Rectum    02/17/2019  SCANNED - COLOGUARD         TDAP/TD VACCINES; (3 - Td or Tdap); Next due on 7/21/2027 07/21/2017  Declined    01/06/2017  Declined      HEPATITIS C SCREENING; Addressed  07/21/2017  Declined    01/06/2017  Declined      ZOSTER VACCINE; (Series Information); Addressed  07/27/2018  Imm Admin: Zostavax    07/21/2017  Declined    01/06/2017  Declined      CMS Preventative Services Quick Reference  Risk Factors Identified During Encounter  Alcohol Misuse: declines  Cardiovascular Disease: screened  Depression/Dysphoria: screened  Fall Risk-High or Moderate: screened low risk  Hearing Problem: denies  Immunizations Discussed/Encouraged (specific Immunizations; Influenza and Prevnar 20 (Pneumococcal 20-valent conjugate)  Inactivity/Sedentary: screened  Obesity/Overweight: obese bmi 34.9 needs to lose 3 pounds before next visit   Polypharmacy: discussed  The above risks/problems have been discussed with the patient.  Follow up actions/plans if indicated are seen below in the Assessment/Plan Section.  Pertinent information has been shared with the patient in the After Visit Summary.      Follow Up:  Return in about 1 year (around 10/5/2023) for Medicare Wellness.     An After Visit Summary and PPPS were made available to the patient.                 Electronically signed by Lisbet Velez DNP, APRN, 10/05/22, 11:14 AM CDT.

## 2022-10-06 ENCOUNTER — TELEPHONE (OUTPATIENT)
Dept: FAMILY MEDICINE CLINIC | Facility: CLINIC | Age: 67
End: 2022-10-06

## 2022-10-06 NOTE — TELEPHONE ENCOUNTER
Sandeep called this morning and wanted to let you know that he received your voicemail yesterday after he left. He stated that he did receive his shots and just wanted to let you know that the nurse did them before he left his appt.

## 2022-10-10 ENCOUNTER — TELEPHONE (OUTPATIENT)
Dept: PODIATRY | Facility: CLINIC | Age: 67
End: 2022-10-10

## 2022-10-10 NOTE — TELEPHONE ENCOUNTER
Called patient regarding appt on 10/11/2022. Left message for patient to return call if any questions or concerns arise.

## 2022-10-10 NOTE — PROGRESS NOTES
[facility] - PODIATRY    Today's Date: 10/11/22    Patient Name: Sandeep Alonso  MRN: [id number]  CSN: [id number]  PCP: Lisbet Velez DNP, APRN  Referring Provider: No ref. provider found    SUBJECTIVE     Chief Complaint   Patient presents with   • Follow-up     Lisbet Velez DNP, 10/05/2022 Return in about 3 months - pt states feet doing good, needs nails trimmed- pt denies pain- pt presents with long thick nails   • Diabetes     98mg/dl BG this am     HPI: Sandeep Alonso, a 67 y.o.male, comes to clinic as a(n) established patient presenting for diabetic foot exam and complaining of long, thickened toenails. Pt has h/o DM2, HTN, Hypokalemia, Edema.  States his nails are long, thick, and discolored. He is unable to reach feet to care for nails himself. Reports mild numbness and tingling in feet. Denies open wounds or sores.  Reports that he feels like left hallux nail was trimmed too far back at last visit resulting in some discomfort after the visit.  Patient is NIDDM with last stated BG level of 98mg/dl. Denies pain today but reports pain in feet when toenails become too long when wearing closed toed shoes. Relates previous treatment(s) including nail debridement by podiatrist. Denies any constitutional symptoms. No other pedal complaints at this time.    Past Medical History:   Diagnosis Date   • Hypokalemia    • Peripheral edema      Past Surgical History:   Procedure Laterality Date   • NO PAST SURGERIES       Family History   Problem Relation Age of Onset   • Heart attack Mother    • Emphysema Father    • No Known Problems Sister    • No Known Problems Brother    • No Known Problems Son    • No Known Problems Maternal Grandmother    • No Known Problems Maternal Grandfather    • No Known Problems Paternal Grandmother    • No Known Problems Paternal Grandfather    • No Known Problems Brother    • No Known Problems Brother    • No Known Problems Son    • No Known Problems Son    • No  Known Problems Son      Social History     Socioeconomic History   • Marital status:    Tobacco Use   • Smoking status: Never   • Smokeless tobacco: Never   Vaping Use   • Vaping Use: Never used   Substance and Sexual Activity   • Alcohol use: Not Currently     Alcohol/week: 1.0 standard drink     Types: 1 Cans of beer per week     Comment: occasional beer   • Drug use: No   • Sexual activity: Defer     Allergies   Allergen Reactions   • Ace Inhibitors Cough     Current Outpatient Medications   Medication Sig Dispense Refill   • amLODIPine (NORVASC) 5 MG tablet Take 1 tablet by mouth Daily. 90 tablet 1   • furosemide (LASIX) 40 MG tablet Take 1 tablet by mouth 2 (Two) Times a Day. 180 tablet 1   • gabapentin (NEURONTIN) 100 MG capsule Take 1 capsule by mouth 2 (Two) Times a Day. 180 capsule 1   • losartan-hydrochlorothiazide (HYZAAR) 50-12.5 MG per tablet Take 1 tablet by mouth Daily. 90 tablet 1   • metFORMIN (GLUCOPHAGE) 500 MG tablet 1/2 tab daily (actual dose is 250) 90 tablet 1   • Omega 3 1000 MG capsule Take 1 capsule by mouth Every Night. 90 each 1   • potassium chloride (K-DUR,KLOR-CON) 20 MEQ CR tablet Take 1 tablet by mouth Daily. 90 tablet 1   • pravastatin (PRAVACHOL) 10 MG tablet Take 1 tablet by mouth Every Night. 90 tablet 1     No current facility-administered medications for this visit.     Review of Systems   Constitutional: Negative for chills and fever.   HENT: Negative for congestion.    Respiratory: Negative for shortness of breath.    Cardiovascular: Negative for chest pain and leg swelling.   Gastrointestinal: Negative for constipation, diarrhea, nausea and vomiting.   Musculoskeletal: Negative for arthralgias, gait problem and myalgias.   Skin: Negative for wound.   Neurological: Positive for numbness.       OBJECTIVE     There were no vitals filed for this visit.    Foot/Ankle Exam:       General:   Appearance: appears stated age and healthy and obesity    Orientation: AAOx3     Affect: appropriate    Gait: unimpaired    Assistance: independent    Shoe Gear:  Casual shoes    VASCULAR      Right Foot Vascularity   Dorsalis pedis:  2+  Posterior tibial:  2+  Skin Temperature: warm    Edema Gradin+ and pitting  CFT:  3  Pedal Hair Growth:  Present  Varicosities: mild varicosities       Left Foot Vascularity   Dorsalis pedis:  2+  Posterior tibial:  2+  Skin Temperature: warm    Edema Gradin+ and pitting  CFT:  3  Pedal Hair Growth:  Present  Varicosities: mild varicosities        NEUROLOGIC     Right Foot Neurologic   Light touch sensation:  Diminished  Vibratory sensation:  Diminished  Hot/Cold sensation: diminished    Protective Sensation using Montgomery-Lucita Monofilament:  8     Left Foot Neurologic   Light touch sensation:  Diminished  Vibratory sensation:  Diminished  Hot/cold sensation: diminished    Protective Sensation using Montgomery-Lucita Monofilament:  8     MUSCULOSKELETAL      Right Foot Musculoskeletal    Amputation   Right toes amputated: No    Ecchymosis:  None  Tenderness: none    Arch:  Normal     Left Foot Musculoskeletal    Amputation   Left toes amputated: No    Ecchymosis:  None  Tenderness: none    Arch:  Normal     MUSCLE STRENGTH     Right Foot Muscle Strength   Foot dorsiflexion:  5  Foot plantar flexion:  5  Foot inversion:  5  Foot eversion:  5     Left Foot Muscle Strength   Foot dorsiflexion:  5  Foot plantar flexion:  5  Foot inversion:  5  Foot eversion:  5     RANGE OF MOTION      Right Foot Range of Motion   Foot and ankle ROM within normal limits       Left Foot Range of Motion   Foot and ankle ROM within normal limits       DERMATOLOGIC     Right Foot Dermatologic   Skin: skin intact    Nails: onychomycosis, abnormally thick and subungual debris       Left Foot Dermatologic   Skin: skin intact    Nails: onychomycosis, abnormally thick and subungual debris        RADIOLOGY/NUCLEAR:  No results found.    LABORATORY/CULTURE  RESULTS:      PATHOLOGY RESULTS:       ASSESSMENT/PLAN     Diagnoses and all orders for this visit:    1. Onychomycosis (Primary)    2. Type 2 diabetes mellitus with diabetic polyneuropathy, without long-term current use of insulin (HCC)    3. Foot pain, bilateral      Comprehensive lower extremity examination and evaluation was performed.  Discussed findings and treatment plan including risks, benefits, and treatment options with patient in detail. Patient agreed with treatment plan.  After verbal consent obtained, nail(s) x10 debrided of length and thickness with nail nipper without incidence  Patient may maintain nails and calluses at home utilizing emery board or pumice stone between visits as needed  Reviewed at home diabetic foot care including daily foot checks   Continue antifungal PRN.   Continue diabetic monitoring and control under direction of PCP.   An After Visit Summary was printed and given to the patient at discharge, including (if requested) any available informative/educational handouts regarding diagnosis, treatment, or medications. All questions were answered to patient/family satisfaction. Should symptoms fail to improve or worsen they agree to call or return to clinic or to go to the Emergency Department. Discussed the importance of following up with any needed screening tests/labs/specialist appointments and any requested follow-up recommended by me today. Importance of maintaining follow-up discussed and patient accepts that missed appointments can delay diagnosis and potentially lead to worsening of conditions.  Return in about 3 months (around 1/11/2023)., or sooner if acute issues arise.      This document has been electronically signed by VENKAT Lyons on October 11, 2022 14:49 CDT

## 2022-10-11 ENCOUNTER — OFFICE VISIT (OUTPATIENT)
Dept: PODIATRY | Facility: CLINIC | Age: 67
End: 2022-10-11

## 2022-10-11 VITALS — WEIGHT: 223 LBS | HEIGHT: 67 IN | BODY MASS INDEX: 35 KG/M2

## 2022-10-11 DIAGNOSIS — M79.672 FOOT PAIN, BILATERAL: ICD-10-CM

## 2022-10-11 DIAGNOSIS — B35.1 ONYCHOMYCOSIS: Primary | ICD-10-CM

## 2022-10-11 DIAGNOSIS — M79.671 FOOT PAIN, BILATERAL: ICD-10-CM

## 2022-10-11 DIAGNOSIS — E11.42 TYPE 2 DIABETES MELLITUS WITH DIABETIC POLYNEUROPATHY, WITHOUT LONG-TERM CURRENT USE OF INSULIN: ICD-10-CM

## 2022-10-11 PROCEDURE — 11721 DEBRIDE NAIL 6 OR MORE: CPT | Performed by: NURSE PRACTITIONER

## 2023-01-05 NOTE — PROGRESS NOTES
[facility] - PODIATRY    Today's Date: 01/10/23    Patient Name: Sandeep Alonso  MRN: [id number]  CSN: [id number]  PCP: Lisbet Velez DNP, APRN  Referring Provider: No ref. provider found    SUBJECTIVE     Chief Complaint   Patient presents with   • Follow-up     Lisbet Velez DNP, 10/05/2022 Return in about 3 months - pt states feet doing good- pt denies pain   • Diabetes     96mg/dl BG this am     HPI: Sandeep Alonso, a 67 y.o.male, comes to clinic as a(n) established patient presenting for diabetic foot exam and complaining of long, thickened toenails. Pt has h/o DM2, HTN, Hypokalemia, Edema.  States his nails are long, thick, and discolored. He is unable to reach feet to care for nails himself. Notes mild numbness and tingling in feet. Denies open wounds or sores. Patient is NIDDM with last stated BG level of 96mg/dl. Denies pain today but reports pain in feet when toenails become too long when wearing closed toed shoes. Relates previous treatment(s) including nail debridement by podiatrist. Denies any constitutional symptoms. No other pedal complaints at this time.    Past Medical History:   Diagnosis Date   • Hypokalemia    • Peripheral edema      Past Surgical History:   Procedure Laterality Date   • NO PAST SURGERIES       Family History   Problem Relation Age of Onset   • Heart attack Mother    • Emphysema Father    • No Known Problems Sister    • No Known Problems Brother    • No Known Problems Son    • No Known Problems Maternal Grandmother    • No Known Problems Maternal Grandfather    • No Known Problems Paternal Grandmother    • No Known Problems Paternal Grandfather    • No Known Problems Brother    • No Known Problems Brother    • No Known Problems Son    • No Known Problems Son    • No Known Problems Son      Social History     Socioeconomic History   • Marital status:    Tobacco Use   • Smoking status: Never   • Smokeless tobacco: Never   Vaping Use   • Vaping Use:  Never used   Substance and Sexual Activity   • Alcohol use: Not Currently     Alcohol/week: 1.0 standard drink     Types: 1 Cans of beer per week     Comment: occasional beer   • Drug use: No   • Sexual activity: Defer     Allergies   Allergen Reactions   • Ace Inhibitors Cough     Current Outpatient Medications   Medication Sig Dispense Refill   • amLODIPine (NORVASC) 5 MG tablet Take 1 tablet by mouth Daily. 90 tablet 1   • furosemide (LASIX) 40 MG tablet Take 1 tablet by mouth 2 (Two) Times a Day. 180 tablet 1   • gabapentin (NEURONTIN) 100 MG capsule Take 1 capsule by mouth 2 (Two) Times a Day. 180 capsule 1   • losartan-hydrochlorothiazide (HYZAAR) 50-12.5 MG per tablet Take 1 tablet by mouth Daily. 90 tablet 1   • metFORMIN (GLUCOPHAGE) 500 MG tablet 1/2 tab daily (actual dose is 250) 90 tablet 1   • Omega 3 1000 MG capsule Take 1 capsule by mouth Every Night. 90 each 1   • potassium chloride (K-DUR,KLOR-CON) 20 MEQ CR tablet Take 1 tablet by mouth Daily. 90 tablet 1   • pravastatin (PRAVACHOL) 10 MG tablet Take 1 tablet by mouth Every Night. 90 tablet 1     No current facility-administered medications for this visit.     Review of Systems   Constitutional: Negative for chills and fever.   HENT: Negative for congestion.    Respiratory: Negative for shortness of breath.    Cardiovascular: Negative for chest pain and leg swelling.   Gastrointestinal: Negative for constipation, diarrhea, nausea and vomiting.   Musculoskeletal: Negative for arthralgias, gait problem and myalgias.   Skin: Negative for wound.   Neurological: Positive for numbness.       OBJECTIVE     Vitals:    01/10/23 1111   BP: 162/78       Foot/Ankle Exam:       General:   Diabetic Foot Exam Performed    Appearance: appears stated age and healthy and obesity    Orientation: AAOx3    Affect: appropriate    Gait: unimpaired    Assistance: independent    Shoe Gear:  Casual shoes    VASCULAR      Right Foot Vascularity   Dorsalis pedis:   2+  Posterior tibial:  2+  Skin Temperature: warm    Edema Gradin+ and pitting  CFT:  3  Pedal Hair Growth:  Present  Varicosities: mild varicosities       Left Foot Vascularity   Dorsalis pedis:  2+  Posterior tibial:  2+  Skin Temperature: warm    Edema Gradin+ and pitting  CFT:  3  Pedal Hair Growth:  Present  Varicosities: mild varicosities        NEUROLOGIC     Right Foot Neurologic   Light touch sensation:  Diminished  Vibratory sensation:  Diminished  Hot/Cold sensation: diminished    Protective Sensation using [place] Monofilament:  8     Left Foot Neurologic   Light touch sensation:  Diminished  Vibratory sensation:  Diminished  Hot/cold sensation: diminished    Protective Sensation using [place] Monofilament:  8     MUSCULOSKELETAL      Right Foot Musculoskeletal    Amputation   Right toes amputated: No    Ecchymosis:  None  Tenderness: toenails    Arch:  Normal     Left Foot Musculoskeletal    Amputation   Left toes amputated: No    Ecchymosis:  None  Tenderness: toenails    Arch:  Normal     MUSCLE STRENGTH     Right Foot Muscle Strength   Foot dorsiflexion:  5  Foot plantar flexion:  5  Foot inversion:  5  Foot eversion:  5     Left Foot Muscle Strength   Foot dorsiflexion:  5  Foot plantar flexion:  5  Foot inversion:  5  Foot eversion:  5     RANGE OF MOTION      Right Foot Range of Motion   Foot and ankle ROM within normal limits       Left Foot Range of Motion   Foot and ankle ROM within normal limits       DERMATOLOGIC     Right Foot Dermatologic   Skin: skin intact    Nails: onychomycosis, abnormally thick and subungual debris       Left Foot Dermatologic   Skin: skin intact    Nails: onychomycosis, abnormally thick and subungual debris        RADIOLOGY/NUCLEAR:  No results found.    LABORATORY/CULTURE RESULTS:      PATHOLOGY RESULTS:       ASSESSMENT/PLAN     Diagnoses and all orders for this visit:    1. Onychomycosis (Primary)    2. Type 2 diabetes mellitus with  diabetic polyneuropathy, without long-term current use of insulin (HCC)    3. Foot pain, bilateral    4. Encounter for diabetic foot exam (HCC)      Comprehensive lower extremity examination and evaluation was performed.  Discussed findings and treatment plan including risks, benefits, and treatment options with patient in detail. Patient agreed with treatment plan.  Diabetic foot exam performed.  After verbal consent obtained, nail(s) x10 debrided of length and thickness with nail nipper without incidence  Patient may maintain nails and calluses at home utilizing emery board or pumice stone between visits as needed  Reviewed at home diabetic foot care including daily foot checks   Continue antifungal PRN.   Continue diabetic monitoring and control under direction of PCP.   An After Visit Summary was printed and given to the patient at discharge, including (if requested) any available informative/educational handouts regarding diagnosis, treatment, or medications. All questions were answered to patient/family satisfaction. Should symptoms fail to improve or worsen they agree to call or return to clinic or to go to the Emergency Department. Discussed the importance of following up with any needed screening tests/labs/specialist appointments and any requested follow-up recommended by me today. Importance of maintaining follow-up discussed and patient accepts that missed appointments can delay diagnosis and potentially lead to worsening of conditions.  Return in about 3 months (around 4/10/2023)., or sooner if acute issues arise.      This document has been electronically signed by VENKAT Lyons on January 10, 2023 12:34 CST

## 2023-01-09 ENCOUNTER — TELEPHONE (OUTPATIENT)
Dept: PODIATRY | Facility: CLINIC | Age: 68
End: 2023-01-09
Payer: MEDICARE

## 2023-01-09 NOTE — TELEPHONE ENCOUNTER
Called patient regarding appt on 01/10/2023. Left message for patient to return call if any questions or concerns arise.

## 2023-01-10 ENCOUNTER — OFFICE VISIT (OUTPATIENT)
Dept: PODIATRY | Facility: CLINIC | Age: 68
End: 2023-01-10
Payer: MEDICARE

## 2023-01-10 VITALS
DIASTOLIC BLOOD PRESSURE: 78 MMHG | WEIGHT: 223 LBS | HEIGHT: 67 IN | SYSTOLIC BLOOD PRESSURE: 162 MMHG | BODY MASS INDEX: 35 KG/M2

## 2023-01-10 DIAGNOSIS — M79.671 FOOT PAIN, BILATERAL: ICD-10-CM

## 2023-01-10 DIAGNOSIS — M79.672 FOOT PAIN, BILATERAL: ICD-10-CM

## 2023-01-10 DIAGNOSIS — B35.1 ONYCHOMYCOSIS: Primary | ICD-10-CM

## 2023-01-10 DIAGNOSIS — E11.9 ENCOUNTER FOR DIABETIC FOOT EXAM: ICD-10-CM

## 2023-01-10 DIAGNOSIS — E11.42 TYPE 2 DIABETES MELLITUS WITH DIABETIC POLYNEUROPATHY, WITHOUT LONG-TERM CURRENT USE OF INSULIN: ICD-10-CM

## 2023-01-10 PROCEDURE — 11721 DEBRIDE NAIL 6 OR MORE: CPT | Performed by: NURSE PRACTITIONER

## 2023-01-10 PROCEDURE — 99213 OFFICE O/P EST LOW 20 MIN: CPT | Performed by: NURSE PRACTITIONER

## 2023-01-10 NOTE — PATIENT INSTRUCTIONS
Diabetes Mellitus and Foot Care  Foot care is an important part of your health, especially when you have diabetes. Diabetes may cause you to have problems because of poor blood flow (circulation) to your feet and legs, which can cause your skin to:  Become thinner and drier.  Break more easily.  Heal more slowly.  Peel and crack.  You may also have nerve damage (neuropathy) in your legs and feet, causing decreased feeling in them. This means that you may not notice minor injuries to your feet that could lead to more serious problems. Noticing and addressing any potential problems early is the best way to prevent future foot problems.  How to care for your feet  Foot hygiene    Wash your feet daily with warm water and mild soap. Do not use hot water. Then, pat your feet and the areas between your toes until they are completely dry. Do not soak your feet as this can dry your skin.  Trim your toenails straight across. Do not dig under them or around the cuticle. File the edges of your nails with an emery board or nail file.  Apply a moisturizing lotion or petroleum jelly to the skin on your feet and to dry, brittle toenails. Use lotion that does not contain alcohol and is unscented. Do not apply lotion between your toes.  Shoes and socks  Wear clean socks or stockings every day. Make sure they are not too tight. Do not wear knee-high stockings since they may decrease blood flow to your legs.  Wear shoes that fit properly and have enough cushioning. Always look in your shoes before you put them on to be sure there are no objects inside.  To break in new shoes, wear them for just a few hours a day. This prevents injuries on your feet.  Wounds, scrapes, corns, and calluses    Check your feet daily for blisters, cuts, bruises, sores, and redness. If you cannot see the bottom of your feet, use a mirror or ask someone for help.  Do not cut corns or calluses or try to remove them with medicine.  If you find a minor scrape,  cut, or break in the skin on your feet, keep it and the skin around it clean and dry. You may clean these areas with mild soap and water. Do not clean the area with peroxide, alcohol, or iodine.  If you have a wound, scrape, corn, or callus on your foot, look at it several times a day to make sure it is healing and not infected. Check for:  Redness, swelling, or pain.  Fluid or blood.  Warmth.  Pus or a bad smell.  General tips  Do not cross your legs. This may decrease blood flow to your feet.  Do not use heating pads or hot water bottles on your feet. They may burn your skin. If you have lost feeling in your feet or legs, you may not know this is happening until it is too late.  Protect your feet from hot and cold by wearing shoes, such as at the beach or on hot pavement.  Schedule a complete foot exam at least once a year (annually) or more often if you have foot problems. Report any cuts, sores, or bruises to your health care provider immediately.  Where to find more information  American Diabetes Association: www.diabetes.org  Association of Diabetes Care & Education Specialists: www.diabeteseducator.org  Contact a health care provider if:  You have a medical condition that increases your risk of infection and you have any cuts, sores, or bruises on your feet.  You have an injury that is not healing.  You have redness on your legs or feet.  You feel burning or tingling in your legs or feet.  You have pain or cramps in your legs and feet.  Your legs or feet are numb.  Your feet always feel cold.  You have pain around any toenails.  Get help right away if:  You have a wound, scrape, corn, or callus on your foot and:  You have pain, swelling, or redness that gets worse.  You have fluid or blood coming from the wound, scrape, corn, or callus.  Your wound, scrape, corn, or callus feels warm to the touch.  You have pus or a bad smell coming from the wound, scrape, corn, or callus.  You have a fever.  You have a red  line going up your leg.  Summary  Check your feet every day for blisters, cuts, bruises, sores, and redness.  Apply a moisturizing lotion or petroleum jelly to the skin on your feet and to dry, brittle toenails.  Wear shoes that fit properly and have enough cushioning.  If you have foot problems, report any cuts, sores, or bruises to your health care provider immediately.  Schedule a complete foot exam at least once a year (annually) or more often if you have foot problems.  This information is not intended to replace advice given to you by your health care provider. Make sure you discuss any questions you have with your health care provider.  Document Revised: 07/08/2021 Document Reviewed: 07/08/2021  Elsevier Patient Education © 2022 [employer]

## 2023-02-15 DIAGNOSIS — E78.2 MIXED HYPERLIPIDEMIA: ICD-10-CM

## 2023-02-15 RX ORDER — PRAVASTATIN SODIUM 10 MG
TABLET ORAL
Qty: 90 TABLET | Refills: 0 | Status: SHIPPED | OUTPATIENT
Start: 2023-02-15

## 2023-02-15 NOTE — TELEPHONE ENCOUNTER
Rx Refill Note  Requested Prescriptions     Pending Prescriptions Disp Refills   • pravastatin (PRAVACHOL) 10 MG tablet [Pharmacy Med Name: Pravastatin Sodium 10 MG Oral Tablet] 90 tablet 0     Sig: TAKE 1 TABLET BY MOUTH ONCE DAILY AT NIGHT      Last office visit with prescribing clinician: 10/5/2022   Next office visit with prescribing clinician: 3/7/2023     Latoya Anand MA  02/15/23, 16:27 CST

## 2023-03-07 ENCOUNTER — OFFICE VISIT (OUTPATIENT)
Dept: FAMILY MEDICINE CLINIC | Facility: CLINIC | Age: 68
End: 2023-03-07
Payer: MEDICARE

## 2023-03-07 VITALS
WEIGHT: 240.2 LBS | TEMPERATURE: 98.2 F | SYSTOLIC BLOOD PRESSURE: 162 MMHG | BODY MASS INDEX: 36.4 KG/M2 | HEART RATE: 56 BPM | RESPIRATION RATE: 20 BRPM | OXYGEN SATURATION: 97 % | HEIGHT: 68 IN | DIASTOLIC BLOOD PRESSURE: 76 MMHG

## 2023-03-07 DIAGNOSIS — R89.9 ABNORMAL LABORATORY TEST RESULT: ICD-10-CM

## 2023-03-07 DIAGNOSIS — E11.43 TYPE 2 DIABETES MELLITUS WITH DIABETIC AUTONOMIC NEUROPATHY, WITHOUT LONG-TERM CURRENT USE OF INSULIN: ICD-10-CM

## 2023-03-07 DIAGNOSIS — E87.6 HYPOKALEMIA: ICD-10-CM

## 2023-03-07 DIAGNOSIS — E78.2 MIXED HYPERLIPIDEMIA: ICD-10-CM

## 2023-03-07 DIAGNOSIS — I10 ESSENTIAL HYPERTENSION: ICD-10-CM

## 2023-03-07 DIAGNOSIS — R60.0 LOCALIZED EDEMA: ICD-10-CM

## 2023-03-07 DIAGNOSIS — I10 ESSENTIAL HYPERTENSION: Primary | ICD-10-CM

## 2023-03-07 PROCEDURE — 99214 OFFICE O/P EST MOD 30 MIN: CPT | Performed by: NURSE PRACTITIONER

## 2023-03-07 RX ORDER — OMEGA-3 FATTY ACIDS/FISH OIL 300-1000MG
1 CAPSULE ORAL NIGHTLY
Qty: 90 EACH | Refills: 1 | Status: SHIPPED | OUTPATIENT
Start: 2023-03-07

## 2023-03-07 RX ORDER — AMLODIPINE BESYLATE 5 MG/1
5 TABLET ORAL DAILY
Qty: 90 TABLET | Refills: 1 | Status: SHIPPED | OUTPATIENT
Start: 2023-03-07

## 2023-03-07 RX ORDER — GABAPENTIN 100 MG/1
100 CAPSULE ORAL 2 TIMES DAILY
Qty: 180 CAPSULE | Refills: 1 | Status: SHIPPED | OUTPATIENT
Start: 2023-03-07

## 2023-03-07 RX ORDER — LOSARTAN POTASSIUM AND HYDROCHLOROTHIAZIDE 25; 100 MG/1; MG/1
1 TABLET ORAL DAILY
Qty: 90 TABLET | Refills: 0 | Status: SHIPPED | OUTPATIENT
Start: 2023-03-07

## 2023-03-07 RX ORDER — FUROSEMIDE 40 MG/1
40 TABLET ORAL 2 TIMES DAILY
Qty: 180 TABLET | Refills: 1 | Status: SHIPPED | OUTPATIENT
Start: 2023-03-07

## 2023-03-07 NOTE — PROGRESS NOTES
Chief Complaint  Diabetes (Follow up )    Subjective        Sandeep Alonso presents to [facility]  History of Present Illness  Here for a 6 month chronic visit for htn uncontrolled with amlodipine, losartan-hctz, hypokalemia stable wth potassium chlorie, hyperlipidemia stable with pravastatin, type 2 diabetes with diabetic neuropathy stable on metformen and gabapentin.    Hypertension  This is a chronic problem. The current episode started more than 1 year ago. The problem has been gradually worsening since onset. The problem is uncontrolled. Pertinent negatives include no blurred vision, chest pain, neck pain, orthopnea, PND or shortness of breath. There are no associated agents to hypertension. Risk factors for coronary artery disease include diabetes mellitus, dyslipidemia, family history, obesity, male gender and sedentary lifestyle. Past treatments include diuretics and angiotensin blockers. Current antihypertension treatment includes diuretics and angiotensin blockers. The current treatment provides no improvement. There are no compliance problems.  There is no history of kidney disease or heart failure. There is no history of chronic renal disease.   Hyperlipidemia  This is a chronic problem. The current episode started more than 1 year ago. The problem is uncontrolled. Recent lipid tests were reviewed and are high. Exacerbating diseases include obesity. He has no history of chronic renal disease, diabetes or liver disease. There are no known factors aggravating his hyperlipidemia. Pertinent negatives include no chest pain, focal sensory loss or shortness of breath. Current antihyperlipidemic treatment includes statins. The current treatment provides mild improvement of lipids. There are no compliance problems.  Risk factors for coronary artery disease include dyslipidemia, diabetes mellitus, family history, male sex and hypertension.   Diabetes  He presents for his follow-up  "diabetic visit. He has type 2 diabetes mellitus. No MedicAlert identification noted. His disease course has been stable. Pertinent negatives for diabetes include no blurred vision, no chest pain, no polydipsia, no polyphagia and no polyuria. There are no hypoglycemic complications. Symptoms are improving. There are no diabetic complications. Risk factors for coronary artery disease include diabetes mellitus, dyslipidemia, family history, hypertension, male sex and obesity. Current diabetic treatment includes oral agent (dual therapy). He is compliant with treatment all of the time. His weight is stable. He is following a generally unhealthy diet. He has not had a previous visit with a dietitian. He participates in exercise intermittently. There is no change in his home blood glucose trend. An ACE inhibitor/angiotensin II receptor blocker is being taken. He does not see a podiatrist.Eye exam is not current.     The following portions of the patient's history were reviewed and updated as appropriate: allergies, current medications, past family history, past medical history, past social history, past surgical history and problem list.    Objective   Vital Signs:  /76 (BP Location: Left arm, Patient Position: Sitting, Cuff Size: Adult)   Pulse 56   Temp 98.2 °F (36.8 °C) (Infrared)   Resp 20   Ht 172.7 cm (68\")   Wt 109 kg (240 lb 3.2 oz)   SpO2 97%   BMI 36.52 kg/m²   Estimated body mass index is 36.52 kg/m² as calculated from the following:    Height as of this encounter: 172.7 cm (68\").    Weight as of this encounter: 109 kg (240 lb 3.2 oz).       Class 2 Severe Obesity (BMI >=35 and <=39.9). Obesity-related health conditions include the following: hypertension, diabetes mellitus and dyslipidemias. Obesity is unchanged. BMI is is above average; BMI management plan is completed. We discussed portion control and increasing exercise.      Physical Exam  Vitals and nursing note reviewed.   Constitutional:  "      General: He is not in acute distress.     Appearance: Normal appearance. He is well-developed. He is not diaphoretic.   HENT:      Head: Normocephalic and atraumatic.      Right Ear: Tympanic membrane, ear canal and external ear normal.      Left Ear: Tympanic membrane, ear canal and external ear normal.      Nose: Nose normal.      Mouth/Throat:      Pharynx: Oropharynx is clear. Uvula midline.      Tonsils: No tonsillar exudate.   Eyes:      General: Lids are normal. Lids are everted, no foreign bodies appreciated.      Conjunctiva/sclera: Conjunctivae normal.      Pupils: Pupils are equal, round, and reactive to light.   Neck:      Thyroid: No thyromegaly.      Trachea: Trachea normal.   Cardiovascular:      Rate and Rhythm: Normal rate and regular rhythm.      Heart sounds: Normal heart sounds, S1 normal and S2 normal.   Pulmonary:      Effort: Pulmonary effort is normal.      Breath sounds: Normal breath sounds.   Abdominal:      General: Bowel sounds are normal.      Palpations: Abdomen is soft.   Musculoskeletal:         General: Normal range of motion.      Cervical back: Full passive range of motion without pain, normal range of motion and neck supple.   Skin:     General: Skin is warm and dry.      Capillary Refill: Capillary refill takes less than 2 seconds.   Neurological:      Mental Status: He is alert and oriented to person, place, and time.   Psychiatric:         Speech: Speech normal.         Behavior: Behavior normal. Behavior is cooperative.         Thought Content: Thought content normal.         Judgment: Judgment normal.        Result Review :                   Assessment and Plan     Diagnoses and all orders for this visit:    1. Essential hypertension (Primary): chronic, worsening, medication management         -     I increased the losartan-hctz to 100-25 daily        -     Elevated bp today 162/76  -     amLODIPine (NORVASC) 5 MG tablet; Take 1 tablet by mouth Daily.  Dispense: 90  tablet; Refill: 1  -     CBC w AUTO Differential; Future  -     Comprehensive metabolic panel    2. Hypokalemia: Chronic, stable        -    Continue increase potassium in diet.    3. Localized edema: chronic, stable  -     furosemide (LASIX) 40 MG tablet; Take 1 tablet by mouth 2 (Two) Times a Day.  Dispense: 180 tablet; Refill: 1    4. Mixed hyperlipidemia: chronic, stable   -     Omega 3 1000 MG capsule; Take 1 capsule by mouth Every Night.  Dispense: 90 each; Refill: 1  -     Lipid panel  -     Eat baked instead of fried or fast foods, lose weight and exercise    5. Abnormal laboratory test result  -     Comprehensive metabolic panel    6. Type 2 diabetes mellitus with diabetic autonomic neuropathy, without long-term current use of insulin (HCC): chronic stable  -     gabapentin (NEURONTIN) 100 MG capsule; Take 1 capsule by mouth 2 (Two) Times a Day.  Dispense: 180 capsule; Refill: 1        -     Continue metformen as prescribed                 Follow Up   Return in about 6 months (around 9/7/2023) for Recheck.  Patient was given instructions and counseling regarding his condition or for health maintenance advice. Please see specific information pulled into the AVS if appropriate.     Electronically signed by Lisbet Veelz, BECKA, APRN, 03/07/23, 1:23 PM CST.

## 2023-03-08 DIAGNOSIS — I10 ESSENTIAL HYPERTENSION: ICD-10-CM

## 2023-03-08 LAB
ALBUMIN SERPL-MCNC: 4.6 G/DL (ref 3.8–4.8)
ALBUMIN/GLOB SERPL: 1.6 {RATIO} (ref 1.2–2.2)
ALP SERPL-CCNC: 75 IU/L (ref 44–121)
ALT SERPL-CCNC: 21 IU/L (ref 0–44)
AST SERPL-CCNC: 19 IU/L (ref 0–40)
BILIRUB SERPL-MCNC: 0.4 MG/DL (ref 0–1.2)
BUN SERPL-MCNC: 21 MG/DL (ref 8–27)
BUN/CREAT SERPL: 22 (ref 10–24)
CALCIUM SERPL-MCNC: 9 MG/DL (ref 8.6–10.2)
CHLORIDE SERPL-SCNC: 103 MMOL/L (ref 96–106)
CHOLEST SERPL-MCNC: 155 MG/DL (ref 100–199)
CO2 SERPL-SCNC: 26 MMOL/L (ref 20–29)
CREAT SERPL-MCNC: 0.95 MG/DL (ref 0.76–1.27)
EGFRCR SERPLBLD CKD-EPI 2021: 88 ML/MIN/1.73
GLOBULIN SER CALC-MCNC: 2.8 G/DL (ref 1.5–4.5)
GLUCOSE SERPL-MCNC: 100 MG/DL (ref 70–99)
HDLC SERPL-MCNC: 37 MG/DL
LDLC SERPL CALC-MCNC: 101 MG/DL (ref 0–99)
POTASSIUM SERPL-SCNC: 3.7 MMOL/L (ref 3.5–5.2)
PROT SERPL-MCNC: 7.4 G/DL (ref 6–8.5)
SODIUM SERPL-SCNC: 145 MMOL/L (ref 134–144)
TRIGL SERPL-MCNC: 93 MG/DL (ref 0–149)
VLDLC SERPL CALC-MCNC: 17 MG/DL (ref 5–40)

## 2023-03-14 ENCOUNTER — TELEPHONE (OUTPATIENT)
Dept: FAMILY MEDICINE CLINIC | Facility: CLINIC | Age: 68
End: 2023-03-14
Payer: MEDICARE

## 2023-03-14 NOTE — TELEPHONE ENCOUNTER
Caller: Sandeep Alonso    Relationship: Self    Best call back number:[phone]      What test was performed: LABS    When was the test performed: 3/7/23    Where was the test performed:  [facility]    Additional notes:  THE PATIENT STATES THAT HE WOULD LIKE A CALL BACK WITH HIS LAB RESULTS.

## 2023-03-27 NOTE — PROGRESS NOTES
[facility] - PODIATRY    Today's Date: 04/11/23    Patient Name: Sandeep Alonso  MRN: [id number]  CSN: [id number]  PCP: Lisbet Velez DNP, APRN  Referring Provider: No ref. provider found    SUBJECTIVE     Chief Complaint   Patient presents with   • Follow-up     Lisbet Velez DNP, APRBELÉN -03/07/2023-Return in about 3 months- pt states feet been sore, thinks its due to neuropathy- pt pain 6/10 at worst   • Diabetes     111mg/dl BG this am     HPI: Sandeep Alonso, a 67 y.o.male, comes to clinic as a(n) established patient presenting for diabetic foot exam and complaining of long, thickened toenails. Pt has h/o DM2, HTN, Hypokalemia, Edema.  States his nails are long, thick, and discolored. He is unable to reach feet to care for nails himself. Notes mild numbness and tingling in feet. Denies open wounds or sores. Patient is NIDDM with last stated BG level of 111mg/dl. Denies pain today but reports pain in feet when toenails become too long when wearing closed toed shoes. States he has been having some left leg discomfort due to neuropathy. Relates previous treatment(s) including nail debridement by podiatrist. Denies any constitutional symptoms. No other pedal complaints at this time.    Past Medical History:   Diagnosis Date   • Hypokalemia    • Peripheral edema      Past Surgical History:   Procedure Laterality Date   • NO PAST SURGERIES       Family History   Problem Relation Age of Onset   • Heart attack Mother    • Emphysema Father    • No Known Problems Sister    • No Known Problems Brother    • No Known Problems Son    • No Known Problems Maternal Grandmother    • No Known Problems Maternal Grandfather    • No Known Problems Paternal Grandmother    • No Known Problems Paternal Grandfather    • No Known Problems Brother    • No Known Problems Brother    • No Known Problems Son    • No Known Problems Son    • No Known Problems Son      Social History     Socioeconomic History   • Marital  status:    Tobacco Use   • Smoking status: Never   • Smokeless tobacco: Never   Vaping Use   • Vaping Use: Never used   Substance and Sexual Activity   • Alcohol use: Not Currently     Alcohol/week: 1.0 standard drink     Types: 1 Cans of beer per week     Comment: occasional beer   • Drug use: No   • Sexual activity: Defer     Allergies   Allergen Reactions   • Ace Inhibitors Cough     Current Outpatient Medications   Medication Sig Dispense Refill   • amLODIPine (NORVASC) 5 MG tablet Take 1 tablet by mouth Daily. 90 tablet 1   • furosemide (LASIX) 40 MG tablet Take 1 tablet by mouth 2 (Two) Times a Day. 180 tablet 1   • gabapentin (NEURONTIN) 100 MG capsule Take 1 capsule by mouth 2 (Two) Times a Day. 180 capsule 1   • losartan-hydrochlorothiazide (Hyzaar) 100-25 MG per tablet Take 1 tablet by mouth Daily. 90 tablet 0   • metFORMIN (GLUCOPHAGE) 500 MG tablet 1/2 tab daily (actual dose is 250) 90 tablet 1   • Omega 3 1000 MG capsule Take 1 capsule by mouth Every Night. 90 each 1   • potassium chloride (K-DUR,KLOR-CON) 20 MEQ CR tablet Take 1 tablet by mouth Daily. 90 tablet 1   • pravastatin (PRAVACHOL) 10 MG tablet TAKE 1 TABLET BY MOUTH ONCE DAILY AT NIGHT 90 tablet 0     No current facility-administered medications for this visit.     Review of Systems   Constitutional: Negative for chills and fever.   HENT: Negative for congestion.    Respiratory: Negative for shortness of breath.    Cardiovascular: Negative for chest pain and leg swelling.   Gastrointestinal: Negative for constipation, diarrhea, nausea and vomiting.   Musculoskeletal: Positive for arthralgias. Negative for gait problem and myalgias.   Skin: Negative for wound.   Neurological: Positive for numbness.       OBJECTIVE     Vitals:    04/11/23 1035   BP: 162/76   Pulse: 66   SpO2: 98%       Foot/Ankle Exam:     GENERAL  Appearance:  obese  Orientation:  AAOx3  Affect:  appropriate  Gait:  unimpaired  Assistance:  independent  Right shoe gear:  casual shoe  Left shoe gear: casual shoe    VASCULAR      Right Foot Vascularity   Dorsalis pedis:  2+  Posterior tibial:  2+  Skin Temperature: warm    Edema Gradin+ and pitting  CFT:  3  Pedal Hair Growth:  Present  Varicosities: mild varicosities       Left Foot Vascularity   Dorsalis pedis:  2+  Posterior tibial:  2+  Skin Temperature: warm    Edema Gradin+ and pitting  CFT:  3  Pedal Hair Growth:  Present  Varicosities: mild varicosities       NEUROLOGIC     Right Foot Neurologic   Light touch sensation: diminished  Vibratory sensation: diminished  Hot/Cold sensation: diminished  Protective Sensation using [place] Monofilament:  8  Sites intact: 8  Sites tested: 10     Left Foot Neurologic   Light touch sensation: diminished  Vibratory sensation: diminished  Hot/Cold sensation:  diminished  Protective Sensation using [place] Monofilament:  8  Sites intact: 8  Sites tested: 10    MUSCULOSKELETAL     Right Foot Musculoskeletal    Amputation   Right toes amputated: No    Ecchymosis:  none  Tenderness:  toenail problem    Arch:  Normal  Hallux valgus: No       Left Foot Musculoskeletal    Amputation   Left toes amputated: No    Ecchymosis:  none  Tenderness:  toenail problem  Arch:  Normal  Hallux valgus: No       MUSCLE STRENGTH     Right Foot Muscle Strength   Foot dorsiflexion:  5  Foot plantar flexion:  5  Foot inversion:  5  Foot eversion:  5     Left Foot Muscle Strength   Foot dorsiflexion:  5  Foot plantar flexion:  5  Foot inversion:  5  Foot eversion:  5     RANGE OF MOTION      Right Foot Range of Motion   Foot and ankle ROM within normal limits       Left Foot Range of Motion   Foot and ankle ROM within normal limits      DERMATOLOGIC      Right Foot Dermatologic   Skin  Right foot skin is intact.   Nails  1.  Positive for onychomycosis, abnormal thickness and subungual debris.  2.  Positive for onychomycosis, abnormal thickness and subungual debris.  3.  Positive for  onychomycosis, abnormal thickness and subungual debris.  4.  Positive for onychomycosis, abnormal thickness and subungual debris.  5.  Positive for onychomycosis, abnormal thickness and subungual debris.     Left Foot Dermatologic   Skin  Left foot skin is intact.   Nails  1.  Positive for onychomycosis, abnormal thickness and subungual debris.  2.  Positive for onychomycosis, abnormal thickness and subungual debris.  3.  Positive for onychomycosis, abnormal thickness and subungual debris.  4.  Positive for onychomycosis, abnormally thick and subungual debris.  5.  Positive for onychomycosis, abnormally thick and subungual debris.      RADIOLOGY/NUCLEAR:  No results found.    LABORATORY/CULTURE RESULTS:      PATHOLOGY RESULTS:       ASSESSMENT/PLAN     Diagnoses and all orders for this visit:    1. Onychomycosis (Primary)    2. Type 2 diabetes mellitus with diabetic polyneuropathy, without long-term current use of insulin    3. Foot pain, bilateral    4. Painful diabetic neuropathy      Comprehensive lower extremity examination and evaluation was performed.  Discussed findings and treatment plan including risks, benefits, and treatment options with patient in detail. Patient agreed with treatment plan.  After verbal consent obtained, nail(s) x10 debrided of length and thickness with nail nipper without incidence  Patient may maintain nails and calluses at home utilizing emery board or pumice stone between visits as needed  Reviewed at home diabetic foot care including daily foot checks   Continue antifungal PRN.   Continue diabetic monitoring and control under direction of PCP.   An After Visit Summary was printed and given to the patient at discharge, including (if requested) any available informative/educational handouts regarding diagnosis, treatment, or medications. All questions were answered to patient/family satisfaction. Should symptoms fail to improve or worsen they agree to call or return to clinic or to  go to the Emergency Department. Discussed the importance of following up with any needed screening tests/labs/specialist appointments and any requested follow-up recommended by me today. Importance of maintaining follow-up discussed and patient accepts that missed appointments can delay diagnosis and potentially lead to worsening of conditions.  Return in about 3 months (around 7/11/2023)., or sooner if acute issues arise.      This document has been electronically signed by VENKAT Lyons on April 11, 2023 10:56 CDT

## 2023-04-10 ENCOUNTER — TELEPHONE (OUTPATIENT)
Dept: PODIATRY | Facility: CLINIC | Age: 68
End: 2023-04-10
Payer: MEDICARE

## 2023-04-10 NOTE — TELEPHONE ENCOUNTER
Called patient to confirmed appointment for 04/11 @ 1100 with Darin Keane in [place]. Patient will be here for appointment.

## 2023-04-11 ENCOUNTER — OFFICE VISIT (OUTPATIENT)
Dept: PODIATRY | Facility: CLINIC | Age: 68
End: 2023-04-11
Payer: MEDICARE

## 2023-04-11 VITALS
HEART RATE: 66 BPM | BODY MASS INDEX: 36.37 KG/M2 | WEIGHT: 240 LBS | SYSTOLIC BLOOD PRESSURE: 162 MMHG | OXYGEN SATURATION: 98 % | DIASTOLIC BLOOD PRESSURE: 76 MMHG | HEIGHT: 68 IN

## 2023-04-11 DIAGNOSIS — M79.672 FOOT PAIN, BILATERAL: ICD-10-CM

## 2023-04-11 DIAGNOSIS — M79.671 FOOT PAIN, BILATERAL: ICD-10-CM

## 2023-04-11 DIAGNOSIS — E11.40 PAINFUL DIABETIC NEUROPATHY: ICD-10-CM

## 2023-04-11 DIAGNOSIS — B35.1 ONYCHOMYCOSIS: Primary | ICD-10-CM

## 2023-04-11 DIAGNOSIS — E11.42 TYPE 2 DIABETES MELLITUS WITH DIABETIC POLYNEUROPATHY, WITHOUT LONG-TERM CURRENT USE OF INSULIN: ICD-10-CM

## 2023-04-11 PROCEDURE — 3077F SYST BP >= 140 MM HG: CPT | Performed by: NURSE PRACTITIONER

## 2023-04-11 PROCEDURE — 11721 DEBRIDE NAIL 6 OR MORE: CPT | Performed by: NURSE PRACTITIONER

## 2023-04-11 PROCEDURE — 3078F DIAST BP <80 MM HG: CPT | Performed by: NURSE PRACTITIONER

## 2023-04-11 PROCEDURE — 1159F MED LIST DOCD IN RCRD: CPT | Performed by: NURSE PRACTITIONER

## 2023-04-11 PROCEDURE — 1160F RVW MEDS BY RX/DR IN RCRD: CPT | Performed by: NURSE PRACTITIONER

## 2023-05-30 DIAGNOSIS — E87.6 HYPOKALEMIA: ICD-10-CM

## 2023-05-30 RX ORDER — POTASSIUM CHLORIDE 20 MEQ/1
TABLET, EXTENDED RELEASE ORAL
Qty: 90 TABLET | Refills: 0 | Status: SHIPPED | OUTPATIENT
Start: 2023-05-30

## 2023-05-30 NOTE — TELEPHONE ENCOUNTER
Rx Refill Note  Requested Prescriptions     Pending Prescriptions Disp Refills    potassium chloride (K-DUR,KLOR-CON) 20 MEQ CR tablet [Pharmacy Med Name: Potassium Chloride Soledad ER 20 MEQ Oral Tablet Extended Release] 90 tablet 0     Sig: Take 1 tablet by mouth once daily      Last office visit with prescribing clinician: 3/7/2023   Last telemedicine visit with prescribing clinician: Visit date not found   Next office visit with prescribing clinician: 9/6/2023                         Would you like a call back once the refill request has been completed: [] Yes [] No    If the office needs to give you a call back, can they leave a voicemail: [] Yes [] No    Kathy Tinajero LPN  05/30/23, 09:29 CDT

## 2023-06-02 DIAGNOSIS — E78.2 MIXED HYPERLIPIDEMIA: ICD-10-CM

## 2023-06-02 RX ORDER — PRAVASTATIN SODIUM 10 MG
TABLET ORAL
Qty: 90 TABLET | Refills: 0 | Status: SHIPPED | OUTPATIENT
Start: 2023-06-02

## 2023-06-02 NOTE — TELEPHONE ENCOUNTER
Rx Refill Note  Requested Prescriptions     Pending Prescriptions Disp Refills    pravastatin (PRAVACHOL) 10 MG tablet [Pharmacy Med Name: Pravastatin Sodium 10 MG Oral Tablet] 90 tablet 0     Sig: Take 1 tablet by mouth nightly      Last office visit with prescribing clinician: 3/7/2023   Next office visit with prescribing clinician: 9/6/2023                         Would you like a call back once the refill request has been completed: [] Yes [] No    If the office needs to give you a call back, can they leave a voicemail: [] Yes [] No    Triny Johnson MA  06/02/23, 08:34 CDT

## 2023-06-14 DIAGNOSIS — I10 ESSENTIAL HYPERTENSION: ICD-10-CM

## 2023-06-14 RX ORDER — LOSARTAN POTASSIUM AND HYDROCHLOROTHIAZIDE 25; 100 MG/1; MG/1
TABLET ORAL
Qty: 90 TABLET | Refills: 0 | Status: SHIPPED | OUTPATIENT
Start: 2023-06-14

## 2023-08-21 ENCOUNTER — TELEPHONE (OUTPATIENT)
Dept: PODIATRY | Facility: CLINIC | Age: 68
End: 2023-08-21

## 2023-08-21 NOTE — TELEPHONE ENCOUNTER
Caller: Sandeep Alonso    Relationship to patient: Self    Best call back number: [phone]    Chief complaint: 3 MONTH CHECK     Type of visit: FOLLOW UP     Requested date:  10/10/2023    If rescheduling, when is the original appointment:10/10/2023 WITH FATEMEH HERNANDEZ

## 2023-08-21 NOTE — TELEPHONE ENCOUNTER
PT IS AWARE OF APPT CHANGE DUE TO FATEMEH LEAVING THE OFFICE AND APPT IS SCHEDULED FOR 12/29/2023 WITH DR PERALTA AND I WILL MAIL OUT A NEW APPT REMINDER

## 2023-08-28 DIAGNOSIS — E87.6 HYPOKALEMIA: ICD-10-CM

## 2023-08-29 DIAGNOSIS — E87.6 HYPOKALEMIA: ICD-10-CM

## 2023-08-29 RX ORDER — POTASSIUM CHLORIDE 20 MEQ/1
TABLET, EXTENDED RELEASE ORAL
Qty: 90 TABLET | Refills: 0 | Status: SHIPPED | OUTPATIENT
Start: 2023-08-29

## 2023-08-29 RX ORDER — POTASSIUM CHLORIDE 20 MEQ/1
20 TABLET, EXTENDED RELEASE ORAL DAILY
Qty: 90 TABLET | Refills: 0 | OUTPATIENT
Start: 2023-08-29

## 2023-08-29 NOTE — TELEPHONE ENCOUNTER
Caller: Sandeep Alonso    Relationship: Self    Best call back number: [phone]     Requested Prescriptions:   Requested Prescriptions     Pending Prescriptions Disp Refills    potassium chloride (K-DUR,KLOR-CON) 20 MEQ CR tablet 90 tablet 0     Sig: Take 1 tablet by mouth Daily.        Pharmacy where request should be sent: [facility] [address] [place] [address] [phone] [facility] [phone]      Last office visit with prescribing clinician: 3/7/2023   Last telemedicine visit with prescribing clinician: Visit date not found   Next office visit with prescribing clinician: 9/6/2023         Does the patient have less than a 3 day supply:  [x] Yes  [] No    Would you like a call back once the refill request has been completed: [] Yes [x] No        Chadwick Connell Rep   08/29/23 07:36 CDT

## 2023-08-29 NOTE — TELEPHONE ENCOUNTER
Duplicate request.   Rx Refill Note  Requested Prescriptions     Pending Prescriptions Disp Refills    potassium chloride (K-DUR,KLOR-CON) 20 MEQ CR tablet 90 tablet 0     Sig: Take 1 tablet by mouth Daily.      Last office visit with prescribing clinician: 3/7/2023   Last telemedicine visit with prescribing clinician: Visit date not found   Next office visit with prescribing clinician: 9/6/2023                         Would you like a call back once the refill request has been completed: [] Yes [] No    If the office needs to give you a call back, can they leave a voicemail: [] Yes [] No    Kathy Tinajero LPN  08/29/23, 11:04 CDT

## 2023-09-06 ENCOUNTER — OFFICE VISIT (OUTPATIENT)
Dept: FAMILY MEDICINE CLINIC | Facility: CLINIC | Age: 68
End: 2023-09-06
Payer: MEDICARE

## 2023-09-06 ENCOUNTER — TELEPHONE (OUTPATIENT)
Dept: FAMILY MEDICINE CLINIC | Facility: CLINIC | Age: 68
End: 2023-09-06

## 2023-09-06 VITALS
OXYGEN SATURATION: 99 % | HEIGHT: 68 IN | HEART RATE: 79 BPM | BODY MASS INDEX: 37.74 KG/M2 | TEMPERATURE: 97.3 F | RESPIRATION RATE: 18 BRPM | WEIGHT: 249 LBS | DIASTOLIC BLOOD PRESSURE: 92 MMHG | SYSTOLIC BLOOD PRESSURE: 152 MMHG

## 2023-09-06 DIAGNOSIS — R60.0 LOCALIZED EDEMA: ICD-10-CM

## 2023-09-06 DIAGNOSIS — I10 ESSENTIAL HYPERTENSION: ICD-10-CM

## 2023-09-06 DIAGNOSIS — M25.551 RIGHT HIP PAIN: ICD-10-CM

## 2023-09-06 DIAGNOSIS — E78.2 MIXED HYPERLIPIDEMIA: ICD-10-CM

## 2023-09-06 DIAGNOSIS — E11.43 TYPE 2 DIABETES MELLITUS WITH DIABETIC AUTONOMIC NEUROPATHY, WITHOUT LONG-TERM CURRENT USE OF INSULIN: Primary | ICD-10-CM

## 2023-09-06 DIAGNOSIS — E66.01 MORBID (SEVERE) OBESITY DUE TO EXCESS CALORIES: ICD-10-CM

## 2023-09-06 DIAGNOSIS — E87.6 HYPOKALEMIA: ICD-10-CM

## 2023-09-06 LAB
EXPIRATION DATE: ABNORMAL
HBA1C MFR BLD: 6 %
Lab: ABNORMAL

## 2023-09-06 RX ORDER — OMEGA-3 FATTY ACIDS/FISH OIL 300-1000MG
1 CAPSULE ORAL NIGHTLY
Qty: 90 EACH | Refills: 1 | Status: SHIPPED | OUTPATIENT
Start: 2023-09-06

## 2023-09-06 RX ORDER — POTASSIUM CHLORIDE 20 MEQ/1
20 TABLET, EXTENDED RELEASE ORAL DAILY
Qty: 90 TABLET | Refills: 1 | Status: SHIPPED | OUTPATIENT
Start: 2023-09-06

## 2023-09-06 RX ORDER — CLONIDINE HYDROCHLORIDE 0.1 MG/1
0.1 TABLET ORAL 2 TIMES DAILY
Qty: 30 TABLET | Refills: 0 | Status: SHIPPED | OUTPATIENT
Start: 2023-09-06

## 2023-09-06 RX ORDER — GABAPENTIN 100 MG/1
100 CAPSULE ORAL 2 TIMES DAILY
Qty: 180 CAPSULE | Refills: 1 | Status: SHIPPED | OUTPATIENT
Start: 2023-09-06

## 2023-09-06 RX ORDER — DICLOFENAC SODIUM 75 MG/1
75 TABLET, DELAYED RELEASE ORAL 2 TIMES DAILY
Qty: 60 TABLET | Refills: 0 | Status: SHIPPED | OUTPATIENT
Start: 2023-09-06

## 2023-09-06 RX ORDER — LOSARTAN POTASSIUM AND HYDROCHLOROTHIAZIDE 25; 100 MG/1; MG/1
1 TABLET ORAL DAILY
Qty: 90 TABLET | Refills: 1 | Status: SHIPPED | OUTPATIENT
Start: 2023-09-06

## 2023-09-06 RX ORDER — PRAVASTATIN SODIUM 10 MG
20 TABLET ORAL NIGHTLY
Qty: 90 TABLET | Refills: 1 | Status: SHIPPED | OUTPATIENT
Start: 2023-09-06

## 2023-09-06 RX ORDER — AMLODIPINE BESYLATE 5 MG/1
5 TABLET ORAL DAILY
Qty: 90 TABLET | Refills: 1 | Status: SHIPPED | OUTPATIENT
Start: 2023-09-06

## 2023-09-06 RX ORDER — FUROSEMIDE 40 MG/1
40 TABLET ORAL 2 TIMES DAILY
Qty: 180 TABLET | Refills: 1 | Status: SHIPPED | OUTPATIENT
Start: 2023-09-06

## 2023-09-06 NOTE — PROGRESS NOTES
Chief Complaint  Hypertension, Diabetes, and Hyperlipidemia    Subjective        Sandeep Alonso presents to [facility]  History of Present Illness    The patient presents for 6 month follow-up of chronic problems, including type 2 diabetes mellitus, hypertension, hyperlipidemia, and hypokalemia.     He has had type 2 diabetes mellitus for a long period of time. His fasting blood glucose on 09/06/2023 was 113 mg/dL. His hemoglobin A1c in clinic is 6.0 percent. His course is worsening, but he is still controlled. He does not wear a medical alert bracelet. His associated symptoms include 1 to 2 episodes of nocturia. He had a diabetic eye examination in 08/2023. He denies increased thirst, increased hunger, visual changes or blurred vision, dizziness, hypoglycemia requiring hospitalization, hypotension, excessive sleepiness, cerebrovascular accident, or retinopathy. He has occasional paresthesia in his bilateral feet when he is sitting and he is able to relieve this by standing and ambulating. His current treatment for type 2 diabetes is monotherapy of metformin 500 mg. He is compliant. He does not have an insulin regimen. He monitors his blood glucose daily. His weight trend is worsening. His BMI on 07/11/2023 was 36.5 kg/m2 and it is currently 37.9 kg/m2. He tries to maintain a healthy diet, but does not always adhere to this. He does not plan his meals. He has not consulted with a dietician. He exercises with light walking occasionally. He sees a podiatrist for toenail care, and he has an appointment in 12/2023. He is currently taking an ARB and a diuretic. Coronary artery risk factors include type 2 diabetes mellitus, family history, male sex, obesity, hypertension, and sedentary lifestyle. He does not smoke.    Hyperlipidemia is chronic and uncontrolled, he has had this for greater than 1 year. His most recent lipid panel in 03/2023 was abnormal. Cholesterol was 155 mg/dL,  triglycerides were 93 mg/dL, HDL was low at 37 mg/dL, and LDL was mildly elevated at 101 mg/dL. He is currently taking pravastatin 10 mg nightly, which will be increased to 20 mg. He has had some improvement. He is compliant with is medication. He does not have kidney disease, hypothyroidism, or liver disease. Hyperlipidemia is aggravated by fatty foods. He denies chest pain, lower extremity pain, or shortness of breath. Coronary artery risk factors include type 2 diabetes mellitus, family history, male sex, obesity, hypertension, and sedentary lifestyle.     He has chronic hypertension, which he has had for more than 1 year. The progression is that it is worsening, but he has times where it is improving. His blood pressure in clinic is uncontrolled at 158/79 mmHg. He denies chest pain, blurred vision, shortness of breath, palpitations. He does not have any agents associated with the hypertension. He does not take amphetamines, decongestants, thyroid medication, or steroids. Coronary artery risk factors include type 2 diabetes mellitus, dyslipidemia, family history, male sex, obesity, and sedentary lifestyle. Current and past treatments include ARB and diuretic. He has no compliance problems other than he does not exercise. He has no end organ damage.    Hypokalemia is a chronic problem that he has had for greater than 1 year. This is controlled and he is gradually improving. He has occasional muscle cramps and lower extremity cramps. He eats bananas. Coronary artery risk factors include type 2 diabetes mellitus, dyslipidemia, family history, male sex, obesity, and sedentary lifestyle. He did not have any past treatments. He is currently taking potassium chloride. He has no chronic kidney disease.    He complains of intermittent right lower hip and upper thigh pain for approximately 3 weeks. He denies any injury. He rates his pain as 6/10. He denies that his pain limits ambulation, sitting, or standing. He denies  "numbness, tingling, loss of motion or sensation, or foreign bodies. His pain is aggravated by movement and weightbearing. He is using NSAIDs with mild relief. He expresses interest in an anti-inflammatory.    CBC, urine microalbumin, and CMP will be ordered. He is due for influenza vaccination, which he will receive at his annual wellness visit on 10/05/2023. A lipid panel will be ordered. He is due for repeat diabetic eye examination in 2024. His diabetic foot examination is due in 04/2024. We discussed obesity. His Cologuard was negative, and he is due to repeat this on 09/23/2025. He is due for Tdap, which he has declined. We discussed hepatitis C screening. We discussed the Shingles vaccination. He received Prevnar 20 in 2022.    The following portions of the patient's history were reviewed and updated as appropriate: allergies, current medications, past family history, past medical history, past social history, past surgical history and problem list.    Patient's (Body mass index is 37.86 kg/m².) indicates that they are morbidly obese (BMI > 40 or > 35 with obesity - related health condition) with health related conditions that include hypertension, diabetes mellitus, and dyslipidemias . Weight is worsening. BMI is is above average; BMI management plan is completed. We discussed portion control and increasing exercise.       Objective   Vital Signs:  /79 (BP Location: Right arm, Patient Position: Sitting, Cuff Size: Large Adult)   Pulse 79   Temp 97.3 °F (36.3 °C) (Infrared)   Resp 18   Ht 172.7 cm (68\") Comment: per patient  Wt 113 kg (249 lb)   SpO2 99%   BMI 37.86 kg/m²   Estimated body mass index is 37.86 kg/m² as calculated from the following:    Height as of this encounter: 172.7 cm (68\").    Weight as of this encounter: 113 kg (249 lb).               Physical Exam  Vitals and nursing note reviewed.   Constitutional:       General: He is awake.      Appearance: Normal appearance. He is " well-developed and well-groomed.   HENT:      Head: Normocephalic and atraumatic.      Right Ear: Hearing, tympanic membrane, ear canal and external ear normal.      Left Ear: Hearing, tympanic membrane, ear canal and external ear normal.      Nose: Nose normal.      Mouth/Throat:      Lips: Pink.      Pharynx: Oropharynx is clear.   Eyes:      General: Lids are normal.      Conjunctiva/sclera: Conjunctivae normal.   Cardiovascular:      Rate and Rhythm: Normal rate and regular rhythm.      Heart sounds: Normal heart sounds.   Pulmonary:      Effort: Pulmonary effort is normal.      Breath sounds: Normal breath sounds and air entry.   Musculoskeletal:      Cervical back: Full passive range of motion without pain.      Right lower leg: No edema.      Left lower leg: No edema.   Lymphadenopathy:      Head:      Right side of head: No submental, submandibular or tonsillar adenopathy.      Left side of head: No submental, submandibular or tonsillar adenopathy.   Skin:     General: Skin is warm and dry.   Neurological:      Mental Status: He is alert.      Sensory: Sensation is intact.      Motor: Motor function is intact.      Coordination: Coordination is intact.      Gait: Gait is intact.   Psychiatric:         Attention and Perception: Attention and perception normal.         Mood and Affect: Mood and affect normal.         Speech: Speech normal.         Behavior: Behavior normal. Behavior is cooperative.         Thought Content: Thought content normal.         Cognition and Memory: Cognition and memory normal.         Judgment: Judgment normal.      Result Review :                   Assessment and Plan   Diagnoses and all orders for this visit:    1. Type 2 diabetes mellitus with diabetic autonomic neuropathy, without long-term current use of insulin (Primary)  -     POC Glycosylated Hemoglobin (Hb A1C)  -     Microalbumin / Creatinine Urine Ratio - Urine, Clean Catch  -     CBC (No Diff)  -     Discontinue:  metFORMIN (GLUCOPHAGE) 500 MG tablet; Take 1 tablet by mouth 2 (Two) Times a Day With Meals. 1/2 tab daily (actual dose is 250)  Dispense: 90 tablet; Refill: 1  -     gabapentin (NEURONTIN) 100 MG capsule; Take 1 capsule by mouth 2 (Two) Times a Day.  Dispense: 180 capsule; Refill: 1  -     metFORMIN (GLUCOPHAGE) 500 MG tablet; Take 1 tablet by mouth 2 (Two) Times a Day With Meals.  Dispense: 180 tablet; Refill: 1    2. Hypokalemia  -     potassium chloride (K-DUR,KLOR-CON) 20 MEQ CR tablet; Take 1 tablet by mouth Daily.  Dispense: 90 tablet; Refill: 1    3. Essential hypertension  -     Microalbumin / Creatinine Urine Ratio - Urine, Clean Catch  -     losartan-hydrochlorothiazide (HYZAAR) 100-25 MG per tablet; Take 1 tablet by mouth Daily.  Dispense: 90 tablet; Refill: 1  -     amLODIPine (NORVASC) 5 MG tablet; Take 1 tablet by mouth Daily.  Dispense: 90 tablet; Refill: 1    4. Mixed hyperlipidemia  -     Lipid panel  -     pravastatin (PRAVACHOL) 10 MG tablet; Take 2 tablets by mouth Every Night.  Dispense: 90 tablet; Refill: 1  -     Omega 3 1000 MG capsule; Take 1 capsule by mouth Every Night.  Dispense: 90 each; Refill: 1  -     cloNIDine (Catapres) 0.1 MG tablet; Take 1 tablet by mouth 2 (Two) Times a Day. Take when top bp is >150 or bottom is > 95  Dispense: 30 tablet; Refill: 0    5. Morbid (severe) obesity due to excess calories    6. Right hip pain  -     diclofenac (VOLTAREN) 75 MG EC tablet; Take 1 tablet by mouth 2 (Two) Times a Day.  Dispense: 60 tablet; Refill: 0    7. Localized edema  -     furosemide (LASIX) 40 MG tablet; Take 1 tablet by mouth 2 (Two) Times a Day.  Dispense: 180 tablet; Refill: 1      He will have laboratory studies ordered. He will follow up in 6 months or sooner if needed.    POC Glycosylated Hemoglobin (Hb A1C)  Order: [id number]  Status: Final result       Visible to patient: No (scheduled for 9/6/2023 10:17 AM)       Next appt: 10/10/2023 at 10:00 AM in Family [facility]  Kylie Velez, DNP, APRN)       Dx: Type 2 diabetes mellitus with diabeti...    Specimen Information: Blood   0 Result Notes       1 HM Topic            Component  Ref Range & Units 09:17  (9/6/23) 12 mo ago  (9/7/22) 12 mo ago  (9/7/22) 1 yr ago  (3/7/22) 1 yr ago  (10/6/21) 1 yr ago  (9/7/21) 2 yr ago  (3/5/21)   Hemoglobin A1C  % 6.0 5.4 5.50 R, CM 5.5 5.70 High  R, CM 5.70 High  R, CM 6.0   Lot Number [id number]  [id number] VC      Expiration Date 3,222,025 511/2,024  9,282,[id number] VC      Resulting Agency [facility]        Health Maintenance Summary    Expand All  Collapse All    Postponed - COVID-19 Vaccine  (5 - Additional dose for Corie series)  Postponed until 9/8/2023 09/06/2023  Postponed until 9/8/2023 by Latoya Anand CMA (Patient Refused)    03/07/2023  Imm Admin: COVID-19 (MODERNA) BIVALENT 12+YRS    10/05/2022  Postponed until 10/7/2022 by Latoya Anand MA (Patient Refused)    05/03/2022  Imm Admin: COVID-19 (MODERNA) Monovalent Original Booster    12/22/2021  Imm Admin: COVID-19 (MODERNA) 1st, 2nd, 3rd Dose Only    View More History     URINE MICROALBUMIN  (Yearly)  Order placed this encounter  09/07/2022  Multiple components of Microalbumin / Creatinine Urine Ratio - Urine, Clean Catch    09/07/2021  Multiple components of Microalbumin / Creatinine Urine Ratio -    08/21/2020  Microalbumin, Urine component of MicroAlbumin, Urine, Random - Urine, Clean Catch    08/16/2019  Done    08/16/2019  Multiple components of Microalbumin / Creatinine Urine Ratio - Urine, Clean Catch    View More History     INFLUENZA VACCINE  (Yearly - October to March)  Next due on 10/1/2023  10/05/2022  Imm Admin: Fluzone High-Dose 65+yrs    10/04/2021  Imm Admin: Fluzone High-Dose 65+yrs    09/23/2020  Imm Admin: FluLaval/Fluzone >6mos    10/18/2019  Imm Admin:  flucelvax quad pfs =>4 YRS    10/18/2019  Imm Admin: Influenza, Unspecified    View More History     ANNUAL WELLNESS VISIT  (Yearly)  Next due on 10/5/2023  10/05/2022  Level of Service: MA PPPS, INITIAL VISIT    10/05/2022  Done    10/04/2021  Level of Service: MA INITIAL PREVENTIVE EXAM    10/04/2021  Done      HEMOGLOBIN A1C  (Every 6 Months)  Next due on 3/6/2024  09/06/2023  Hemoglobin A1C component of POC Glycosylated Hemoglobin (Hb A1C)    09/07/2022  Hemoglobin A1C component of POC Glycosylated Hemoglobin (Hb A1C)    09/07/2022  Hemoglobin A1C component of Hemoglobin A1c    03/07/2022  Hemoglobin A1C component of POC Glycosylated Hemoglobin (Hb A1C)    10/06/2021  Hemoglobin A1C component of Hemoglobin A1c    View More History     LIPID PANEL  (Yearly)  Order placed this encounter  03/07/2023  Lipid panel    09/07/2022  Lipid panel    03/07/2022  Lipid panel    10/06/2021  Lipid panel    09/07/2021  Lipid Panel    View More History     BMI FOLLOWUP  (Yearly)  Next due on 3/7/2024  03/07/2023  SmartData: WORKFLOW - QUALITY MEASUREMENT - BMI FOLLOW UP CARE PLAN DOCUMENTED    09/07/2022  SmartData: WORKFLOW - QUALITY MEASUREMENT - BMI FOLLOW UP CARE PLAN DOCUMENTED    06/23/2022  SmartData: WORKFLOW - QUALITY MEASUREMENT - BMI FOLLOW UP CARE PLAN DOCUMENTED    03/17/2022  SmartData: WORKFLOW - QUALITY MEASUREMENT - BMI FOLLOW UP CARE PLAN DOCUMENTED      DIABETIC FOOT EXAM  (Yearly)  Next due on 4/11/2024 04/11/2023  SmartData: WORKFLOW - DIABETES - DIABETIC FOOT EXAM PERFORMED    01/10/2023  Visit Dx: Encounter for diabetic foot exam    01/10/2023  SmartData: WORKFLOW - DIABETES - DIABETIC FOOT EXAM PERFORMED    01/10/2023  SmartData: WORKFLOW - DIABETES - DIABETIC FOOT EXAM PERFORMED    06/23/2022  SmartData: WORKFLOW - DIABETES - DIABETIC FOOT EXAM PERFORMED    View More History     DIABETIC EYE EXAM  (Yearly)  Next due on 8/2/2024 08/02/2023  Outside Claim: [facility] PT 1/>     02/07/2023  SCANNED - EYE EXAM    08/10/2022  SCANNED - EYE EXAM    11/30/2021  SCANNED - EYE EXAM    10/04/2021  Postponed until 11/30/2021 by Akil Morgan MA (Pending event)    View More History     COLORECTAL CANCER SCREENING  (COLONOSCOPY - Every 3 Years)  Next due on 9/23/2025 09/23/2022  Follow-up changed to COLONOSCOPY - Every 3 Years and due date changed to 9/23/2025 by Akil Morgan MA    09/23/2022  Follow-up changed to COLONOSCOPY - Every 3 Years by Akil Morgan MA    09/23/2022  COLOGUARD (Done)    09/19/2022  Cologuard component of Cologuard - Stool, Per Rectum    03/18/2019  Cologuard - ,    View More History     TDAP/TD VACCINES  (3 - Td or Tdap)  Next due on 7/21/2027 07/21/2017  Declined    01/06/2017  Declined      HEPATITIS C SCREENING  Addressed  07/21/2017  Declined    01/06/2017  Declined      ZOSTER VACCINE  (Series Information)  Addressed  07/27/2018  Imm Admin: Zostavax    07/21/2017  Declined    01/06/2017  Declined      Pneumococcal Vaccine 65+  (Series Information)  Completed  10/05/2022  Imm Admin: Pneumococcal Conjugate 20-Valent (PCV20)    10/04/2021  Imm Admin: Pneumococcal Polysaccharide (PPSV23)             Follow Up   Return in about 6 months (around 3/6/2024) for Recheck.  Patient was given instructions and counseling regarding his condition or for health maintenance advice. Please see specific information pulled into the AVS if appropriate.       Transcribed from ambient dictation for Lisbet Velez DNP, APRN by Daniel Tierney.  09/06/23   14:40 CDT    Patient or patient representative verbalized consent to the visit recording.  I have personally performed the services described in this document as transcribed by the above individual, and it is both accurate and complete.    Electronically signed by Lisbet Velez DNP, APRBELÉN, 09/07/23, 3:34 PM CDT.

## 2023-09-07 LAB
ALBUMIN/CREAT UR: <5 MG/G CREAT (ref 0–29)
CHOLEST SERPL-MCNC: 135 MG/DL (ref 0–200)
CREAT UR-MCNC: 56 MG/DL
ERYTHROCYTE [DISTWIDTH] IN BLOOD BY AUTOMATED COUNT: 13.1 % (ref 12.3–15.4)
HCT VFR BLD AUTO: 42 % (ref 37.5–51)
HDLC SERPL-MCNC: 34 MG/DL (ref 40–60)
HGB BLD-MCNC: 13.6 G/DL (ref 13–17.7)
LDLC SERPL CALC-MCNC: 82 MG/DL (ref 0–100)
MCH RBC QN AUTO: 29.5 PG (ref 26.6–33)
MCHC RBC AUTO-ENTMCNC: 32.4 G/DL (ref 31.5–35.7)
MCV RBC AUTO: 91.1 FL (ref 79–97)
MICROALBUMIN UR-MCNC: <3 UG/ML
PLATELET # BLD AUTO: 243 10*3/MM3 (ref 140–450)
RBC # BLD AUTO: 4.61 10*6/MM3 (ref 4.14–5.8)
TRIGL SERPL-MCNC: 99 MG/DL (ref 0–150)
VLDLC SERPL CALC-MCNC: 19 MG/DL (ref 5–40)
WBC # BLD AUTO: 7.91 10*3/MM3 (ref 3.4–10.8)

## 2023-09-13 ENCOUNTER — TELEPHONE (OUTPATIENT)
Dept: FAMILY MEDICINE CLINIC | Facility: CLINIC | Age: 68
End: 2023-09-13

## 2023-09-13 NOTE — TELEPHONE ENCOUNTER
Caller: Sandeep Alonso    Relationship: Self    Best call back number: [phone]     Caller requesting test results: SELF    What test was performed: BLOOD WORK    When was the test performed: LAST WEEK    Where was the test performed: IN OFFICE    Additional notes: JUST WANTS TO CHECK RESULTS

## 2023-10-03 DIAGNOSIS — M25.551 RIGHT HIP PAIN: ICD-10-CM

## 2023-10-03 RX ORDER — DICLOFENAC SODIUM 75 MG/1
TABLET, DELAYED RELEASE ORAL
Qty: 60 TABLET | Refills: 0 | Status: SHIPPED | OUTPATIENT
Start: 2023-10-03

## 2023-10-03 NOTE — TELEPHONE ENCOUNTER
Rx Refill Note  Requested Prescriptions     Pending Prescriptions Disp Refills    diclofenac (VOLTAREN) 75 MG EC tablet [Pharmacy Med Name: Diclofenac Sodium 75 MG Oral Tablet Delayed Release] 60 tablet 0     Sig: Take 1 tablet by mouth twice daily      Last office visit with prescribing clinician: 9/6/2023   Next office visit with prescribing clinician: 10/10/2023                         Would you like a call back once the refill request has been completed: [] Yes [] No    If the office needs to give you a call back, can they leave a voicemail: [] Yes [] No    Triny Johnson MA  10/03/23, 14:14 CDT

## 2023-10-10 ENCOUNTER — OFFICE VISIT (OUTPATIENT)
Dept: FAMILY MEDICINE CLINIC | Facility: CLINIC | Age: 68
End: 2023-10-10
Payer: MEDICARE

## 2023-10-10 VITALS
HEART RATE: 67 BPM | SYSTOLIC BLOOD PRESSURE: 135 MMHG | OXYGEN SATURATION: 96 % | HEIGHT: 68 IN | RESPIRATION RATE: 18 BRPM | DIASTOLIC BLOOD PRESSURE: 67 MMHG | TEMPERATURE: 98.4 F | BODY MASS INDEX: 39.25 KG/M2 | WEIGHT: 259 LBS

## 2023-10-10 DIAGNOSIS — Z00.00 MEDICARE ANNUAL WELLNESS VISIT, SUBSEQUENT: Primary | ICD-10-CM

## 2023-10-10 DIAGNOSIS — Z23 IMMUNIZATION DUE: ICD-10-CM

## 2023-10-10 NOTE — PROGRESS NOTES
The ABCs of the Annual Wellness Visit  Subsequent Medicare Wellness Visit    Subjective      Sandeep Alonso is a 68 y.o. male who presents for a Subsequent Medicare Wellness Visit.    The following portions of the patient's history were reviewed and updated as appropriate: allergies, current medications, past family history, past medical history, past social history, past surgical history, and problem list.    Compared to one year ago, the patient feels his physical  health is better.    Compared to one year ago, the patient feels his mental health is better.    Recent Hospitalizations:  He was not admitted to the hospital during the last year.       Current Medical Providers:  Patient Care Team:  Lisbet Velez DNP, VENKAT as PCP - General  Lisbet Velez DNP, APRN as PCP - Family Medicine    Outpatient Medications Prior to Visit   Medication Sig Dispense Refill    amLODIPine (NORVASC) 5 MG tablet Take 1 tablet by mouth Daily. 90 tablet 1    cloNIDine (Catapres) 0.1 MG tablet Take 1 tablet by mouth 2 (Two) Times a Day. Take when top bp is >150 or bottom is > 95 30 tablet 0    diclofenac (VOLTAREN) 75 MG EC tablet Take 1 tablet by mouth twice daily 60 tablet 0    furosemide (LASIX) 40 MG tablet Take 1 tablet by mouth 2 (Two) Times a Day. 180 tablet 1    gabapentin (NEURONTIN) 100 MG capsule Take 1 capsule by mouth 2 (Two) Times a Day. 180 capsule 1    losartan-hydrochlorothiazide (HYZAAR) 100-25 MG per tablet Take 1 tablet by mouth Daily. 90 tablet 1    metFORMIN (GLUCOPHAGE) 500 MG tablet Take 1 tablet by mouth 2 (Two) Times a Day With Meals. 180 tablet 1    Omega 3 1000 MG capsule Take 1 capsule by mouth Every Night. 90 each 1    potassium chloride (K-DUR,KLOR-CON) 20 MEQ CR tablet Take 1 tablet by mouth Daily. 90 tablet 1    pravastatin (PRAVACHOL) 10 MG tablet Take 2 tablets by mouth Every Night. 90 tablet 1     No facility-administered medications prior to visit.       No opioid medication identified on  "active medication list. I have reviewed chart for other potential  high risk medication/s and harmful drug interactions in the elderly.        Aspirin is not on active medication list.  Aspirin use is not indicated based on review of current medical condition/s. Risk of harm outweighs potential benefits.  .    Patient Active Problem List   Diagnosis    Essential hypertension    Mixed hyperlipidemia    Hypokalemia    Screening PSA (prostate specific antigen)    Diabetic eye exam    Influenza vaccination given    Encounter for diabetic foot exam    Encounter for therapeutic drug monitoring    Encounter for drug screening    Swelling of ankle joint    Class 3 severe obesity due to excess calories with serious comorbidity and body mass index (BMI) of 40.0 to 44.9 in adult     Advance Care Planning   Advance Care Planning     Advance Directive is not on file.  ACP discussion was held with the patient during this visit. Patient does not have an advance directive, information provided.     Objective    Vitals:    10/10/23 0954   BP: 135/67   BP Location: Left arm   Patient Position: Sitting   Cuff Size: Large Adult   Pulse: 67   Resp: 18   Temp: 98.4 øF (36.9 øC)   TempSrc: Infrared   SpO2: 96%   Weight: 117 kg (259 lb)   Height: 172.7 cm (68\")  Comment: per patient   PainSc: 0-No pain     Estimated body mass index is 39.38 kg/mý as calculated from the following:    Height as of this encounter: 172.7 cm (68\").    Weight as of this encounter: 117 kg (259 lb).           Does the patient have evidence of cognitive impairment? No    Lab Results   Component Value Date    CHLPL 135 09/06/2023    TRIG 99 09/06/2023    HDL 34 (L) 09/06/2023    LDL 82 09/06/2023    VLDL 19 09/06/2023    HGBA1C 6.0 09/06/2023          HEALTH RISK ASSESSMENT    Smoking Status:  Social History     Tobacco Use   Smoking Status Never    Passive exposure: Never   Smokeless Tobacco Never     Alcohol Consumption:  Social History     Substance and Sexual " Activity   Alcohol Use Not Currently    Alcohol/week: 1.0 standard drink of alcohol    Types: 1 Cans of beer per week    Comment: occasional beer     Fall Risk Screen:    RICHARDSONHAI Fall Risk Assessment was completed, and patient is at LOW risk for falls.Assessment completed on:10/10/2023    Depression Screening:      10/10/2023     9:56 AM   PHQ-2/PHQ-9 Depression Screening   Little Interest or Pleasure in Doing Things 0-->not at all   Feeling Down, Depressed or Hopeless 0-->not at all   PHQ-9: Brief Depression Severity Measure Score 0       Health Habits and Functional and Cognitive Screening:      10/10/2023     9:57 AM   Functional & Cognitive Status   Do you have difficulty preparing food and eating? No   Do you have difficulty bathing yourself, getting dressed or grooming yourself? No   Do you have difficulty using the toilet? No   Do you have difficulty moving around from place to place? No   Do you have trouble with steps or getting out of a bed or a chair? No   Current Diet Unhealthy Diet   Dental Exam Not up to date   Eye Exam Up to date   Exercise (times per week) 3 times per week   Current Exercises Include Treadmill   Do you need help using the phone?  No   Are you deaf or do you have serious difficulty hearing?  No   Do you need help to go to places out of walking distance? No   Do you need help shopping? No   Do you need help preparing meals?  No   Do you need help with housework?  No   Do you need help with laundry? No   Do you need help taking your medications? No   Do you need help managing money? No   Do you ever drive or ride in a car without wearing a seat belt? No   Have you felt unusual stress, anger or loneliness in the last month? No   Who do you live with? Alone   If you need help, do you have trouble finding someone available to you? No   Have you been bothered in the last four weeks by sexual problems? No   Do you have difficulty concentrating, remembering or making decisions? Yes        Age-appropriate Screening Schedule:  Refer to the list below for future screening recommendations based on patient's age, sex and/or medical conditions. Orders for these recommended tests are listed in the plan section. The patient has been provided with a written plan.    Health Maintenance   Topic Date Due    INFLUENZA VACCINE  08/01/2023    COVID-19 Vaccine (5 - 2023-24 season) 09/01/2023    ANNUAL WELLNESS VISIT  10/05/2023    HEMOGLOBIN A1C  03/06/2024    DIABETIC FOOT EXAM  04/11/2024    DIABETIC EYE EXAM  08/02/2024    LIPID PANEL  09/06/2024    URINE MICROALBUMIN  09/06/2024    BMI FOLLOWUP  09/06/2024    COLORECTAL CANCER SCREENING  09/23/2025    TDAP/TD VACCINES (3 - Td or Tdap) 07/21/2027    Pneumococcal Vaccine 65+  Completed    HEPATITIS C SCREENING  Addressed    ZOSTER VACCINE  Addressed              Physical Exam  Vitals and nursing note reviewed.   Constitutional:       General: He is awake.      Appearance: Normal appearance. He is well-developed and well-groomed.   HENT:      Head: Normocephalic and atraumatic.      Right Ear: Hearing, tympanic membrane, ear canal and external ear normal.      Left Ear: Hearing, tympanic membrane, ear canal and external ear normal.      Nose: Nose normal.      Mouth/Throat:      Lips: Pink.      Pharynx: Oropharynx is clear.   Eyes:      General: Lids are normal.      Conjunctiva/sclera: Conjunctivae normal.   Cardiovascular:      Rate and Rhythm: Normal rate and regular rhythm.      Heart sounds: Normal heart sounds.   Pulmonary:      Effort: Pulmonary effort is normal.      Breath sounds: Normal breath sounds and air entry.   Musculoskeletal:      Cervical back: Full passive range of motion without pain.      Right lower leg: No edema.      Left lower leg: No edema.   Lymphadenopathy:      Head:      Right side of head: No submental, submandibular or tonsillar adenopathy.      Left side of head: No submental, submandibular or tonsillar adenopathy.   Skin:    "  General: Skin is warm and dry.   Neurological:      Mental Status: He is alert.      Sensory: Sensation is intact.      Motor: Motor function is intact.      Coordination: Coordination is intact.      Gait: Gait is intact.   Psychiatric:         Attention and Perception: Attention and perception normal.         Mood and Affect: Mood and affect normal.         Speech: Speech normal.         Behavior: Behavior normal. Behavior is cooperative.         Thought Content: Thought content normal.         Cognition and Memory: Cognition and memory normal.         Judgment: Judgment normal.       Assessment     Diagnoses and all orders for this visit:    1. Medicare annual wellness visit, subsequent (Primary)    2. Immunization due  -     Fluzone High-Dose 65+yrs ([id number])          -   "Discussed risks/benefits to vaccination, reviewed components of the vaccine, discussed VIS, discussed informed consent, informed consent obtained. Patient/Parent was allowed to accept or refuse vaccine. Questions answered to satisfactory state of patient/Parent. We reviewed typical age appropriate and seasonally appropriate vaccinations. Reviewed immunization history and updated state vaccination form as needed. Patient was counseled on Influenza    Health Maintenance Summary    Ordered - INFLUENZA VACCINE: (Yearly - August to March): Ordered on 10/10/2023  10/05/2022  Imm Admin: Fluzone High-Dose 65+yrs   10/04/2021  Imm Admin: Fluzone High-Dose 65+yrs   09/23/2020  Imm Admin: FluLaval/Fluzone >6mos   10/18/2019  Imm Admin: flucelvax quad pfs =>4 YRS   10/18/2019  Imm Admin: Influenza, Unspecified        Postponed - COVID-19 Vaccine: (5 - 2023-24 season): Postponed until 10/12/2023  10/10/2023  Postponed until 10/12/2023 by Lisbet Velez, DNP, APRN (Patient Refused)   09/06/2023  Postponed until 9/8/2023 by Latoya Anand, MALLIKA (Patient Refused)   03/07/2023  Imm Admin: COVID-19 (MODERNA) BIVALENT 12+YRS   10/05/2022  Postponed until " 10/7/2022 by Latoya Anand MA (Patient Refused)   05/03/2022  Imm Admin: COVID-19 (MODERNA) Monovalent Original Booster        HEMOGLOBIN A1C: (Every 6 Months): Next due on 3/6/2024  09/06/2023  Hemoglobin A1C component of POC Glycosylated Hemoglobin (Hb A1C)   09/07/2022  Hemoglobin A1C component of POC Glycosylated Hemoglobin (Hb A1C)   09/07/2022  Hemoglobin A1C component of Hemoglobin A1c   03/07/2022  Hemoglobin A1C component of POC Glycosylated Hemoglobin (Hb A1C)   10/06/2021  Hemoglobin A1C component of Hemoglobin A1c        DIABETIC FOOT EXAM: (Yearly): Next due on 4/11/2024 04/11/2023  SmartData: WORKFLOW - DIABETES - DIABETIC FOOT EXAM PERFORMED   01/10/2023  Visit Dx: Encounter for diabetic foot exam   01/10/2023  SmartData: WORKFLOW - DIABETES - DIABETIC FOOT EXAM PERFORMED   01/10/2023  SmartData: WORKFLOW - DIABETES - DIABETIC FOOT EXAM PERFORMED   06/23/2022  SmartData: WORKFLOW - DIABETES - DIABETIC FOOT EXAM PERFORMED        DIABETIC EYE EXAM: (Yearly): Next due on 8/2/2024 08/02/2023  Outside Claim: ID OPH MEDICAL XM&EVAL COMPRHNSV ESTAB PT 1/>   02/07/2023  SCANNED - EYE EXAM   08/10/2022  SCANNED - EYE EXAM   11/30/2021  SCANNED - EYE EXAM   10/04/2021  Postponed until 11/30/2021 by Akil Morgan MA (Pending event)        LIPID PANEL: (Yearly): Next due on 9/6/2024 09/06/2023  Lipid panel   03/07/2023  Lipid panel   09/07/2022  Lipid panel   03/07/2022  Lipid panel   10/06/2021  Lipid panel        URINE MICROALBUMIN: (Yearly): Next due on 9/6/2024 09/06/2023  Multiple components of Microalbumin / Creatinine Urine Ratio - Urine, Clean Catch   09/07/2022  Multiple components of Microalbumin / Creatinine Urine Ratio - Urine, Clean Catch   09/07/2021  Multiple components of Microalbumin / Creatinine Urine Ratio -   08/21/2020  Microalbumin, Urine component of MicroAlbumin, Urine, Random - Urine, Clean Catch   08/16/2019  Done        BMI FOLLOWUP: (Yearly): Next due on 9/6/2024 09/06/2023   SmartData: WORKFLOW - QUALITY MEASUREMENT - BMI FOLLOW UP CARE PLAN DOCUMENTED   03/07/2023  SmartData: WORKFLOW - QUALITY MEASUREMENT - BMI FOLLOW UP CARE PLAN DOCUMENTED   09/07/2022  SmartData: WORKFLOW - QUALITY MEASUREMENT - BMI FOLLOW UP CARE PLAN DOCUMENTED   06/23/2022  SmartData: WORKFLOW - QUALITY MEASUREMENT - BMI FOLLOW UP CARE PLAN DOCUMENTED   03/17/2022  SmartData: WORKFLOW - QUALITY MEASUREMENT - BMI FOLLOW UP CARE PLAN DOCUMENTED        ANNUAL WELLNESS VISIT: (Yearly): Next due on 10/10/2024  10/10/2023  Done   10/05/2022  Level of Service: [place] PPPS, INITIAL VISIT   10/05/2022  Done   10/04/2021  Level of Service: GA INITIAL PREVENTIVE EXAM   10/04/2021  Done        COLORECTAL CANCER SCREENING: (COLONOSCOPY - Every 3 Years): Next due on 9/23/2025 09/23/2022  Follow-up changed to COLONOSCOPY - Every 3 Years and due date changed to 9/23/2025 by Akil Morgan MA   09/23/2022  Follow-up changed to COLONOSCOPY - Every 3 Years by Akil Morgan MA   09/23/2022  COLOGUARD (Done)   09/19/2022  Cologuard component of Cologuard - Stool, Per Rectum   03/18/2019  Cologuard - ,        TDAP/TD VACCINES: (3 - Td or Tdap): Next due on 7/21/2027 07/21/2017  Declined   01/06/2017  Declined     HEPATITIS C SCREENING: Addressed:   07/21/2017  Declined   01/06/2017  Declined     ZOSTER VACCINE: (Series Information): Addressed  07/27/2018  Imm Admin: Zostavax   07/21/2017  Declined   01/06/2017  Declined     Pneumococcal Vaccine 65+: (Series Information): Completed  10/05/2022  Imm Admin: Pneumococcal Conjugate 20-Valent (PCV20)   10/04/2021  Imm Admin: Pneumococcal Polysaccharide (PPSV23)       CMS Preventative Services Quick Reference  Risk Factors Identified During Encounter:    Alcohol Misuse:  occassionally beer once or twice a month  Chronic Pain: Chronic Pain Educational material Discussed and Shared in After Visit Summary for Patient.  Drug Use/Abuse Identified or Suspected:  denies  Fall Risk-High or  Moderate: Information on Fall Prevention Shared in After Visit Summary  Polypharmacy: Medication List reviewed  Dental Screening Recommended  Vision Screening Recommended    The above risks/problems have been discussed with the patient.  Pertinent information has been shared with the patient in the After Visit Summary.    Follow Up:   Next Medicare Wellness visit to be scheduled in 1 year.      An After Visit Summary and PPPS were made available to the patient.      Electronically signed by Lisbet Velez DNP, APRN, 10/15/23, 10:07 PM CDT.

## 2023-10-13 DIAGNOSIS — E78.2 MIXED HYPERLIPIDEMIA: ICD-10-CM

## 2023-10-13 RX ORDER — CLONIDINE HYDROCHLORIDE 0.1 MG/1
TABLET ORAL
Qty: 30 TABLET | Refills: 0 | Status: SHIPPED | OUTPATIENT
Start: 2023-10-13

## 2023-11-03 DIAGNOSIS — M25.551 RIGHT HIP PAIN: ICD-10-CM

## 2023-11-03 RX ORDER — DICLOFENAC SODIUM 75 MG/1
TABLET, DELAYED RELEASE ORAL
Qty: 60 TABLET | Refills: 0 | Status: SHIPPED | OUTPATIENT
Start: 2023-11-03

## 2023-12-05 DIAGNOSIS — M25.551 RIGHT HIP PAIN: ICD-10-CM

## 2023-12-05 DIAGNOSIS — E78.2 MIXED HYPERLIPIDEMIA: ICD-10-CM

## 2023-12-05 NOTE — TELEPHONE ENCOUNTER
Rx Refill Note  Requested Prescriptions     Pending Prescriptions Disp Refills    pravastatin (PRAVACHOL) 10 MG tablet [Pharmacy Med Name: Pravastatin Sodium 10 MG Oral Tablet] 90 tablet 0     Sig: Take 1 tablet by mouth nightly    diclofenac (VOLTAREN) 75 MG EC tablet [Pharmacy Med Name: Diclofenac Sodium 75 MG Oral Tablet Delayed Release] 60 tablet 0     Sig: Take 1 tablet by mouth twice daily      Last office visit with prescribing clinician: 10/10/2023   Last telemedicine visit with prescribing clinician: Visit date not found   Next office visit with prescribing clinician: 3/8/2024                         Would you like a call back once the refill request has been completed: [] Yes [] No    If the office needs to give you a call back, can they leave a voicemail: [] Yes [] No    Anne Parr RN  12/05/23, 15:09 CST

## 2023-12-06 RX ORDER — DICLOFENAC SODIUM 75 MG/1
TABLET, DELAYED RELEASE ORAL
Qty: 60 TABLET | Refills: 0 | Status: SHIPPED | OUTPATIENT
Start: 2023-12-06

## 2023-12-06 RX ORDER — PRAVASTATIN SODIUM 10 MG
10 TABLET ORAL NIGHTLY
Qty: 90 TABLET | Refills: 0 | Status: SHIPPED | OUTPATIENT
Start: 2023-12-06

## 2023-12-26 NOTE — PROGRESS NOTES
[facility] - PODIATRY    Today's Date: 12/29/23    Patient Name: Sandeep Alonso  MRN: [id number]  CSN: [id number]  PCP: Lisbet Velez DNP, VENKAT  Referring Provider: No ref. provider found    SUBJECTIVE     Chief Complaint   Patient presents with    Follow-up     Lisbet Velez DNP, VENKAT 10/10/2023 Return in about 3 months (around 10/11/2023). Patient states he is here for diabetic nail care. Patient has no complaints or concerns at this time.    Diabetes     130mg/dl BG      HPI: Sandeep Alonso, a 68 y.o.male, comes to clinic as a(n) established patient presenting for diabetic foot exam and complaining of toenail/callus issues. Pt has h/o DM2, HTN, Hypokalemia, Edema.  States his nails are long, thick, and discolored. He is unable to reach feet to care for nails himself. Notes mild numbness and tingling in feet. Denies open wounds or sores. Patient is NIDDM with last stated BG level of 130mg/dl. Denies pain today but notes some pain at times due to neuropathy. Relates previous treatment(s) including nail debridement by podiatrist . Denies any constitutional symptoms. No other pedal complaints at this time.    Past Medical History:   Diagnosis Date    Hypokalemia     Peripheral edema      Past Surgical History:   Procedure Laterality Date    NO PAST SURGERIES       Family History   Problem Relation Age of Onset    Heart attack Mother     Emphysema Father     No Known Problems Sister     No Known Problems Brother     No Known Problems Son     No Known Problems Maternal Grandmother     No Known Problems Maternal Grandfather     No Known Problems Paternal Grandmother     No Known Problems Paternal Grandfather     No Known Problems Brother     No Known Problems Brother     No Known Problems Son     No Known Problems Son     No Known Problems Son      Social History     Socioeconomic History    Marital status:    Tobacco Use    Smoking status: Never     Passive exposure: Never    Smokeless  tobacco: Never   Vaping Use    Vaping Use: Never used   Substance and Sexual Activity    Alcohol use: Not Currently     Alcohol/week: 1.0 standard drink of alcohol     Types: 1 Cans of beer per week     Comment: occasional beer    Drug use: No    Sexual activity: Defer     Allergies   Allergen Reactions    Ace Inhibitors Cough     Current Outpatient Medications   Medication Sig Dispense Refill    amLODIPine (NORVASC) 5 MG tablet Take 1 tablet by mouth Daily. 90 tablet 1    cloNIDine (CATAPRES) 0.1 MG tablet TAKE 1 TABLET BY MOUTH TWICE DAILY. TAKE WHEN TOP BLOOD PRESSURE GREATER THAN 150 OR BOTTOM IS GREATER THAN 95 30 tablet 0    diclofenac (VOLTAREN) 75 MG EC tablet Take 1 tablet by mouth twice daily 60 tablet 0    furosemide (LASIX) 40 MG tablet Take 1 tablet by mouth 2 (Two) Times a Day. 180 tablet 1    gabapentin (NEURONTIN) 100 MG capsule Take 1 capsule by mouth 2 (Two) Times a Day. 180 capsule 1    losartan-hydrochlorothiazide (HYZAAR) 100-25 MG per tablet Take 1 tablet by mouth Daily. 90 tablet 1    metFORMIN (GLUCOPHAGE) 500 MG tablet Take 1 tablet by mouth 2 (Two) Times a Day With Meals. 180 tablet 1    Omega 3 1000 MG capsule Take 1 capsule by mouth Every Night. 90 each 1    potassium chloride (K-DUR,KLOR-CON) 20 MEQ CR tablet Take 1 tablet by mouth Daily. 90 tablet 1    pravastatin (PRAVACHOL) 10 MG tablet Take 1 tablet by mouth nightly 90 tablet 0     No current facility-administered medications for this visit.     Review of Systems   Constitutional:  Negative for chills and fever.   HENT:  Negative for congestion.    Respiratory:  Negative for shortness of breath.    Cardiovascular:  Negative for chest pain and leg swelling.   Gastrointestinal:  Negative for constipation, diarrhea, nausea and vomiting.   Musculoskeletal:  Positive for arthralgias. Negative for gait problem and myalgias.   Skin:  Negative for wound.   Neurological:  Positive for numbness.       OBJECTIVE     Vitals:    12/29/23 1544    BP: 134/80   Pulse: 64   SpO2: 98%         Foot/Ankle Exam    GENERAL  Appearance:  obese  Orientation:  AAOx3  Affect:  appropriate  Gait:  unimpaired  Assistance:  independent  Right shoe gear: casual shoe  Left shoe gear: casual shoe    VASCULAR     Right Foot Vascularity   Dorsalis pedis:  2+  Posterior tibial:  2+  Skin temperature:  warm  Edema gradin+ and pitting  CFT:  3  Pedal hair growth:  Present  Varicosities:  mild varicosities     Left Foot Vascularity   Dorsalis pedis:  2+  Posterior tibial:  2+  Skin temperature:  warm  Edema gradin+ and pitting  CFT:  3  Pedal hair growth:  Present  Varicosities:  mild varicosities     NEUROLOGIC     Right Foot Neurologic   Light touch sensation: diminished  Vibratory sensation: diminished  Hot/Cold sensation: diminished  Protective Sensation using Gabriels-Lucita Monofilament:   Sites intact: 8  Sites tested: 10     Left Foot Neurologic   Light touch sensation: diminished  Vibratory sensation: diminished  Hot/Cold sensation:  diminished  Protective Sensation using Gabriels-Lucita Monofilament:   Sites intact: 8  Sites tested: 10    MUSCULOSKELETAL     Right Foot Musculoskeletal   Ecchymosis:  none  Tenderness:  toenail problem    Arch:  Normal  Hallux valgus: No       Left Foot Musculoskeletal   Ecchymosis:  none  Tenderness:  toenail problem  Arch:  Normal  Hallux valgus: No      MUSCLE STRENGTH     Right Foot Muscle Strength   Foot dorsiflexion:  5  Foot plantar flexion:  5  Foot inversion:  5  Foot eversion:  5     Left Foot Muscle Strength   Foot dorsiflexion:  5  Foot plantar flexion:  5  Foot inversion:  5  Foot eversion:  5    RANGE OF MOTION     Right Foot Range of Motion   Foot and ankle ROM within normal limits       Left Foot Range of Motion   Foot and ankle ROM within normal limits      DERMATOLOGIC      Right Foot Dermatologic   Skin  Right foot skin is intact.   Nails  1.  Positive for onychomycosis, abnormal thickness and subungual  debris.  2.  Positive for onychomycosis, abnormal thickness and subungual debris.  3.  Positive for onychomycosis, abnormal thickness and subungual debris.  4.  Positive for onychomycosis, abnormal thickness and subungual debris.  5.  Positive for onychomycosis, abnormal thickness and subungual debris.     Left Foot Dermatologic   Skin  Left foot skin is intact.   Nails  1.  Positive for onychomycosis, abnormal thickness and subungual debris.  2.  Positive for onychomycosis, abnormal thickness and subungual debris.  3.  Positive for onychomycosis, abnormal thickness and subungual debris.  4.  Positive for onychomycosis, abnormally thick and subungual debris.  5.  Positive for onychomycosis, abnormally thick and subungual debris.      RADIOLOGY/NUCLEAR:  No results found.    LABORATORY/CULTURE RESULTS:      PATHOLOGY RESULTS:       ASSESSMENT/PLAN     Diagnoses and all orders for this visit:    1. Onychomycosis (Primary)    2. Type 2 diabetes mellitus with diabetic polyneuropathy, without long-term current use of insulin    3. Painful diabetic neuropathy    4. Class 2 obesity due to excess calories without serious comorbidity with body mass index (BMI) of 36.0 to 36.9 in adult        Comprehensive lower extremity examination and evaluation was performed.  Discussed findings and treatment plan including risks, benefits, and treatment options with patient in detail. Patient agreed with treatment plan.  After verbal consent obtained, nail(s) x10 debrided of length and thickness with nail nipper without incidence  Patient may maintain nails and calluses at home utilizing emery board or pumice stone between visits as needed  Reviewed at home diabetic foot care including daily foot checks   Continue diabetic monitoring and control under direction of PCP.   An After Visit Summary was printed and given to the patient at discharge, including (if requested) any available informative/educational handouts regarding diagnosis,  treatment, or medications. All questions were answered to patient/family satisfaction. Should symptoms fail to improve or worsen they agree to call or return to clinic or to go to the Emergency Department. Discussed the importance of following up with any needed screening tests/labs/specialist appointments and any requested follow-up recommended by me today. Importance of maintaining follow-up discussed and patient accepts that missed appointments can delay diagnosis and potentially lead to worsening of conditions.  Return in about 3 months (around 3/29/2024) for Follow-up with Podiatry APRN, Schedule [facility]., or sooner if acute issues arise.      This document has been electronically signed by Ghassan Sheets DPM on December 29, 2023 17:07 CST

## 2023-12-28 ENCOUNTER — TELEPHONE (OUTPATIENT)
Dept: PODIATRY | Facility: CLINIC | Age: 68
End: 2023-12-28
Payer: MEDICARE

## 2023-12-29 ENCOUNTER — OFFICE VISIT (OUTPATIENT)
Dept: PODIATRY | Facility: CLINIC | Age: 68
End: 2023-12-29
Payer: MEDICARE

## 2023-12-29 VITALS
OXYGEN SATURATION: 98 % | WEIGHT: 259 LBS | HEIGHT: 68 IN | HEART RATE: 64 BPM | BODY MASS INDEX: 39.25 KG/M2 | SYSTOLIC BLOOD PRESSURE: 134 MMHG | DIASTOLIC BLOOD PRESSURE: 80 MMHG

## 2023-12-29 DIAGNOSIS — E11.42 TYPE 2 DIABETES MELLITUS WITH DIABETIC POLYNEUROPATHY, WITHOUT LONG-TERM CURRENT USE OF INSULIN: ICD-10-CM

## 2023-12-29 DIAGNOSIS — E66.09 CLASS 2 OBESITY DUE TO EXCESS CALORIES WITHOUT SERIOUS COMORBIDITY WITH BODY MASS INDEX (BMI) OF 36.0 TO 36.9 IN ADULT: ICD-10-CM

## 2023-12-29 DIAGNOSIS — E11.40 PAINFUL DIABETIC NEUROPATHY: ICD-10-CM

## 2023-12-29 DIAGNOSIS — B35.1 ONYCHOMYCOSIS: Primary | ICD-10-CM

## 2024-01-03 DIAGNOSIS — M25.551 RIGHT HIP PAIN: ICD-10-CM

## 2024-01-03 RX ORDER — DICLOFENAC SODIUM 75 MG/1
TABLET, DELAYED RELEASE ORAL
Qty: 60 TABLET | Refills: 0 | Status: SHIPPED | OUTPATIENT
Start: 2024-01-03

## 2024-01-05 DIAGNOSIS — E78.2 MIXED HYPERLIPIDEMIA: ICD-10-CM

## 2024-01-05 DIAGNOSIS — M25.551 RIGHT HIP PAIN: ICD-10-CM

## 2024-01-05 RX ORDER — DICLOFENAC SODIUM 75 MG/1
75 TABLET, DELAYED RELEASE ORAL 2 TIMES DAILY
Qty: 60 TABLET | Refills: 0 | OUTPATIENT
Start: 2024-01-05

## 2024-01-05 RX ORDER — CLONIDINE HYDROCHLORIDE 0.1 MG/1
0.1 TABLET ORAL 2 TIMES DAILY PRN
Qty: 30 TABLET | Refills: 0 | Status: SHIPPED | OUTPATIENT
Start: 2024-01-05

## 2024-01-05 NOTE — TELEPHONE ENCOUNTER
Rx Refill Note  Requested Prescriptions     Pending Prescriptions Disp Refills    cloNIDine (CATAPRES) 0.1 MG tablet 30 tablet 0     Sig: Take 1 tablet by mouth 2 (Two) Times a Day As Needed for High Blood Pressure.     Refused Prescriptions Disp Refills    diclofenac (VOLTAREN) 75 MG EC tablet 60 tablet 0     Sig: Take 1 tablet by mouth 2 (Two) Times a Day.      Last office visit with prescribing clinician: 10/10/2023   Last telemedicine visit with prescribing clinician: Visit date not found   Next office visit with prescribing clinician: 3/8/2024                         Would you like a call back once the refill request has been completed: [] Yes [] No    If the office needs to give you a call back, can they leave a voicemail: [] Yes [] No    Sarah Singer, PCT  01/05/24, 09:47 CST

## 2024-01-05 NOTE — TELEPHONE ENCOUNTER
Caller: Sandeep Alonso    Relationship: Self    Best call back number: [phone]     Requested Prescriptions:   Requested Prescriptions     Pending Prescriptions Disp Refills    diclofenac (VOLTAREN) 75 MG EC tablet 60 tablet 0     Sig: Take 1 tablet by mouth 2 (Two) Times a Day.    cloNIDine (CATAPRES) 0.1 MG tablet 30 tablet 0        Pharmacy where request should be sent: [address] [phone] [facility] [phone]      Last office visit with prescribing clinician: 10/10/2023   Last telemedicine visit with prescribing clinician: Visit date not found   Next office visit with prescribing clinician: 3/8/2024     Does the patient have less than a 3 day supply:  [x] Yes  [] No    Would you like a call back once the refill request has been completed: [x] Yes [] No    If the office needs to give you a call back, can they leave a voicemail: [x] Yes [] No    Chadwick Steward Rep   01/05/24 09:30 CST

## 2024-02-01 DIAGNOSIS — M25.551 RIGHT HIP PAIN: ICD-10-CM

## 2024-02-01 NOTE — TELEPHONE ENCOUNTER
Rx Refill Note  Requested Prescriptions     Pending Prescriptions Disp Refills    diclofenac (VOLTAREN) 75 MG EC tablet [Pharmacy Med Name: Diclofenac Sodium 75 MG Oral Tablet Delayed Release] 60 tablet 0     Sig: Take 1 tablet by mouth twice daily      Last office visit with prescribing clinician: Visit date not found   Last telemedicine visit with prescribing clinician: Visit date not found   Next office visit with prescribing clinician: Visit date not found                         Would you like a call back once the refill request has been completed: [] Yes [] No    If the office needs to give you a call back, can they leave a voicemail: [] Yes [] No    Sarah Singer MA  02/01/24, 11:34 CST

## 2024-02-02 RX ORDER — DICLOFENAC SODIUM 75 MG/1
TABLET, DELAYED RELEASE ORAL
Qty: 60 TABLET | Refills: 0 | Status: SHIPPED | OUTPATIENT
Start: 2024-02-02

## 2024-03-01 DIAGNOSIS — R60.0 LOCALIZED EDEMA: ICD-10-CM

## 2024-03-01 DIAGNOSIS — I10 ESSENTIAL HYPERTENSION: ICD-10-CM

## 2024-03-01 RX ORDER — AMLODIPINE BESYLATE 5 MG/1
5 TABLET ORAL DAILY
Qty: 90 TABLET | Refills: 0 | Status: SHIPPED | OUTPATIENT
Start: 2024-03-01

## 2024-03-01 RX ORDER — FUROSEMIDE 40 MG/1
40 TABLET ORAL 2 TIMES DAILY
Qty: 180 TABLET | Refills: 0 | Status: SHIPPED | OUTPATIENT
Start: 2024-03-01

## 2024-03-01 NOTE — TELEPHONE ENCOUNTER
Rx Refill Note  Requested Prescriptions     Pending Prescriptions Disp Refills    amLODIPine (NORVASC) 5 MG tablet [Pharmacy Med Name: amLODIPine Besylate 5 MG Oral Tablet] 90 tablet 0     Sig: Take 1 tablet by mouth once daily    furosemide (LASIX) 40 MG tablet [Pharmacy Med Name: Furosemide 40 MG Oral Tablet] 180 tablet 0     Sig: Take 1 tablet by mouth twice daily      Last office visit with prescribing clinician: 10/10/2023   Last telemedicine visit with prescribing clinician: Visit date not found   Next office visit with prescribing clinician: 3/8/2024                         Would you like a call back once the refill request has been completed: [] Yes [] No    If the office needs to give you a call back, can they leave a voicemail: [] Yes [] No    Kathy Tinajero LPN  03/01/24, 08:20 CST

## 2024-03-08 ENCOUNTER — OFFICE VISIT (OUTPATIENT)
Dept: FAMILY MEDICINE CLINIC | Facility: CLINIC | Age: 69
End: 2024-03-08
Payer: MEDICARE

## 2024-03-08 VITALS
HEART RATE: 70 BPM | DIASTOLIC BLOOD PRESSURE: 82 MMHG | HEIGHT: 68 IN | RESPIRATION RATE: 18 BRPM | SYSTOLIC BLOOD PRESSURE: 138 MMHG | OXYGEN SATURATION: 99 % | WEIGHT: 250 LBS | BODY MASS INDEX: 37.89 KG/M2 | TEMPERATURE: 97.8 F

## 2024-03-08 DIAGNOSIS — E78.2 MIXED HYPERLIPIDEMIA: ICD-10-CM

## 2024-03-08 DIAGNOSIS — E66.01 CLASS 3 SEVERE OBESITY DUE TO EXCESS CALORIES WITH SERIOUS COMORBIDITY AND BODY MASS INDEX (BMI) OF 40.0 TO 44.9 IN ADULT: ICD-10-CM

## 2024-03-08 DIAGNOSIS — Z12.5 SCREENING FOR PROSTATE CANCER: ICD-10-CM

## 2024-03-08 DIAGNOSIS — I10 PRIMARY HYPERTENSION: Primary | ICD-10-CM

## 2024-03-08 DIAGNOSIS — E11.40 PAINFUL DIABETIC NEUROPATHY: ICD-10-CM

## 2024-03-08 DIAGNOSIS — E11.43 TYPE 2 DIABETES MELLITUS WITH DIABETIC AUTONOMIC NEUROPATHY, WITHOUT LONG-TERM CURRENT USE OF INSULIN: ICD-10-CM

## 2024-03-08 DIAGNOSIS — E87.6 HYPOKALEMIA: ICD-10-CM

## 2024-03-08 RX ORDER — OMEGA-3 FATTY ACIDS/FISH OIL 300-1000MG
1 CAPSULE ORAL NIGHTLY
Qty: 90 EACH | Refills: 1 | Status: SHIPPED | OUTPATIENT
Start: 2024-03-08

## 2024-03-08 RX ORDER — PRAVASTATIN SODIUM 20 MG
20 TABLET ORAL NIGHTLY
Qty: 90 TABLET | Refills: 1 | Status: SHIPPED | OUTPATIENT
Start: 2024-03-08

## 2024-03-08 RX ORDER — POTASSIUM CHLORIDE 20 MEQ/1
20 TABLET, EXTENDED RELEASE ORAL DAILY
Qty: 90 TABLET | Refills: 1 | Status: SHIPPED | OUTPATIENT
Start: 2024-03-08

## 2024-03-08 RX ORDER — PRAVASTATIN SODIUM 10 MG
10 TABLET ORAL NIGHTLY
Qty: 90 TABLET | Refills: 0 | OUTPATIENT
Start: 2024-03-08

## 2024-03-08 RX ORDER — GABAPENTIN 100 MG/1
100 CAPSULE ORAL 2 TIMES DAILY
Qty: 180 CAPSULE | Refills: 1 | Status: SHIPPED | OUTPATIENT
Start: 2024-03-08

## 2024-03-08 NOTE — TELEPHONE ENCOUNTER
Refill is a duplicate     Rx Refill Note  Requested Prescriptions     Pending Prescriptions Disp Refills    pravastatin (PRAVACHOL) 10 MG tablet [Pharmacy Med Name: Pravastatin Sodium 10 MG Oral Tablet] 90 tablet 0     Sig: Take 1 tablet by mouth nightly      Last office visit with prescribing clinician: Visit date not found   Last telemedicine visit with prescribing clinician: Visit date not found   Next office visit with prescribing clinician: Visit date not found                         Would you like a call back once the refill request has been completed: [] Yes [] No    If the office needs to give you a call back, can they leave a voicemail: [] Yes [] No    Triny Johnson MA  03/08/24, 15:21 CST

## 2024-03-08 NOTE — PROGRESS NOTES
Chief Complaint  Follow-up (Patient is here for a follow up on hypertension. )    Subjective        Sandeep Alonso presents to [facility]  History of Present Illness  The patient is a 68-year-old male who is here for a chronic visit.    Hypertension   This is chronic and has been present for over year. It is controlled and improving. Today, his blood pressure reading is 138/82 mmHg. He denies agents associated with hypertension. He is on diclofenac. His coronary artery disease risk factors include diabetes, dyslipidemia, family history, male gender, obesity, and sedentary lifestyle. His past treatments have included an angiotensin-converting enzyme, an angiotensin receptor blocker and a diuretic. He is currently being treated with an angiotensin receptor blocker and a diuretic, with mild improvement. He has not had any compliance problems. He denies kidney disease, cerebral vascular accident, and peripheral vascular disease.    Hyperlipidemia   This is chronic, and has been present for over year. His last lipid panel was in 09/2023. His Cholesterol, triglycerides and LDL were all normal. His HDL was 34 mg/dl. He does not have any known factors that aggravate his hyperlipidemia. He denies angina , myalgias or dyspnea. His current treatment includes a statin with mild improvement. He has not had any compliance problems. His coronary artery disease risk factors are the same as for hypertension.     Peripheral neuropathy  He has a history of peripheral neuropathy. He denies any focal sensory loss, focal weakness, vision change or memory loss. Chronicity of peripheral neuropathy is over a year, and it is improving. No focality has been noted. He does have associated back pain. He denies any bowel or bladder incontinence, dizziness, fatigue, palpations, or dyspnea. His current treatment is gabapentin, with improvement.     Lower extremity edema   His lower extremity edema is intermittent,  "chronic, and has been present for over a year. Progression is improving. His associated symptoms include arthralgias. He denies any angina, diaphoresis, cephalalgia, myalgias, or lymphadenopathy. He does not have any emesis, vertigo, or weakness. Aggravating factors include standing, twisting, walking. He is on gabapentin with mild relief.    Obesity   This is chronic, and has been present for over a year. His current BMI is 38, and his weight is 250 pounds. He is improving with dietary changes. He has associated arthralgias. He denies angina, dyspnea, congestion, nausea, vertigo, or visual changes. He does not have any known aggravating factors. He has not been treated for this condition and has had mild improvement.     The following portions of the patient's history were reviewed and updated as appropriate: allergies, current medications, past family history, past medical history, past social history, past surgical history and problem list.    Objective   Vital Signs:  /82 (BP Location: Left arm, Patient Position: Sitting, Cuff Size: Adult)   Pulse 70   Temp 97.8 °F (36.6 °C)   Resp 18   Ht 172.7 cm (67.99\")   Wt 113 kg (250 lb)   SpO2 99%   BMI 38.02 kg/m²   Estimated body mass index is 38.02 kg/m² as calculated from the following:    Height as of this encounter: 172.7 cm (67.99\").    Weight as of this encounter: 113 kg (250 lb).     The following portions of the patient's history were reviewed and updated as appropriate: allergies, current medications, past family history, past medical history, past social history, past surgical history and problem list.    Class 2 Severe Obesity (BMI >=35 and <=39.9). Obesity-related health conditions include the following: hypertension, diabetes mellitus, and dyslipidemias. Obesity is improving with lifestyle modifications. BMI is is above average; BMI management plan is completed. We discussed portion control and increasing exercise.    Physical Exam  Vitals and " nursing note reviewed.   Constitutional:       General: He is awake.      Appearance: Normal appearance. He is well-developed and well-groomed.   HENT:      Head: Normocephalic and atraumatic.      Right Ear: Hearing, tympanic membrane, ear canal and external ear normal.      Left Ear: Hearing, tympanic membrane, ear canal and external ear normal.      Nose: Nose normal.      Mouth/Throat:      Lips: Pink.      Pharynx: Oropharynx is clear.   Eyes:      General: Lids are normal.      Conjunctiva/sclera: Conjunctivae normal.   Cardiovascular:      Rate and Rhythm: Normal rate and regular rhythm.      Heart sounds: Normal heart sounds.   Pulmonary:      Effort: Pulmonary effort is normal.      Breath sounds: Normal breath sounds and air entry.   Musculoskeletal:      Cervical back: Full passive range of motion without pain.      Right lower leg: Edema present.      Left lower leg: Edema present.   Lymphadenopathy:      Head:      Right side of head: No submental, submandibular or tonsillar adenopathy.      Left side of head: No submental, submandibular or tonsillar adenopathy.   Skin:     General: Skin is warm and dry.   Neurological:      Mental Status: He is alert.      Sensory: Sensation is intact.      Motor: Motor function is intact.      Coordination: Coordination is intact.      Gait: Gait is intact.   Psychiatric:         Attention and Perception: Attention and perception normal.         Mood and Affect: Mood and affect normal.         Speech: Speech normal.         Behavior: Behavior normal. Behavior is cooperative.         Thought Content: Thought content normal.         Cognition and Memory: Cognition and memory normal.         Judgment: Judgment normal.        Result Review :                     Assessment and Plan     Diagnoses and all orders for this visit:    1. Primary hypertension (Primary)  -     Comprehensive metabolic panel  -     CBC & Differential  -     Continue amlodipine as prescribed  -      Continue clonidine as prescribed   -     Continue losartan-hctz as prescribed     2. Type 2 diabetes mellitus with diabetic autonomic neuropathy, without long-term current use of insulin  3. Painful diabetic neuropathy  -     Hemoglobin A1c  -     gabapentin (NEURONTIN) 100 MG capsule; Take 1 capsule by mouth 2 (Two) Times a Day.  Dispense: 180 capsule; Refill: 1  -     metFORMIN (GLUCOPHAGE) 500 MG tablet; Take 1 tablet by mouth 2 (Two) Times a Day With Meals.  Dispense: 180 tablet; Refill: 1        -      clement reviewed    4. Hypokalemia  -     potassium chloride (K-DUR,KLOR-CON) 20 MEQ CR tablet; Take 1 tablet by mouth Daily.  Dispense: 90 tablet; Refill: 1    5. Mixed hyperlipidemia  -     pravastatin (PRAVACHOL) 20 MG tablet; Take 1 tablet by mouth Every Night.  Dispense: 90 tablet; Refill: 1  -     Omega 3 1000 MG capsule; Take 1 capsule by mouth Every Night.  Dispense: 90 each; Refill: 1  -     Lipid panel  -     Eat baked instead of fried or fast foods    6. Screening for prostate cancer  -     PSA Screen    7. Class 3 severe obesity due to excess calories with serious comorbidity and body mass index (BMI) of 40.0 to 44.9 in adult  Obesity Plan:                       Lose 3 pounds before next visit   The patient BMI is outside this range and we recommended/discussed today to utilize a diet/exercise program to get back into the appropriate range.  Federal guidelines recommend that people under the age of 65 should have a BMI of 18.5-24.9  The initial step is to document everything that is consumed into a food diary. Studies have shown that patients can lose up to 2x the weight by keeping track of foods.   Choose one bad food weekly and eliminate it from your diet.  Replace with one healthy food  Goal over next 2-4 weeks is walk 30 minutes per day 5 days per week at pace difficult to hold conversation.   Drink more water, less soda.   Cut back on portion sizes.   Today we encouraged roughly a 1 lb per week  weight loss with initial goal of 5% weight loss.  Discussed if eating out for a meal, consider cutting food in half and placing into a to go container.  Individually portion any foods coming into the home based on package.  Use smaller plates  Drinking 12 oz of water 30 minutes before meal as way to suppress appetite.  Medications discussed today include metformin, topamax, phentermine, Qsymia, Belviq (lorcaserin), Contrave (Buproprion/naltrexone).    Nutritional counseling and supervised diet visit monthly x 3 months  Lifestyle therapy   Simple advice to lose weight   Internet programs or self help books.    Advice from dietitian  Set Goals that are realistic   Encouraged to use visual aides to help in measuring foods  Baseball-1 cup good for green salad, frozen yogurt, medium piece of fruit, baked potato  Handful - ½ cup good cut fruit, cooked vegetables, pasta, rice  Egg- ¼ cup good for dried fruit  Deck of cards-3 ounces good for meat and poultry  Check book-3 ounces good for grilled fish  6 dice side by side- 1 ½ ounces good for natural cheese  Simple tips   Plate method-reduce plate size to 9 inch dinner plate.  Half of plate should be filled with non-starchy vegetables (broccoli, lettuce, cauliflower, tomatoes), ¼ plate with lean source of protein (lean chicken, turkey, fish), remaining ½ with whole grains (brown rice, potato, whole grain breads  Avoid liquid calories (regular soda, juice, coffee with cream)  Focus  on water, seltzer water and other non-calorie drinks  Replace regular sugar with non-caloric sweeteners  Avoid skipping meals: plan small regular meals throughout the day in order to keep your hunger controlled  Consider using meal replacements if unable to plan a healthy meal (protein shake, high protein bar)  Replace all white bread with whole wheat/whole grain alternative  Swap regular salad dressings (mayonnaise, butter, or low fat or fat free alternative)  Avoid high fat, high calorie, high  carbohydrate snakes (cookies, pastries, cakes)  Snack on fruits, low fat dairy (yogurt, cottage cheese)    Behavioral Modification  Self-Monitoring of dietary Intake-keep a dietary intake log. Obese individuals have been shown to underestimate their food intake  Behavioral treatment -gives exacts about amount and total calories  Read food labels    Follow Up     Return in about 6 months (around 9/8/2024).  Patient was given instructions and counseling regarding his condition or for health maintenance advice. Please see specific information pulled into the AVS if appropriate.       Transcribed from ambient dictation for Lisbet Velez DNP, APRBELÉN by Jackie Mishra.   03/08/24   10:03 CST    Patient or patient representative verbalized consent to the visit recording.  I have personally performed the services described in this document as transcribed by the above individual, and it is both accurate and complete.    Electronically signed by Lisbet Velez DNP, VENKAT, 03/08/24, 12:40 PM CST.

## 2024-03-09 LAB
ALBUMIN SERPL-MCNC: 4.3 G/DL (ref 3.5–5.2)
ALBUMIN/GLOB SERPL: 1.8 G/DL
ALP SERPL-CCNC: 68 U/L (ref 39–117)
ALT SERPL-CCNC: 18 U/L (ref 1–41)
AST SERPL-CCNC: 17 U/L (ref 1–40)
BASOPHILS # BLD AUTO: 0.03 10*3/MM3 (ref 0–0.2)
BASOPHILS NFR BLD AUTO: 0.4 % (ref 0–1.5)
BILIRUB SERPL-MCNC: 0.4 MG/DL (ref 0–1.2)
BUN SERPL-MCNC: 20 MG/DL (ref 8–23)
BUN/CREAT SERPL: 18.3 (ref 7–25)
CALCIUM SERPL-MCNC: 8.6 MG/DL (ref 8.6–10.5)
CHLORIDE SERPL-SCNC: 102 MMOL/L (ref 98–107)
CHOLEST SERPL-MCNC: 129 MG/DL (ref 0–200)
CO2 SERPL-SCNC: 28.9 MMOL/L (ref 22–29)
CREAT SERPL-MCNC: 1.09 MG/DL (ref 0.76–1.27)
EGFRCR SERPLBLD CKD-EPI 2021: 74.4 ML/MIN/1.73
EOSINOPHIL # BLD AUTO: 0.17 10*3/MM3 (ref 0–0.4)
EOSINOPHIL NFR BLD AUTO: 2.4 % (ref 0.3–6.2)
ERYTHROCYTE [DISTWIDTH] IN BLOOD BY AUTOMATED COUNT: 13.1 % (ref 12.3–15.4)
GLOBULIN SER CALC-MCNC: 2.4 GM/DL
GLUCOSE SERPL-MCNC: 90 MG/DL (ref 65–99)
HBA1C MFR BLD: 5.8 % (ref 4.8–5.6)
HCT VFR BLD AUTO: 40 % (ref 37.5–51)
HDLC SERPL-MCNC: 30 MG/DL (ref 40–60)
HGB BLD-MCNC: 12.8 G/DL (ref 13–17.7)
IMM GRANULOCYTES # BLD AUTO: 0.01 10*3/MM3 (ref 0–0.05)
IMM GRANULOCYTES NFR BLD AUTO: 0.1 % (ref 0–0.5)
LDLC SERPL CALC-MCNC: 77 MG/DL (ref 0–100)
LYMPHOCYTES # BLD AUTO: 2.42 10*3/MM3 (ref 0.7–3.1)
LYMPHOCYTES NFR BLD AUTO: 34.9 % (ref 19.6–45.3)
MCH RBC QN AUTO: 29 PG (ref 26.6–33)
MCHC RBC AUTO-ENTMCNC: 32 G/DL (ref 31.5–35.7)
MCV RBC AUTO: 90.5 FL (ref 79–97)
MONOCYTES # BLD AUTO: 0.44 10*3/MM3 (ref 0.1–0.9)
MONOCYTES NFR BLD AUTO: 6.3 % (ref 5–12)
NEUTROPHILS # BLD AUTO: 3.87 10*3/MM3 (ref 1.7–7)
NEUTROPHILS NFR BLD AUTO: 55.9 % (ref 42.7–76)
NRBC BLD AUTO-RTO: 0 /100 WBC (ref 0–0.2)
PLATELET # BLD AUTO: 265 10*3/MM3 (ref 140–450)
POTASSIUM SERPL-SCNC: 3.4 MMOL/L (ref 3.5–5.2)
PROT SERPL-MCNC: 6.7 G/DL (ref 6–8.5)
PSA SERPL-MCNC: 1.17 NG/ML (ref 0–4)
RBC # BLD AUTO: 4.42 10*6/MM3 (ref 4.14–5.8)
SODIUM SERPL-SCNC: 142 MMOL/L (ref 136–145)
TRIGL SERPL-MCNC: 121 MG/DL (ref 0–150)
VLDLC SERPL CALC-MCNC: 22 MG/DL (ref 5–40)
WBC # BLD AUTO: 6.94 10*3/MM3 (ref 3.4–10.8)

## 2024-03-11 DIAGNOSIS — R79.89 ABNORMAL CBC: ICD-10-CM

## 2024-03-11 DIAGNOSIS — E11.43 TYPE 2 DIABETES MELLITUS WITH DIABETIC AUTONOMIC NEUROPATHY, WITHOUT LONG-TERM CURRENT USE OF INSULIN: ICD-10-CM

## 2024-03-11 DIAGNOSIS — E78.2 MIXED HYPERLIPIDEMIA: Primary | ICD-10-CM

## 2024-03-11 DIAGNOSIS — M25.551 RIGHT HIP PAIN: ICD-10-CM

## 2024-03-11 DIAGNOSIS — I10 ESSENTIAL HYPERTENSION: ICD-10-CM

## 2024-03-11 DIAGNOSIS — E78.2 MIXED HYPERLIPIDEMIA: ICD-10-CM

## 2024-03-11 NOTE — TELEPHONE ENCOUNTER
Caller: Sandeep Alonso    Relationship: Self    Best call back number: [phone]     Requested Prescriptions:   Requested Prescriptions     Pending Prescriptions Disp Refills    cloNIDine (CATAPRES) 0.1 MG tablet 30 tablet 0     Sig: Take 1 tablet by mouth 2 (Two) Times a Day As Needed for High Blood Pressure.    losartan-hydrochlorothiazide (HYZAAR) 100-25 MG per tablet 90 tablet 1     Sig: Take 1 tablet by mouth Daily.    gabapentin (NEURONTIN) 100 MG capsule 180 capsule 1     Sig: Take 1 capsule by mouth 2 (Two) Times a Day.    diclofenac (VOLTAREN) 75 MG EC tablet 60 tablet 0     Sig: Take 1 tablet by mouth 2 (Two) Times a Day.    metFORMIN (GLUCOPHAGE) 500 MG tablet 180 tablet 1     Sig: Take 1 tablet by mouth 2 (Two) Times a Day With Meals.        Pharmacy where request should be sent: [facility] [address] [place] [address] [phone] [facility] [phone]      Last office visit with prescribing clinician: 3/8/2024   Last telemedicine visit with prescribing clinician: Visit date not found   Next office visit with prescribing clinician: 9/9/2024     Additional details provided by patient: PATIENT STATES HE IS OUT OF MEDICATIONS.    Does the patient have less than a 3 day supply:  [x] Yes  [] No    Would you like a call back once the refill request has been completed: [] Yes [x] No    If the office needs to give you a call back, can they leave a voicemail: [] Yes [x] No    Chadwick Glynn Rep   03/11/24 10:49 CDT

## 2024-03-12 RX ORDER — GABAPENTIN 100 MG/1
100 CAPSULE ORAL 2 TIMES DAILY
Qty: 180 CAPSULE | Refills: 1 | OUTPATIENT
Start: 2024-03-12

## 2024-03-12 RX ORDER — DICLOFENAC SODIUM 75 MG/1
75 TABLET, DELAYED RELEASE ORAL 2 TIMES DAILY
Qty: 60 TABLET | Refills: 0 | Status: SHIPPED | OUTPATIENT
Start: 2024-03-12

## 2024-03-12 RX ORDER — CLONIDINE HYDROCHLORIDE 0.1 MG/1
0.1 TABLET ORAL 2 TIMES DAILY PRN
Qty: 30 TABLET | Refills: 0 | Status: SHIPPED | OUTPATIENT
Start: 2024-03-12

## 2024-03-12 RX ORDER — LOSARTAN POTASSIUM AND HYDROCHLOROTHIAZIDE 25; 100 MG/1; MG/1
1 TABLET ORAL DAILY
Qty: 90 TABLET | Refills: 1 | Status: SHIPPED | OUTPATIENT
Start: 2024-03-12

## 2024-03-12 NOTE — TELEPHONE ENCOUNTER
Too early to refill gabapentin and metformin. Okay to fill others.     Rx Refill Note  Requested Prescriptions     Pending Prescriptions Disp Refills    cloNIDine (CATAPRES) 0.1 MG tablet 30 tablet 0     Sig: Take 1 tablet by mouth 2 (Two) Times a Day As Needed for High Blood Pressure.    losartan-hydrochlorothiazide (HYZAAR) 100-25 MG per tablet 90 tablet 1     Sig: Take 1 tablet by mouth Daily.    gabapentin (NEURONTIN) 100 MG capsule 180 capsule 1     Sig: Take 1 capsule by mouth 2 (Two) Times a Day.    diclofenac (VOLTAREN) 75 MG EC tablet 60 tablet 0     Sig: Take 1 tablet by mouth 2 (Two) Times a Day.    metFORMIN (GLUCOPHAGE) 500 MG tablet 180 tablet 1     Sig: Take 1 tablet by mouth 2 (Two) Times a Day With Meals.      Last office visit with prescribing clinician: 3/8/2024   Last telemedicine visit with prescribing clinician: Visit date not found   Next office visit with prescribing clinician: 9/9/2024                         Would you like a call back once the refill request has been completed: [] Yes [] No    If the office needs to give you a call back, can they leave a voicemail: [] Yes [] No    Kathy Tinajero LPN  03/12/24, 09:46 CDT

## 2024-03-21 NOTE — PROGRESS NOTES
[facility] - PODIATRY    Today's Date: 03/28/24    Patient Name: Sandeep Alonso  MRN: [id number]  CSN: [id number]  PCP: Lisbet Velez DNP, APRN  Referring Provider: No ref. provider found    SUBJECTIVE     Chief Complaint   Patient presents with    Follow-up     Lisbet Velez DNP, VENKAT 03/08/2024 3 MONTH FOLLOW UP- pt states he is here today for diabetic nail/foot care-pt denies pain    Diabetes     115 mg/dl BG     HPI: Sandeep Alonso, a 68 y.o.male, comes to clinic as a(n) established patient presenting for diabetic foot exam and complaining of toenail/callus issues. Pt has h/o DM2, HTN, Hypokalemia, Edema.  States his nails are long, thick, and discolored. He is unable to reach feet to care for nails himself. Notes mild numbness and tingling in feet. Denies open wounds or sores. Patient is NIDDM with last stated BG level of 115mg/dl. Denies pain today but notes some pain at times due to neuropathy. Relates previous treatment(s) including nail debridement by podiatrist . Denies any constitutional symptoms. No other pedal complaints at this time.    Past Medical History:   Diagnosis Date    Hypokalemia     Peripheral edema      Past Surgical History:   Procedure Laterality Date    NO PAST SURGERIES       Family History   Problem Relation Age of Onset    Heart attack Mother     Emphysema Father     No Known Problems Sister     No Known Problems Brother     No Known Problems Son     No Known Problems Maternal Grandmother     No Known Problems Maternal Grandfather     No Known Problems Paternal Grandmother     No Known Problems Paternal Grandfather     No Known Problems Brother     No Known Problems Brother     No Known Problems Son     No Known Problems Son     No Known Problems Son      Social History     Socioeconomic History    Marital status:    Tobacco Use    Smoking status: Never     Passive exposure: Never    Smokeless tobacco: Never   Vaping Use    Vaping status: Never Used    Substance and Sexual Activity    Alcohol use: Not Currently     Alcohol/week: 1.0 standard drink of alcohol     Types: 1 Cans of beer per week     Comment: occasional beer    Drug use: No    Sexual activity: Defer     Allergies   Allergen Reactions    Ace Inhibitors Cough     Current Outpatient Medications   Medication Sig Dispense Refill    amLODIPine (NORVASC) 5 MG tablet Take 1 tablet by mouth once daily 90 tablet 0    cloNIDine (CATAPRES) 0.1 MG tablet Take 1 tablet by mouth 2 (Two) Times a Day As Needed for High Blood Pressure. 30 tablet 0    diclofenac (VOLTAREN) 75 MG EC tablet Take 1 tablet by mouth 2 (Two) Times a Day. 60 tablet 0    furosemide (LASIX) 40 MG tablet Take 1 tablet by mouth twice daily 180 tablet 0    gabapentin (NEURONTIN) 100 MG capsule Take 1 capsule by mouth 2 (Two) Times a Day. 180 capsule 1    losartan-hydrochlorothiazide (HYZAAR) 100-25 MG per tablet Take 1 tablet by mouth Daily. 90 tablet 1    metFORMIN (GLUCOPHAGE) 500 MG tablet Take 1 tablet by mouth 2 (Two) Times a Day With Meals. 180 tablet 1    Omega 3 1000 MG capsule Take 1 capsule by mouth Every Night. 90 each 1    potassium chloride (K-DUR,KLOR-CON) 20 MEQ CR tablet Take 1 tablet by mouth Daily. 90 tablet 1    pravastatin (PRAVACHOL) 20 MG tablet Take 1 tablet by mouth Every Night. 90 tablet 1     No current facility-administered medications for this visit.     Review of Systems   Constitutional:  Negative for chills and fever.   HENT:  Negative for congestion.    Respiratory:  Negative for shortness of breath.    Cardiovascular:  Negative for chest pain and leg swelling.   Gastrointestinal:  Negative for constipation, diarrhea, nausea and vomiting.   Musculoskeletal:  Positive for arthralgias. Negative for gait problem and myalgias.   Skin:  Negative for wound.   Neurological:  Positive for numbness.       OBJECTIVE     Vitals:    03/28/24 1512   BP: 136/78   Pulse: 70   SpO2: 95%           Foot/Ankle  Exam    GENERAL  Appearance:  obese  Orientation:  AAOx3  Affect:  appropriate  Gait:  unimpaired  Assistance:  independent  Right shoe gear: casual shoe  Left shoe gear: casual shoe    VASCULAR     Right Foot Vascularity   Dorsalis pedis:  2+  Posterior tibial:  2+  Skin temperature:  warm  Edema gradin+ and pitting  CFT:  3  Pedal hair growth:  Present  Varicosities:  mild varicosities     Left Foot Vascularity   Dorsalis pedis:  2+  Posterior tibial:  2+  Skin temperature:  warm  Edema gradin+ and pitting  CFT:  3  Pedal hair growth:  Present  Varicosities:  mild varicosities     NEUROLOGIC     Right Foot Neurologic   Light touch sensation: diminished  Vibratory sensation: diminished  Hot/Cold sensation: diminished  Protective Sensation using [place] Monofilament:   Sites intact: 8  Sites tested: 10     Left Foot Neurologic   Light touch sensation: diminished  Vibratory sensation: diminished  Hot/Cold sensation:  diminished  Protective Sensation using [place] Monofilament:   Sites intact: 8  Sites tested: 10    MUSCULOSKELETAL     Right Foot Musculoskeletal   Ecchymosis:  none  Tenderness:  toenail problem    Arch:  Normal  Hallux valgus: No       Left Foot Musculoskeletal   Ecchymosis:  none  Tenderness:  toenail problem  Arch:  Normal  Hallux valgus: No      MUSCLE STRENGTH     Right Foot Muscle Strength   Foot dorsiflexion:  5  Foot plantar flexion:  5  Foot inversion:  5  Foot eversion:  5     Left Foot Muscle Strength   Foot dorsiflexion:  5  Foot plantar flexion:  5  Foot inversion:  5  Foot eversion:  5    RANGE OF MOTION     Right Foot Range of Motion   Foot and ankle ROM within normal limits       Left Foot Range of Motion   Foot and ankle ROM within normal limits      DERMATOLOGIC      Right Foot Dermatologic   Skin  Right foot skin is intact.   Nails  1.  Positive for onychomycosis, abnormal thickness and subungual debris.  2.  Positive for onychomycosis, abnormal thickness  and subungual debris.  3.  Positive for onychomycosis, abnormal thickness and subungual debris.  4.  Positive for onychomycosis, abnormal thickness and subungual debris.  5.  Positive for onychomycosis, abnormal thickness and subungual debris.     Left Foot Dermatologic   Skin  Left foot skin is intact.   Nails  1.  Positive for onychomycosis, abnormal thickness and subungual debris.  2.  Positive for onychomycosis, abnormal thickness and subungual debris.  3.  Positive for onychomycosis, abnormal thickness and subungual debris.  4.  Positive for onychomycosis, abnormally thick and subungual debris.  5.  Positive for onychomycosis, abnormally thick and subungual debris.      RADIOLOGY/NUCLEAR:  No results found.    LABORATORY/CULTURE RESULTS:      PATHOLOGY RESULTS:       ASSESSMENT/PLAN     Diagnoses and all orders for this visit:    1. Onychomycosis (Primary)    2. Type 2 diabetes mellitus with diabetic polyneuropathy, without long-term current use of insulin    3. Class 2 obesity due to excess calories without serious comorbidity with body mass index (BMI) of 36.0 to 36.9 in adult    4. Painful diabetic neuropathy    5. Encounter for diabetic foot exam          Comprehensive lower extremity examination and evaluation was performed.  Discussed findings and treatment plan including risks, benefits, and treatment options with patient in detail. Patient agreed with treatment plan.  Diabetic foot exam performed.   After verbal consent obtained, nail(s) x10 debrided of length and thickness with nail nipper without incidence  Patient may maintain nails and calluses at home utilizing emery board or pumice stone between visits as needed  Reviewed at home diabetic foot care including daily foot checks   Continue diabetic monitoring and control under direction of PCP.   An After Visit Summary was printed and given to the patient at discharge, including (if requested) any available informative/educational handouts regarding  diagnosis, treatment, or medications. All questions were answered to patient/family satisfaction. Should symptoms fail to improve or worsen they agree to call or return to clinic or to go to the Emergency Department. Discussed the importance of following up with any needed screening tests/labs/specialist appointments and any requested follow-up recommended by me today. Importance of maintaining follow-up discussed and patient accepts that missed appointments can delay diagnosis and potentially lead to worsening of conditions.  Return in about 3 months (around 6/28/2024) for Schedule Foot Care Clinic, Follow-up with Podiatry APRN., or sooner if acute issues arise.      This document has been electronically signed by VENKAT Bullard on March 28, 2024 15:49 CDT

## 2024-03-27 ENCOUNTER — TELEPHONE (OUTPATIENT)
Dept: PODIATRY | Facility: CLINIC | Age: 69
End: 2024-03-27
Payer: MEDICARE

## 2024-03-28 ENCOUNTER — OFFICE VISIT (OUTPATIENT)
Dept: PODIATRY | Facility: CLINIC | Age: 69
End: 2024-03-28
Payer: MEDICARE

## 2024-03-28 VITALS
DIASTOLIC BLOOD PRESSURE: 78 MMHG | WEIGHT: 248 LBS | HEART RATE: 70 BPM | SYSTOLIC BLOOD PRESSURE: 136 MMHG | HEIGHT: 68 IN | BODY MASS INDEX: 37.59 KG/M2 | OXYGEN SATURATION: 95 %

## 2024-03-28 DIAGNOSIS — E66.09 CLASS 2 OBESITY DUE TO EXCESS CALORIES WITHOUT SERIOUS COMORBIDITY WITH BODY MASS INDEX (BMI) OF 36.0 TO 36.9 IN ADULT: ICD-10-CM

## 2024-03-28 DIAGNOSIS — B35.1 ONYCHOMYCOSIS: Primary | ICD-10-CM

## 2024-03-28 DIAGNOSIS — E11.40 PAINFUL DIABETIC NEUROPATHY: ICD-10-CM

## 2024-03-28 DIAGNOSIS — E11.9 ENCOUNTER FOR DIABETIC FOOT EXAM: ICD-10-CM

## 2024-03-28 DIAGNOSIS — E11.42 TYPE 2 DIABETES MELLITUS WITH DIABETIC POLYNEUROPATHY, WITHOUT LONG-TERM CURRENT USE OF INSULIN: ICD-10-CM

## 2024-04-11 DIAGNOSIS — M25.551 RIGHT HIP PAIN: ICD-10-CM

## 2024-04-11 RX ORDER — DICLOFENAC SODIUM 75 MG/1
75 TABLET, DELAYED RELEASE ORAL 2 TIMES DAILY
Qty: 60 TABLET | Refills: 0 | Status: SHIPPED | OUTPATIENT
Start: 2024-04-11

## 2024-05-10 DIAGNOSIS — M25.551 RIGHT HIP PAIN: ICD-10-CM

## 2024-05-10 RX ORDER — DICLOFENAC SODIUM 75 MG/1
75 TABLET, DELAYED RELEASE ORAL 2 TIMES DAILY
Qty: 60 TABLET | Refills: 0 | Status: SHIPPED | OUTPATIENT
Start: 2024-05-10

## 2024-05-21 DIAGNOSIS — E78.2 MIXED HYPERLIPIDEMIA: ICD-10-CM

## 2024-05-21 NOTE — TELEPHONE ENCOUNTER
Rx Refill Note  Requested Prescriptions     Pending Prescriptions Disp Refills    cloNIDine (CATAPRES) 0.1 MG tablet [Pharmacy Med Name: cloNIDine HCl 0.1 MG Oral Tablet] 30 tablet 0     Sig: TAKE 1 TABLET BY MOUTH TWICE DAILY AS NEEDED FOR HIGH BLOOD PRESSURE      Last office visit with prescribing clinician: 3/8/2024   Next office visit with prescribing clinician: 9/9/2024                         Would you like a call back once the refill request has been completed: [] Yes [] No    If the office needs to give you a call back, can they leave a voicemail: [] Yes [] No    Triny Johnson MA  05/21/24, 11:33 CDT

## 2024-05-22 RX ORDER — CLONIDINE HYDROCHLORIDE 0.1 MG/1
TABLET ORAL
Qty: 30 TABLET | Refills: 0 | Status: SHIPPED | OUTPATIENT
Start: 2024-05-22

## 2024-05-29 DIAGNOSIS — R60.0 LOCALIZED EDEMA: ICD-10-CM

## 2024-05-29 DIAGNOSIS — I10 ESSENTIAL HYPERTENSION: ICD-10-CM

## 2024-05-29 RX ORDER — FUROSEMIDE 40 MG/1
40 TABLET ORAL 2 TIMES DAILY
Qty: 180 TABLET | Refills: 0 | Status: SHIPPED | OUTPATIENT
Start: 2024-05-29

## 2024-05-29 RX ORDER — AMLODIPINE BESYLATE 5 MG/1
5 TABLET ORAL DAILY
Qty: 90 TABLET | Refills: 0 | Status: SHIPPED | OUTPATIENT
Start: 2024-05-29

## 2024-05-29 NOTE — TELEPHONE ENCOUNTER
Rx Refill Note  Requested Prescriptions     Pending Prescriptions Disp Refills    amLODIPine (NORVASC) 5 MG tablet [Pharmacy Med Name: amLODIPine Besylate 5 MG Oral Tablet] 90 tablet 0     Sig: Take 1 tablet by mouth once daily    furosemide (LASIX) 40 MG tablet [Pharmacy Med Name: Furosemide 40 MG Oral Tablet] 180 tablet 0     Sig: Take 1 tablet by mouth twice daily      Last office visit with prescribing clinician: 3/8/2024   Next office visit with prescribing clinician: 9/9/2024       Latoya Anand CMA  05/29/24, 14:16 CDT

## 2024-06-10 DIAGNOSIS — M25.551 RIGHT HIP PAIN: ICD-10-CM

## 2024-06-10 RX ORDER — DICLOFENAC SODIUM 75 MG/1
75 TABLET, DELAYED RELEASE ORAL 2 TIMES DAILY
Qty: 60 TABLET | Refills: 0 | Status: SHIPPED | OUTPATIENT
Start: 2024-06-10

## 2024-06-10 NOTE — TELEPHONE ENCOUNTER
Rx Refill Note  Requested Prescriptions     Pending Prescriptions Disp Refills    diclofenac (VOLTAREN) 75 MG EC tablet [Pharmacy Med Name: Diclofenac Sodium 75 MG Oral Tablet Delayed Release] 60 tablet 0     Sig: Take 1 tablet by mouth twice daily      Last office visit with prescribing clinician: 3/8/2024   Last telemedicine visit with prescribing clinician: Visit date not found   Next office visit with prescribing clinician: 9/9/2024                         Would you like a call back once the refill request has been completed: [] Yes [] No    If the office needs to give you a call back, can they leave a voicemail: [] Yes [] No    Sarah Singer MA  06/10/24, 10:27 CDT

## 2024-06-10 NOTE — TELEPHONE ENCOUNTER
Rx Refill Note  Requested Prescriptions     Pending Prescriptions Disp Refills    diclofenac (VOLTAREN) 75 MG EC tablet [Pharmacy Med Name: Diclofenac Sodium 75 MG Oral Tablet Delayed Release] 60 tablet 0     Sig: Take 1 tablet by mouth twice daily      Last office visit with prescribing clinician: 3/8/2024   Next office visit with prescribing clinician: 9/9/2024       Latoya Anand CMA  06/10/24, 10:28 CDT

## 2024-06-12 NOTE — PROGRESS NOTES
[facility] - PODIATRY    Today's Date: 06/20/24    Patient Name: Sandeep Alonso  MRN: [id number]  CSN: [id number]  PCP: Lisbet Velez DNP, APRN  Referring Provider: No ref. provider found    SUBJECTIVE     Chief Complaint   Patient presents with    Follow-up     Lisbet Velez DNP, APRN 03/08/2024 3 MTH FU- pt states feet doing ok- pt denies pain     Diabetes     103mg/dl BG this am      HPI: Sandeep Alonso, a 69 y.o.male, comes to clinic as a(n) established patient presenting for diabetic foot exam and complaining of toenail/callus issues. Pt has h/o DM2, HTN, Hypokalemia, Edema.  States his nails are long, thick, and discolored. He is unable to reach feet to care for nails himself. Notes mild numbness and tingling in feet. Denies open wounds or sores. Patient is NIDDM with last stated BG level of 103mg/dl. Last A1C of 5.8%.Denies pain. Relates previous treatment(s) including nail debridement by podiatrist . Denies any constitutional symptoms. No other pedal complaints at this time.    Past Medical History:   Diagnosis Date    Hypokalemia     Peripheral edema      Past Surgical History:   Procedure Laterality Date    NO PAST SURGERIES       Family History   Problem Relation Age of Onset    Heart attack Mother     Emphysema Father     No Known Problems Sister     No Known Problems Brother     No Known Problems Son     No Known Problems Maternal Grandmother     No Known Problems Maternal Grandfather     No Known Problems Paternal Grandmother     No Known Problems Paternal Grandfather     No Known Problems Brother     No Known Problems Brother     No Known Problems Son     No Known Problems Son     No Known Problems Son      Social History     Socioeconomic History    Marital status:    Tobacco Use    Smoking status: Never     Passive exposure: Never    Smokeless tobacco: Never   Vaping Use    Vaping status: Never Used   Substance and Sexual Activity    Alcohol use: Not Currently      Alcohol/week: 1.0 standard drink of alcohol     Types: 1 Cans of beer per week     Comment: occasional beer    Drug use: No    Sexual activity: Defer     Allergies   Allergen Reactions    Ace Inhibitors Cough     Current Outpatient Medications   Medication Sig Dispense Refill    amLODIPine (NORVASC) 5 MG tablet Take 1 tablet by mouth once daily 90 tablet 0    cloNIDine (CATAPRES) 0.1 MG tablet TAKE 1 TABLET BY MOUTH TWICE DAILY AS NEEDED FOR HIGH BLOOD PRESSURE 30 tablet 0    diclofenac (VOLTAREN) 75 MG EC tablet Take 1 tablet by mouth twice daily 60 tablet 0    furosemide (LASIX) 40 MG tablet Take 1 tablet by mouth twice daily 180 tablet 0    gabapentin (NEURONTIN) 100 MG capsule Take 1 capsule by mouth 2 (Two) Times a Day. 180 capsule 1    losartan-hydrochlorothiazide (HYZAAR) 100-25 MG per tablet Take 1 tablet by mouth Daily. 90 tablet 1    metFORMIN (GLUCOPHAGE) 500 MG tablet Take 1 tablet by mouth 2 (Two) Times a Day With Meals. 180 tablet 1    Omega 3 1000 MG capsule Take 1 capsule by mouth Every Night. 90 each 1    potassium chloride (K-DUR,KLOR-CON) 20 MEQ CR tablet Take 1 tablet by mouth Daily. 90 tablet 1    pravastatin (PRAVACHOL) 20 MG tablet Take 1 tablet by mouth Every Night. 90 tablet 1     No current facility-administered medications for this visit.     Review of Systems   Constitutional:  Negative for chills and fever.   HENT:  Negative for congestion.    Respiratory:  Negative for shortness of breath.    Cardiovascular:  Negative for chest pain and leg swelling.   Gastrointestinal:  Negative for constipation, diarrhea, nausea and vomiting.   Musculoskeletal:  Positive for arthralgias. Negative for gait problem and myalgias.   Skin:  Negative for wound.   Neurological:  Positive for numbness.       OBJECTIVE     Vitals:    06/20/24 1414   BP: 153/85   Pulse: (!) 47         Foot/Ankle Exam    GENERAL  Appearance:  obese  Orientation:  AAOx3  Affect:  appropriate  Gait:  unimpaired  Assistance:   independent  Right shoe gear: casual shoe  Left shoe gear: casual shoe    VASCULAR     Right Foot Vascularity   Dorsalis pedis:  2+  Posterior tibial:  2+  Skin temperature:  warm  Edema gradin+ and pitting  CFT:  3  Pedal hair growth:  Present  Varicosities:  mild varicosities     Left Foot Vascularity   Dorsalis pedis:  2+  Posterior tibial:  2+  Skin temperature:  warm  Edema gradin+ and pitting  CFT:  3  Pedal hair growth:  Present  Varicosities:  mild varicosities     NEUROLOGIC     Right Foot Neurologic   Light touch sensation: diminished  Vibratory sensation: diminished  Hot/Cold sensation: diminished  Protective Sensation using [place] Monofilament:   Sites intact: 8  Sites tested: 10     Left Foot Neurologic   Light touch sensation: diminished  Vibratory sensation: diminished  Hot/Cold sensation:  diminished  Protective Sensation using [place] Monofilament:   Sites intact: 8  Sites tested: 10    MUSCULOSKELETAL     Right Foot Musculoskeletal   Ecchymosis:  none  Tenderness:  toenail problem    Arch:  Normal  Hallux valgus: No       Left Foot Musculoskeletal   Ecchymosis:  none  Tenderness:  toenail problem  Arch:  Normal  Hallux valgus: No      MUSCLE STRENGTH     Right Foot Muscle Strength   Foot dorsiflexion:  5  Foot plantar flexion:  5  Foot inversion:  5  Foot eversion:  5     Left Foot Muscle Strength   Foot dorsiflexion:  5  Foot plantar flexion:  5  Foot inversion:  5  Foot eversion:  5    RANGE OF MOTION     Right Foot Range of Motion   Foot and ankle ROM within normal limits       Left Foot Range of Motion   Foot and ankle ROM within normal limits      DERMATOLOGIC      Right Foot Dermatologic   Skin  Right foot skin is intact.   Nails  1.  Positive for onychomycosis, abnormal thickness and subungual debris.  2.  Positive for onychomycosis, abnormal thickness and subungual debris.  3.  Positive for onychomycosis, abnormal thickness and subungual debris.  4.  Positive  for onychomycosis, abnormal thickness and subungual debris.  5.  Positive for onychomycosis, abnormal thickness and subungual debris.     Left Foot Dermatologic   Skin  Left foot skin is intact.   Nails  1.  Positive for onychomycosis, abnormal thickness and subungual debris.  2.  Positive for onychomycosis, abnormal thickness and subungual debris.  3.  Positive for onychomycosis, abnormal thickness and subungual debris.  4.  Positive for onychomycosis, abnormally thick and subungual debris.  5.  Positive for onychomycosis, abnormally thick and subungual debris.      RADIOLOGY/NUCLEAR:  No results found.    LABORATORY/CULTURE RESULTS:      PATHOLOGY RESULTS:       ASSESSMENT/PLAN     Diagnoses and all orders for this visit:    1. Onychomycosis (Primary)    2. Type 2 diabetes mellitus with diabetic polyneuropathy, without long-term current use of insulin    3. Class 2 obesity due to excess calories without serious comorbidity with body mass index (BMI) of 36.0 to 36.9 in adult    4. Painful diabetic neuropathy    Comprehensive lower extremity examination and evaluation was performed.  Discussed findings and treatment plan including risks, benefits, and treatment options with patient in detail. Patient agreed with treatment plan.  After verbal consent obtained, nail(s) x10 debrided of length and thickness with nail nipper without incidence  Patient may maintain nails and calluses at home utilizing emery board or pumice stone between visits as needed  Reviewed at home diabetic foot care including daily foot checks   Continue diabetic monitoring and control under direction of PCP.   An After Visit Summary was printed and given to the patient at discharge, including (if requested) any available informative/educational handouts regarding diagnosis, treatment, or medications. All questions were answered to patient/family satisfaction. Should symptoms fail to improve or worsen they agree to call or return to clinic or to go  to the Emergency Department. Discussed the importance of following up with any needed screening tests/labs/specialist appointments and any requested follow-up recommended by me today. Importance of maintaining follow-up discussed and patient accepts that missed appointments can delay diagnosis and potentially lead to worsening of conditions.  Return in about 3 months (around 9/20/2024) for Follow-up with Podiatry APRN, Schedule Foot Care Clinic., or sooner if acute issues arise.      This document has been electronically signed by VENKAT Bullard on June 20, 2024 15:51 CDT

## 2024-06-19 ENCOUNTER — TELEPHONE (OUTPATIENT)
Dept: PODIATRY | Facility: CLINIC | Age: 69
End: 2024-06-19
Payer: MEDICARE

## 2024-06-19 NOTE — TELEPHONE ENCOUNTER
Hub to relay  Called patient regarding appt on 06/20/2024. Left message for patient to return call if any questions or concerns arise.

## 2024-06-20 ENCOUNTER — OFFICE VISIT (OUTPATIENT)
Dept: PODIATRY | Facility: CLINIC | Age: 69
End: 2024-06-20
Payer: MEDICARE

## 2024-06-20 VITALS
SYSTOLIC BLOOD PRESSURE: 153 MMHG | BODY MASS INDEX: 37.59 KG/M2 | DIASTOLIC BLOOD PRESSURE: 85 MMHG | WEIGHT: 248 LBS | HEIGHT: 68 IN | HEART RATE: 47 BPM

## 2024-06-20 DIAGNOSIS — E11.42 TYPE 2 DIABETES MELLITUS WITH DIABETIC POLYNEUROPATHY, WITHOUT LONG-TERM CURRENT USE OF INSULIN: ICD-10-CM

## 2024-06-20 DIAGNOSIS — B35.1 ONYCHOMYCOSIS: Primary | Chronic | ICD-10-CM

## 2024-06-20 DIAGNOSIS — E66.09 CLASS 2 OBESITY DUE TO EXCESS CALORIES WITHOUT SERIOUS COMORBIDITY WITH BODY MASS INDEX (BMI) OF 36.0 TO 36.9 IN ADULT: ICD-10-CM

## 2024-06-20 DIAGNOSIS — E11.40 PAINFUL DIABETIC NEUROPATHY: ICD-10-CM

## 2024-07-01 DIAGNOSIS — E78.2 MIXED HYPERLIPIDEMIA: ICD-10-CM

## 2024-07-02 RX ORDER — CLONIDINE HYDROCHLORIDE 0.1 MG/1
TABLET ORAL
Qty: 60 TABLET | Refills: 0 | Status: SHIPPED | OUTPATIENT
Start: 2024-07-02

## 2024-07-02 NOTE — TELEPHONE ENCOUNTER
Rx Refill Note  Requested Prescriptions     Pending Prescriptions Disp Refills    cloNIDine (CATAPRES) 0.1 MG tablet [Pharmacy Med Name: cloNIDine HCl 0.1 MG Oral Tablet] 30 tablet 0     Sig: TAKE 1 TABLET BY MOUTH TWICE DAILY AS NEEDED FOR HIGH BLOOD PRESSURE      Last office visit with prescribing clinician: 3/8/2024   Next office visit with prescribing clinician: 9/9/2024                         Would you like a call back once the refill request has been completed: [] Yes [] No    If the office needs to give you a call back, can they leave a voicemail: [] Yes [] No    Triny Johnson MA  07/02/24, 11:20 CDT

## 2024-07-08 DIAGNOSIS — M25.551 RIGHT HIP PAIN: ICD-10-CM

## 2024-07-08 RX ORDER — DICLOFENAC SODIUM 75 MG/1
75 TABLET, DELAYED RELEASE ORAL 2 TIMES DAILY
Qty: 60 TABLET | Refills: 0 | Status: SHIPPED | OUTPATIENT
Start: 2024-07-08

## 2024-07-08 NOTE — TELEPHONE ENCOUNTER
Rx Refill Note  Requested Prescriptions     Pending Prescriptions Disp Refills    diclofenac (VOLTAREN) 75 MG EC tablet [Pharmacy Med Name: Diclofenac Sodium 75 MG Oral Tablet Delayed Release] 60 tablet 0     Sig: Take 1 tablet by mouth twice daily      Last office visit with prescribing clinician: 3/8/2024   Next office visit with prescribing clinician: 9/9/2024       Latoya Anand CMA  07/08/24, 08:39 CDT

## 2024-08-07 DIAGNOSIS — M25.551 RIGHT HIP PAIN: ICD-10-CM

## 2024-08-07 RX ORDER — DICLOFENAC SODIUM 75 MG/1
75 TABLET, DELAYED RELEASE ORAL 2 TIMES DAILY
Qty: 60 TABLET | Refills: 0 | Status: SHIPPED | OUTPATIENT
Start: 2024-08-07

## 2024-08-07 NOTE — TELEPHONE ENCOUNTER
Rx Refill Note  Requested Prescriptions     Pending Prescriptions Disp Refills    diclofenac (VOLTAREN) 75 MG EC tablet [Pharmacy Med Name: Diclofenac Sodium 75 MG Oral Tablet Delayed Release] 60 tablet 0     Sig: Take 1 tablet by mouth twice daily      Last office visit with prescribing clinician: 3/8/2024   Next office visit with prescribing clinician: 9/9/2024       Latoya Anand CMA  08/07/24, 15:33 CDT

## 2024-08-29 DIAGNOSIS — I10 ESSENTIAL HYPERTENSION: ICD-10-CM

## 2024-08-29 DIAGNOSIS — R60.0 LOCALIZED EDEMA: ICD-10-CM

## 2024-08-29 DIAGNOSIS — E78.2 MIXED HYPERLIPIDEMIA: ICD-10-CM

## 2024-08-29 RX ORDER — PRAVASTATIN SODIUM 20 MG
20 TABLET ORAL NIGHTLY
Qty: 90 TABLET | Refills: 0 | OUTPATIENT
Start: 2024-08-29

## 2024-08-29 RX ORDER — AMLODIPINE BESYLATE 5 MG/1
5 TABLET ORAL DAILY
Qty: 90 TABLET | Refills: 0 | OUTPATIENT
Start: 2024-08-29

## 2024-08-29 RX ORDER — FUROSEMIDE 40 MG
40 TABLET ORAL 2 TIMES DAILY
Qty: 180 TABLET | Refills: 0 | OUTPATIENT
Start: 2024-08-29

## 2024-08-29 RX ORDER — CLONIDINE HYDROCHLORIDE 0.1 MG/1
0.1 TABLET ORAL AS NEEDED
Qty: 60 TABLET | Refills: 0 | Status: SHIPPED | OUTPATIENT
Start: 2024-08-29

## 2024-08-29 RX ORDER — PRAVASTATIN SODIUM 20 MG
20 TABLET ORAL NIGHTLY
Qty: 90 TABLET | Refills: 1 | Status: SHIPPED | OUTPATIENT
Start: 2024-08-29

## 2024-08-29 RX ORDER — AMLODIPINE BESYLATE 5 MG/1
5 TABLET ORAL DAILY
Qty: 90 TABLET | Refills: 0 | Status: SHIPPED | OUTPATIENT
Start: 2024-08-29

## 2024-08-29 RX ORDER — FUROSEMIDE 40 MG
40 TABLET ORAL 2 TIMES DAILY
Qty: 180 TABLET | Refills: 0 | Status: SHIPPED | OUTPATIENT
Start: 2024-08-29

## 2024-08-29 RX ORDER — CLONIDINE HYDROCHLORIDE 0.1 MG/1
TABLET ORAL
Qty: 60 TABLET | Refills: 0 | OUTPATIENT
Start: 2024-08-29

## 2024-08-29 NOTE — TELEPHONE ENCOUNTER
Caller: Sandeep Alonso    Relationship: Self    Best call back number: [phone]     Requested Prescriptions:   Requested Prescriptions     Pending Prescriptions Disp Refills    amLODIPine (NORVASC) 5 MG tablet 90 tablet 0     Sig: Take 1 tablet by mouth Daily.    furosemide (LASIX) 40 MG tablet 180 tablet 0     Sig: Take 1 tablet by mouth 2 (Two) Times a Day.    pravastatin (PRAVACHOL) 20 MG tablet 90 tablet 1     Sig: Take 1 tablet by mouth Every Night.    cloNIDine (CATAPRES) 0.1 MG tablet 60 tablet 0        Pharmacy where request should be sent: [address] [phone] [facility] [phone]      Last office visit with prescribing clinician: 3/8/2024   Last telemedicine visit with prescribing clinician: Visit date not found   Next office visit with prescribing clinician: 8/29/2024     Additional details provided by patient:     Does the patient have less than a 3 day supply:  [x] Yes  [] No    Would you like a call back once the refill request has been completed: [x] Yes [] No      Chadwick Alcaraz Rep   08/29/24 08:23 CDT

## 2024-08-29 NOTE — TELEPHONE ENCOUNTER
duplicate  Rx Refill Note  Requested Prescriptions     Pending Prescriptions Disp Refills    furosemide (LASIX) 40 MG tablet [Pharmacy Med Name: Furosemide 40 MG Oral Tablet] 180 tablet 0     Sig: Take 1 tablet by mouth twice daily    pravastatin (PRAVACHOL) 20 MG tablet [Pharmacy Med Name: Pravastatin Sodium 20 MG Oral Tablet] 90 tablet 0     Sig: TAKE 1 TABLET BY MOUTH ONCE DAILY AT NIGHT    amLODIPine (NORVASC) 5 MG tablet [Pharmacy Med Name: amLODIPine Besylate 5 MG Oral Tablet] 90 tablet 0     Sig: Take 1 tablet by mouth once daily    cloNIDine (CATAPRES) 0.1 MG tablet [Pharmacy Med Name: cloNIDine HCl 0.1 MG Oral Tablet] 60 tablet 0     Sig: TAKE 1 TABLET BY MOUTH TWICE DAILY AS NEEDED FOR HIGH BLOOD PRESSURE      Last office visit with prescribing clinician: 3/8/2024   Last telemedicine visit with prescribing clinician: Visit date not found   Next office visit with prescribing clinician: 9/9/2024                         Would you like a call back once the refill request has been completed: [] Yes [] No    If the office needs to give you a call back, can they leave a voicemail: [] Yes [] No    Sarah Singer MA  08/29/24, 10:02 CDT

## 2024-09-09 ENCOUNTER — OFFICE VISIT (OUTPATIENT)
Dept: FAMILY MEDICINE CLINIC | Facility: CLINIC | Age: 69
End: 2024-09-09
Payer: MEDICARE

## 2024-09-09 VITALS
WEIGHT: 241 LBS | HEIGHT: 68 IN | SYSTOLIC BLOOD PRESSURE: 138 MMHG | RESPIRATION RATE: 18 BRPM | OXYGEN SATURATION: 99 % | HEART RATE: 62 BPM | DIASTOLIC BLOOD PRESSURE: 82 MMHG | BODY MASS INDEX: 36.53 KG/M2 | TEMPERATURE: 98 F

## 2024-09-09 DIAGNOSIS — E78.2 MIXED HYPERLIPIDEMIA: ICD-10-CM

## 2024-09-09 DIAGNOSIS — Z51.81 THERAPEUTIC DRUG MONITORING: ICD-10-CM

## 2024-09-09 DIAGNOSIS — I10 ESSENTIAL HYPERTENSION: Primary | ICD-10-CM

## 2024-09-09 DIAGNOSIS — E87.6 HYPOKALEMIA: ICD-10-CM

## 2024-09-09 DIAGNOSIS — M25.551 RIGHT HIP PAIN: ICD-10-CM

## 2024-09-09 DIAGNOSIS — E11.43 TYPE 2 DIABETES MELLITUS WITH DIABETIC AUTONOMIC NEUROPATHY, WITHOUT LONG-TERM CURRENT USE OF INSULIN: ICD-10-CM

## 2024-09-09 PROCEDURE — 3075F SYST BP GE 130 - 139MM HG: CPT | Performed by: NURSE PRACTITIONER

## 2024-09-09 PROCEDURE — 3078F DIAST BP <80 MM HG: CPT | Performed by: NURSE PRACTITIONER

## 2024-09-09 PROCEDURE — 3044F HG A1C LEVEL LT 7.0%: CPT | Performed by: NURSE PRACTITIONER

## 2024-09-09 PROCEDURE — 1126F AMNT PAIN NOTED NONE PRSNT: CPT | Performed by: NURSE PRACTITIONER

## 2024-09-09 PROCEDURE — 99214 OFFICE O/P EST MOD 30 MIN: CPT | Performed by: NURSE PRACTITIONER

## 2024-09-09 RX ORDER — AMLODIPINE BESYLATE 5 MG/1
5 TABLET ORAL DAILY
Qty: 90 TABLET | Refills: 0 | Status: SHIPPED | OUTPATIENT
Start: 2024-09-09

## 2024-09-09 RX ORDER — GABAPENTIN 100 MG/1
100 CAPSULE ORAL 2 TIMES DAILY
Qty: 180 CAPSULE | Refills: 1 | Status: SHIPPED | OUTPATIENT
Start: 2024-09-09

## 2024-09-09 RX ORDER — DICLOFENAC SODIUM 75 MG/1
75 TABLET, DELAYED RELEASE ORAL 2 TIMES DAILY
Qty: 60 TABLET | Refills: 0 | Status: SHIPPED | OUTPATIENT
Start: 2024-09-09

## 2024-09-09 RX ORDER — PRAVASTATIN SODIUM 20 MG
20 TABLET ORAL NIGHTLY
Qty: 90 TABLET | Refills: 1 | Status: SHIPPED | OUTPATIENT
Start: 2024-09-09

## 2024-09-09 RX ORDER — POTASSIUM CHLORIDE 1500 MG/1
20 TABLET, EXTENDED RELEASE ORAL DAILY
Qty: 90 TABLET | Refills: 1 | Status: SHIPPED | OUTPATIENT
Start: 2024-09-09

## 2024-09-09 RX ORDER — LOSARTAN POTASSIUM AND HYDROCHLOROTHIAZIDE 25; 100 MG/1; MG/1
1 TABLET ORAL DAILY
Qty: 90 TABLET | Refills: 1 | Status: SHIPPED | OUTPATIENT
Start: 2024-09-09

## 2024-09-09 RX ORDER — OMEGA-3 FATTY ACIDS/FISH OIL 300-1000MG
1 CAPSULE ORAL NIGHTLY
Qty: 90 EACH | Refills: 1 | Status: SHIPPED | OUTPATIENT
Start: 2024-09-09

## 2024-09-09 RX ORDER — CLONIDINE HYDROCHLORIDE 0.1 MG/1
0.1 TABLET ORAL AS NEEDED
Qty: 60 TABLET | Refills: 0 | Status: SHIPPED | OUTPATIENT
Start: 2024-09-09

## 2024-09-09 NOTE — PROGRESS NOTES
Chief Complaint  Hypertension (Pt is here for a follow up for hypertension)    Subjective        Sandeep Alonso presents to [facility]  History of Present Illness  History of Present Illness  The patient is a 69-year-old male here to follow up on chronic problems, including diabetes type 2, essential hypertension, right hip pain, mixed hyperlipidemia, and hypokalemia.    He has been managing his diabetes for over a year, and it remains stable. His blood sugar level was recorded at 120 this morning. He reports experiencing heartburn and belching but has no blurred vision or chest pain. He also reports no increase in hunger, thirst, or urination. He has not observed any instances of low blood sugar and has not required any hospitalizations. He does not monitor his blood sugar daily. His weight has remained stable. He underwent a diabetic eye exam in February 2024. He does not engage in meal planning or exercise and has not consulted a dietitian.    His hypertension is a long-standing condition, persisting for over a year. Today, his blood pressure is elevated at 175/73. He reports no chest pain, shortness of breath, or palpitations. He has had some episodes of heartburn. His current medication regimen for hypertension includes amlodipine, clonidine, losartan, and HCTZ, which he takes as directed. This has resulted in mild improvement. He has a history of CAD, diabetes, dyslipidemia, obesity, and is of male gender. He has previously been treated with lisinopril. He reports no end-organ damage.    The following portions of the patient's history were reviewed and updated as appropriate: allergies, current medications, past family history, past medical history, past social history, past surgical history and problem list.    Objective   Vital Signs:  /73 (BP Location: Left arm, Patient Position: Sitting, Cuff Size: Adult)   Pulse 62   Temp 98 °F (36.7 °C)   Resp 18   Ht 172.7 cm  "(67.99\")   Wt 109 kg (241 lb)   SpO2 99%   BMI 36.65 kg/m²   Estimated body mass index is 36.65 kg/m² as calculated from the following:    Height as of this encounter: 172.7 cm (67.99\").    Weight as of this encounter: 109 kg (241 lb).       Class 2 Severe Obesity (BMI >=35 and <=39.9). Obesity-related health conditions include the following: hypertension, dyslipidemias, and GERD. Obesity is improving with lifestyle modifications. BMI is is above average; BMI management plan is completed. We discussed portion control and increasing exercise.        Physical Exam  Vitals and nursing note reviewed.   Constitutional:       General: He is awake.      Appearance: Normal appearance. He is well-developed and well-groomed.   HENT:      Head: Normocephalic and atraumatic.      Right Ear: Hearing, tympanic membrane, ear canal and external ear normal.      Left Ear: Hearing, tympanic membrane, ear canal and external ear normal.      Nose: Nose normal.      Mouth/Throat:      Lips: Pink.      Pharynx: Oropharynx is clear.   Eyes:      General: Lids are normal.      Conjunctiva/sclera: Conjunctivae normal.   Cardiovascular:      Rate and Rhythm: Normal rate and regular rhythm.      Heart sounds: Normal heart sounds.   Pulmonary:      Effort: Pulmonary effort is normal.      Breath sounds: Normal breath sounds and air entry.   Musculoskeletal:      Cervical back: Full passive range of motion without pain.      Right lower leg: No edema.      Left lower leg: No edema.   Feet:      Right foot:      Skin integrity: Blister, skin breakdown and erythema present. No ulcer, warmth, callus or dry skin.      Left foot:      Skin integrity: No ulcer, blister, skin breakdown, erythema, warmth, callus or dry skin.   Lymphadenopathy:      Head:      Right side of head: No submental, submandibular or tonsillar adenopathy.      Left side of head: No submental, submandibular or tonsillar adenopathy.   Skin:     General: Skin is warm and dry. "   Neurological:      Mental Status: He is alert.      Sensory: Sensation is intact.      Motor: Motor function is intact.      Coordination: Coordination is intact.      Gait: Gait is intact.   Psychiatric:         Attention and Perception: Attention and perception normal.         Mood and Affect: Mood and affect normal.         Speech: Speech normal.         Behavior: Behavior normal. Behavior is cooperative.         Thought Content: Thought content normal.         Cognition and Memory: Cognition and memory normal.         Judgment: Judgment normal.      Physical Exam      Result Review :          Results  Laboratory Studies  Blood sugar was 120.           Assessment and Plan     Diagnoses and all orders for this visit:    1. Essential hypertension (Primary)  -     losartan-hydrochlorothiazide (HYZAAR) 100-25 MG per tablet; Take 1 tablet by mouth Daily.  Dispense: 90 tablet; Refill: 1  -     amLODIPine (NORVASC) 5 MG tablet; Take 1 tablet by mouth Daily.  Dispense: 90 tablet; Refill: 0    2. Type 2 diabetes mellitus with diabetic autonomic neuropathy, without long-term current use of insulin  -     Hemoglobin A1c  -     CBC (No Diff)  -     Comprehensive metabolic panel  -     metFORMIN (GLUCOPHAGE) 500 MG tablet; Take 1 tablet by mouth 2 (Two) Times a Day With Meals.  Dispense: 180 tablet; Refill: 1  -     gabapentin (NEURONTIN) 100 MG capsule; Take 1 capsule by mouth 2 (Two) Times a Day.  Dispense: 180 capsule; Refill: 1  -     ToxASSURE Select 13 Discrete -    3. Therapeutic drug monitoring  -     ToxASSURE Select 13 Discrete -    4. Right hip pain  -     diclofenac (VOLTAREN) 75 MG EC tablet; Take 1 tablet by mouth 2 (Two) Times a Day.  Dispense: 60 tablet; Refill: 0    5. Mixed hyperlipidemia  -     Lipid panel  -     cloNIDine (CATAPRES) 0.1 MG tablet; Take 1 tablet by mouth As Needed for High Blood Pressure.  Dispense: 60 tablet; Refill: 0  -     Omega 3 1000 MG capsule; Take 1 capsule by mouth Every Night.   Dispense: 90 each; Refill: 1  -     pravastatin (PRAVACHOL) 20 MG tablet; Take 1 tablet by mouth Every Night.  Dispense: 90 tablet; Refill: 1    6. Hypokalemia  -     potassium chloride (KLOR-CON M20) 20 MEQ CR tablet; Take 1 tablet by mouth Daily.  Dispense: 90 tablet; Refill: 1    Med   Assessment & Plan  1. Diabetes Mellitus Type 2.  His diabetes has been stable for over a year, with no associated symptoms such as blurred vision or chest pain. He reports a blood sugar level of 120 this morning. There have been no hospitalizations related to diabetes. He does not check his blood sugar daily. An A1c test will be conducted today. Urine samples will be collected for microalbumin and drug screen tests. He had a diabetic eye exam in February 2024 at [facility]. He does not engage in meal planning or exercise.    2. Essential Hypertension.  His hypertension is chronic and uncontrolled today, with a reading of 175/73. He is currently on amlodipine, clonidine, losartan, and HCTZ. CAD risk factors include dyslipidemia, diabetes mellitus, family history, and adherence to medication regimen. He also has class II severe obesity. A manual blood pressure check will be performed today. He has not experienced chest pain, shortness of breath, or palpitations. He reports some heartburn.    3. Mixed Hyperlipidemia.  His hyperlipidemia is chronic and has been present for more than a year. CAD risk factors include dyslipidemia, diabetes mellitus, and family history. He is advised to continue his current medication regimen.    4. Hypokalemia.  No specific symptoms or treatments were discussed during this visit. Monitoring will continue as per the current regimen.    5. Right Hip Pain.  No specific symptoms or treatments were discussed during this visit. Monitoring will continue as per the current regimen.        ICD-10-CM ICD-9-CM   1. Essential hypertension  I10 401.9   2. Type 2 diabetes mellitus with diabetic autonomic  neuropathy, without long-term current use of insulin  E11.43 250.60     337.1   3. Therapeutic drug monitoring  Z51.81 V58.83   4. Right hip pain  M25.551 719.45   5. Mixed hyperlipidemia  E78.2 272.2   6. Hypokalemia  E87.6 276.8                Follow Up     Return in about 6 months (around 3/9/2025) for Recheck.  Patient was given instructions and counseling regarding his condition or for health maintenance advice. Please see specific information pulled into the AVS if appropriate.       Patient or patient representative verbalized consent for the use of Ambient Listening during the visit with  Lisbet Velez DNP, VENKAT for chart documentation. 9/9/2024  09:04 CDT    Electronically signed by Lisbet Velez DNP, VENKAT, 09/09/24, 9:23 AM CDT.

## 2024-09-10 ENCOUNTER — TELEPHONE (OUTPATIENT)
Dept: FAMILY MEDICINE CLINIC | Facility: CLINIC | Age: 69
End: 2024-09-10
Payer: MEDICARE

## 2024-09-10 LAB
ALBUMIN SERPL-MCNC: 4.4 G/DL (ref 3.5–5.2)
ALBUMIN/GLOB SERPL: 1.5 G/DL
ALP SERPL-CCNC: 80 U/L (ref 39–117)
ALT SERPL-CCNC: 20 U/L (ref 1–41)
AST SERPL-CCNC: 20 U/L (ref 1–40)
BILIRUB SERPL-MCNC: 0.4 MG/DL (ref 0–1.2)
BUN SERPL-MCNC: 29 MG/DL (ref 8–23)
BUN/CREAT SERPL: 23.4 (ref 7–25)
CALCIUM SERPL-MCNC: 9.4 MG/DL (ref 8.6–10.5)
CHLORIDE SERPL-SCNC: 101 MMOL/L (ref 98–107)
CHOLEST SERPL-MCNC: 152 MG/DL (ref 0–200)
CO2 SERPL-SCNC: 30.1 MMOL/L (ref 22–29)
CREAT SERPL-MCNC: 1.24 MG/DL (ref 0.76–1.27)
EGFRCR SERPLBLD CKD-EPI 2021: 63.3 ML/MIN/1.73
ERYTHROCYTE [DISTWIDTH] IN BLOOD BY AUTOMATED COUNT: 13.2 % (ref 12.3–15.4)
GLOBULIN SER CALC-MCNC: 3 GM/DL
GLUCOSE SERPL-MCNC: 88 MG/DL (ref 65–99)
HBA1C MFR BLD: 5.8 % (ref 4.8–5.6)
HCT VFR BLD AUTO: 42.8 % (ref 37.5–51)
HDLC SERPL-MCNC: 28 MG/DL (ref 40–60)
HGB BLD-MCNC: 13.5 G/DL (ref 13–17.7)
LDLC SERPL CALC-MCNC: 91 MG/DL (ref 0–100)
MCH RBC QN AUTO: 28.7 PG (ref 26.6–33)
MCHC RBC AUTO-ENTMCNC: 31.5 G/DL (ref 31.5–35.7)
MCV RBC AUTO: 90.9 FL (ref 79–97)
PLATELET # BLD AUTO: 293 10*3/MM3 (ref 140–450)
POTASSIUM SERPL-SCNC: 3.7 MMOL/L (ref 3.5–5.2)
PROT SERPL-MCNC: 7.4 G/DL (ref 6–8.5)
RBC # BLD AUTO: 4.71 10*6/MM3 (ref 4.14–5.8)
SODIUM SERPL-SCNC: 142 MMOL/L (ref 136–145)
TRIGL SERPL-MCNC: 192 MG/DL (ref 0–150)
VLDLC SERPL CALC-MCNC: 33 MG/DL (ref 5–40)
WBC # BLD AUTO: 8.66 10*3/MM3 (ref 3.4–10.8)

## 2024-09-10 NOTE — TELEPHONE ENCOUNTER
----- Message from Lisbet Velez sent at 9/10/2024  6:59 AM CDT -----  Please call pt and tell him results of labs  A1C was good at 5.80    CMP: Metabolic panel reviewed.  1. Kidney function is stable. Normal creatinine and GFR within acceptable range. BUN is mildly elelvated and CO2 is elevated most likely due to obesity.  2. Liver enzymes stable and normal.  3. Electrolytes to include sodium, potassium, Calcium normal range.      Lipid: Cholesterol and LDL are normal.  Triglycerides is elevated 192, and HDL is low.  Work on lifestyle changes, baked instead of fried or fast foods.  Exercise and lose weight.     CBC: normal

## 2024-09-13 LAB
6MAM UR QL CFM: NEGATIVE
6MAM/CREAT UR: NOT DETECTED NG/MG CREAT
7AMINOCLONAZEPAM/CREAT UR: NOT DETECTED NG/MG CREAT
A-OH ALPRAZ/CREAT UR: NOT DETECTED NG/MG CREAT
A-OH-TRIAZOLAM/CREAT UR CFM: NOT DETECTED NG/MG CREAT
ALBUMIN/CREAT UR: <8 MG/G CREAT (ref 0–29)
ALFENTANIL/CREAT UR CFM: NOT DETECTED NG/MG CREAT
ALPHA-HYDROXYMIDAZOLAM, URINE: NOT DETECTED NG/MG CREAT
ALPRAZ/CREAT UR CFM: NOT DETECTED NG/MG CREAT
AMOBARBITAL UR QL CFM: NOT DETECTED
AMPHET/CREAT UR: NOT DETECTED NG/MG CREAT
AMPHETAMINES UR QL CFM: NEGATIVE
BARBITAL UR QL CFM: NOT DETECTED
BARBITURATES UR QL CFM: NEGATIVE
BENZODIAZ UR QL CFM: NEGATIVE
BUPRENORPHINE UR QL CFM: NEGATIVE
BUPRENORPHINE/CREAT UR: NOT DETECTED NG/MG CREAT
BUTABARBITAL UR QL CFM: NOT DETECTED
BUTALBITAL UR QL CFM: NOT DETECTED
BZE/CREAT UR: NOT DETECTED NG/MG CREAT
CANNABINOIDS UR QL CFM: NEGATIVE
CARBOXYTHC/CREAT UR: NOT DETECTED NG/MG CREAT
CLONAZEPAM/CREAT UR CFM: NOT DETECTED NG/MG CREAT
COCAETHYLENE/CREAT UR CFM: NOT DETECTED NG/MG CREAT
COCAINE UR QL CFM: NEGATIVE
COCAINE/CREAT UR CFM: NOT DETECTED NG/MG CREAT
CODEINE/CREAT UR: NOT DETECTED NG/MG CREAT
CREAT UR-MCNC: 37.1 MG/DL
CREAT UR-MCNC: 38 MG/DL
DESALKYLFLURAZ/CREAT UR: NOT DETECTED NG/MG CREAT
DESMETHYLFLUNITRAZEPAM: NOT DETECTED NG/MG CREAT
DHC/CREAT UR: NOT DETECTED NG/MG CREAT
DIAZEPAM/CREAT UR: NOT DETECTED NG/MG CREAT
DRUGS UR: NORMAL
EDDP/CREAT UR: NOT DETECTED NG/MG CREAT
ETHANOL UR CFM-MCNC: NOT DETECTED G/DL
ETHANOL UR QL CFM: NEGATIVE
FENTANYL UR QL CFM: NEGATIVE
FENTANYL/CREAT UR: NOT DETECTED NG/MG CREAT
FLUNITRAZEPAM UR QL CFM: NOT DETECTED NG/MG CREAT
HYDROCODONE/CREAT UR: NOT DETECTED NG/MG CREAT
HYDROMORPHONE/CREAT UR: NOT DETECTED NG/MG CREAT
LORAZEPAM/CREAT UR: NOT DETECTED NG/MG CREAT
MDA/CREAT UR: NOT DETECTED NG/MG CREAT
MDMA/CREAT UR: NOT DETECTED NG/MG CREAT
MEPHOBARBITAL UR QL CFM: NOT DETECTED
METHADONE UR QL CFM: NEGATIVE
METHADONE/CREAT UR: NOT DETECTED NG/MG CREAT
METHAMPHET/CREAT UR: NOT DETECTED NG/MG CREAT
MICROALBUMIN UR-MCNC: <3 UG/ML
MIDAZOLAM/CREAT UR CFM: NOT DETECTED NG/MG CREAT
MORPHINE/CREAT UR: NOT DETECTED NG/MG CREAT
N-NORTRAMADOL/CREAT UR CFM: NOT DETECTED NG/MG CREAT
NARCOTICS UR: NEGATIVE
NORBUPRENORPHINE/CREAT UR: NOT DETECTED NG/MG CREAT
NORCODEINE/CREAT UR CFM: NOT DETECTED NG/MG CREAT
NORDIAZEPAM/CREAT UR: NOT DETECTED NG/MG CREAT
NORFENTANYL/CREAT UR: NOT DETECTED NG/MG CREAT
NORHYDROCODONE/CREAT UR: NOT DETECTED NG/MG CREAT
NORMORPHINE UR-MCNC: NOT DETECTED NG/MG CREAT
NOROXYCODONE/CREAT UR: NOT DETECTED NG/MG CREAT
NOROXYMORPHONE/CREAT UR CFM: NOT DETECTED NG/MG CREAT
O-NORTRAMADOL UR CFM-MCNC: NOT DETECTED NG/MG CREAT
OPIATES UR QL CFM: NEGATIVE
OXAZEPAM/CREAT UR: NOT DETECTED NG/MG CREAT
OXYCODONE UR QL CFM: NEGATIVE
OXYCODONE/CREAT UR: NOT DETECTED NG/MG CREAT
OXYMORPHONE/CREAT UR: NOT DETECTED NG/MG CREAT
PENTOBARB UR QL CFM: NOT DETECTED
PHENOBARB UR QL CFM: NOT DETECTED
SECOBARBITAL UR QL CFM: NOT DETECTED
SERVICE CMNT 02-IMP: NORMAL
SUFENTANIL/CREAT UR CFM: NOT DETECTED NG/MG CREAT
TAPENTADOL UR QL CFM: NEGATIVE
TAPENTADOL/CREAT UR: NOT DETECTED NG/MG CREAT
TEMAZEPAM/CREAT UR: NOT DETECTED NG/MG CREAT
THIOPENTAL UR QL CFM: NOT DETECTED
TRAMADOL UR QL CFM: NOT DETECTED NG/MG CREAT

## 2024-09-18 ENCOUNTER — TELEPHONE (OUTPATIENT)
Dept: FAMILY MEDICINE CLINIC | Facility: CLINIC | Age: 69
End: 2024-09-18
Payer: MEDICARE

## 2024-09-19 ENCOUNTER — OFFICE VISIT (OUTPATIENT)
Age: 69
End: 2024-09-19
Payer: MEDICARE

## 2024-09-19 VITALS
WEIGHT: 241 LBS | DIASTOLIC BLOOD PRESSURE: 80 MMHG | BODY MASS INDEX: 36.53 KG/M2 | SYSTOLIC BLOOD PRESSURE: 138 MMHG | HEIGHT: 68 IN

## 2024-09-19 DIAGNOSIS — E11.40 PAINFUL DIABETIC NEUROPATHY: ICD-10-CM

## 2024-09-19 DIAGNOSIS — E11.42 TYPE 2 DIABETES MELLITUS WITH DIABETIC POLYNEUROPATHY, WITHOUT LONG-TERM CURRENT USE OF INSULIN: ICD-10-CM

## 2024-09-19 DIAGNOSIS — B35.1 ONYCHOMYCOSIS: Primary | ICD-10-CM

## 2024-10-07 DIAGNOSIS — M25.551 RIGHT HIP PAIN: ICD-10-CM

## 2024-10-07 RX ORDER — DICLOFENAC SODIUM 75 MG/1
75 TABLET, DELAYED RELEASE ORAL 2 TIMES DAILY
Qty: 60 TABLET | Refills: 0 | Status: SHIPPED | OUTPATIENT
Start: 2024-10-07

## 2024-10-07 NOTE — TELEPHONE ENCOUNTER
Rx Refill Note  Requested Prescriptions     Pending Prescriptions Disp Refills    diclofenac (VOLTAREN) 75 MG EC tablet [Pharmacy Med Name: Diclofenac Sodium 75 MG Oral Tablet Delayed Release] 60 tablet 0     Sig: Take 1 tablet by mouth twice daily      Last office visit with prescribing clinician: 9/9/2024   Last telemedicine visit with prescribing clinician: Visit date not found   Next office visit with prescribing clinician: 10/15/2024                         Would you like a call back once the refill request has been completed: [] Yes [] No    If the office needs to give you a call back, can they leave a voicemail: [] Yes [] No    Kathy Tinajero LPN  10/07/24, 09:12 CDT

## 2024-10-15 ENCOUNTER — OFFICE VISIT (OUTPATIENT)
Dept: FAMILY MEDICINE CLINIC | Facility: CLINIC | Age: 69
End: 2024-10-15
Payer: MEDICARE

## 2024-10-15 VITALS
HEART RATE: 46 BPM | WEIGHT: 240 LBS | OXYGEN SATURATION: 98 % | TEMPERATURE: 98.6 F | DIASTOLIC BLOOD PRESSURE: 67 MMHG | RESPIRATION RATE: 18 BRPM | SYSTOLIC BLOOD PRESSURE: 142 MMHG | BODY MASS INDEX: 36.37 KG/M2 | HEIGHT: 68 IN

## 2024-10-15 DIAGNOSIS — Z00.00 MEDICARE ANNUAL WELLNESS VISIT, SUBSEQUENT: Primary | ICD-10-CM

## 2024-10-15 DIAGNOSIS — Z23 IMMUNIZATION DUE: ICD-10-CM

## 2024-10-15 PROCEDURE — 90480 ADMN SARSCOV2 VAC 1/ONLY CMP: CPT | Performed by: NURSE PRACTITIONER

## 2024-10-15 PROCEDURE — 90662 IIV NO PRSV INCREASED AG IM: CPT | Performed by: NURSE PRACTITIONER

## 2024-10-15 PROCEDURE — 1160F RVW MEDS BY RX/DR IN RCRD: CPT | Performed by: NURSE PRACTITIONER

## 2024-10-15 PROCEDURE — 3077F SYST BP >= 140 MM HG: CPT | Performed by: NURSE PRACTITIONER

## 2024-10-15 PROCEDURE — G0008 ADMIN INFLUENZA VIRUS VAC: HCPCS | Performed by: NURSE PRACTITIONER

## 2024-10-15 PROCEDURE — G0439 PPPS, SUBSEQ VISIT: HCPCS | Performed by: NURSE PRACTITIONER

## 2024-10-15 PROCEDURE — 3044F HG A1C LEVEL LT 7.0%: CPT | Performed by: NURSE PRACTITIONER

## 2024-10-15 PROCEDURE — 1159F MED LIST DOCD IN RCRD: CPT | Performed by: NURSE PRACTITIONER

## 2024-10-15 PROCEDURE — 3078F DIAST BP <80 MM HG: CPT | Performed by: NURSE PRACTITIONER

## 2024-10-15 PROCEDURE — 1125F AMNT PAIN NOTED PAIN PRSNT: CPT | Performed by: NURSE PRACTITIONER

## 2024-10-15 PROCEDURE — 91320 SARSCV2 VAC 30MCG TRS-SUC IM: CPT | Performed by: NURSE PRACTITIONER

## 2024-10-15 NOTE — PATIENT INSTRUCTIONS
Obesity, Adult  Obesity is having too much body fat. Being obese means that your weight is more than what is healthy for you.   BMI (body mass index) is a number that explains how much body fat you have. If you have a BMI of 30 or more, you are obese.  Obesity can cause serious health problems, such as:  Stroke.  Coronary artery disease (CAD).  Type 2 diabetes.  Some types of cancer.  High blood pressure (hypertension).  High cholesterol.  Gallbladder stones.  Obesity can also contribute to:  Osteoarthritis.  Sleep apnea.  Infertility problems.  What are the causes?  Eating meals each day that are high in calories, sugar, and fat.  Drinking a lot of drinks that have sugar in them.  Being born with genes that may make you more likely to become obese.  Having a medical condition that causes obesity.  Taking certain medicines.  Sitting a lot (having a sedentary lifestyle).  Not getting enough sleep.  What increases the risk?  Having a family history of obesity.  Living in an area with limited access to:  [place], recreation centers, or sidewalks.  Healthy food choices, such as grocery stores and farmers' markets.  What are the signs or symptoms?  The main sign is having too much body fat.  How is this treated?  Treatment for this condition often includes changing your lifestyle. Treatment may include:  Changing your diet. This may include making a healthy meal plan.  Exercise. This may include activity that causes your heart to beat faster (aerobic exercise) and strength training. Work with your doctor to design a program that works for you.  Medicine to help you lose weight. This may be used if you are not able to lose one pound a week after 6 weeks of healthy eating and more exercise.  Treating conditions that cause the obesity.  Surgery. Options may include gastric banding and gastric bypass. This may be done if:  Other treatments have not helped to improve your condition.  You have a BMI of 40 or higher.  You have  life-threatening health problems related to obesity.  Follow these instructions at home:  Eating and drinking    Follow advice from your doctor about what to eat and drink. Your doctor may tell you to:  Limit fast food, sweets, and processed snack foods.  Choose low-fat options. For example, choose low-fat milk instead of whole milk.  Eat five or more servings of fruits or vegetables each day.  Eat at home more often. This gives you more control over what you eat.  Choose healthy foods when you eat out.  Learn to read food labels. This will help you learn how much food is in one serving.  Keep low-fat snacks available.  Avoid drinks that have a lot of sugar in them. These include soda, fruit juice, iced tea with sugar, and flavored milk.  Drink enough water to keep your pee (urine) pale yellow.  Do not go on fad diets.  Physical activity  Exercise often, as told by your doctor. Most adults should get up to 150 minutes of moderate-intensity exercise every week.Ask your doctor:  What types of exercise are safe for you.  How often you should exercise.  Warm up and stretch before being active.  Do slow stretching after being active (cool down).  Rest between times of being active.  Lifestyle  Work with your doctor and a food expert (dietitian) to set a weight-loss goal that is best for you.  Limit your screen time.  Find ways to reward yourself that do not involve food.  Do not drink alcohol if:  Your doctor tells you not to drink.  You are pregnant, may be pregnant, or are planning to become pregnant.  If you drink alcohol:  Limit how much you have to:  0-1 drink a day for women.  0-2 drinks a day for men.  Know how much alcohol is in your drink. In the [place] one drink equals one 12 oz bottle of beer (355 mL), one 5 oz glass of wine (148 mL), or one 1½ oz glass of hard liquor (44 mL).  General instructions  Keep a weight-loss journal. This can help you keep track of:  The food that you eat.  How much exercise you  get.  Take over-the-counter and prescription medicines only as told by your doctor.  Take vitamins and supplements only as told by your doctor.  Think about joining a support group.  Pay attention to your mental health as obesity can lead to depression or self esteem issues.  Keep all follow-up visits.  Contact a doctor if:  You cannot meet your weight-loss goal after you have changed your diet and lifestyle for 6 weeks.  You are having trouble breathing.  Summary  Obesity is having too much body fat.  Being obese means that your weight is more than what is healthy for you.  Work with your doctor to set a weight-loss goal.  Get regular exercise as told by your doctor.  This information is not intended to replace advice given to you by your health care provider. Make sure you discuss any questions you have with your health care provider.  Document Revised: 07/26/2022 Document Reviewed: 07/26/2022  [employer]vier Patient Education © 2024 [employer]

## 2024-10-15 NOTE — PROGRESS NOTES
Subjective   The ABCs of the Annual Wellness Visit  Medicare Wellness Visit      Sandeep Alonso is a 69 y.o. patient who presents for a Medicare Wellness Visit.    The following portions of the patient's history were reviewed and updated as appropriate: allergies, current medications, past family history, past medical history, past social history, past surgical history, and problem list.  Compared to one year ago, the patient's physical health is the same.  Compared to one year ago, the patient's mental health is the same.    Recent Hospitalizations:  He was not admitted to the hospital during the last year.     Current Medical Providers:  Patient Care Team:  Lisbet Velez DNP, APRN as PCP - General  Lisbet Velez DNP, APRN as PCP - Family Medicine    Outpatient Medications Prior to Visit   Medication Sig Dispense Refill    amLODIPine (NORVASC) 5 MG tablet Take 1 tablet by mouth Daily. 90 tablet 0    cloNIDine (CATAPRES) 0.1 MG tablet Take 1 tablet by mouth As Needed for High Blood Pressure. 60 tablet 0    diclofenac (VOLTAREN) 75 MG EC tablet Take 1 tablet by mouth twice daily 60 tablet 0    furosemide (LASIX) 40 MG tablet Take 1 tablet by mouth 2 (Two) Times a Day. 180 tablet 0    gabapentin (NEURONTIN) 100 MG capsule Take 1 capsule by mouth 2 (Two) Times a Day. 180 capsule 1    losartan-hydrochlorothiazide (HYZAAR) 100-25 MG per tablet Take 1 tablet by mouth Daily. 90 tablet 1    metFORMIN (GLUCOPHAGE) 500 MG tablet Take 1 tablet by mouth 2 (Two) Times a Day With Meals. 180 tablet 1    Omega 3 1000 MG capsule Take 1 capsule by mouth Every Night. 90 each 1    potassium chloride (KLOR-CON M20) 20 MEQ CR tablet Take 1 tablet by mouth Daily. 90 tablet 1    pravastatin (PRAVACHOL) 20 MG tablet Take 1 tablet by mouth Every Night. 90 tablet 1     No facility-administered medications prior to visit.     No opioid medication identified on active medication list. I have reviewed chart for other potential  high  "risk medication/s and harmful drug interactions in the elderly.    Aspirin is not on active medication list.  Aspirin use is not indicated based on review of current medical condition/s. Risk of harm outweighs potential benefits.  .    Patient Active Problem List   Diagnosis    Essential hypertension    Mixed hyperlipidemia    Hypokalemia    Screening PSA (prostate specific antigen)    Diabetic eye exam    Influenza vaccination given    Encounter for diabetic foot exam    Encounter for therapeutic drug monitoring    Encounter for drug screening    Swelling of ankle joint    Class 3 severe obesity due to excess calories with serious comorbidity and body mass index (BMI) of 40.0 to 44.9 in adult     Advance Care Planning Advance Directive is not on file.  ACP discussion was held with the patient during this visit. Patient does not have an advance directive, information provided.      Objective   Vitals:    10/15/24 1019   Pulse: (!) 46   Resp: 18   Temp: 98.6 °F (37 °C)   TempSrc: Infrared   SpO2: 98%   Weight: 109 kg (240 lb)   Height: 172.7 cm (67.99\")   PainSc: 10-Worst pain ever   PainLoc: Generalized     Estimated body mass index is 36.5 kg/m² as calculated from the following:    Height as of this encounter: 172.7 cm (67.99\").    Weight as of this encounter: 109 kg (240 lb).      Does the patient have evidence of cognitive impairment? No  Lab Results   Component Value Date    CHLPL 152 09/09/2024    TRIG 192 (H) 09/09/2024    HDL 28 (L) 09/09/2024    LDL 91 09/09/2024    VLDL 33 09/09/2024    HGBA1C 5.80 (H) 09/09/2024                                                                                         Health  Risk Assessment    Smoking Status:  Social History     Tobacco Use   Smoking Status Never    Passive exposure: Never   Smokeless Tobacco Never     Alcohol Consumption:  Social History     Substance and Sexual Activity   Alcohol Use Not Currently    Alcohol/week: 1.0 standard drink of alcohol    Types: 1 " Cans of beer per week    Comment: occasional beer     Fall Risk Screen  STEADI Fall Risk Assessment was completed, and patient is at LOW risk for falls.Assessment completed on:10/15/2024    Depression Screening:      10/15/2024    10:19 AM   PHQ-2/PHQ-9 Depression Screening   Little interest or pleasure in doing things Not at all   Feeling down, depressed, or hopeless Not at all     Health Habits and Functional and Cognitive Screening:      10/15/2024    10:20 AM   Functional & Cognitive Status   Do you have difficulty preparing food and eating? No   Do you have difficulty bathing yourself, getting dressed or grooming yourself? No   Do you have difficulty using the toilet? No   Do you have difficulty moving around from place to place? No   Do you have trouble with steps or getting out of a bed or a chair? No   Current Diet Well Balanced Diet   Dental Exam Up to date   Eye Exam Not up to date   Exercise (times per week) 0 times per week   Current Exercises Include No Regular Exercise   Do you need help using the phone?  No   Are you deaf or do you have serious difficulty hearing?  No   Do you need help to go to places out of walking distance? No   Do you need help shopping? No   Do you need help preparing meals?  No   Do you need help with housework?  No   Do you need help with laundry? No   Do you need help taking your medications? No   Do you need help managing money? No   Do you ever drive or ride in a car without wearing a seat belt? No   Have you felt unusual stress, anger or loneliness in the last month? No   Who do you live with? Alone   If you need help, do you have trouble finding someone available to you? No   Have you been bothered in the last four weeks by sexual problems? No   Do you have difficulty concentrating, remembering or making decisions? No         Age-appropriate Screening Schedule:  Refer to the list below for future screening recommendations based on patient's age, sex and/or medical  conditions. Orders for these recommended tests are listed in the plan section. The patient has been provided with a written plan.    Health Maintenance List  Health Maintenance   Topic Date Due    INFLUENZA VACCINE  08/01/2024    COVID-19 Vaccine (5 - 2023-24 season) 09/01/2024    ANNUAL WELLNESS VISIT  10/10/2024    HEMOGLOBIN A1C  03/09/2025    DIABETIC FOOT EXAM  03/28/2025    DIABETIC EYE EXAM  05/01/2025    LIPID PANEL  09/09/2025    URINE MICROALBUMIN  09/09/2025    BMI FOLLOWUP  09/09/2025    COLORECTAL CANCER SCREENING  09/23/2025    TDAP/TD VACCINES (3 - Td or Tdap) 07/21/2027    Pneumococcal Vaccine 65+  Completed    HEPATITIS C SCREENING  Addressed    ZOSTER VACCINE  Addressed     Physical Exam  Vitals and nursing note reviewed.   Constitutional:       General: He is awake.      Appearance: Normal appearance. He is well-developed and well-groomed.   HENT:      Head: Normocephalic and atraumatic.      Right Ear: Hearing, tympanic membrane, ear canal and external ear normal.      Left Ear: Hearing, tympanic membrane, ear canal and external ear normal.      Nose: Nose normal.      Mouth/Throat:      Lips: Pink.      Pharynx: Oropharynx is clear.   Eyes:      General: Lids are normal.      Conjunctiva/sclera: Conjunctivae normal.   Cardiovascular:      Rate and Rhythm: Normal rate and regular rhythm.      Heart sounds: Normal heart sounds.   Pulmonary:      Effort: Pulmonary effort is normal.      Breath sounds: Normal breath sounds and air entry.   Musculoskeletal:      Cervical back: Full passive range of motion without pain.      Right lower leg: No edema.      Left lower leg: No edema.   Lymphadenopathy:      Head:      Right side of head: No submental, submandibular or tonsillar adenopathy.      Left side of head: No submental, submandibular or tonsillar adenopathy.   Skin:     General: Skin is warm and dry.   Neurological:      Mental Status: He is alert.      Cranial Nerves: Cranial nerves 2-12 are  intact.      Sensory: Sensation is intact.      Motor: Motor function is intact.      Coordination: Coordination is intact.      Gait: Gait is intact.   Psychiatric:         Attention and Perception: Attention and perception normal.         Mood and Affect: Mood and affect normal.         Speech: Speech normal.         Behavior: Behavior normal. Behavior is cooperative.         Thought Content: Thought content normal.         Cognition and Memory: Cognition and memory normal.         Judgment: Judgment normal.                                                                                                                         CMS Preventative Services Quick Reference  Risk Factors Identified During Encounter  Alcohol Misuse: denies alcohol use  Chronic Pain:  denies any chronic pain  Depression/Dysphoria:  denies any suicidal/homicidal ideations; no depression   Drug Use/Abuse Identified or Suspected:  denies drug use or abuse   Fall Risk-High or Moderate: Discussed Fall Prevention in the home; not at risk for fall   Hearing Problem:  none  Immunizations Discussed/Encouraged: Influenza and COVID19  Polypharmacy: Medication List reviewed and Medications are appropriate for patient  Dental Screening Recommended; hasn't seen a dentist in a while  Vision Screening Recommended: UTD next appt Feb 2025    The above risks/problems have been discussed with the patient.  Pertinent information has been shared with the patient in the After Visit Summary.  An After Visit Summary and PPPS were made available to the patient.    Assessment & Plan  Medicare annual wellness visit, subsequent    Immunization due     Diagnoses and all orders for this visit:    1. Medicare annual wellness visit, subsequent (Primary)    2. Immunization due  -     Fluzone High-Dose 65+yrs (6735-5427)  -     COVID-19 (Pfizer) 12yrs+ (COMIRNATY)        -     “Discussed risks/benefits to vaccination, reviewed components of the vaccine, discussed VIS,  discussed informed consent, informed consent obtained. Patient/Parent was allowed to accept or refuse vaccine. Questions answered to satisfactory state of patient/Parent. We reviewed typical age appropriate and seasonally appropriate vaccinations. Reviewed immunization history and updated state vaccination form as needed. Patient was counseled on COVID-19  Influenza    Immunization History   Administered Date(s) Administered    COVID-19 (MILANA) 03/11/2021    COVID-19 (MODERNA) 1st,2nd,3rd Dose Monovalent 12/22/2021    COVID-19 (MODERNA) BIVALENT 12+YRS 03/07/2023    COVID-19 (MODERNA) Monovalent Original Booster 05/03/2022    Fluzone (or Fluarix & Flulaval for VFC) >6mos 09/23/2020    Fluzone High-Dose 65+yrs 10/04/2021, 10/05/2022, 10/10/2023    Influenza, Unspecified 10/18/2019    Pneumococcal Conjugate 20-Valent (PCV20) 10/05/2022    Pneumococcal Polysaccharide (PPSV23) 10/04/2021    Zostavax 07/27/2018    flucelvax quad pfs =>4 YRS 10/18/2019       Med management  Continue amlodipine; losartan-hctz, clonidine and furosemide as prescribed for HTN and Edema  Continue gabapentin as prescribed for neuropathy  Continue losartan-hctz as prescribed  Continue omega 3 fatty acids, and pravastatin as prescribed.           Follow Up:     Return in about 1 year (around 10/15/2025) for Annual physical.      Electronically signed by Lisbet Velez, BECKA, APRN, 10/15/24, 11:20 AM CDT.

## 2024-11-02 DIAGNOSIS — M25.551 RIGHT HIP PAIN: ICD-10-CM

## 2024-11-05 RX ORDER — DICLOFENAC SODIUM 75 MG/1
75 TABLET, DELAYED RELEASE ORAL 2 TIMES DAILY
Qty: 60 TABLET | Refills: 0 | Status: SHIPPED | OUTPATIENT
Start: 2024-11-05

## 2024-11-20 DIAGNOSIS — R60.0 LOCALIZED EDEMA: ICD-10-CM

## 2024-11-21 NOTE — TELEPHONE ENCOUNTER
Rx Refill Note  Requested Prescriptions     Pending Prescriptions Disp Refills    furosemide (LASIX) 40 MG tablet [Pharmacy Med Name: Furosemide 40 MG Oral Tablet] 180 tablet 0     Sig: Take 1 tablet by mouth twice daily      Last office visit with prescribing clinician: 10/15/2024   Last telemedicine visit with prescribing clinician: Visit date not found   Next office visit with prescribing clinician: 3/10/2025                         Would you like a call back once the refill request has been completed: [] Yes [] No    If the office needs to give you a call back, can they leave a voicemail: [] Yes [] No    Sarah Singer MA  11/21/24, 13:49 CST

## 2024-11-22 RX ORDER — FUROSEMIDE 40 MG/1
40 TABLET ORAL 2 TIMES DAILY
Qty: 180 TABLET | Refills: 0 | Status: SHIPPED | OUTPATIENT
Start: 2024-11-22

## 2024-12-03 ENCOUNTER — TELEPHONE (OUTPATIENT)
Dept: FAMILY MEDICINE CLINIC | Facility: CLINIC | Age: 69
End: 2024-12-03
Payer: MEDICARE

## 2024-12-03 DIAGNOSIS — M25.551 RIGHT HIP PAIN: ICD-10-CM

## 2024-12-03 DIAGNOSIS — I10 ESSENTIAL HYPERTENSION: ICD-10-CM

## 2024-12-03 DIAGNOSIS — E11.43 TYPE 2 DIABETES MELLITUS WITH DIABETIC AUTONOMIC NEUROPATHY, WITHOUT LONG-TERM CURRENT USE OF INSULIN: ICD-10-CM

## 2024-12-03 RX ORDER — DICLOFENAC SODIUM 75 MG/1
75 TABLET, DELAYED RELEASE ORAL 2 TIMES DAILY
Qty: 60 TABLET | Refills: 0 | Status: SHIPPED | OUTPATIENT
Start: 2024-12-03

## 2024-12-03 NOTE — TELEPHONE ENCOUNTER
Caller: aSndeep Alonso    Relationship: Self    Best call back number: [phone]     Requested Prescriptions:   Requested Prescriptions     Pending Prescriptions Disp Refills    losartan-hydrochlorothiazide (HYZAAR) 100-25 MG per tablet 90 tablet 1     Sig: Take 1 tablet by mouth Daily.    diclofenac (VOLTAREN) 75 MG EC tablet 60 tablet 0     Sig: Take 1 tablet by mouth 2 (Two) Times a Day.    metFORMIN (GLUCOPHAGE) 500 MG tablet 180 tablet 1     Sig: Take 1 tablet by mouth 2 (Two) Times a Day With Meals.    gabapentin (NEURONTIN) 100 MG capsule 180 capsule 1     Sig: Take 1 capsule by mouth 2 (Two) Times a Day.        Pharmacy where request should be sent: [facility] [address] [place] [address] [phone] [facility] [phone]      Last office visit with prescribing clinician: 10/15/2024   Last telemedicine visit with prescribing clinician: Visit date not found   Next office visit with prescribing clinician: 12/3/2024     Additional details provided by patient:     Does the patient have less than a 3 day supply:  [x] Yes  [] No    Would you like a call back once the refill request has been completed: [] Yes [x] No    If the office needs to give you a call back, can they leave a voicemail: [] Yes [x] No    Chadwick Hendrix III Rep   12/03/24 08:25 CST

## 2024-12-03 NOTE — TELEPHONE ENCOUNTER
Rx Refill Note  Requested Prescriptions     Pending Prescriptions Disp Refills    diclofenac (VOLTAREN) 75 MG EC tablet [Pharmacy Med Name: Diclofenac Sodium 75 MG Oral Tablet Delayed Release] 60 tablet 0     Sig: Take 1 tablet by mouth twice daily      Last office visit with prescribing clinician: 10/15/2024   Last telemedicine visit with prescribing clinician: Visit date not found   Next office visit with prescribing clinician: 3/10/2025                         Would you like a call back once the refill request has been completed: [] Yes [] No    If the office needs to give you a call back, can they leave a voicemail: [] Yes [] No    Sarah Singer MA  12/03/24, 08:20 CST

## 2024-12-06 NOTE — TELEPHONE ENCOUNTER
Rx Refill Note  Requested Prescriptions     Pending Prescriptions Disp Refills    losartan-hydrochlorothiazide (HYZAAR) 100-25 MG per tablet 90 tablet 1     Sig: Take 1 tablet by mouth Daily.    diclofenac (VOLTAREN) 75 MG EC tablet 60 tablet 0     Sig: Take 1 tablet by mouth 2 (Two) Times a Day.    metFORMIN (GLUCOPHAGE) 500 MG tablet 180 tablet 1     Sig: Take 1 tablet by mouth 2 (Two) Times a Day With Meals.    gabapentin (NEURONTIN) 100 MG capsule 180 capsule 1     Sig: Take 1 capsule by mouth 2 (Two) Times a Day.      Last office visit with prescribing clinician: 10/15/2024   Last telemedicine visit with prescribing clinician: Visit date not found   Next office visit with prescribing clinician: 12/3/2024                         Would you like a call back once the refill request has been completed: [] Yes [x] No    If the office needs to give you a call back, can they leave a voicemail: [] Yes [x] No    Saige Conn MA  12/06/24, 14:57 CST

## 2024-12-08 RX ORDER — GABAPENTIN 100 MG/1
100 CAPSULE ORAL 2 TIMES DAILY
Qty: 180 CAPSULE | Refills: 0 | Status: SHIPPED | OUTPATIENT
Start: 2024-12-08

## 2024-12-08 RX ORDER — LOSARTAN POTASSIUM AND HYDROCHLOROTHIAZIDE 25; 100 MG/1; MG/1
1 TABLET ORAL DAILY
Qty: 90 TABLET | Refills: 0 | Status: SHIPPED | OUTPATIENT
Start: 2024-12-08

## 2024-12-08 RX ORDER — DICLOFENAC SODIUM 75 MG/1
75 TABLET, DELAYED RELEASE ORAL 2 TIMES DAILY
Qty: 60 TABLET | Refills: 0 | Status: SHIPPED | OUTPATIENT
Start: 2024-12-08

## 2024-12-12 ENCOUNTER — OFFICE VISIT (OUTPATIENT)
Dept: FAMILY MEDICINE CLINIC | Facility: CLINIC | Age: 69
End: 2024-12-12
Payer: MEDICARE

## 2024-12-12 VITALS
SYSTOLIC BLOOD PRESSURE: 148 MMHG | HEIGHT: 68 IN | DIASTOLIC BLOOD PRESSURE: 88 MMHG | OXYGEN SATURATION: 99 % | HEART RATE: 71 BPM | WEIGHT: 240 LBS | BODY MASS INDEX: 36.37 KG/M2 | TEMPERATURE: 97.5 F | RESPIRATION RATE: 8 BRPM

## 2024-12-12 DIAGNOSIS — S61.419A: Primary | ICD-10-CM

## 2024-12-12 PROCEDURE — 99213 OFFICE O/P EST LOW 20 MIN: CPT | Performed by: FAMILY MEDICINE

## 2024-12-12 PROCEDURE — 3078F DIAST BP <80 MM HG: CPT | Performed by: FAMILY MEDICINE

## 2024-12-12 PROCEDURE — 3044F HG A1C LEVEL LT 7.0%: CPT | Performed by: FAMILY MEDICINE

## 2024-12-12 PROCEDURE — 3077F SYST BP >= 140 MM HG: CPT | Performed by: FAMILY MEDICINE

## 2024-12-12 PROCEDURE — 1125F AMNT PAIN NOTED PAIN PRSNT: CPT | Performed by: FAMILY MEDICINE

## 2024-12-12 RX ORDER — MUPIROCIN 20 MG/G
1 OINTMENT TOPICAL 3 TIMES DAILY
Qty: 1 G | Refills: 0 | Status: SHIPPED | OUTPATIENT
Start: 2024-12-12

## 2024-12-12 NOTE — PROGRESS NOTES
[facility] - PODIATRY    Today's Date: 12/20/24    Patient Name: Sandeep Alonso  MRN: [id number]  CSN: [id number]  PCP: Lisbet Velez DNP, APRN  Referring Provider: No ref. provider found    SUBJECTIVE     Chief Complaint   Patient presents with    Follow-up     Lisbet Velez DNP, VENKAT 03/08/2024     HPI: Sandeep Alonso, a 69 y.o.male, comes to clinic as a(n) established patient presenting for diabetic foot exam and complaining of toenail/callus issues. Pt has h/o DM2, HTN, Hypokalemia, Edema.  Presents with nails that are long, thick, and discolored. He is unable to reach feet to care for nails himself. Notes mild numbness and tingling in feet. Denies open wounds or sores. Patient is NIDDM with last stated BG level of 98 mg/dl. Last A1C of 5.8%.Denies pain. Relates previous treatment(s) including nail debridement by podiatrist . Denies any constitutional symptoms. No other pedal complaints at this time.    Past Medical History:   Diagnosis Date    Hypokalemia     Peripheral edema      Past Surgical History:   Procedure Laterality Date    NO PAST SURGERIES       Family History   Problem Relation Age of Onset    Heart attack Mother     Emphysema Father     No Known Problems Sister     No Known Problems Brother     No Known Problems Son     No Known Problems Maternal Grandmother     No Known Problems Maternal Grandfather     No Known Problems Paternal Grandmother     No Known Problems Paternal Grandfather     No Known Problems Brother     No Known Problems Brother     No Known Problems Son     No Known Problems Son     No Known Problems Son      Social History     Socioeconomic History    Marital status:    Tobacco Use    Smoking status: Never     Passive exposure: Never    Smokeless tobacco: Never   Vaping Use    Vaping status: Never Used   Substance and Sexual Activity    Alcohol use: Not Currently     Alcohol/week: 1.0 standard drink of alcohol     Types: 1 Cans of beer per week      Comment: occasional beer    Drug use: No    Sexual activity: Defer     Allergies   Allergen Reactions    Ace Inhibitors Cough     Current Outpatient Medications   Medication Sig Dispense Refill    amLODIPine (NORVASC) 5 MG tablet Take 1 tablet by mouth Daily. 90 tablet 0    cloNIDine (CATAPRES) 0.1 MG tablet Take 1 tablet by mouth As Needed for High Blood Pressure. 60 tablet 0    diclofenac (VOLTAREN) 75 MG EC tablet Take 1 tablet by mouth twice daily 60 tablet 0    diclofenac (VOLTAREN) 75 MG EC tablet Take 1 tablet by mouth 2 (Two) Times a Day. 60 tablet 0    furosemide (LASIX) 40 MG tablet Take 1 tablet by mouth twice daily 180 tablet 0    gabapentin (NEURONTIN) 100 MG capsule Take 1 capsule by mouth 2 (Two) Times a Day. 180 capsule 0    losartan-hydrochlorothiazide (HYZAAR) 100-25 MG per tablet Take 1 tablet by mouth Daily. 90 tablet 0    metFORMIN (GLUCOPHAGE) 500 MG tablet Take 1 tablet by mouth 2 (Two) Times a Day With Meals. 180 tablet 0    Omega 3 1000 MG capsule Take 1 capsule by mouth Every Night. 90 each 1    potassium chloride (KLOR-CON M20) 20 MEQ CR tablet Take 1 tablet by mouth Daily. 90 tablet 1    pravastatin (PRAVACHOL) 20 MG tablet Take 1 tablet by mouth Every Night. 90 tablet 1    mupirocin (BACTROBAN) 2 % ointment Apply 1 Application topically to the appropriate area as directed 3 (Three) Times a Day. (Patient not taking: Reported on 12/20/2024) 1 g 0     No current facility-administered medications for this visit.     Review of Systems   Constitutional:  Negative for chills and fever.   HENT:  Negative for congestion.    Respiratory:  Negative for shortness of breath.    Cardiovascular:  Negative for chest pain and leg swelling.   Gastrointestinal:  Negative for constipation, diarrhea, nausea and vomiting.   Musculoskeletal:  Positive for arthralgias. Negative for gait problem and myalgias.   Skin:  Negative for wound.   Neurological:  Positive for numbness.       OBJECTIVE     Vitals:     24 1303   BP: 162/88   Pulse: 56   Resp: 18   SpO2: 95%     Foot/Ankle Exam    GENERAL  Appearance:  obese  Orientation:  AAOx3  Affect:  appropriate  Gait:  unimpaired  Assistance:  independent  Right shoe gear: casual shoe  Left shoe gear: casual shoe    VASCULAR     Right Foot Vascularity   Dorsalis pedis:  2+  Posterior tibial:  2+  Skin temperature:  warm  Edema gradin+ and pitting  CFT:  3  Pedal hair growth:  Present  Varicosities:  mild varicosities     Left Foot Vascularity   Dorsalis pedis:  2+  Posterior tibial:  2+  Skin temperature:  warm  Edema gradin+ and pitting  CFT:  3  Pedal hair growth:  Present  Varicosities:  mild varicosities     NEUROLOGIC     Right Foot Neurologic   Light touch sensation: diminished  Vibratory sensation: diminished  Hot/Cold sensation: diminished  Protective Sensation using [place] Monofilament:   Sites intact: 8  Sites tested: 10     Left Foot Neurologic   Light touch sensation: diminished  Vibratory sensation: diminished  Hot/Cold sensation:  diminished  Protective Sensation using [place] Monofilament:   Sites intact: 8  Sites tested: 10    MUSCULOSKELETAL     Right Foot Musculoskeletal   Ecchymosis:  none  Tenderness:  toenail problem    Arch:  Normal  Hallux valgus: No       Left Foot Musculoskeletal   Ecchymosis:  none  Tenderness:  toenail problem  Arch:  Normal  Hallux valgus: No      MUSCLE STRENGTH     Right Foot Muscle Strength   Foot dorsiflexion:  5  Foot plantar flexion:  5  Foot inversion:  5  Foot eversion:  5     Left Foot Muscle Strength   Foot dorsiflexion:  5  Foot plantar flexion:  5  Foot inversion:  5  Foot eversion:  5    RANGE OF MOTION     Right Foot Range of Motion   Foot and ankle ROM within normal limits       Left Foot Range of Motion   Foot and ankle ROM within normal limits      DERMATOLOGIC      Right Foot Dermatologic   Skin  Right foot skin is intact.   Nails  1.  Positive for onychomycosis, abnormal  thickness and subungual debris.  2.  Positive for onychomycosis, abnormal thickness and subungual debris.  3.  Positive for onychomycosis, abnormal thickness and subungual debris.  4.  Positive for onychomycosis, abnormal thickness and subungual debris.  5.  Positive for onychomycosis, abnormal thickness and subungual debris.     Left Foot Dermatologic   Skin  Left foot skin is intact.   Nails  1.  Positive for onychomycosis, abnormal thickness and subungual debris.  2.  Positive for onychomycosis, abnormal thickness and subungual debris.  3.  Positive for onychomycosis, abnormal thickness and subungual debris.  4.  Positive for onychomycosis, abnormally thick and subungual debris.  5.  Positive for onychomycosis, abnormally thick and subungual debris.      RADIOLOGY/NUCLEAR:  No results found.    LABORATORY/CULTURE RESULTS:      PATHOLOGY RESULTS:       ASSESSMENT/PLAN     Diagnoses and all orders for this visit:    1. Onychomycosis (Primary)    2. Painful diabetic neuropathy    3. Type 2 diabetes mellitus with diabetic polyneuropathy, without long-term current use of insulin      Comprehensive lower extremity examination and evaluation was performed.  Discussed findings and treatment plan including risks, benefits, and treatment options with patient in detail. Patient agreed with treatment plan.  After verbal consent obtained, nail(s) x10 debrided of length and thickness with nail nipper without incidence  Patient may maintain nails and calluses at home utilizing emery board or pumice stone between visits as needed  Reviewed at home diabetic foot care including daily foot checks   Continue diabetic monitoring and control under direction of PCP.   An After Visit Summary was printed and given to the patient at discharge, including (if requested) any available informative/educational handouts regarding diagnosis, treatment, or medications. All questions were answered to patient/family satisfaction. Should symptoms  fail to improve or worsen they agree to call or return to clinic or to go to the Emergency Department. Discussed the importance of following up with any needed screening tests/labs/specialist appointments and any requested follow-up recommended by me today. Importance of maintaining follow-up discussed and patient accepts that missed appointments can delay diagnosis and potentially lead to worsening of conditions.  Return in about 3 months (around 3/20/2025) for Diabetic Foot Exam, Schedule Foot Care Clinic, With Liane ROBLEDO., or sooner if acute issues arise.      This document has been electronically signed by VENKAT Bullard on December 20, 2024 14:39 CST

## 2024-12-12 NOTE — PROGRESS NOTES
"Chief Complaint  Hand Injury (Pt is here for hand injury. )    Subjective        Sandeep Alonso presents to [facility]  Hand Injury       Mr. Alonso presents with scratches on each hand.  He says he was making his bed and got some small scratches/lacerations on his hands.  They have scabbed over.  He is concerned about them becoming infected.  No erythema, warmth, induration, or purulence around the lesions at this time.  No fever.      Objective   Vital Signs:  /74 (BP Location: Left arm, Patient Position: Sitting, Cuff Size: Adult)   Pulse 71   Temp 97.5 °F (36.4 °C) (Temporal)   Resp 8   Ht 172.7 cm (67.99\")   Wt 109 kg (240 lb)   SpO2 99%   BMI 36.50 kg/m²   Estimated body mass index is 36.5 kg/m² as calculated from the following:    Height as of this encounter: 172.7 cm (67.99\").    Weight as of this encounter: 109 kg (240 lb).            Physical Exam  Vitals reviewed.   Constitutional:       General: He is not in acute distress.     Appearance: Normal appearance. He is normal weight. He is not ill-appearing, toxic-appearing or diaphoretic.   HENT:      Head: Normocephalic and atraumatic.      Right Ear: External ear normal.      Left Ear: External ear normal.      Nose: Nose normal.   Eyes:      Extraocular Movements: Extraocular movements intact.   Cardiovascular:      Rate and Rhythm: Normal rate and regular rhythm.   Pulmonary:      Effort: Pulmonary effort is normal. No respiratory distress.      Breath sounds: Normal breath sounds.   Skin:     General: Skin is warm and dry.      Coloration: Skin is not jaundiced or pale.      Comments: Multiple small scratches/lacerations on each hand.  These have scabbed over.  No erythema, warmth, induration.  No fluctuance or purulence   Neurological:      Mental Status: He is alert and oriented to person, place, and time.   Psychiatric:         Mood and Affect: Mood normal.         Behavior: Behavior normal.         " Thought Content: Thought content normal.         Judgment: Judgment normal.            Result Review :                Assessment and Plan   Diagnoses and all orders for this visit:    1. Laceration of skin of hand, unspecified laterality, initial encounter (Primary)  -     mupirocin (BACTROBAN) 2 % ointment; Apply 1 Application topically to the appropriate area as directed 3 (Three) Times a Day.  Dispense: 1 g; Refill: 0             Follow Up   No follow-ups on file.  Patient was given instructions and counseling regarding his condition or for health maintenance advice. Please see specific information pulled into the AVS if appropriate.

## 2024-12-20 ENCOUNTER — OFFICE VISIT (OUTPATIENT)
Age: 69
End: 2024-12-20
Payer: MEDICARE

## 2024-12-20 VITALS
DIASTOLIC BLOOD PRESSURE: 88 MMHG | RESPIRATION RATE: 18 BRPM | WEIGHT: 239.3 LBS | HEART RATE: 56 BPM | SYSTOLIC BLOOD PRESSURE: 162 MMHG | OXYGEN SATURATION: 95 % | BODY MASS INDEX: 36.27 KG/M2 | HEIGHT: 68 IN

## 2024-12-20 DIAGNOSIS — E11.40 PAINFUL DIABETIC NEUROPATHY: ICD-10-CM

## 2024-12-20 DIAGNOSIS — B35.1 ONYCHOMYCOSIS: Primary | ICD-10-CM

## 2024-12-20 DIAGNOSIS — E11.42 TYPE 2 DIABETES MELLITUS WITH DIABETIC POLYNEUROPATHY, WITHOUT LONG-TERM CURRENT USE OF INSULIN: ICD-10-CM

## 2024-12-23 DIAGNOSIS — E78.2 MIXED HYPERLIPIDEMIA: ICD-10-CM

## 2024-12-23 RX ORDER — CLONIDINE HYDROCHLORIDE 0.1 MG/1
0.1 TABLET ORAL AS NEEDED
Qty: 60 TABLET | Refills: 0 | Status: SHIPPED | OUTPATIENT
Start: 2024-12-23

## 2025-02-03 DIAGNOSIS — M25.551 RIGHT HIP PAIN: ICD-10-CM

## 2025-02-03 DIAGNOSIS — R60.0 LOCALIZED EDEMA: ICD-10-CM

## 2025-02-03 DIAGNOSIS — I10 ESSENTIAL HYPERTENSION: ICD-10-CM

## 2025-02-03 DIAGNOSIS — E78.2 MIXED HYPERLIPIDEMIA: ICD-10-CM

## 2025-02-03 RX ORDER — FUROSEMIDE 40 MG/1
40 TABLET ORAL 2 TIMES DAILY
Qty: 180 TABLET | Refills: 0 | Status: SHIPPED | OUTPATIENT
Start: 2025-02-03

## 2025-02-03 RX ORDER — DICLOFENAC SODIUM 75 MG/1
75 TABLET, DELAYED RELEASE ORAL 2 TIMES DAILY
Qty: 60 TABLET | Refills: 0 | Status: SHIPPED | OUTPATIENT
Start: 2025-02-03

## 2025-02-03 RX ORDER — CLONIDINE HYDROCHLORIDE 0.1 MG/1
0.1 TABLET ORAL AS NEEDED
Qty: 60 TABLET | Refills: 0 | Status: SHIPPED | OUTPATIENT
Start: 2025-02-03

## 2025-02-03 RX ORDER — AMLODIPINE BESYLATE 5 MG/1
5 TABLET ORAL DAILY
Qty: 90 TABLET | Refills: 0 | Status: SHIPPED | OUTPATIENT
Start: 2025-02-03

## 2025-02-03 NOTE — TELEPHONE ENCOUNTER
Rx Refill Note  Requested Prescriptions     Pending Prescriptions Disp Refills    diclofenac (VOLTAREN) 75 MG EC tablet [Pharmacy Med Name: Diclofenac Sodium 75 MG Oral Tablet Delayed Release] 60 tablet 0     Sig: Take 1 tablet by mouth twice daily    cloNIDine (CATAPRES) 0.1 MG tablet [Pharmacy Med Name: cloNIDine HCl 0.1 MG Oral Tablet] 60 tablet 0     Sig: TAKE 1 TABLET BY MOUTH AS NEEDED FOR HIGH BLOOD PRESSURE    furosemide (LASIX) 40 MG tablet [Pharmacy Med Name: Furosemide 40 MG Oral Tablet] 180 tablet 0     Sig: Take 1 tablet by mouth twice daily    amLODIPine (NORVASC) 5 MG tablet [Pharmacy Med Name: amLODIPine Besylate 5 MG Oral Tablet] 90 tablet 0     Sig: Take 1 tablet by mouth once daily      Last office visit with prescribing clinician: 10/15/2024   Next office visit with prescribing clinician: 3/10/2025       Sarah Singer MA  02/03/25, 08:50 CST

## 2025-03-03 DIAGNOSIS — M25.551 RIGHT HIP PAIN: ICD-10-CM

## 2025-03-03 RX ORDER — DICLOFENAC SODIUM 75 MG/1
75 TABLET, DELAYED RELEASE ORAL 2 TIMES DAILY
Qty: 60 TABLET | Refills: 0 | Status: SHIPPED | OUTPATIENT
Start: 2025-03-03

## 2025-03-03 NOTE — TELEPHONE ENCOUNTER
Rx Refill Note  Requested Prescriptions     Pending Prescriptions Disp Refills    diclofenac (VOLTAREN) 75 MG EC tablet [Pharmacy Med Name: Diclofenac Sodium 75 MG Oral Tablet Delayed Release] 60 tablet 0     Sig: Take 1 tablet by mouth twice daily      Last office visit with prescribing clinician: 10/15/2024   Next office visit with prescribing clinician: 3/10/2025       Sarah Singer MA  03/03/25, 08:36 CST

## 2025-03-10 ENCOUNTER — OFFICE VISIT (OUTPATIENT)
Dept: FAMILY MEDICINE CLINIC | Facility: CLINIC | Age: 70
End: 2025-03-10
Payer: MEDICARE

## 2025-03-10 VITALS
OXYGEN SATURATION: 98 % | TEMPERATURE: 97.7 F | RESPIRATION RATE: 20 BRPM | WEIGHT: 244.8 LBS | SYSTOLIC BLOOD PRESSURE: 135 MMHG | HEIGHT: 68 IN | HEART RATE: 62 BPM | BODY MASS INDEX: 37.1 KG/M2 | DIASTOLIC BLOOD PRESSURE: 61 MMHG

## 2025-03-10 DIAGNOSIS — E66.813 CLASS 3 SEVERE OBESITY DUE TO EXCESS CALORIES WITH SERIOUS COMORBIDITY AND BODY MASS INDEX (BMI) OF 40.0 TO 44.9 IN ADULT: ICD-10-CM

## 2025-03-10 DIAGNOSIS — I10 ESSENTIAL HYPERTENSION: ICD-10-CM

## 2025-03-10 DIAGNOSIS — E11.9 TYPE 2 DIABETES MELLITUS WITHOUT COMPLICATION, WITHOUT LONG-TERM CURRENT USE OF INSULIN: Primary | ICD-10-CM

## 2025-03-10 DIAGNOSIS — E66.01 CLASS 3 SEVERE OBESITY DUE TO EXCESS CALORIES WITH SERIOUS COMORBIDITY AND BODY MASS INDEX (BMI) OF 40.0 TO 44.9 IN ADULT: ICD-10-CM

## 2025-03-10 DIAGNOSIS — E11.43 TYPE 2 DIABETES MELLITUS WITH DIABETIC AUTONOMIC NEUROPATHY, WITHOUT LONG-TERM CURRENT USE OF INSULIN: ICD-10-CM

## 2025-03-10 DIAGNOSIS — E78.2 MIXED HYPERLIPIDEMIA: ICD-10-CM

## 2025-03-10 RX ORDER — PRAVASTATIN SODIUM 20 MG
20 TABLET ORAL NIGHTLY
Qty: 90 TABLET | Refills: 1 | Status: SHIPPED | OUTPATIENT
Start: 2025-03-10

## 2025-03-10 RX ORDER — OMEGA-3 FATTY ACIDS/FISH OIL 300-1000MG
1 CAPSULE ORAL NIGHTLY
Qty: 90 EACH | Refills: 1 | Status: SHIPPED | OUTPATIENT
Start: 2025-03-10

## 2025-03-10 NOTE — PROGRESS NOTES
Chief Complaint  Hypertension    Subjective        Sandeep Alonso presents to [facility]  History of Present Illness  History of Present Illness  The patient is a 69-year-old male who presents today to follow up on his chronic conditions of hypertension, hyperlipidemia, and diabetes.    He has been living with type 2 diabetes for over a year, which has remained stable. He does not wear a medical alert bracelet. He reports no symptoms of polyuria, polyphagia, or polydipsia. He also reports no episodes of hypoglycemia characterized by shakiness or sweating, and no instances of syncope or hospitalizations. He reports no diabetic complications such as obesity or neuropathy. His weight has slightly increased. His diet is not healthy, and he does not engage in meal planning. He has not consulted a dietitian. He engages in physical activity, specifically walking, a few times a week. His last ophthalmological examination was recent, and he is under the care of Dr. Sheets for podiatric health. His current medication regimen includes metformin, which he takes as prescribed. He monitors his blood glucose levels at home, with a reading of 134 this morning.    He has been managing hypertension for over a year. He reports no chest pain or palpitations but does experience occasional shortness of breath. He does not use any agents associated with hypertension such as amphetamines, anorexics, or thyroid hormones. He has no history of kidney disease or heart failure. His current antihypertensive regimen includes amlodipine and losartan HCTZ, which have effectively controlled his blood pressure. His past treatment included an ACE inhibitor.    SOCIAL HISTORY  He does not smoke.    MEDICATIONS  Metformin, amlodipine, losartan HCTZ.    The following portions of the patient's history were reviewed and updated as appropriate: allergies, current medications, past family history, past medical history, past  "social history, past surgical history and problem list.    Aspirin use is not indicated based on review of current medical condition/s. Pros and cons of this therapy have been discussed today. Benefits of this medication outweigh potential harm.  Patient has been encouraged to continue taking this medication.      Objective   Vital Signs:  /61 (BP Location: Right arm)   Pulse 62   Temp 97.7 °F (36.5 °C)   Resp 20   Ht 172.7 cm (67.99\")   Wt 111 kg (244 lb 12.8 oz)   SpO2 98%   BMI 37.23 kg/m²   Estimated body mass index is 37.23 kg/m² as calculated from the following:    Height as of this encounter: 172.7 cm (67.99\").    Weight as of this encounter: 111 kg (244 lb 12.8 oz).     Class 2 Severe Obesity (BMI >=35 and <=39.9). Obesity-related health conditions include the following: hypertension, diabetes mellitus, and dyslipidemias. Obesity is worsening. BMI is is above average; BMI management plan is completed. We discussed portion control and increasing exercise.    Physical Exam  Vitals and nursing note reviewed.   Constitutional:       General: He is awake.      Appearance: Normal appearance. He is well-developed and well-groomed. He is morbidly obese.   HENT:      Head: Normocephalic and atraumatic.      Right Ear: Hearing, tympanic membrane, ear canal and external ear normal.      Left Ear: Hearing, tympanic membrane, ear canal and external ear normal.      Nose: Nose normal.      Mouth/Throat:      Lips: Pink.      Pharynx: Oropharynx is clear.   Eyes:      General: Lids are normal.      Conjunctiva/sclera: Conjunctivae normal.   Cardiovascular:      Rate and Rhythm: Normal rate and regular rhythm.      Heart sounds: Normal heart sounds.   Pulmonary:      Effort: Pulmonary effort is normal.      Breath sounds: Normal breath sounds and air entry.   Musculoskeletal:      Cervical back: Full passive range of motion without pain.      Right lower leg: No edema.      Left lower leg: No edema. "   Lymphadenopathy:      Head:      Right side of head: No submental, submandibular or tonsillar adenopathy.      Left side of head: No submental, submandibular or tonsillar adenopathy.   Skin:     General: Skin is warm and dry.   Neurological:      Mental Status: He is alert and oriented to person, place, and time.      Sensory: Sensation is intact.      Motor: Motor function is intact.      Coordination: Coordination is intact.      Gait: Gait is intact.   Psychiatric:         Attention and Perception: Attention and perception normal.         Mood and Affect: Mood and affect normal.         Speech: Speech normal.         Behavior: Behavior normal. Behavior is cooperative.         Thought Content: Thought content normal.         Cognition and Memory: Cognition and memory normal.         Judgment: Judgment normal.      Physical Exam  Vital Signs  Blood pressure is 135/61.    Result Review :          Results  Laboratory Studies  Blood sugar was 134.           Assessment and Plan     Diagnoses and all orders for this visit:    1. Type 2 diabetes mellitus without complication, without long-term current use of insulin (Primary)  -     Hemoglobin A1c  -     CBC (No Diff)  -     Comprehensive metabolic panel        -     Continue metformin as prescribed        -     Continue januvia as prescribed    2. Mixed hyperlipidemia  -     Lipid panel  -     Omega 3 1000 MG capsule; Take 1 capsule by mouth Every Night.  Dispense: 90 each; Refill: 1  -     pravastatin (PRAVACHOL) 20 MG tablet; Take 1 tablet by mouth Every Night.  Dispense: 90 tablet; Refill: 1        -     eat baked instead of fried or fast foods more green leafy veggies      Diagnoses and all orders for this visit:    1. Type 2 diabetes mellitus without complication, without long-term current use of insulin (Primary)  2. Type 2 diabetes mellitus with diabetic autonomic neuropathy, without long-term current use of insulin  -     Hemoglobin A1c  -     CBC (No Diff)  -      Comprehensive metabolic panel    3. Essential hypertension       -   continue amlodipine as prescribed       -   continue clonidine as prescribed       -   continue losartan-hctz as prescribed    4. Mixed hyperlipidemia  -     Lipid panel  -     Omega 3 1000 MG capsule; Take 1 capsule by mouth Every Night.  Dispense: 90 each; Refill: 1  -     pravastatin (PRAVACHOL) 20 MG tablet; Take 1 tablet by mouth Every Night.  Dispense: 90 tablet; Refill: 1    5. Class 3 severe obesity due to excess calories with serious comorbidity and body mass index (BMI) of 40.0 to 44.9 in adult  Obesity Plan:    The patient BMI is outside this range and we recommended/discussed today to utilize a diet/exercise program to get back into the appropriate range.  Federal guidelines recommend that people under the age of 65 should have a BMI of 18.5-24.9  The initial step is to document everything that is consumed into a food diary. Studies have shown that patients can lose up to 2x the weight by keeping track of foods.   Choose one bad food weekly and eliminate it from your diet.  Replace with one healthy food  Goal over next 2-4 weeks is walk 30 minutes per day 5 days per week at pace difficult to hold conversation.   Drink more water, less soda.   Cut back on portion sizes.   Today we encouraged roughly a 1 lb per week weight loss with initial goal of 5% weight loss.  Discussed if eating out for a meal, consider cutting food in half and placing into a to go container.  Individually portion any foods coming into the home based on package.  Use smaller plates  Drinking 12 oz of water 30 minutes before meal as way to suppress appetite.  Medications discussed today include metformin, topamax, phentermine, Qsymia, Belviq (lorcaserin), Contrave (Buproprion/naltrexone).    Lifestyle therapy   Simple advice to lose weight   Internet programs or self help books.    Advice from dietitian  Set Goals that are realistic   Encouraged to use visual  aides to help in measuring foods  Baseball-1 cup good for green salad, frozen yogurt, medium piece of fruit, baked potato  Handful - ½ cup good cut fruit, cooked vegetables, pasta, rice  Egg- ¼ cup good for dried fruit  Deck of cards-3 ounces good for meat and poultry  Check book-3 ounces good for grilled fish  6 dice side by side- 1 ½ ounces good for natural cheese  Simple tips   Plate method-reduce plate size to 9 inch dinner plate.  Half of plate should be filled with non-starchy vegetables (broccoli, lettuce, cauliflower, tomatoes), ¼ plate with lean source of protein (lean chicken, turkey, fish), remaining ½ with whole grains (brown rice, potato, whole grain breads  Avoid liquid calories (regular soda, juice, coffee with cream)  Focus  on water, seltzer water and other non-calorie drinks  Replace regular sugar with non-caloric sweeteners  Avoid skipping meals: plan small regular meals throughout the day in order to keep your hunger controlled  Consider using meal replacements if unable to plan a healthy meal (protein shake, high protein bar)  Replace all white bread with whole wheat/whole grain alternative  Swap regular salad dressings (mayonnaise, butter, or low fat or fat free alternative)  Avoid high fat, high calorie, high carbohydrate snakes (cookies, pastries, cakes)  Snack on fruits, low fat dairy (yogurt, cottage cheese)                Assessment & Plan  1. Type 2 diabetes mellitus.  The condition has been stable for over a year. He does not wear a medical alert bracelet. He reports no symptoms of polyuria, polyphagia, or polydipsia. He also reports no episodes of hypoglycemia characterized by shakiness or sweating, and no instances of syncope or hospitalizations. He reports no diabetic complications such as obesity or neuropathy. His weight has slightly increased. His diet is not healthy, and he does not engage in meal planning. He has not consulted a dietitian. He engages in physical activity,  specifically walking, a few times a week. His last ophthalmological examination was recent, and he is under the care of Dr. Sheets for podiatric health. The progression of the condition has remained stable since its onset. His risk factors for coronary artery disease include diabetes, dyslipidemia, hypertension, family history, male gender, obesity, and a sedentary lifestyle. He is a non-smoker. His current treatment regimen includes metformin, which he takes as prescribed. He demonstrates good compliance with his treatment plan. His weight has slightly increased. His diet is not healthy, and he does not engage in meal planning. He has not consulted a dietitian. He engages in physical activity, specifically walking, a few times a week. His last ophthalmological examination was recent, and he is under the care of Dr. Sheets for podiatric health. He will undergo blood work, including CBC, CMP, and lipid panel.    2. Hypertension.  The condition has been chronic for over a year. His blood pressure readings today are within the normal range at 135/61. His current antihypertensive regimen includes amlodipine and losartan HCTZ, which have effectively controlled his blood pressure. The progression of his hypertension has been well-managed and is currently under control. He reports no chest pain or palpitations but does experience occasional shortness of breath. He does not use any agents associated with hypertension such as amphetamines, anorexics, or thyroid hormones. His risk factors for coronary artery disease include diabetes, dyslipidemia, family history, male gender, obesity, and a sedentary lifestyle. He has no history of kidney disease or heart failure. His past treatment included an ACE inhibitor. His current treatment includes an ARB, a diuretic, and a beta blocker, which have shown improvement. There are no issues with compliance or organ damage.      ICD-10-CM ICD-9-CM   1. Type 2 diabetes mellitus without  complication, without long-term current use of insulin  E11.9 250.00   2. Mixed hyperlipidemia  E78.2 272.2                Follow Up     Return in about 3 months (around 6/10/2025).  Patient was given instructions and counseling regarding his condition or for health maintenance advice. Please see specific information pulled into the AVS if appropriate.       Patient or patient representative verbalized consent for the use of Ambient Listening during the visit with  Lisbet Velez DNP, VENKAT for chart documentation. 3/10/2025  09:33 CDT      Electronically signed by Lisbet Velez DNP, VENKAT, 03/16/25, 2:27 PM CDT.

## 2025-03-10 NOTE — PROGRESS NOTES
[facility] - PODIATRY    Today's Date: 03/20/25    Patient Name: Sandeep Alonso  MRN: [id number]  CSN: [id number]  PCP: Lisbet Velez DNP, APRN  Referring Provider: No ref. provider found    SUBJECTIVE     Chief Complaint   Patient presents with    Follow-up     Lisbet Velez DNP, APRN 03/10/25   3 months (around 3/20/2025) for Diabetic Foot Exam, Schedule Foot Care Clinic, With Liane ROBLEDO.   Pt states he is here today for diabetic foot/nail care. Pt denies pain.     Diabetes     115 mg/dLbg      HPI: Sandeep Alonso, a 69 y.o.male, comes to clinic as a(n) established patient presenting for diabetic foot exam and complaining of toenail/callus issues. Pt has h/o DM2, HTN, Hypokalemia, Edema.  Presents with nails that are long, thick, and discolored. He is unable to reach feet to care for nails himself. Notes mild numbness and tingling in feet. Denies open wounds or sores. Patient is NIDDM with last stated BG level of 115mg/dl. Last A1C of 5.9%.Denies pain. Relates previous treatment(s) including nail debridement by podiatrist . Denies any constitutional symptoms. No other pedal complaints at this time.    Past Medical History:   Diagnosis Date    Hypokalemia     Peripheral edema      Past Surgical History:   Procedure Laterality Date    NO PAST SURGERIES       Family History   Problem Relation Age of Onset    Heart attack Mother     Emphysema Father     No Known Problems Sister     No Known Problems Brother     No Known Problems Son     No Known Problems Maternal Grandmother     No Known Problems Maternal Grandfather     No Known Problems Paternal Grandmother     No Known Problems Paternal Grandfather     No Known Problems Brother     No Known Problems Brother     No Known Problems Son     No Known Problems Son     No Known Problems Son      Social History     Socioeconomic History    Marital status:    Tobacco Use    Smoking status: Never     Passive exposure: Never     Smokeless tobacco: Never   Vaping Use    Vaping status: Never Used   Substance and Sexual Activity    Alcohol use: Not Currently     Alcohol/week: 1.0 standard drink of alcohol     Types: 1 Cans of beer per week     Comment: occasional beer    Drug use: No    Sexual activity: Defer     Allergies   Allergen Reactions    Ace Inhibitors Cough     Current Outpatient Medications   Medication Sig Dispense Refill    amLODIPine (NORVASC) 5 MG tablet Take 1 tablet by mouth once daily 90 tablet 0    cloNIDine (CATAPRES) 0.1 MG tablet TAKE 1 TABLET BY MOUTH AS NEEDED FOR HIGH BLOOD PRESSURE 60 tablet 0    diclofenac (VOLTAREN) 75 MG EC tablet Take 1 tablet by mouth twice daily 60 tablet 0    diclofenac (VOLTAREN) 75 MG EC tablet Take 1 tablet by mouth twice daily 60 tablet 0    furosemide (LASIX) 40 MG tablet Take 1 tablet by mouth twice daily 180 tablet 0    gabapentin (NEURONTIN) 100 MG capsule Take 1 capsule by mouth 2 (Two) Times a Day. 180 capsule 0    losartan-hydrochlorothiazide (HYZAAR) 100-25 MG per tablet Take 1 tablet by mouth Daily. 90 tablet 0    metFORMIN (GLUCOPHAGE) 500 MG tablet Take 1 tablet by mouth 2 (Two) Times a Day With Meals. 180 tablet 0    mupirocin (BACTROBAN) 2 % ointment Apply 1 Application topically to the appropriate area as directed 3 (Three) Times a Day. 1 g 0    Omega 3 1000 MG capsule Take 1 capsule by mouth Every Night. 90 each 1    potassium chloride (KLOR-CON M20) 20 MEQ CR tablet Take 1 tablet by mouth Daily. 90 tablet 1    pravastatin (PRAVACHOL) 20 MG tablet Take 1 tablet by mouth Every Night. 90 tablet 1     No current facility-administered medications for this visit.     Review of Systems   Constitutional:  Negative for chills and fever.   HENT:  Negative for congestion.    Respiratory:  Negative for shortness of breath.    Cardiovascular:  Negative for chest pain and leg swelling.   Gastrointestinal:  Negative for constipation, diarrhea, nausea and vomiting.   Musculoskeletal:   Positive for arthralgias. Negative for gait problem and myalgias.   Skin:  Negative for wound.   Neurological:  Positive for numbness.       OBJECTIVE     Vitals:    25 0958   BP: 120/82   Pulse: 57   SpO2: 98%       Foot/Ankle Exam    GENERAL  Appearance:  obese  Orientation:  AAOx3  Affect:  appropriate  Gait:  unimpaired  Assistance:  independent  Right shoe gear: casual shoe  Left shoe gear: casual shoe    VASCULAR     Right Foot Vascularity   Dorsalis pedis:  2+  Posterior tibial:  2+  Skin temperature:  warm  Edema gradin+ and pitting  CFT:  3  Pedal hair growth:  Present  Varicosities:  mild varicosities     Left Foot Vascularity   Dorsalis pedis:  2+  Posterior tibial:  2+  Skin temperature:  warm  Edema gradin+ and pitting  CFT:  3  Pedal hair growth:  Present  Varicosities:  mild varicosities     NEUROLOGIC     Right Foot Neurologic   Light touch sensation: diminished  Vibratory sensation: diminished  Hot/Cold sensation: diminished  Protective Sensation using [place] Monofilament:   Sites intact: 8  Sites tested: 10     Left Foot Neurologic   Light touch sensation: diminished  Vibratory sensation: diminished  Hot/Cold sensation:  diminished  Protective Sensation using [place] Monofilament:   Sites intact: 8  Sites tested: 10    MUSCULOSKELETAL     Right Foot Musculoskeletal   Ecchymosis:  none  Tenderness:  toenail problem    Arch:  Normal  Hallux valgus: No       Left Foot Musculoskeletal   Ecchymosis:  none  Tenderness:  toenail problem  Arch:  Normal  Hallux valgus: No      MUSCLE STRENGTH     Right Foot Muscle Strength   Foot dorsiflexion:  5  Foot plantar flexion:  5  Foot inversion:  5  Foot eversion:  5     Left Foot Muscle Strength   Foot dorsiflexion:  5  Foot plantar flexion:  5  Foot inversion:  5  Foot eversion:  5    RANGE OF MOTION     Right Foot Range of Motion   Foot and ankle ROM within normal limits       Left Foot Range of Motion   Foot and ankle ROM  within normal limits      DERMATOLOGIC      Right Foot Dermatologic   Skin  Right foot skin is intact.   Nails  1.  Positive for onychomycosis, abnormal thickness and subungual debris.  2.  Positive for onychomycosis, abnormal thickness and subungual debris.  3.  Positive for onychomycosis, abnormal thickness and subungual debris.  4.  Positive for onychomycosis, abnormal thickness and subungual debris.  5.  Positive for onychomycosis, abnormal thickness and subungual debris.     Left Foot Dermatologic   Skin  Left foot skin is intact.   Nails  1.  Positive for onychomycosis, abnormal thickness and subungual debris.  2.  Positive for onychomycosis, abnormal thickness and subungual debris.  3.  Positive for onychomycosis, abnormal thickness and subungual debris.  4.  Positive for onychomycosis, abnormally thick and subungual debris.  5.  Positive for onychomycosis, abnormally thick and subungual debris.      RADIOLOGY/NUCLEAR:  No results found.    LABORATORY/CULTURE RESULTS:      PATHOLOGY RESULTS:       ASSESSMENT/PLAN     Diagnoses and all orders for this visit:    1. Onychomycosis (Primary)    2. Painful diabetic neuropathy    3. Encounter for diabetic foot exam    4. Type 2 diabetes mellitus with diabetic polyneuropathy, without long-term current use of insulin      Comprehensive lower extremity examination and evaluation was performed.  Discussed findings and treatment plan including risks, benefits, and treatment options with patient in detail. Patient agreed with treatment plan.  PCP note reviewed. Hemoglobin A1c reviewed. CMP reviewed. CBC reviewed.   Diabetic foot exam performed.   After verbal consent obtained, nail(s) x10 debrided of length and thickness with nail nipper without incidence  Patient may maintain nails and calluses at home utilizing emery board or pumice stone between visits as needed  Reviewed at home diabetic foot care including daily foot checks   Continue diabetic monitoring and control  under direction of PCP.   An After Visit Summary was printed and given to the patient at discharge, including (if requested) any available informative/educational handouts regarding diagnosis, treatment, or medications. All questions were answered to patient/family satisfaction. Should symptoms fail to improve or worsen they agree to call or return to clinic or to go to the Emergency Department. Discussed the importance of following up with any needed screening tests/labs/specialist appointments and any requested follow-up recommended by me today. Importance of maintaining follow-up discussed and patient accepts that missed appointments can delay diagnosis and potentially lead to worsening of conditions.  Return in about 3 months (around 6/20/2025) for Diabetic foot care clinic, With Liane ROBLEDO., or sooner if acute issues arise.      This document has been electronically signed by VENKAT Bullard on March 20, 2025 10:18 CDT

## 2025-03-11 LAB
ALBUMIN SERPL-MCNC: 3.9 G/DL (ref 3.5–5.2)
ALBUMIN/GLOB SERPL: 1.3 G/DL
ALP SERPL-CCNC: 74 U/L (ref 39–117)
ALT SERPL-CCNC: 14 U/L (ref 1–41)
AST SERPL-CCNC: 18 U/L (ref 1–40)
BILIRUB SERPL-MCNC: 0.4 MG/DL (ref 0–1.2)
BUN SERPL-MCNC: 18 MG/DL (ref 8–23)
BUN/CREAT SERPL: 15 (ref 7–25)
CALCIUM SERPL-MCNC: 9.2 MG/DL (ref 8.6–10.5)
CHLORIDE SERPL-SCNC: 101 MMOL/L (ref 98–107)
CHOLEST SERPL-MCNC: 133 MG/DL (ref 0–200)
CO2 SERPL-SCNC: 31.5 MMOL/L (ref 22–29)
CREAT SERPL-MCNC: 1.2 MG/DL (ref 0.76–1.27)
EGFRCR SERPLBLD CKD-EPI 2021: 65.9 ML/MIN/1.73
ERYTHROCYTE [DISTWIDTH] IN BLOOD BY AUTOMATED COUNT: 13.4 % (ref 12.3–15.4)
GLOBULIN SER CALC-MCNC: 3.1 GM/DL
GLUCOSE SERPL-MCNC: 92 MG/DL (ref 65–99)
HBA1C MFR BLD: 5.9 % (ref 4.8–5.6)
HCT VFR BLD AUTO: 40.6 % (ref 37.5–51)
HDLC SERPL-MCNC: 26 MG/DL (ref 40–60)
HGB BLD-MCNC: 12.8 G/DL (ref 13–17.7)
LDLC SERPL CALC-MCNC: 72 MG/DL (ref 0–100)
MCH RBC QN AUTO: 28.6 PG (ref 26.6–33)
MCHC RBC AUTO-ENTMCNC: 31.5 G/DL (ref 31.5–35.7)
MCV RBC AUTO: 90.6 FL (ref 79–97)
PLATELET # BLD AUTO: 287 10*3/MM3 (ref 140–450)
POTASSIUM SERPL-SCNC: 3.9 MMOL/L (ref 3.5–5.2)
PROT SERPL-MCNC: 7 G/DL (ref 6–8.5)
RBC # BLD AUTO: 4.48 10*6/MM3 (ref 4.14–5.8)
SODIUM SERPL-SCNC: 142 MMOL/L (ref 136–145)
TRIGL SERPL-MCNC: 208 MG/DL (ref 0–150)
VLDLC SERPL CALC-MCNC: 35 MG/DL (ref 5–40)
WBC # BLD AUTO: 8.5 10*3/MM3 (ref 3.4–10.8)

## 2025-03-13 ENCOUNTER — RESULTS FOLLOW-UP (OUTPATIENT)
Dept: FAMILY MEDICINE CLINIC | Facility: CLINIC | Age: 70
End: 2025-03-13
Payer: MEDICARE

## 2025-03-20 ENCOUNTER — OFFICE VISIT (OUTPATIENT)
Age: 70
End: 2025-03-20
Payer: MEDICARE

## 2025-03-20 VITALS
DIASTOLIC BLOOD PRESSURE: 82 MMHG | OXYGEN SATURATION: 98 % | WEIGHT: 244 LBS | HEART RATE: 57 BPM | HEIGHT: 68 IN | SYSTOLIC BLOOD PRESSURE: 120 MMHG | BODY MASS INDEX: 36.98 KG/M2

## 2025-03-20 DIAGNOSIS — B35.1 ONYCHOMYCOSIS: Primary | ICD-10-CM

## 2025-03-20 DIAGNOSIS — E11.42 TYPE 2 DIABETES MELLITUS WITH DIABETIC POLYNEUROPATHY, WITHOUT LONG-TERM CURRENT USE OF INSULIN: ICD-10-CM

## 2025-03-20 DIAGNOSIS — E11.40 PAINFUL DIABETIC NEUROPATHY: ICD-10-CM

## 2025-03-20 DIAGNOSIS — E11.9 ENCOUNTER FOR DIABETIC FOOT EXAM: ICD-10-CM

## 2025-04-03 DIAGNOSIS — M25.551 RIGHT HIP PAIN: ICD-10-CM

## 2025-04-03 RX ORDER — DICLOFENAC SODIUM 75 MG/1
75 TABLET, DELAYED RELEASE ORAL 2 TIMES DAILY
Qty: 60 TABLET | Refills: 0 | Status: SHIPPED | OUTPATIENT
Start: 2025-04-03

## 2025-04-03 NOTE — TELEPHONE ENCOUNTER
Rx Refill Note  Requested Prescriptions     Pending Prescriptions Disp Refills    diclofenac (VOLTAREN) 75 MG EC tablet [Pharmacy Med Name: Diclofenac Sodium 75 MG Oral Tablet Delayed Release] 60 tablet 0     Sig: Take 1 tablet by mouth twice daily      Last office visit with prescribing clinician: 3/10/2025   Next office visit with prescribing clinician: 6/10/2025       Latoya Chinchilla CMA  04/03/25, 12:20 CDT

## 2025-05-02 DIAGNOSIS — M25.551 RIGHT HIP PAIN: ICD-10-CM

## 2025-05-02 DIAGNOSIS — E87.6 HYPOKALEMIA: ICD-10-CM

## 2025-05-02 RX ORDER — POTASSIUM CHLORIDE 1500 MG/1
TABLET, EXTENDED RELEASE ORAL DAILY
Qty: 90 TABLET | Refills: 0 | Status: SHIPPED | OUTPATIENT
Start: 2025-05-02

## 2025-05-02 RX ORDER — DICLOFENAC SODIUM 75 MG/1
75 TABLET, DELAYED RELEASE ORAL 2 TIMES DAILY
Qty: 60 TABLET | Refills: 0 | Status: SHIPPED | OUTPATIENT
Start: 2025-05-02

## 2025-05-02 NOTE — TELEPHONE ENCOUNTER
Rx Refill Note  Requested Prescriptions     Pending Prescriptions Disp Refills    diclofenac (VOLTAREN) 75 MG EC tablet [Pharmacy Med Name: Diclofenac Sodium 75 MG Oral Tablet Delayed Release] 60 tablet 0     Sig: Take 1 tablet by mouth twice daily      Last office visit with prescribing clinician: 12/12/2024       Sarha Singer MA  05/02/25, 08:26 CDT

## 2025-05-21 DIAGNOSIS — I10 ESSENTIAL HYPERTENSION: ICD-10-CM

## 2025-05-21 DIAGNOSIS — E11.43 TYPE 2 DIABETES MELLITUS WITH DIABETIC AUTONOMIC NEUROPATHY, WITHOUT LONG-TERM CURRENT USE OF INSULIN: ICD-10-CM

## 2025-05-21 DIAGNOSIS — R60.0 LOCALIZED EDEMA: ICD-10-CM

## 2025-05-21 RX ORDER — AMLODIPINE BESYLATE 5 MG/1
5 TABLET ORAL DAILY
Qty: 90 TABLET | Refills: 0 | Status: SHIPPED | OUTPATIENT
Start: 2025-05-21

## 2025-05-21 RX ORDER — LOSARTAN POTASSIUM AND HYDROCHLOROTHIAZIDE 25; 100 MG/1; MG/1
1 TABLET ORAL DAILY
Qty: 90 TABLET | Refills: 0 | Status: SHIPPED | OUTPATIENT
Start: 2025-05-21

## 2025-05-21 RX ORDER — FUROSEMIDE 40 MG/1
40 TABLET ORAL 2 TIMES DAILY
Qty: 180 TABLET | Refills: 0 | Status: SHIPPED | OUTPATIENT
Start: 2025-05-21

## 2025-05-21 NOTE — TELEPHONE ENCOUNTER
Rx Refill Note  Requested Prescriptions     Pending Prescriptions Disp Refills    gabapentin (NEURONTIN) 100 MG capsule [Pharmacy Med Name: Gabapentin 100 MG Oral Capsule] 180 capsule 0     Sig: Take 1 capsule by mouth twice daily     Signed Prescriptions Disp Refills    amLODIPine (NORVASC) 5 MG tablet 90 tablet 0     Sig: Take 1 tablet by mouth once daily     Authorizing Provider: JOE COCHRAN     Ordering User: LICO CHANG    metFORMIN (GLUCOPHAGE) 500 MG tablet 180 tablet 0     Sig: TAKE 1 TABLET BY MOUTH TWICE DAILY WITH MEALS     Authorizing Provider: JOE COCHRAN     Ordering User: LICO CHANG    losartan-hydrochlorothiazide (HYZAAR) 100-25 MG per tablet 90 tablet 0     Sig: Take 1 tablet by mouth once daily     Authorizing Provider: JOE COCHRAN     Ordering User: LICO CHANG    furosemide (LASIX) 40 MG tablet 180 tablet 0     Sig: Take 1 tablet by mouth twice daily     Authorizing Provider: JOE COCHRAN     Ordering User: LICO CHANG      Last office visit with prescribing clinician: 3/10/2025   Next office visit with prescribing clinician: 6/10/2025    Last RF: 12/08/2024    CC: 9/9/2024    UDS: 9/9/2024     Lico Chang CMA  05/21/25, 15:52 CDT

## 2025-05-23 RX ORDER — GABAPENTIN 100 MG/1
100 CAPSULE ORAL 2 TIMES DAILY
Qty: 180 CAPSULE | Refills: 0 | Status: SHIPPED | OUTPATIENT
Start: 2025-05-23

## 2025-06-03 DIAGNOSIS — M25.551 RIGHT HIP PAIN: ICD-10-CM

## 2025-06-03 RX ORDER — DICLOFENAC SODIUM 75 MG/1
75 TABLET, DELAYED RELEASE ORAL 2 TIMES DAILY
Qty: 60 TABLET | Refills: 0 | Status: SHIPPED | OUTPATIENT
Start: 2025-06-03

## 2025-06-03 NOTE — TELEPHONE ENCOUNTER
Rx Refill Note  Requested Prescriptions     Pending Prescriptions Disp Refills    diclofenac (VOLTAREN) 75 MG EC tablet [Pharmacy Med Name: Diclofenac Sodium 75 MG Oral Tablet Delayed Release] 60 tablet 0     Sig: Take 1 tablet by mouth twice daily      Last office visit with prescribing clinician: 12/12/2024     Sarah Singer MA  06/03/25, 10:20 CDT

## 2025-06-10 ENCOUNTER — OFFICE VISIT (OUTPATIENT)
Dept: FAMILY MEDICINE CLINIC | Facility: CLINIC | Age: 70
End: 2025-06-10
Payer: MEDICARE

## 2025-06-10 VITALS
DIASTOLIC BLOOD PRESSURE: 74 MMHG | HEIGHT: 67 IN | BODY MASS INDEX: 37.92 KG/M2 | HEART RATE: 51 BPM | OXYGEN SATURATION: 97 % | WEIGHT: 241.6 LBS | SYSTOLIC BLOOD PRESSURE: 132 MMHG | TEMPERATURE: 97.9 F

## 2025-06-10 DIAGNOSIS — E66.01 CLASS 3 SEVERE OBESITY DUE TO EXCESS CALORIES WITH SERIOUS COMORBIDITY AND BODY MASS INDEX (BMI) OF 40.0 TO 44.9 IN ADULT: ICD-10-CM

## 2025-06-10 DIAGNOSIS — I10 ESSENTIAL HYPERTENSION: ICD-10-CM

## 2025-06-10 DIAGNOSIS — E66.813 CLASS 3 SEVERE OBESITY DUE TO EXCESS CALORIES WITH SERIOUS COMORBIDITY AND BODY MASS INDEX (BMI) OF 40.0 TO 44.9 IN ADULT: ICD-10-CM

## 2025-06-10 DIAGNOSIS — E78.2 MIXED HYPERLIPIDEMIA: ICD-10-CM

## 2025-06-10 DIAGNOSIS — E11.65 TYPE 2 DIABETES MELLITUS WITH HYPERGLYCEMIA, WITHOUT LONG-TERM CURRENT USE OF INSULIN: Primary | ICD-10-CM

## 2025-06-10 PROCEDURE — 1126F AMNT PAIN NOTED NONE PRSNT: CPT | Performed by: NURSE PRACTITIONER

## 2025-06-10 PROCEDURE — 1159F MED LIST DOCD IN RCRD: CPT | Performed by: NURSE PRACTITIONER

## 2025-06-10 PROCEDURE — 3044F HG A1C LEVEL LT 7.0%: CPT | Performed by: NURSE PRACTITIONER

## 2025-06-10 PROCEDURE — 1160F RVW MEDS BY RX/DR IN RCRD: CPT | Performed by: NURSE PRACTITIONER

## 2025-06-10 PROCEDURE — 99214 OFFICE O/P EST MOD 30 MIN: CPT | Performed by: NURSE PRACTITIONER

## 2025-06-10 PROCEDURE — 3075F SYST BP GE 130 - 139MM HG: CPT | Performed by: NURSE PRACTITIONER

## 2025-06-10 PROCEDURE — 3078F DIAST BP <80 MM HG: CPT | Performed by: NURSE PRACTITIONER

## 2025-06-10 NOTE — PROGRESS NOTES
Chief Complaint  Diabetes (3 month follow up ) and Hypertension    Subjective        Sandeep Alonso presents to [facility]  History of Present Illness  History of Present Illness  The patient is a 70-year-old male who presents for a follow-up of diabetes, mixed hyperlipidemia, hypertension, and higher body weight.    He reports a blood glucose level of 131 this morning, with no instances of hypoglycemia or associated symptoms such as shakiness. He has been managing his diabetes for over a year, maintaining stability without experiencing chest pain, excessive urination, hunger, or thirst. He has not had any hypoglycemic episodes leading to confusion, dizziness, or hospitalization. His only diabetic complication is higher body weight. His current treatment regimen includes metformin, and he does not require insulin. He monitors his blood glucose levels daily. His weight has remained stable, and he follows a generally healthy diet. However, he does not engage in meal planning or consult with a dietitian. He reports a lack of physical exercise. His eye examinations are up-to-date, conducted at [facility].    He has been dealing with hypertension for over a year. His blood pressure is elevated today at 151/70. He reports no associated chest pain or palpitations. He also reports no ankle swelling. He does not use any substances associated with hypertension, such as amphetamines or anorexia. His coronary artery disease risk factors include diabetes, dyslipidemia, family history, male gender, higher body weight, and a sedentary lifestyle. He has no end-organ damage and adheres to his medication regimen without any compliance issues. His current treatment includes losartan, HCTZ, clonidine, and amlodipine as prescribed. His past treatments have included lisinopril.    He has been managing hyperlipidemia for over a year. His last lipid panel, conducted in 03/2025, showed a total  "cholesterol level of 133, LDL of 72, triglycerides of 208, and HDL of 26. His current treatment for hyperlipidemia includes omega-3 fatty acids and pravastatin.      The following portions of the patient's history were reviewed and updated as appropriate: allergies, current medications, past family history, past medical history, past social history, past surgical history and problem list.    Objective   Vital Signs:  /74 (BP Location: Right arm, Patient Position: Sitting, Cuff Size: Large Adult)   Pulse 51   Temp 97.9 °F (36.6 °C) (Temporal)   Ht 170.2 cm (67\")   Wt 110 kg (241 lb 9.6 oz)   SpO2 97%   BMI 37.84 kg/m²   Estimated body mass index is 37.84 kg/m² as calculated from the following:    Height as of this encounter: 170.2 cm (67\").    Weight as of this encounter: 110 kg (241 lb 9.6 oz).       Class 2 Severe Obesity (BMI >=35 and <=39.9). Obesity-related health conditions include the following: hypertension, diabetes mellitus, dyslipidemias, and GERD. Obesity is improving with lifestyle modifications. BMI is is above average; BMI management plan is completed. We discussed portion control and increasing exercise.    Physical Exam  Vitals and nursing note reviewed.   Constitutional:       General: He is awake.      Appearance: Normal appearance. He is well-developed and well-groomed.   HENT:      Head: Normocephalic and atraumatic.      Right Ear: Hearing, tympanic membrane, ear canal and external ear normal.      Left Ear: Hearing, tympanic membrane, ear canal and external ear normal.      Nose: Nose normal.      Mouth/Throat:      Lips: Pink.      Pharynx: Oropharynx is clear.   Eyes:      General: Lids are normal.      Conjunctiva/sclera: Conjunctivae normal.   Cardiovascular:      Rate and Rhythm: Normal rate and regular rhythm.      Heart sounds: Normal heart sounds.   Pulmonary:      Effort: Pulmonary effort is normal.      Breath sounds: Normal breath sounds and air entry.   Musculoskeletal: "      Cervical back: Full passive range of motion without pain.      Right lower leg: No edema.      Left lower leg: No edema.   Lymphadenopathy:      Head:      Right side of head: No submental, submandibular or tonsillar adenopathy.      Left side of head: No submental, submandibular or tonsillar adenopathy.   Skin:     General: Skin is warm and dry.   Neurological:      Mental Status: He is alert.      Sensory: Sensation is intact.      Motor: Motor function is intact.      Coordination: Coordination is intact.      Gait: Gait is intact.   Psychiatric:         Attention and Perception: Attention and perception normal.         Mood and Affect: Mood and affect normal.         Speech: Speech normal.         Behavior: Behavior normal. Behavior is cooperative.         Thought Content: Thought content normal.         Cognition and Memory: Cognition and memory normal.         Judgment: Judgment normal.        Physical Exam  Extremities: No edema in the ankles    Result Review :          Results  Labs   - Blood Sugar: 131   - Cholesterol: 03/2025, 133   - LDL: 03/2025, 72   - Triglycerides: 03/2025, 208   - HDL: 03/2025, 26   - Hemoglobin: 03/2025, 12.8           Assessment and Plan     Diagnoses and all orders for this visit:    1. Type 2 diabetes mellitus with hyperglycemia, without long-term current use of insulin (Primary)    2. Essential hypertension    3. Mixed hyperlipidemia    4. Class 3 severe obesity due to excess calories with serious comorbidity and body mass index (BMI) of 40.0 to 44.9 in adult      Assessment & Plan  1. Diabetes Mellitus.  - Blood sugar was 131 this morning. No low blood sugars, shakiness, chest pain, polyuria, polyphagia, or polydipsia.  - No hypoglycemic events with confusion or dizziness requiring hospitalization. Obesity noted as a diabetic complication.  -Continue metformen as prescribed  - CAD risk factors include diabetes, dyslipidemia, family history, hypertension, male sex, and  sedentary lifestyle.  - Currently on metformin, does not take insulin. Monitors blood sugars daily. Weight trend stable. Generally healthy diet, no meal planning or dietitian visits. Does not exercise regularly. Eye exam up to date at [facility].    2. Hypertension.  - Hypertension for more than a year. Blood pressure elevated today at 151/70.  - Current treatment includes losartan, HCTZ, clonidine, and amlodipine as prescribed. Blood pressure uncontrolled but will be rechecked manually.  - No associated chest pain or palpitations. No edema in the ankles.  - CAD risk factors include diabetes, dyslipidemia, family history, male gender, obesity, and sedentary lifestyle. Past treatments included lisinopril. No end-organ damage or compliance problems.  - Continue amlodipine as prescribed  - Continue clonidine as prescribed  - Continue losartan-hctz    3. Hyperlipidemia.  - Hyperlipidemia for more than a year. Last lipids in 03/2025: cholesterol 133, LDL 72, triglycerides 208, HDL 26.  - Current treatment includes omega-3 fatty acids and pravastatin.  - CMP in 03/2025 showed normal results except hemoglobin at 12.8.  - No problems noted with current treatment.  - continue omega 3 fatty acids  - continue pravastatin as prescribed      ICD-10-CM ICD-9-CM   1. Type 2 diabetes mellitus with hyperglycemia, without long-term current use of insulin  E11.65 250.00     790.29   2. Essential hypertension  I10 401.9   3. Mixed hyperlipidemia  E78.2 272.2   4. Class 3 severe obesity due to excess calories with serious comorbidity and body mass index (BMI) of 40.0 to 44.9 in adult  E66.813 278.01    E66.01 V85.41    Z68.41                 Follow Up     Return in about 6 months (around 12/10/2025) for Recheck.  Patient was given instructions and counseling regarding his condition or for health maintenance advice. Please see specific information pulled into the AVS if appropriate.       Patient or patient representative  verbalized consent for the use of Ambient Listening during the visit with  Lisbet Velez DNP, VENKAT for chart documentation. 6/10/2025  09:55 CDT    Electronically signed by Lisbet Velez DNP, APRN, 06/10/25, 9:55 AM CDT.

## 2025-06-18 DIAGNOSIS — E78.2 MIXED HYPERLIPIDEMIA: ICD-10-CM

## 2025-06-18 RX ORDER — CLONIDINE HYDROCHLORIDE 0.1 MG/1
0.1 TABLET ORAL AS NEEDED
Qty: 60 TABLET | Refills: 0 | Status: SHIPPED | OUTPATIENT
Start: 2025-06-18

## 2025-06-18 NOTE — TELEPHONE ENCOUNTER
Rx Refill Note  Requested Prescriptions     Pending Prescriptions Disp Refills    cloNIDine (CATAPRES) 0.1 MG tablet [Pharmacy Med Name: cloNIDine HCl 0.1 MG Oral Tablet] 60 tablet 0     Sig: TAKE 1 TABLET BY MOUTH AS NEEDED FOR HIGH BLOOD PRESSURE      Last office visit with prescribing clinician: 6/10/2025   Next office visit with prescribing clinician: 10/17/2025     Triny Leija MA  06/18/25, 11:15 CDT

## 2025-06-18 NOTE — PROGRESS NOTES
[facility] - PODIATRY    Today's Date: 06/19/25    Patient Name: Sandeep Alonso  MRN: [id number]  CSN: [id number]  PCP: Lisbet Velez DNP, APRN  Referring Provider: No ref. provider found    SUBJECTIVE     Chief Complaint   Patient presents with    Follow-up     Lisbet Velez DNP, VENKAT-06/10/2025-3 month diabetic nail/foot care  Pt here for nail/foot care. Pt reports no pain or issues.    Diabetes     130 mg/dl BG     HPI: Sandeep Alonso, a 70 y.o.male, comes to clinic as a(n) established patient presenting for diabetic foot exam and complaining of toenail/callus issues. Pt has h/o DM2, HTN, Hypokalemia, Edema.  Presents with nails that are long, thick, and discolored. He is unable to reach feet to care for nails himself. Notes mild numbness and tingling in feet. Denies open wounds or sores. Patient is NIDDM with last stated BG level of 130mg/dl. Last A1C of 5.9%.Denies pain. Relates previous treatment(s) including nail debridement by podiatrist. Denies any constitutional symptoms. No other pedal complaints at this time.    Past Medical History:   Diagnosis Date    Hypokalemia     Peripheral edema      Past Surgical History:   Procedure Laterality Date    NO PAST SURGERIES       Family History   Problem Relation Age of Onset    Heart attack Mother     Emphysema Father     No Known Problems Sister     No Known Problems Brother     No Known Problems Son     No Known Problems Maternal Grandmother     No Known Problems Maternal Grandfather     No Known Problems Paternal Grandmother     No Known Problems Paternal Grandfather     No Known Problems Brother     No Known Problems Brother     No Known Problems Son     No Known Problems Son     No Known Problems Son      Social History     Socioeconomic History    Marital status:    Tobacco Use    Smoking status: Never     Passive exposure: Never    Smokeless tobacco: Never   Vaping Use    Vaping status: Never Used   Substance and Sexual  Activity    Alcohol use: Not Currently     Alcohol/week: 1.0 standard drink of alcohol     Types: 1 Cans of beer per week     Comment: occasional beer    Drug use: No    Sexual activity: Defer     Allergies   Allergen Reactions    Ace Inhibitors Cough     Current Outpatient Medications   Medication Sig Dispense Refill    amLODIPine (NORVASC) 5 MG tablet Take 1 tablet by mouth once daily 90 tablet 0    cloNIDine (CATAPRES) 0.1 MG tablet TAKE 1 TABLET BY MOUTH AS NEEDED FOR HIGH BLOOD PRESSURE 60 tablet 0    diclofenac (VOLTAREN) 75 MG EC tablet Take 1 tablet by mouth twice daily 60 tablet 0    furosemide (LASIX) 40 MG tablet Take 1 tablet by mouth twice daily 180 tablet 0    gabapentin (NEURONTIN) 100 MG capsule Take 1 capsule by mouth twice daily 180 capsule 0    losartan-hydrochlorothiazide (HYZAAR) 100-25 MG per tablet Take 1 tablet by mouth once daily 90 tablet 0    metFORMIN (GLUCOPHAGE) 500 MG tablet TAKE 1 TABLET BY MOUTH TWICE DAILY WITH MEALS 180 tablet 0    Omega 3 1000 MG capsule Take 1 capsule by mouth Every Night. 90 each 1    potassium chloride (KLOR-CON M20) 20 MEQ CR tablet Take 1 tablet by mouth once daily 90 tablet 0    pravastatin (PRAVACHOL) 20 MG tablet Take 1 tablet by mouth Every Night. 90 tablet 1     No current facility-administered medications for this visit.     Review of Systems   Constitutional:  Negative for chills and fever.   HENT:  Negative for congestion.    Respiratory:  Negative for shortness of breath.    Cardiovascular:  Negative for chest pain and leg swelling.   Gastrointestinal:  Negative for constipation, diarrhea, nausea and vomiting.   Musculoskeletal:  Positive for arthralgias. Negative for gait problem and myalgias.   Skin:  Negative for wound.   Neurological:  Positive for numbness.       OBJECTIVE     Vitals:    06/19/25 1045   BP: 126/74   Pulse: 55   SpO2: 95%         Foot/Ankle Exam    GENERAL  Appearance:  obese  Orientation:  AAOx3  Affect:  appropriate  Gait:   unimpaired  Assistance:  independent  Right shoe gear: casual shoe  Left shoe gear: casual shoe    VASCULAR     Right Foot Vascularity   Dorsalis pedis:  2+  Posterior tibial:  2+  Skin temperature:  warm  Edema gradin+ and pitting  CFT:  3  Pedal hair growth:  Present  Varicosities:  mild varicosities     Left Foot Vascularity   Dorsalis pedis:  2+  Posterior tibial:  2+  Skin temperature:  warm  Edema gradin+ and pitting  CFT:  3  Pedal hair growth:  Present  Varicosities:  mild varicosities     NEUROLOGIC     Right Foot Neurologic   Light touch sensation: diminished  Vibratory sensation: diminished  Hot/Cold sensation: diminished  Protective Sensation using [place] Monofilament:   Sites intact: 8  Sites tested: 10     Left Foot Neurologic   Light touch sensation: diminished  Vibratory sensation: diminished  Hot/Cold sensation:  diminished  Protective Sensation using [place] Monofilament:   Sites intact: 8  Sites tested: 10    MUSCULOSKELETAL     Right Foot Musculoskeletal   Ecchymosis:  none  Tenderness:  toenail problem    Arch:  Normal  Hallux valgus: No       Left Foot Musculoskeletal   Ecchymosis:  none  Tenderness:  toenail problem  Arch:  Normal  Hallux valgus: No      MUSCLE STRENGTH     Right Foot Muscle Strength   Foot dorsiflexion:  5  Foot plantar flexion:  5  Foot inversion:  5  Foot eversion:  5     Left Foot Muscle Strength   Foot dorsiflexion:  5  Foot plantar flexion:  5  Foot inversion:  5  Foot eversion:  5    RANGE OF MOTION     Right Foot Range of Motion   Foot and ankle ROM within normal limits       Left Foot Range of Motion   Foot and ankle ROM within normal limits      DERMATOLOGIC      Right Foot Dermatologic   Skin  Right foot skin is intact.   Nails  1.  Positive for onychomycosis, abnormal thickness and subungual debris.  2.  Positive for onychomycosis, abnormal thickness and subungual debris.  3.  Positive for onychomycosis, abnormal thickness and subungual  debris.  4.  Positive for onychomycosis, abnormal thickness and subungual debris.  5.  Positive for onychomycosis, abnormal thickness and subungual debris.     Left Foot Dermatologic   Skin  Left foot skin is intact.   Nails  1.  Positive for onychomycosis, abnormal thickness and subungual debris.  2.  Positive for onychomycosis, abnormal thickness and subungual debris.  3.  Positive for onychomycosis, abnormal thickness and subungual debris.  4.  Positive for onychomycosis, abnormally thick and subungual debris.  5.  Positive for onychomycosis, abnormally thick and subungual debris.      RADIOLOGY/NUCLEAR:  No results found.    LABORATORY/CULTURE RESULTS:      PATHOLOGY RESULTS:       ASSESSMENT/PLAN     Diagnoses and all orders for this visit:    1. Onychomycosis (Primary)    2. Painful diabetic neuropathy    3. Type 2 diabetes mellitus with diabetic polyneuropathy, without long-term current use of insulin    4. Foot pain, bilateral    Comprehensive lower extremity examination and evaluation was performed.  Discussed findings and treatment plan including risks, benefits, and treatment options with patient in detail. Patient agreed with treatment plan.  After verbal consent obtained, nail(s) x10 debrided of length and thickness with nail nipper without incidence  Patient may maintain nails and calluses at home utilizing emery board or pumice stone between visits as needed  Reviewed at home diabetic foot care including daily foot checks   Continue diabetic monitoring and control under direction of PCP.   Due to diabetic polyneuropathy, it is recommended that patient return for routine foot and nail care.   Encouraged patient to call with any questions or concerns before next appointment.   An After Visit Summary was printed and given to the patient at discharge, including (if requested) any available informative/educational handouts regarding diagnosis, treatment, or medications. All questions were answered to  patient/family satisfaction. Should symptoms fail to improve or worsen they agree to call or return to clinic or to go to the Emergency Department. Discussed the importance of following up with any needed screening tests/labs/specialist appointments and any requested follow-up recommended by me today. Importance of maintaining follow-up discussed and patient accepts that missed appointments can delay diagnosis and potentially lead to worsening of conditions.  Return in about 3 months (around 9/19/2025) for Diabetic foot care clinic, With Liane ROBLEDO., or sooner if acute issues arise.      This document has been electronically signed by VENKAT Bullard on June 19, 2025 13:41 CDT

## 2025-06-19 ENCOUNTER — OFFICE VISIT (OUTPATIENT)
Age: 70
End: 2025-06-19
Payer: MEDICARE

## 2025-06-19 VITALS
OXYGEN SATURATION: 95 % | SYSTOLIC BLOOD PRESSURE: 126 MMHG | DIASTOLIC BLOOD PRESSURE: 74 MMHG | WEIGHT: 241 LBS | HEIGHT: 67 IN | BODY MASS INDEX: 37.83 KG/M2 | HEART RATE: 55 BPM

## 2025-06-19 DIAGNOSIS — M79.671 FOOT PAIN, BILATERAL: ICD-10-CM

## 2025-06-19 DIAGNOSIS — E11.40 PAINFUL DIABETIC NEUROPATHY: ICD-10-CM

## 2025-06-19 DIAGNOSIS — M79.672 FOOT PAIN, BILATERAL: ICD-10-CM

## 2025-06-19 DIAGNOSIS — E11.42 TYPE 2 DIABETES MELLITUS WITH DIABETIC POLYNEUROPATHY, WITHOUT LONG-TERM CURRENT USE OF INSULIN: ICD-10-CM

## 2025-06-19 DIAGNOSIS — B35.1 ONYCHOMYCOSIS: Primary | ICD-10-CM

## 2025-07-03 DIAGNOSIS — M25.551 RIGHT HIP PAIN: ICD-10-CM

## 2025-07-08 NOTE — TELEPHONE ENCOUNTER
Caller: Sandeep Alonso    Relationship: Self    Best call back number: [phone]     Requested Prescriptions:   Requested Prescriptions     Pending Prescriptions Disp Refills    diclofenac (VOLTAREN) 75 MG EC tablet [Pharmacy Med Name: Diclofenac Sodium 75 MG Oral Tablet Delayed Release] 60 tablet 0     Sig: Take 1 tablet by mouth twice daily        Pharmacy where request should be sent: [facility] [address] [place] [address] [phone] [facility] [phone] FX     Last office visit with prescribing clinician: 6/10/2025   Last telemedicine visit with prescribing clinician: Visit date not found   Next office visit with prescribing clinician: 10/17/2025     Additional details provided by patient:     Does the patient have less than a 3 day supply:  [x] Yes  [] No      Chadwick Arroyo Rep   07/08/25 09:25 CDT

## 2025-07-09 RX ORDER — DICLOFENAC SODIUM 75 MG/1
75 TABLET, DELAYED RELEASE ORAL 2 TIMES DAILY
Qty: 60 TABLET | Refills: 0 | Status: SHIPPED | OUTPATIENT
Start: 2025-07-09

## 2025-07-09 NOTE — TELEPHONE ENCOUNTER
Rx Refill Note  Requested Prescriptions     Pending Prescriptions Disp Refills    diclofenac (VOLTAREN) 75 MG EC tablet [Pharmacy Med Name: Diclofenac Sodium 75 MG Oral Tablet Delayed Release] 60 tablet 0     Sig: Take 1 tablet by mouth twice daily      Last office visit with prescribing clinician: 6/10/2025   Next office visit with prescribing clinician: 10/17/2025     Triny Leija MA  07/09/25, 10:27 CDT

## 2025-08-01 ENCOUNTER — TELEPHONE (OUTPATIENT)
Dept: FAMILY MEDICINE CLINIC | Facility: CLINIC | Age: 70
End: 2025-08-01
Payer: MEDICARE

## 2025-08-01 DIAGNOSIS — E87.6 HYPOKALEMIA: ICD-10-CM

## 2025-08-01 RX ORDER — POTASSIUM CHLORIDE 1500 MG/1
20 TABLET, EXTENDED RELEASE ORAL DAILY
Qty: 90 TABLET | Refills: 0 | Status: SHIPPED | OUTPATIENT
Start: 2025-08-01

## 2025-08-01 NOTE — TELEPHONE ENCOUNTER
Caller: Sandeep Alonso    Relationship: Self    Best call back number: [phone]     Requested Prescriptions:   Requested Prescriptions     Pending Prescriptions Disp Refills    potassium chloride (KLOR-CON M20) 20 MEQ CR tablet 90 tablet 0     Sig: Take  by mouth Daily.        Pharmacy where request should be sent: [facility] [address] [place] [address] [phone] [facility] [phone]      Last office visit with prescribing clinician: 6/10/2025   Last telemedicine visit with prescribing clinician: Visit date not found   Next office visit with prescribing clinician: 10/17/2025     Additional details provided by patient:     Does the patient have less than a 3 day supply:  [] Yes  [x] No    Would you like a call back once the refill request has been completed: [] Yes [x] No    If the office needs to give you a call back, can they leave a voicemail: [] Yes [x] No    Raji Ocampo III, RegSched Rep   08/01/25 10:24 CDT

## 2025-08-01 NOTE — TELEPHONE ENCOUNTER
Rx Refill Note  Requested Prescriptions     Pending Prescriptions Disp Refills    potassium chloride (KLOR-CON M20) 20 MEQ CR tablet 90 tablet 0     Sig: Take 1 tablet by mouth Daily.      Last office visit with prescribing clinician: 6/10/2025   Last telemedicine visit with prescribing clinician: Visit date not found   Next office visit with prescribing clinician: 10/17/2025                         Would you like a call back once the refill request has been completed: [] Yes [] No    If the office needs to give you a call back, can they leave a voicemail: [] Yes [] No    Sarah Singer MA  08/01/25, 11:13 CDT

## 2025-08-05 DIAGNOSIS — E78.2 MIXED HYPERLIPIDEMIA: ICD-10-CM

## 2025-08-05 DIAGNOSIS — M25.551 RIGHT HIP PAIN: ICD-10-CM

## 2025-08-05 RX ORDER — CLONIDINE HYDROCHLORIDE 0.1 MG/1
0.1 TABLET ORAL AS NEEDED
Qty: 90 TABLET | Refills: 0 | Status: SHIPPED | OUTPATIENT
Start: 2025-08-05

## 2025-08-05 RX ORDER — DICLOFENAC SODIUM 75 MG/1
75 TABLET, DELAYED RELEASE ORAL 2 TIMES DAILY
Qty: 60 TABLET | Refills: 1 | Status: SHIPPED | OUTPATIENT
Start: 2025-08-05

## 2025-08-07 DIAGNOSIS — M25.551 RIGHT HIP PAIN: ICD-10-CM

## 2025-08-07 DIAGNOSIS — E78.2 MIXED HYPERLIPIDEMIA: ICD-10-CM

## 2025-08-07 RX ORDER — CLONIDINE HYDROCHLORIDE 0.1 MG/1
0.1 TABLET ORAL AS NEEDED
Qty: 90 TABLET | Refills: 0 | OUTPATIENT
Start: 2025-08-07

## 2025-08-07 RX ORDER — DICLOFENAC SODIUM 75 MG/1
75 TABLET, DELAYED RELEASE ORAL 2 TIMES DAILY
Qty: 60 TABLET | Refills: 1 | OUTPATIENT
Start: 2025-08-07

## 2025-08-15 DIAGNOSIS — R60.0 LOCALIZED EDEMA: ICD-10-CM

## 2025-08-15 DIAGNOSIS — I10 ESSENTIAL HYPERTENSION: ICD-10-CM

## 2025-08-15 DIAGNOSIS — E11.43 TYPE 2 DIABETES MELLITUS WITH DIABETIC AUTONOMIC NEUROPATHY, WITHOUT LONG-TERM CURRENT USE OF INSULIN: ICD-10-CM

## 2025-08-15 RX ORDER — AMLODIPINE BESYLATE 5 MG/1
5 TABLET ORAL DAILY
Qty: 90 TABLET | Refills: 0 | Status: SHIPPED | OUTPATIENT
Start: 2025-08-15

## 2025-08-15 RX ORDER — GABAPENTIN 100 MG/1
100 CAPSULE ORAL 2 TIMES DAILY
Qty: 180 CAPSULE | Refills: 0 | Status: SHIPPED | OUTPATIENT
Start: 2025-08-15

## 2025-08-15 RX ORDER — LOSARTAN POTASSIUM AND HYDROCHLOROTHIAZIDE 25; 100 MG/1; MG/1
1 TABLET ORAL DAILY
Qty: 90 TABLET | Refills: 0 | Status: SHIPPED | OUTPATIENT
Start: 2025-08-15

## 2025-08-15 RX ORDER — FUROSEMIDE 40 MG/1
40 TABLET ORAL 2 TIMES DAILY
Qty: 180 TABLET | Refills: 0 | Status: SHIPPED | OUTPATIENT
Start: 2025-08-15

## 2025-08-20 DIAGNOSIS — E11.43 TYPE 2 DIABETES MELLITUS WITH DIABETIC AUTONOMIC NEUROPATHY, WITHOUT LONG-TERM CURRENT USE OF INSULIN: ICD-10-CM

## 2025-08-20 DIAGNOSIS — R60.0 LOCALIZED EDEMA: ICD-10-CM

## 2025-08-20 DIAGNOSIS — E78.2 MIXED HYPERLIPIDEMIA: ICD-10-CM

## 2025-08-20 DIAGNOSIS — I10 ESSENTIAL HYPERTENSION: ICD-10-CM

## 2025-08-20 RX ORDER — PRAVASTATIN SODIUM 20 MG
20 TABLET ORAL NIGHTLY
Qty: 90 TABLET | Refills: 1 | Status: SHIPPED | OUTPATIENT
Start: 2025-08-20

## 2025-08-20 RX ORDER — AMLODIPINE BESYLATE 5 MG/1
5 TABLET ORAL DAILY
Qty: 90 TABLET | Refills: 0 | OUTPATIENT
Start: 2025-08-20

## 2025-08-20 RX ORDER — FUROSEMIDE 40 MG/1
40 TABLET ORAL 2 TIMES DAILY
Qty: 180 TABLET | Refills: 0 | OUTPATIENT
Start: 2025-08-20